# Patient Record
Sex: FEMALE | Race: BLACK OR AFRICAN AMERICAN | NOT HISPANIC OR LATINO | Employment: OTHER | URBAN - METROPOLITAN AREA
[De-identification: names, ages, dates, MRNs, and addresses within clinical notes are randomized per-mention and may not be internally consistent; named-entity substitution may affect disease eponyms.]

---

## 2018-11-12 ENCOUNTER — PATIENT OUTREACH (OUTPATIENT)
Dept: ADMINISTRATIVE | Facility: HOSPITAL | Age: 63
End: 2018-11-12

## 2018-11-15 LAB
LEFT EYE DM RETINOPATHY: NEGATIVE
RIGHT EYE DM RETINOPATHY: NEGATIVE

## 2018-12-03 ENCOUNTER — OFFICE VISIT (OUTPATIENT)
Dept: FAMILY MEDICINE | Facility: CLINIC | Age: 63
End: 2018-12-03
Payer: MEDICARE

## 2018-12-03 ENCOUNTER — LAB VISIT (OUTPATIENT)
Dept: LAB | Facility: HOSPITAL | Age: 63
End: 2018-12-03
Attending: INTERNAL MEDICINE
Payer: MEDICARE

## 2018-12-03 VITALS
RESPIRATION RATE: 18 BRPM | OXYGEN SATURATION: 99 % | BODY MASS INDEX: 35.81 KG/M2 | HEIGHT: 65 IN | SYSTOLIC BLOOD PRESSURE: 146 MMHG | DIASTOLIC BLOOD PRESSURE: 94 MMHG | HEART RATE: 56 BPM | WEIGHT: 214.94 LBS | TEMPERATURE: 98 F

## 2018-12-03 DIAGNOSIS — Z12.31 ENCOUNTER FOR SCREENING MAMMOGRAM FOR BREAST CANCER: ICD-10-CM

## 2018-12-03 DIAGNOSIS — Z11.59 NEED FOR HEPATITIS C SCREENING TEST: ICD-10-CM

## 2018-12-03 DIAGNOSIS — Z12.11 ENCOUNTER FOR SCREENING COLONOSCOPY: ICD-10-CM

## 2018-12-03 DIAGNOSIS — E11.69 HYPERLIPIDEMIA ASSOCIATED WITH TYPE 2 DIABETES MELLITUS: ICD-10-CM

## 2018-12-03 DIAGNOSIS — E11.65 TYPE 2 DIABETES MELLITUS WITH HYPERGLYCEMIA, WITHOUT LONG-TERM CURRENT USE OF INSULIN: ICD-10-CM

## 2018-12-03 DIAGNOSIS — E66.01 SEVERE OBESITY (BMI 35.0-35.9 WITH COMORBIDITY): ICD-10-CM

## 2018-12-03 DIAGNOSIS — M94.9 DISORDER OF CARTILAGE: ICD-10-CM

## 2018-12-03 DIAGNOSIS — E78.5 HYPERLIPIDEMIA ASSOCIATED WITH TYPE 2 DIABETES MELLITUS: ICD-10-CM

## 2018-12-03 DIAGNOSIS — I10 HYPERTENSION, ESSENTIAL, BENIGN: Primary | ICD-10-CM

## 2018-12-03 DIAGNOSIS — I10 HYPERTENSION, ESSENTIAL, BENIGN: ICD-10-CM

## 2018-12-03 DIAGNOSIS — I69.30 HISTORY OF STROKE WITH CURRENT RESIDUAL EFFECTS: ICD-10-CM

## 2018-12-03 LAB
ALBUMIN SERPL BCP-MCNC: 4.4 G/DL
ALP SERPL-CCNC: 77 U/L
ALT SERPL W/O P-5'-P-CCNC: 17 U/L
ANION GAP SERPL CALC-SCNC: 11 MMOL/L
AST SERPL-CCNC: 17 U/L
BASOPHILS # BLD AUTO: 0.04 K/UL
BASOPHILS NFR BLD: 0.7 %
BILIRUB SERPL-MCNC: 0.3 MG/DL
BUN SERPL-MCNC: 12 MG/DL
CALCIUM SERPL-MCNC: 9.9 MG/DL
CHLORIDE SERPL-SCNC: 101 MMOL/L
CHOLEST SERPL-MCNC: 121 MG/DL
CHOLEST/HDLC SERPL: 2.8 {RATIO}
CO2 SERPL-SCNC: 27 MMOL/L
CREAT SERPL-MCNC: 0.9 MG/DL
DIFFERENTIAL METHOD: ABNORMAL
EOSINOPHIL # BLD AUTO: 0.3 K/UL
EOSINOPHIL NFR BLD: 5.1 %
ERYTHROCYTE [DISTWIDTH] IN BLOOD BY AUTOMATED COUNT: 16.2 %
EST. GFR  (AFRICAN AMERICAN): >60 ML/MIN/1.73 M^2
EST. GFR  (NON AFRICAN AMERICAN): >60 ML/MIN/1.73 M^2
ESTIMATED AVG GLUCOSE: 148 MG/DL
GLUCOSE SERPL-MCNC: 147 MG/DL
HBA1C MFR BLD HPLC: 6.8 %
HCT VFR BLD AUTO: 36.7 %
HDLC SERPL-MCNC: 43 MG/DL
HDLC SERPL: 35.5 %
HGB BLD-MCNC: 11.2 G/DL
LDLC SERPL CALC-MCNC: 55.8 MG/DL
LYMPHOCYTES # BLD AUTO: 2.2 K/UL
LYMPHOCYTES NFR BLD: 37.5 %
MCH RBC QN AUTO: 24.2 PG
MCHC RBC AUTO-ENTMCNC: 30.5 G/DL
MCV RBC AUTO: 79 FL
MONOCYTES # BLD AUTO: 0.5 K/UL
MONOCYTES NFR BLD: 9.2 %
NEUTROPHILS # BLD AUTO: 2.8 K/UL
NEUTROPHILS NFR BLD: 47.5 %
NONHDLC SERPL-MCNC: 78 MG/DL
PLATELET # BLD AUTO: 610 K/UL
PMV BLD AUTO: 10.6 FL
POTASSIUM SERPL-SCNC: 3.5 MMOL/L
PROT SERPL-MCNC: 8.4 G/DL
RBC # BLD AUTO: 4.63 M/UL
SODIUM SERPL-SCNC: 139 MMOL/L
TRIGL SERPL-MCNC: 111 MG/DL
TSH SERPL DL<=0.005 MIU/L-ACNC: 1.85 UIU/ML
WBC # BLD AUTO: 5.9 K/UL

## 2018-12-03 PROCEDURE — 36415 COLL VENOUS BLD VENIPUNCTURE: CPT | Mod: PO

## 2018-12-03 PROCEDURE — 85025 COMPLETE CBC W/AUTO DIFF WBC: CPT

## 2018-12-03 PROCEDURE — 86803 HEPATITIS C AB TEST: CPT

## 2018-12-03 PROCEDURE — 99204 OFFICE O/P NEW MOD 45 MIN: CPT | Mod: S$PBB,,, | Performed by: INTERNAL MEDICINE

## 2018-12-03 PROCEDURE — 99214 OFFICE O/P EST MOD 30 MIN: CPT | Mod: PBBFAC,PO | Performed by: INTERNAL MEDICINE

## 2018-12-03 PROCEDURE — 80053 COMPREHEN METABOLIC PANEL: CPT

## 2018-12-03 PROCEDURE — 83036 HEMOGLOBIN GLYCOSYLATED A1C: CPT

## 2018-12-03 PROCEDURE — 82043 UR ALBUMIN QUANTITATIVE: CPT

## 2018-12-03 PROCEDURE — 99999 PR PBB SHADOW E&M-EST. PATIENT-LVL IV: CPT | Mod: PBBFAC,,, | Performed by: INTERNAL MEDICINE

## 2018-12-03 PROCEDURE — 84443 ASSAY THYROID STIM HORMONE: CPT

## 2018-12-03 PROCEDURE — 80061 LIPID PANEL: CPT

## 2018-12-03 RX ORDER — GLIPIZIDE 5 MG/1
TABLET ORAL
Qty: 90 TABLET | Refills: 1 | Status: SHIPPED | OUTPATIENT
Start: 2018-12-03 | End: 2019-02-26 | Stop reason: SDUPTHER

## 2018-12-03 RX ORDER — METFORMIN HYDROCHLORIDE 1000 MG/1
1000 TABLET ORAL 2 TIMES DAILY
Qty: 180 TABLET | Refills: 1 | Status: SHIPPED | OUTPATIENT
Start: 2018-12-03 | End: 2019-02-26 | Stop reason: SDUPTHER

## 2018-12-03 RX ORDER — ATENOLOL 25 MG/1
25 TABLET ORAL DAILY
Qty: 30 TABLET | Refills: 1 | Status: SHIPPED | OUTPATIENT
Start: 2018-12-03 | End: 2019-01-28 | Stop reason: SDUPTHER

## 2018-12-03 RX ORDER — UBIDECARENONE 75 MG
500 CAPSULE ORAL DAILY
COMMUNITY
End: 2023-08-11

## 2018-12-03 RX ORDER — VALSARTAN AND HYDROCHLOROTHIAZIDE 320; 25 MG/1; MG/1
TABLET, FILM COATED ORAL
Refills: 2 | COMMUNITY
Start: 2018-10-22 | End: 2018-12-03 | Stop reason: SDUPTHER

## 2018-12-03 RX ORDER — SIMVASTATIN 20 MG/1
20 TABLET, FILM COATED ORAL DAILY
Refills: 2 | COMMUNITY
Start: 2018-10-22 | End: 2018-12-03 | Stop reason: SDUPTHER

## 2018-12-03 RX ORDER — GLIPIZIDE 5 MG/1
TABLET ORAL
Refills: 2 | COMMUNITY
Start: 2018-10-22 | End: 2018-12-03 | Stop reason: SDUPTHER

## 2018-12-03 RX ORDER — PNEUMOCOCCAL 13-VALENT CONJUGATE VACCINE 2.2; 2.2; 2.2; 2.2; 2.2; 4.4; 2.2; 2.2; 2.2; 2.2; 2.2; 2.2; 2.2 UG/.5ML; UG/.5ML; UG/.5ML; UG/.5ML; UG/.5ML; UG/.5ML; UG/.5ML; UG/.5ML; UG/.5ML; UG/.5ML; UG/.5ML; UG/.5ML; UG/.5ML
INJECTION, SUSPENSION INTRAMUSCULAR
Refills: 0 | COMMUNITY
Start: 2018-10-15 | End: 2020-07-14 | Stop reason: ALTCHOICE

## 2018-12-03 RX ORDER — ASPIRIN 81 MG/1
81 TABLET ORAL DAILY
COMMUNITY

## 2018-12-03 RX ORDER — AMLODIPINE BESYLATE 10 MG/1
10 TABLET ORAL DAILY
Refills: 2 | COMMUNITY
Start: 2018-10-22 | End: 2018-12-03 | Stop reason: SDUPTHER

## 2018-12-03 RX ORDER — SIMVASTATIN 20 MG/1
20 TABLET, FILM COATED ORAL DAILY
Qty: 90 TABLET | Refills: 1 | Status: SHIPPED | OUTPATIENT
Start: 2018-12-03 | End: 2019-02-26 | Stop reason: SDUPTHER

## 2018-12-03 RX ORDER — AMLODIPINE BESYLATE 10 MG/1
10 TABLET ORAL DAILY
Qty: 90 TABLET | Refills: 1 | Status: SHIPPED | OUTPATIENT
Start: 2018-12-03 | End: 2019-02-26 | Stop reason: SDUPTHER

## 2018-12-03 RX ORDER — METFORMIN HYDROCHLORIDE 1000 MG/1
1000 TABLET ORAL 2 TIMES DAILY
Refills: 2 | COMMUNITY
Start: 2018-10-22 | End: 2018-12-03 | Stop reason: SDUPTHER

## 2018-12-03 RX ORDER — VALSARTAN AND HYDROCHLOROTHIAZIDE 320; 25 MG/1; MG/1
TABLET, FILM COATED ORAL
Qty: 90 TABLET | Refills: 1 | Status: SHIPPED | OUTPATIENT
Start: 2018-12-03 | End: 2019-02-26 | Stop reason: SDUPTHER

## 2018-12-03 NOTE — PROGRESS NOTES
SUBJECTIVE     Chief Complaint   Patient presents with    Establish Care    Medication Refill       HPI  Harriett Oglesby is a 63 y.o. female with multiple medical diagnoses as listed in the medical history and problem list that presents for establishment of care with [facility] as below. Pt presents for med refills. She is fully compliant with meds and denies any adverse side effects. Her fasting blood sugar levels range from . She does not check her BP at home. Pt is mostly compliant with a low Na and ADA diet. She walks for exercise 3 times weekly in her neighborhood.     PAST MEDICAL HISTORY:  Past Medical History:   Diagnosis Date    Hyperlipidemia associated with type 2 diabetes mellitus     Hypertension     Type 2 diabetes mellitus        PAST SURGICAL HISTORY:  Past Surgical History:   Procedure Laterality Date    HEMORRHOID SURGERY         SOCIAL HISTORY:  Social History     Socioeconomic History    Marital status: Single     Spouse name: Not on file    Number of children: Not on file    Years of education: Not on file    Highest education level: Not on file   Social Needs    Financial resource strain: Not on file    Food insecurity - worry: Not on file    Food insecurity - inability: Not on file    Transportation needs - medical: Not on file    Transportation needs - non-medical: Not on file   Occupational History    Not on file   Tobacco Use    Smoking status: Former Smoker     Types: Cigarettes    Smokeless tobacco: Never Used   Substance and Sexual Activity    Alcohol use: No     Frequency: Never    Drug use: No    Sexual activity: Not on file   Other Topics Concern    Not on file   Social History Narrative    Not on file       FAMILY HISTORY:  Family History   Problem Relation Age of Onset    Pancreatic cancer Mother     Emphysema Father     Cancer Father        ALLERGIES AND MEDICATIONS: updated and reviewed.  Review of patient's allergies indicates:  No Known  Allergies  Current Outpatient Medications   Medication Sig Dispense Refill    amLODIPine (NORVASC) 10 MG tablet Take 1 tablet (10 mg total) by mouth once daily. 90 tablet 1    aspirin (ECOTRIN) 81 MG EC tablet Take 81 mg by mouth once daily.      atenolol (TENORMIN) 25 MG tablet Take 1 tablet (25 mg total) by mouth once daily. 30 tablet 1    cyanocobalamin (VITAMIN B-12) 500 MCG tablet Take 500 mcg by mouth once daily.      glipiZIDE (GLUCOTROL) 5 MG tablet TAKE ONE TABLET BY MOUTH ONCE DAILY FOR DIABETES. 90 tablet 1    metFORMIN (GLUCOPHAGE) 1000 MG tablet Take 1 tablet (1,000 mg total) by mouth 2 (two) times daily. for diabetes. 180 tablet 1    simvastatin (ZOCOR) 20 MG tablet Take 1 tablet (20 mg total) by mouth once daily. 90 tablet 1    valsartan-hydrochlorothiazide (DIOVAN-HCT) 320-25 mg per tablet TAKE ONE TABLET BY MOUTH ONCE DAILY FOR BLOOD PRESSURE AND FOR FLUID. 90 tablet 1    blood sugar diagnostic Strp 1 strip by Misc.(Non-Drug; Combo Route) route once daily. 200 each 3    FLUZONE HIGH-DOSE 2018-19, PF, 180 mcg/0.5 mL vaccine ADM 0.5ML IM UTD  0    PREVNAR 13, PF, 0.5 mL Syrg ADM 0.5ML IM UTD  0     No current facility-administered medications for this visit.        ROS  Review of Systems   Constitutional: Negative for chills and fever.   HENT: Negative for hearing loss and sore throat.    Eyes: Negative for visual disturbance.   Respiratory: Negative for cough and shortness of breath.    Cardiovascular: Negative for chest pain, palpitations and leg swelling.   Gastrointestinal: Negative for abdominal pain, constipation, diarrhea, nausea and vomiting.   Genitourinary: Negative for dysuria, frequency and urgency.   Musculoskeletal: Negative for arthralgias, joint swelling and myalgias.   Skin: Negative for rash and wound.   Neurological: Negative for headaches.   Psychiatric/Behavioral: Negative for agitation and confusion. The patient is not nervous/anxious.          OBJECTIVE     Physical  "Exam  Vitals:    12/03/18 0812   BP: (!) 146/94   Pulse: (!) 56   Resp: 18   Temp: 97.8 °F (36.6 °C)    Body mass index is 35.77 kg/m².  Weight: 97.5 kg (214 lb 15.2 oz)   Height: 5' 5" (165.1 cm)     Physical Exam   Constitutional: She is oriented to person, place, and time. She appears well-developed and well-nourished. No distress.   HENT:   Head: Normocephalic and atraumatic.   Right Ear: External ear normal.   Left Ear: External ear normal.   Nose: Nose normal.   Mouth/Throat: Oropharynx is clear and moist.   Eyes: Conjunctivae and EOM are normal. Right eye exhibits no discharge. Left eye exhibits no discharge. No scleral icterus.   Neck: Normal range of motion. Neck supple. No JVD present. No tracheal deviation present.   Cardiovascular: Normal rate, regular rhythm and intact distal pulses. Exam reveals no gallop and no friction rub.   No murmur heard.  Pulmonary/Chest: Effort normal and breath sounds normal. No respiratory distress. She has no wheezes.   Abdominal: Soft. Bowel sounds are normal. She exhibits no distension and no mass. There is no tenderness. There is no rebound and no guarding.   Musculoskeletal: Normal range of motion. She exhibits no edema, tenderness or deformity.   LUE weakness   Neurological: She is alert and oriented to person, place, and time. She exhibits normal muscle tone. Coordination normal.   Skin: Skin is warm and dry. No rash noted. No erythema.   Psychiatric: She has a normal mood and affect. Her behavior is normal. Judgment and thought content normal.         Health Maintenance       Date Due Completion Date    Hepatitis C Screening 1955 ---    Lipid Panel 1955 ---    TETANUS VACCINE 02/02/1971 ---    DEXA SCAN 02/02/1993 ---    Colonoscopy 02/02/2003 ---    Zoster Vaccine 02/02/2013 ---    Mammogram 10/02/2016 10/2/2014    Pneumococcal Vaccine (65+ Low/Medium Risk) (1 of 2 - PCV13) 02/02/2018 ---    Influenza Vaccine 08/01/2018 ---            ASSESSMENT     63 " y.o. female with     1. Hypertension, essential, benign    2. Type 2 diabetes mellitus with hyperglycemia, without long-term current use of insulin    3. Hyperlipidemia associated with type 2 diabetes mellitus    4. Severe obesity (BMI 35.0-35.9 with comorbidity)    5. History of stroke with current residual effects    6. Need for hepatitis C screening test    7. Encounter for screening mammogram for breast cancer    8. Encounter for screening colonoscopy    9. Disorder of cartilage         PLAN:     1. Hypertension, essential, benign  - BP elevated above goal of <140/90  - Will add Atenolol to current regimen  - Comprehensive metabolic panel; Future  - CBC auto differential; Future  - TSH; Future  - atenolol (TENORMIN) 25 MG tablet; Take 1 tablet (25 mg total) by mouth once daily.  Dispense: 30 tablet; Refill: 1  - valsartan-hydrochlorothiazide (DIOVAN-HCT) 320-25 mg per tablet; TAKE ONE TABLET BY MOUTH ONCE DAILY FOR BLOOD PRESSURE AND FOR FLUID.  Dispense: 90 tablet; Refill: 1  - amLODIPine (NORVASC) 10 MG tablet; Take 1 tablet (10 mg total) by mouth once daily.  Dispense: 90 tablet; Refill: 1  - Monitor    2. Type 2 diabetes mellitus with hyperglycemia, without long-term current use of insulin  - Check labs and urine as below; continue current regimen  - Comprehensive metabolic panel; Future  - CBC auto differential; Future  - Hemoglobin A1c; Future  - Microalbumin/creatinine urine ratio  - DXA Bone Density Spine And Hip; Future  - glipiZIDE (GLUCOTROL) 5 MG tablet; TAKE ONE TABLET BY MOUTH ONCE DAILY FOR DIABETES.  Dispense: 90 tablet; Refill: 1  - metFORMIN (GLUCOPHAGE) 1000 MG tablet; Take 1 tablet (1,000 mg total) by mouth 2 (two) times daily. for diabetes.  Dispense: 180 tablet; Refill: 1  - blood sugar diagnostic Strp; 1 strip by Misc.(Non-Drug; Combo Route) route once daily.  Dispense: 200 each; Refill: 3    3. Hyperlipidemia associated with type 2 diabetes mellitus  - Lipid check today; continue current  meds  - Lipid panel; Future  - simvastatin (ZOCOR) 20 MG tablet; Take 1 tablet (20 mg total) by mouth once daily.  Dispense: 90 tablet; Refill: 1    4. Severe obesity (BMI 35.0-35.9 with comorbidity)  - Pt aware of importance of eating a prudent diet and exercising    5. History of stroke with current residual effects  - Pt needs to obtain good BP control, statin, and ASA    6. Need for hepatitis C screening test  - Hepatitis C antibody; Future    7. Encounter for screening mammogram for breast cancer  - Mammo Digital Screening Bilat w/ Suman; Future    8. Encounter for screening colonoscopy  - Case request GI: COLONOSCOPY    9. Disorder of cartilage   - DXA Bone Density Spine And Hip; Future        RTC in 4 weeks for nurse visit BP check     Zeinab Noble MD  12/03/2018 8:29 AM        No Follow-up on file.

## 2018-12-04 PROBLEM — E11.21 MACROALBUMINURIC DIABETIC NEPHROPATHY: Status: ACTIVE | Noted: 2018-12-04

## 2018-12-04 LAB
ALBUMIN/CREAT UR: 48.5 UG/MG
CREAT UR-MCNC: 130 MG/DL
HCV AB SERPL QL IA: NEGATIVE
MICROALBUMIN UR DL<=1MG/L-MCNC: 63 UG/ML

## 2018-12-05 PROBLEM — D75.839 THROMBOCYTOSIS: Status: ACTIVE | Noted: 2018-12-05

## 2018-12-05 PROBLEM — D50.9 MICROCYTIC ANEMIA: Status: ACTIVE | Noted: 2018-12-05

## 2018-12-28 DIAGNOSIS — I10 HYPERTENSION, ESSENTIAL, BENIGN: ICD-10-CM

## 2018-12-28 RX ORDER — ATENOLOL 25 MG/1
TABLET ORAL
Qty: 30 TABLET | Refills: 1 | OUTPATIENT
Start: 2018-12-28

## 2019-01-03 ENCOUNTER — CLINICAL SUPPORT (OUTPATIENT)
Dept: FAMILY MEDICINE | Facility: CLINIC | Age: 64
End: 2019-01-03
Payer: MEDICARE

## 2019-01-03 VITALS — DIASTOLIC BLOOD PRESSURE: 78 MMHG | SYSTOLIC BLOOD PRESSURE: 138 MMHG | HEART RATE: 73 BPM

## 2019-01-03 DIAGNOSIS — I10 HYPERTENSION, ESSENTIAL, BENIGN: Primary | ICD-10-CM

## 2019-01-03 PROCEDURE — 99499 UNLISTED E&M SERVICE: CPT | Mod: S$PBB,,, | Performed by: INTERNAL MEDICINE

## 2019-01-03 PROCEDURE — 99211 OFF/OP EST MAY X REQ PHY/QHP: CPT | Mod: PBBFAC,PO

## 2019-01-03 PROCEDURE — 99499 NO LOS: ICD-10-PCS | Mod: S$PBB,,, | Performed by: INTERNAL MEDICINE

## 2019-01-03 PROCEDURE — 99999 PR PBB SHADOW E&M-EST. PATIENT-LVL I: CPT | Mod: PBBFAC,,,

## 2019-01-03 PROCEDURE — 99999 PR PBB SHADOW E&M-EST. PATIENT-LVL I: ICD-10-PCS | Mod: PBBFAC,,,

## 2019-01-04 ENCOUNTER — HOSPITAL ENCOUNTER (OUTPATIENT)
Facility: HOSPITAL | Age: 64
Discharge: HOME | End: 2019-01-04
Attending: INTERNAL MEDICINE | Admitting: INTERNAL MEDICINE
Payer: MEDICARE

## 2019-01-04 ENCOUNTER — ANESTHESIA EVENT (OUTPATIENT)
Dept: ENDOSCOPY | Facility: HOSPITAL | Age: 64
End: 2019-01-04
Payer: MEDICARE

## 2019-01-04 ENCOUNTER — ANESTHESIA (OUTPATIENT)
Dept: ENDOSCOPY | Facility: HOSPITAL | Age: 64
End: 2019-01-04
Payer: MEDICARE

## 2019-01-04 VITALS
OXYGEN SATURATION: 100 % | TEMPERATURE: 98 F | HEART RATE: 81 BPM | HEIGHT: 67 IN | RESPIRATION RATE: 18 BRPM | WEIGHT: 212 LBS | DIASTOLIC BLOOD PRESSURE: 61 MMHG | BODY MASS INDEX: 33.27 KG/M2 | SYSTOLIC BLOOD PRESSURE: 137 MMHG

## 2019-01-04 DIAGNOSIS — Z12.11 SCREEN FOR COLON CANCER: ICD-10-CM

## 2019-01-04 LAB — POCT GLUCOSE: 161 MG/DL (ref 70–110)

## 2019-01-04 PROCEDURE — 37000008 HC ANESTHESIA 1ST 15 MINUTES: Performed by: INTERNAL MEDICINE

## 2019-01-04 PROCEDURE — D9220A PRA ANESTHESIA: Mod: PT,CRNA,, | Performed by: NURSE ANESTHETIST, CERTIFIED REGISTERED

## 2019-01-04 PROCEDURE — 25000003 PHARM REV CODE 250: Performed by: ANESTHESIOLOGY

## 2019-01-04 PROCEDURE — 88305 TISSUE EXAM BY PATHOLOGIST: CPT | Performed by: PATHOLOGY

## 2019-01-04 PROCEDURE — 27201012 HC FORCEPS, HOT/COLD, DISP: Performed by: INTERNAL MEDICINE

## 2019-01-04 PROCEDURE — D9220A PRA ANESTHESIA: ICD-10-PCS | Mod: PT,CRNA,, | Performed by: NURSE ANESTHETIST, CERTIFIED REGISTERED

## 2019-01-04 PROCEDURE — D9220A PRA ANESTHESIA: ICD-10-PCS | Mod: PT,ANES,, | Performed by: ANESTHESIOLOGY

## 2019-01-04 PROCEDURE — 82962 GLUCOSE BLOOD TEST: CPT | Performed by: INTERNAL MEDICINE

## 2019-01-04 PROCEDURE — 37000009 HC ANESTHESIA EA ADD 15 MINS: Performed by: INTERNAL MEDICINE

## 2019-01-04 PROCEDURE — 88305 TISSUE EXAM BY PATHOLOGIST: CPT | Mod: 26,,, | Performed by: PATHOLOGY

## 2019-01-04 PROCEDURE — 45380 COLONOSCOPY AND BIOPSY: CPT | Performed by: INTERNAL MEDICINE

## 2019-01-04 PROCEDURE — 88305 TISSUE SPECIMEN TO PATHOLOGY - SURGERY: ICD-10-PCS | Mod: 26,,, | Performed by: PATHOLOGY

## 2019-01-04 PROCEDURE — D9220A PRA ANESTHESIA: Mod: PT,ANES,, | Performed by: ANESTHESIOLOGY

## 2019-01-04 PROCEDURE — 63600175 PHARM REV CODE 636 W HCPCS: Performed by: NURSE ANESTHETIST, CERTIFIED REGISTERED

## 2019-01-04 RX ORDER — PROPOFOL 10 MG/ML
VIAL (ML) INTRAVENOUS
Status: DISCONTINUED | OUTPATIENT
Start: 2019-01-04 | End: 2019-01-04

## 2019-01-04 RX ORDER — PROPOFOL 10 MG/ML
VIAL (ML) INTRAVENOUS
Status: DISCONTINUED
Start: 2019-01-04 | End: 2019-01-04 | Stop reason: HOSPADM

## 2019-01-04 RX ORDER — SODIUM CHLORIDE 0.9 % (FLUSH) 0.9 %
3 SYRINGE (ML) INJECTION
Status: DISCONTINUED | OUTPATIENT
Start: 2019-01-04 | End: 2019-01-04 | Stop reason: HOSPADM

## 2019-01-04 RX ORDER — SODIUM CHLORIDE 9 MG/ML
INJECTION, SOLUTION INTRAVENOUS CONTINUOUS
Status: DISCONTINUED | OUTPATIENT
Start: 2019-01-04 | End: 2019-01-04 | Stop reason: HOSPADM

## 2019-01-04 RX ORDER — LIDOCAINE HCL/PF 100 MG/5ML
SYRINGE (ML) INTRAVENOUS
Status: DISCONTINUED | OUTPATIENT
Start: 2019-01-04 | End: 2019-01-04

## 2019-01-04 RX ORDER — LIDOCAINE HYDROCHLORIDE 20 MG/ML
INJECTION, SOLUTION EPIDURAL; INFILTRATION; INTRACAUDAL; PERINEURAL
Status: DISCONTINUED
Start: 2019-01-04 | End: 2019-01-04 | Stop reason: HOSPADM

## 2019-01-04 RX ADMIN — PROPOFOL 20 MG: 10 INJECTION, EMULSION INTRAVENOUS at 10:01

## 2019-01-04 RX ADMIN — PROPOFOL 50 MG: 10 INJECTION, EMULSION INTRAVENOUS at 10:01

## 2019-01-04 RX ADMIN — PROPOFOL 150 MG: 10 INJECTION, EMULSION INTRAVENOUS at 10:01

## 2019-01-04 RX ADMIN — PROPOFOL 30 MG: 10 INJECTION, EMULSION INTRAVENOUS at 10:01

## 2019-01-04 RX ADMIN — SODIUM CHLORIDE: 0.9 INJECTION, SOLUTION INTRAVENOUS at 09:01

## 2019-01-04 RX ADMIN — LIDOCAINE HYDROCHLORIDE 100 MG: 20 INJECTION, SOLUTION INTRAVENOUS at 10:01

## 2019-01-04 NOTE — H&P
"Chief Complaint:  "I need a colonoscopy."    HPI:  The patient is a 63 year old woman presenting for a screening colonoscopy.  She believes she had a colonoscopy years ago, but she does not know if she had polyps.  The patient denies any abdominal pain, weight loss, nausea, emesis, diarrhea, constipation, melena, or hematochezia.  The patient also denies a family history of colon cancer.    Past Medical History:   Diagnosis Date    Hyperlipidemia associated with type 2 diabetes mellitus     Hypertension     Type 2 diabetes mellitus      Past Surgical History:   Procedure Laterality Date    HEMORRHOID SURGERY       Family History   Problem Relation Age of Onset    Pancreatic cancer Mother     Emphysema Father     Cancer Father      Social History     Socioeconomic History    Marital status: Single     Spouse name: Not on file    Number of children: Not on file    Years of education: Not on file    Highest education level: Not on file   Social Needs    Financial resource strain: Not on file    Food insecurity - worry: Not on file    Food insecurity - inability: Not on file    Transportation needs - medical: Not on file    Transportation needs - non-medical: Not on file   Occupational History    Not on file   Tobacco Use    Smoking status: Former Smoker     Types: Cigarettes    Smokeless tobacco: Never Used   Substance and Sexual Activity    Alcohol use: No     Frequency: Never    Drug use: No    Sexual activity: Not on file   Other Topics Concern    Not on file   Social History Narrative    Not on file           Review of patient's allergies indicates:  No Known Allergies    ROS:  No chest pain or dyspnea.  No dysuria.  No heartburn or dysphagia.  Otherwise as stated above.  Ten other systems negative.    Vitals:    01/04/19 0921   BP: 114/72   BP Location: Left arm   Patient Position: Lying   Pulse: 94   Resp: 18   Temp: 98 °F (36.7 °C)   TempSrc: Oral   SpO2: 97%   Weight: 96.2 kg (212 lb) " "  Height: 5' 7" (1.702 m)     P.E.:  GEN: A x O x 3, NAD  SKIN: No jaundice  HEENT: EOMI, PERRL, anicteric sclera  CV: RRR, no M/R/G  Chest: CTA B  Abdomen: soft, NTND, normoactive BS  Ext: No C/C/E.  2+ dorsalis pedis pulses B  Neuro: No asterixes or tremors.  CN II-XII intact  Musculoskeletal: 5/5 strength bilaterally    Labs:  Lab Results   Component Value Date    WBC 5.90 12/03/2018    HGB 11.2 (L) 12/03/2018    HCT 36.7 (L) 12/03/2018    MCV 79 (L) 12/03/2018     (H) 12/03/2018     CMP  Sodium   Date Value Ref Range Status   12/03/2018 139 136 - 145 mmol/L Final     Potassium   Date Value Ref Range Status   12/03/2018 3.5 3.5 - 5.1 mmol/L Final     Chloride   Date Value Ref Range Status   12/03/2018 101 95 - 110 mmol/L Final     CO2   Date Value Ref Range Status   12/03/2018 27 23 - 29 mmol/L Final     Glucose   Date Value Ref Range Status   12/03/2018 147 (H) 70 - 110 mg/dL Final     BUN, Bld   Date Value Ref Range Status   12/03/2018 12 8 - 23 mg/dL Final     Creatinine   Date Value Ref Range Status   12/03/2018 0.9 0.5 - 1.4 mg/dL Final     Calcium   Date Value Ref Range Status   12/03/2018 9.9 8.7 - 10.5 mg/dL Final     Total Protein   Date Value Ref Range Status   12/03/2018 8.4 6.0 - 8.4 g/dL Final     Albumin   Date Value Ref Range Status   12/03/2018 4.4 3.5 - 5.2 g/dL Final     Total Bilirubin   Date Value Ref Range Status   12/03/2018 0.3 0.1 - 1.0 mg/dL Final     Comment:     For infants and newborns, interpretation of results should be based  on gestational age, weight and in agreement with clinical  observations.  Premature Infant recommended reference ranges:  Up to 24 hours.............<8.0 mg/dL  Up to 48 hours............<12.0 mg/dL  3-5 days..................<15.0 mg/dL  6-29 days.................<15.0 mg/dL       Alkaline Phosphatase   Date Value Ref Range Status   12/03/2018 77 55 - 135 U/L Final     AST   Date Value Ref Range Status   12/03/2018 17 10 - 40 U/L Final     ALT   Date " Value Ref Range Status   12/03/2018 17 10 - 44 U/L Final     Anion Gap   Date Value Ref Range Status   12/03/2018 11 8 - 16 mmol/L Final     eGFR if    Date Value Ref Range Status   12/03/2018 >60 >60 mL/min/1.73 m^2 Final     eGFR if non    Date Value Ref Range Status   12/03/2018 >60 >60 mL/min/1.73 m^2 Final     Comment:     Calculation used to obtain the estimated glomerular filtration  rate (eGFR) is the CKD-EPI equation.          No results for input(s): PT, INR, APTT in the last 24 hours.    A/P:  The patient is a 63 year old woman presenting for a colonoscopy.  1.  Colonoscopy - she can undergo a colonoscopy.  I have explained the risks, benefits, and alternatives of the procedure in detail.  The patient voices understanding and all questions have been answered.  The patient agrees to proceed as planned.

## 2019-01-04 NOTE — ANESTHESIA PREPROCEDURE EVALUATION
01/04/2019  Harriett Oglesby is a 63 y.o., female.    Anesthesia Evaluation         Review of Systems  Anesthesia Hx:  No problems with previous Anesthesia   Social:  Non-Smoker    Hematology/Oncology:  Hematology Normal   Oncology Normal     EENT/Dental:EENT/Dental Normal   Cardiovascular:   Hypertension    Pulmonary:  Pulmonary Normal    Renal/:   Chronic Renal Disease    Hepatic/GI:  Hepatic/GI Normal    Musculoskeletal:  Musculoskeletal Normal    Neurological:   CVA (left upper ext paresis)    Endocrine:   Diabetes    Dermatological:  Skin Normal    Psych:  Psychiatric Normal           Physical Exam  General:  Well nourished    Airway/Jaw/Neck:  Airway Findings: Mallampati: II TM Distance: 4 - 6 cm      Dental:  DENTAL FINDINGS: Normal   Chest/Lungs:  Chest/Lungs Clear    Heart/Vascular:  Heart Findings: Normal       Mental Status:  Mental Status Findings:  Cooperative, Alert and Oriented         Anesthesia Plan  Type of Anesthesia, risks & benefits discussed:  Anesthesia Type:  general  Patient's Preference:   Intra-op Monitoring Plan: standard ASA monitors  Intra-op Monitoring Plan Comments:   Post Op Pain Control Plan: multimodal analgesia, IV/PO Opioids PRN and per primary service following discharge from PACU  Post Op Pain Control Plan Comments:   Induction:    Beta Blocker:  Patient is on a Beta-Blocker and has received one dose within the past 24 hours (No further documentation required).       Informed Consent: Patient understands risks and agrees with Anesthesia plan.  Questions answered. Anesthesia consent signed with patient.  ASA Score: 3     Day of Surgery Review of History & Physical:    H&P update referred to the provider.  H&P completed by Anesthesiologist.   Anesthesia Plan Notes: npo        Ready For Surgery From Anesthesia Perspective.

## 2019-01-04 NOTE — TRANSFER OF CARE
"Anesthesia Transfer of Care Note    Patient: Harriett Oglesby    Procedure(s) Performed: Procedure(s) (LRB):  COLONOSCOPY (N/A)    Patient location: GI    Anesthesia Type: general    Transport from OR: Transported from OR on room air with adequate spontaneous ventilation    Post pain: adequate analgesia    Post assessment: no apparent anesthetic complications and tolerated procedure well    Post vital signs: stable    Level of consciousness: awake and responds to stimulation    Nausea/Vomiting: no nausea/vomiting    Complications: none    Transfer of care protocol was followed      Last vitals:   Visit Vitals  BP (!) 100/53 (BP Location: Left arm, Patient Position: Lying)   Pulse 93   Temp 36.4 °C (97.5 °F) (Oral)   Resp 14   Ht 5' 7" (1.702 m)   Wt 96.2 kg (212 lb)   SpO2 99%   Breastfeeding? No   BMI 33.20 kg/m²     "

## 2019-01-04 NOTE — ANESTHESIA POSTPROCEDURE EVALUATION
"Anesthesia Post Evaluation    Patient: Harriett Oglesby    Procedure(s) Performed: Procedure(s) (LRB):  COLONOSCOPY (N/A)    Final Anesthesia Type: general  Patient location during evaluation: PACU  Patient participation: Yes- Able to Participate  Level of consciousness: awake and alert, oriented and awake  Post-procedure vital signs: reviewed and stable  Pain management: adequate  Airway patency: patent  PONV status at discharge: No PONV  Anesthetic complications: no      Cardiovascular status: blood pressure returned to baseline, hemodynamically stable and stable  Respiratory status: unassisted and spontaneous ventilation  Hydration status: euvolemic  Follow-up not needed.        Visit Vitals  /61 (BP Location: Left arm, Patient Position: Lying)   Pulse 81   Temp 36.7 °C (98 °F) (Oral)   Resp 18   Ht 5' 7" (1.702 m)   Wt 96.2 kg (212 lb)   SpO2 100%   Breastfeeding? No   BMI 33.20 kg/m²       Pain/Lori Score: Lori Score: 10 (1/4/2019 10:49 AM)        "

## 2019-01-04 NOTE — DISCHARGE SUMMARY
[facility]  Discharge Summary      Admit Date: 1/4/2019    Discharge Date and Time:  01/04/2019 10:30 AM    Attending Physician: James Ozuna MD     Reason for Admission: Screening colonoscopy    Procedures Performed: Procedure(s) (LRB):  COLONOSCOPY (N/A)    Hospital Course (synopsis of major diagnoses, care, treatment, and services provided during the course of the hospital stay): Outpatient colonoscopy     Consults: none    Significant Diagnostic Studies: Screening colonoscopy    Final Diagnoses:    Principal Problem: <principal problem not specified>   Secondary Diagnoses: Screening colonoscopy    Discharged Condition: good    Disposition: Home or Self Care    Follow Up/Patient Instructions: Follow-up with referring physician             Resume previous diet and activity.    Medications:  Reconciled Home Medications:      Medication List      ASK your doctor about these medications    amLODIPine 10 MG tablet  Commonly known as:  NORVASC  Take 1 tablet (10 mg total) by mouth once daily.     aspirin 81 MG EC tablet  Commonly known as:  ECOTRIN  Take 81 mg by mouth once daily.     atenolol 25 MG tablet  Commonly known as:  TENORMIN  Take 1 tablet (25 mg total) by mouth once daily.     blood sugar diagnostic Strp  1 strip by Misc.(Non-Drug; Combo Route) route once daily.     FLUZONE HIGH-DOSE 2018-19 (PF) 180 mcg/0.5 mL vaccine  Generic drug:  influenza  ADM 0.5ML IM UTD     glipiZIDE 5 MG tablet  Commonly known as:  GLUCOTROL  TAKE ONE TABLET BY MOUTH ONCE DAILY FOR DIABETES.     metFORMIN 1000 MG tablet  Commonly known as:  GLUCOPHAGE  Take 1 tablet (1,000 mg total) by mouth 2 (two) times daily. for diabetes.     polyethylene glycol 240-22.72-6.72 -5.84 gram Solr  Commonly known as:  COLYTE  One gallon; split prep.     PREVNAR 13 (PF) 0.5 mL Syrg  Generic drug:  pneumoc 13-sheila conj-dip cr(PF)  ADM 0.5ML IM UTD     simvastatin 20 MG tablet  Commonly known as:  ZOCOR  Take 1 tablet (20 mg  total) by mouth once daily.     valsartan-hydrochlorothiazide 320-25 mg per tablet  Commonly known as:  DIOVAN-HCT  TAKE ONE TABLET BY MOUTH ONCE DAILY FOR BLOOD PRESSURE AND FOR FLUID.     VITAMIN B-12 500 MCG tablet  Generic drug:  cyanocobalamin  Take 500 mcg by mouth once daily.          No discharge procedures on file.

## 2019-01-04 NOTE — [employer] PATIENT INSTRUCTIONS
Discharge Summary/Instructions after an Endoscopic Procedure  Patient Name: Harriett Oglesby  Patient MRN: [id number]  Patient YOB: 1955 Friday, January 04, 2019  James Ozuna MD  RESTRICTIONS:  During your procedure today, you received medications for sedation.  These   medications may affect your judgment, balance and coordination.  Therefore,   for 24 hours, you have the following restrictions:   - DO NOT drive a car, operate machinery, make legal/financial decisions,   sign important papers or drink alcohol.    ACTIVITY:  Today: no heavy lifting, straining or running due to procedural   sedation/anesthesia.  The following day: return to full activity including work.  DIET:  Eat and drink normally unless instructed otherwise.     TREATMENT FOR COMMON SIDE EFFECTS:  - Mild abdominal pain, nausea, belching, bloating or excessive gas:  rest,   eat lightly and use a heating pad.  - Sore Throat: treat with throat lozenges and/or gargle with warm salt   water.  - Because air was used during the procedure, expelling large amounts of air   from your rectum or belching is normal.  - If a bowel prep was taken, you may not have a bowel movement for 1-3 days.    This is normal.  SYMPTOMS TO WATCH FOR AND REPORT TO YOUR PHYSICIAN:  1. Abdominal pain or bloating, other than gas cramps.  2. Chest pain.  3. Back pain.  4. Signs of infection such as: chills or fever occurring within 24 hours   after the procedure.  5. Rectal bleeding, which would show as bright red, maroon, or black stools.   (A tablespoon of blood from the rectum is not serious, especially if   hemorrhoids are present.)  6. Vomiting.  7. Weakness or dizziness.  GO DIRECTLY TO THE NEAREST EMERGENCY ROOM IF YOU HAVE ANY OF THE FOLLOWING:      Difficulty breathing              Chills and/or fever over 101 F   Persistent vomiting and/or vomiting blood   Severe abdominal pain   Severe chest pain   Black, tarry stools   Bleeding- more than one  tablespoon   Any other symptom or condition that you feel may need urgent attention  Your doctor recommends these additional instructions:  If any biopsies were taken, your doctors clinic will contact you in 1 to 2   weeks with any results.  - Await pathology results.   - Repeat colonoscopy in 10 years for screening purposes.   - Return to referring physician as previously scheduled.   - Discharge patient to home (via wheelchair).  For questions, problems or results please call your physician - James Ozuna MD at Work:  [phone].  [facility] can be reached at [phone]     IF A COMPLICATION OR EMERGENCY SITUATION ARISES AND YOU ARE UNABLE TO REACH   YOUR PHYSICIAN - GO DIRECTLY TO THE EMERGENCY ROOM.  James Ozuna MD  1/4/2019 10:33:30 AM  This report has been verified and signed electronically.  [employer]

## 2019-01-28 ENCOUNTER — HOSPITAL ENCOUNTER (OUTPATIENT)
Dept: RADIOLOGY | Facility: HOSPITAL | Age: 64
Discharge: HOME | End: 2019-01-28
Attending: INTERNAL MEDICINE
Payer: MEDICARE

## 2019-01-28 DIAGNOSIS — Z12.31 ENCOUNTER FOR SCREENING MAMMOGRAM FOR BREAST CANCER: ICD-10-CM

## 2019-01-28 DIAGNOSIS — I10 HYPERTENSION, ESSENTIAL, BENIGN: ICD-10-CM

## 2019-01-28 DIAGNOSIS — M94.9 DISORDER OF CARTILAGE: ICD-10-CM

## 2019-01-28 DIAGNOSIS — E11.65 TYPE 2 DIABETES MELLITUS WITH HYPERGLYCEMIA, WITHOUT LONG-TERM CURRENT USE OF INSULIN: ICD-10-CM

## 2019-01-28 DIAGNOSIS — M85.80 OSTEOPENIA, UNSPECIFIED LOCATION: Primary | ICD-10-CM

## 2019-01-28 PROCEDURE — 77063 BREAST TOMOSYNTHESIS BI: CPT | Mod: 26,,, | Performed by: RADIOLOGY

## 2019-01-28 PROCEDURE — 77080 DEXA BONE DENSITY SPINE HIP: ICD-10-PCS | Mod: 26,,, | Performed by: RADIOLOGY

## 2019-01-28 PROCEDURE — 77067 SCR MAMMO BI INCL CAD: CPT | Mod: 26,,, | Performed by: RADIOLOGY

## 2019-01-28 PROCEDURE — 77067 MAMMO DIGITAL SCREENING BILAT WITH TOMOSYNTHESIS_CAD: ICD-10-PCS | Mod: 26,,, | Performed by: RADIOLOGY

## 2019-01-28 PROCEDURE — 77080 DXA BONE DENSITY AXIAL: CPT | Mod: 26,,, | Performed by: RADIOLOGY

## 2019-01-28 PROCEDURE — 77063 MAMMO DIGITAL SCREENING BILAT WITH TOMOSYNTHESIS_CAD: ICD-10-PCS | Mod: 26,,, | Performed by: RADIOLOGY

## 2019-01-28 PROCEDURE — 77080 DXA BONE DENSITY AXIAL: CPT | Mod: TC

## 2019-01-28 PROCEDURE — 77067 SCR MAMMO BI INCL CAD: CPT | Mod: TC

## 2019-01-28 RX ORDER — ALENDRONATE SODIUM 70 MG/1
70 TABLET ORAL
Qty: 4 TABLET | Refills: 11 | Status: SHIPPED | OUTPATIENT
Start: 2019-01-28 | End: 2019-06-03 | Stop reason: SDUPTHER

## 2019-01-28 RX ORDER — ATENOLOL 25 MG/1
TABLET ORAL
Qty: 90 TABLET | Refills: 1 | Status: SHIPPED | OUTPATIENT
Start: 2019-01-28 | End: 2019-05-01 | Stop reason: SDUPTHER

## 2019-02-26 DIAGNOSIS — E11.65 TYPE 2 DIABETES MELLITUS WITH HYPERGLYCEMIA, WITHOUT LONG-TERM CURRENT USE OF INSULIN: ICD-10-CM

## 2019-02-26 DIAGNOSIS — I10 HYPERTENSION, ESSENTIAL, BENIGN: ICD-10-CM

## 2019-02-26 DIAGNOSIS — E11.69 HYPERLIPIDEMIA ASSOCIATED WITH TYPE 2 DIABETES MELLITUS: ICD-10-CM

## 2019-02-26 DIAGNOSIS — E78.5 HYPERLIPIDEMIA ASSOCIATED WITH TYPE 2 DIABETES MELLITUS: ICD-10-CM

## 2019-02-26 RX ORDER — GLIPIZIDE 5 MG/1
TABLET ORAL
Qty: 90 TABLET | Refills: 1 | Status: SHIPPED | OUTPATIENT
Start: 2019-02-26 | End: 2019-06-03

## 2019-02-26 RX ORDER — VALSARTAN AND HYDROCHLOROTHIAZIDE 320; 25 MG/1; MG/1
TABLET, FILM COATED ORAL
Qty: 90 TABLET | Refills: 1 | Status: SHIPPED | OUTPATIENT
Start: 2019-02-26 | End: 2019-06-03 | Stop reason: SDUPTHER

## 2019-02-26 RX ORDER — AMLODIPINE BESYLATE 10 MG/1
TABLET ORAL
Qty: 90 TABLET | Refills: 1 | Status: SHIPPED | OUTPATIENT
Start: 2019-02-26 | End: 2019-06-03 | Stop reason: SDUPTHER

## 2019-02-26 RX ORDER — METFORMIN HYDROCHLORIDE 1000 MG/1
TABLET ORAL
Qty: 180 TABLET | Refills: 1 | Status: SHIPPED | OUTPATIENT
Start: 2019-02-26 | End: 2019-06-03 | Stop reason: SDUPTHER

## 2019-02-26 RX ORDER — SIMVASTATIN 20 MG/1
TABLET, FILM COATED ORAL
Qty: 90 TABLET | Refills: 1 | Status: SHIPPED | OUTPATIENT
Start: 2019-02-26 | End: 2019-06-03 | Stop reason: SDUPTHER

## 2019-05-01 DIAGNOSIS — I10 HYPERTENSION, ESSENTIAL, BENIGN: ICD-10-CM

## 2019-05-01 RX ORDER — ATENOLOL 25 MG/1
TABLET ORAL
Qty: 90 TABLET | Refills: 1 | Status: SHIPPED | OUTPATIENT
Start: 2019-05-01 | End: 2019-06-03 | Stop reason: SDUPTHER

## 2019-05-17 ENCOUNTER — PATIENT OUTREACH (OUTPATIENT)
Dept: ADMINISTRATIVE | Facility: HOSPITAL | Age: 64
End: 2019-05-17

## 2019-05-17 DIAGNOSIS — E11.65 TYPE 2 DIABETES MELLITUS WITH HYPERGLYCEMIA, WITHOUT LONG-TERM CURRENT USE OF INSULIN: Primary | ICD-10-CM

## 2019-06-03 ENCOUNTER — PATIENT OUTREACH (OUTPATIENT)
Dept: ADMINISTRATIVE | Facility: HOSPITAL | Age: 64
End: 2019-06-03

## 2019-06-03 ENCOUNTER — LAB VISIT (OUTPATIENT)
Dept: LAB | Facility: HOSPITAL | Age: 64
End: 2019-06-03
Attending: INTERNAL MEDICINE
Payer: MEDICARE

## 2019-06-03 ENCOUNTER — OFFICE VISIT (OUTPATIENT)
Dept: FAMILY MEDICINE | Facility: CLINIC | Age: 64
End: 2019-06-03
Payer: MEDICARE

## 2019-06-03 VITALS
DIASTOLIC BLOOD PRESSURE: 70 MMHG | BODY MASS INDEX: 33.27 KG/M2 | HEART RATE: 77 BPM | TEMPERATURE: 97 F | OXYGEN SATURATION: 99 % | SYSTOLIC BLOOD PRESSURE: 134 MMHG | WEIGHT: 212 LBS | HEIGHT: 67 IN

## 2019-06-03 DIAGNOSIS — I10 HYPERTENSION, ESSENTIAL, BENIGN: ICD-10-CM

## 2019-06-03 DIAGNOSIS — M85.80 OSTEOPENIA, UNSPECIFIED LOCATION: ICD-10-CM

## 2019-06-03 DIAGNOSIS — E11.65 TYPE 2 DIABETES MELLITUS WITH HYPERGLYCEMIA, WITHOUT LONG-TERM CURRENT USE OF INSULIN: Primary | ICD-10-CM

## 2019-06-03 DIAGNOSIS — E11.65 TYPE 2 DIABETES MELLITUS WITH HYPERGLYCEMIA, WITHOUT LONG-TERM CURRENT USE OF INSULIN: ICD-10-CM

## 2019-06-03 DIAGNOSIS — E11.69 HYPERLIPIDEMIA ASSOCIATED WITH TYPE 2 DIABETES MELLITUS: ICD-10-CM

## 2019-06-03 DIAGNOSIS — E78.5 HYPERLIPIDEMIA ASSOCIATED WITH TYPE 2 DIABETES MELLITUS: ICD-10-CM

## 2019-06-03 LAB
ALBUMIN SERPL BCP-MCNC: 4.3 G/DL (ref 3.5–5.2)
ALP SERPL-CCNC: 58 U/L (ref 55–135)
ALT SERPL W/O P-5'-P-CCNC: 9 U/L (ref 10–44)
ANION GAP SERPL CALC-SCNC: 8 MMOL/L (ref 8–16)
AST SERPL-CCNC: 13 U/L (ref 10–40)
BASOPHILS # BLD AUTO: 0.02 K/UL (ref 0–0.2)
BASOPHILS NFR BLD: 0.3 % (ref 0–1.9)
BILIRUB SERPL-MCNC: 0.3 MG/DL (ref 0.1–1)
BUN SERPL-MCNC: 15 MG/DL (ref 8–23)
CALCIUM SERPL-MCNC: 10.2 MG/DL (ref 8.7–10.5)
CHLORIDE SERPL-SCNC: 100 MMOL/L (ref 95–110)
CO2 SERPL-SCNC: 31 MMOL/L (ref 23–29)
CREAT SERPL-MCNC: 0.8 MG/DL (ref 0.5–1.4)
DIFFERENTIAL METHOD: ABNORMAL
EOSINOPHIL # BLD AUTO: 0.3 K/UL (ref 0–0.5)
EOSINOPHIL NFR BLD: 4.1 % (ref 0–8)
ERYTHROCYTE [DISTWIDTH] IN BLOOD BY AUTOMATED COUNT: 15.9 % (ref 11.5–14.5)
EST. GFR  (AFRICAN AMERICAN): >60 ML/MIN/1.73 M^2
EST. GFR  (NON AFRICAN AMERICAN): >60 ML/MIN/1.73 M^2
ESTIMATED AVG GLUCOSE: 146 MG/DL (ref 68–131)
GLUCOSE SERPL-MCNC: 100 MG/DL (ref 70–110)
HBA1C MFR BLD HPLC: 6.7 % (ref 4–5.6)
HCT VFR BLD AUTO: 35.3 % (ref 37–48.5)
HGB BLD-MCNC: 10.6 G/DL (ref 12–16)
LYMPHOCYTES # BLD AUTO: 2.1 K/UL (ref 1–4.8)
LYMPHOCYTES NFR BLD: 34.4 % (ref 18–48)
MCH RBC QN AUTO: 24.5 PG (ref 27–31)
MCHC RBC AUTO-ENTMCNC: 30 G/DL (ref 32–36)
MCV RBC AUTO: 82 FL (ref 82–98)
MONOCYTES # BLD AUTO: 0.5 K/UL (ref 0.3–1)
MONOCYTES NFR BLD: 8.4 % (ref 4–15)
NEUTROPHILS # BLD AUTO: 3.3 K/UL (ref 1.8–7.7)
NEUTROPHILS NFR BLD: 52.8 % (ref 38–73)
PLATELET # BLD AUTO: 606 K/UL (ref 150–350)
PMV BLD AUTO: 10.1 FL (ref 9.2–12.9)
POTASSIUM SERPL-SCNC: 4 MMOL/L (ref 3.5–5.1)
PROT SERPL-MCNC: 8.4 G/DL (ref 6–8.4)
RBC # BLD AUTO: 4.32 M/UL (ref 4–5.4)
SODIUM SERPL-SCNC: 139 MMOL/L (ref 136–145)
WBC # BLD AUTO: 6.17 K/UL (ref 3.9–12.7)

## 2019-06-03 PROCEDURE — 99999 PR PBB SHADOW E&M-EST. PATIENT-LVL III: ICD-10-PCS | Mod: PBBFAC,,, | Performed by: INTERNAL MEDICINE

## 2019-06-03 PROCEDURE — 80053 COMPREHEN METABOLIC PANEL: CPT

## 2019-06-03 PROCEDURE — 99213 OFFICE O/P EST LOW 20 MIN: CPT | Mod: PBBFAC,PO | Performed by: INTERNAL MEDICINE

## 2019-06-03 PROCEDURE — 99214 OFFICE O/P EST MOD 30 MIN: CPT | Mod: S$PBB,,, | Performed by: INTERNAL MEDICINE

## 2019-06-03 PROCEDURE — 99999 PR PBB SHADOW E&M-EST. PATIENT-LVL III: CPT | Mod: PBBFAC,,, | Performed by: INTERNAL MEDICINE

## 2019-06-03 PROCEDURE — 83036 HEMOGLOBIN GLYCOSYLATED A1C: CPT

## 2019-06-03 PROCEDURE — 85025 COMPLETE CBC W/AUTO DIFF WBC: CPT

## 2019-06-03 PROCEDURE — 36415 COLL VENOUS BLD VENIPUNCTURE: CPT | Mod: PO

## 2019-06-03 PROCEDURE — 99214 PR OFFICE/OUTPT VISIT, EST, LEVL IV, 30-39 MIN: ICD-10-PCS | Mod: S$PBB,,, | Performed by: INTERNAL MEDICINE

## 2019-06-03 RX ORDER — ALENDRONATE SODIUM 70 MG/1
70 TABLET ORAL
Qty: 4 TABLET | Refills: 11 | Status: SHIPPED | OUTPATIENT
Start: 2019-06-03 | End: 2020-11-19

## 2019-06-03 RX ORDER — ATENOLOL 25 MG/1
25 TABLET ORAL DAILY
Qty: 90 TABLET | Refills: 1 | Status: SHIPPED | OUTPATIENT
Start: 2019-06-03 | End: 2019-12-04 | Stop reason: SDUPTHER

## 2019-06-03 RX ORDER — AMLODIPINE BESYLATE 10 MG/1
10 TABLET ORAL DAILY
Qty: 90 TABLET | Refills: 1 | Status: SHIPPED | OUTPATIENT
Start: 2019-06-03 | End: 2019-12-04 | Stop reason: SDUPTHER

## 2019-06-03 RX ORDER — VALSARTAN AND HYDROCHLOROTHIAZIDE 320; 25 MG/1; MG/1
1 TABLET, FILM COATED ORAL DAILY
Qty: 90 TABLET | Refills: 1 | Status: SHIPPED | OUTPATIENT
Start: 2019-06-03 | End: 2019-12-04 | Stop reason: SDUPTHER

## 2019-06-03 RX ORDER — SIMVASTATIN 20 MG/1
20 TABLET, FILM COATED ORAL DAILY
Qty: 90 TABLET | Refills: 1 | Status: SHIPPED | OUTPATIENT
Start: 2019-06-03 | End: 2019-12-04 | Stop reason: SDUPTHER

## 2019-06-03 RX ORDER — METFORMIN HYDROCHLORIDE 1000 MG/1
1000 TABLET ORAL 2 TIMES DAILY
Qty: 180 TABLET | Refills: 1 | Status: SHIPPED | OUTPATIENT
Start: 2019-06-03 | End: 2019-12-04 | Stop reason: SDUPTHER

## 2019-06-03 NOTE — LETTER
Shavon 3, 2019    DR FAISAL Herrera - [facility]   Shavon 3, 2019     Patient: Harriett Oglesby    YOB: 1955   Date of Visit: 6/3/2019       To Whom It May Concern:    We are seeing Harriett Oglesby in the clinic at [facility].  Zeinab Noble MD is their PCP.      To help with our Health Maintenance records will you please supply the following report(s)                                                    (XX) EYE EXAM                         Please Fax to [facility] at [phone]    If any questions please contact Mary Alba at [phone]

## 2019-06-03 NOTE — PROGRESS NOTES
SUBJECTIVE     Chief Complaint   Patient presents with    Medication Refill    Diabetes       HPI  Harriett Oglesby is a 64 y.o. female with multiple medical diagnoses as listed in the medical history and problem list that presents for follow-up of HTN and DM2. Pt has been doing okay since her last visit. She is fully compliant with meds and denies any adverse side effects. Her fasting blood sugar levels range from . She does not check her BP at home. Pt is mostly compliant with a low Na and ADA diet. She still walks for exercise 3 times weekly in her neighborhood. Pt is without any other complaints today and presents for med refill.     PAST MEDICAL HISTORY:  Past Medical History:   Diagnosis Date    Hyperlipidemia associated with type 2 diabetes mellitus     Hypertension     Type 2 diabetes mellitus        PAST SURGICAL HISTORY:  Past Surgical History:   Procedure Laterality Date    COLONOSCOPY N/A 1/4/2019    Performed by [facility]una [facility] at [facility] END[facility]    HEMORRHOID SURGERY         SOCIAL HISTORY:  Social History     Socioeconomic History    Marital status: Single     Spouse name: Not on file    Number of children: Not on file    Years of education: Not on file    Highest education level: Not on file   Occupational History    Not on file   Social Needs    Financial resource strain: Not on file    Food insecurity:     Worry: Not on file     Inability: Not on file    Transportation needs:     Medical: Not on file     Non-medical: Not on file   Tobacco Use    Smoking status: Former Smoker     Types: Cigarettes    Smokeless tobacco: Never Used   Substance and Sexual Activity    Alcohol use: No     Frequency: Never    Drug use: No    Sexual activity: Not on file   Lifestyle    Physical activity:     Days per week: Not on file     Minutes per session: Not on file    Stress: Not on file   Relationships    Social connections:     Talks on phone: Not on file     Gets together: Not on file      Attends Jewish service: Not on file     Active member of club or organization: Not on file     Attends meetings of clubs or organizations: Not on file     Relationship status: Not on file   Other Topics Concern    Not on file   Social History Narrative    Not on file       FAMILY HISTORY:  Family History   Problem Relation Age of Onset    Pancreatic cancer Mother     Emphysema Father     Cancer Father     Breast cancer Cousin     Breast cancer Cousin        ALLERGIES AND MEDICATIONS: updated and reviewed.  Review of patient's allergies indicates:  No Known Allergies  Current Outpatient Medications   Medication Sig Dispense Refill    alendronate (FOSAMAX) 70 MG tablet Take 1 tablet (70 mg total) by mouth every 7 days. 4 tablet 11    amLODIPine (NORVASC) 10 MG tablet Take 1 tablet (10 mg total) by mouth once daily. 90 tablet 1    aspirin (ECOTRIN) 81 MG EC tablet Take 81 mg by mouth once daily.      atenolol (TENORMIN) 25 MG tablet Take 1 tablet (25 mg total) by mouth once daily. 90 tablet 1    blood sugar diagnostic Strp 1 strip by Misc.(Non-Drug; Combo Route) route once daily. 200 each 3    cyanocobalamin (VITAMIN B-12) 500 MCG tablet Take 500 mcg by mouth once daily.      metFORMIN (GLUCOPHAGE) 1000 MG tablet Take 1 tablet (1,000 mg total) by mouth 2 (two) times daily. for diabetes. 180 tablet 1    simvastatin (ZOCOR) 20 MG tablet Take 1 tablet (20 mg total) by mouth once daily. 90 tablet 1    valsartan-hydrochlorothiazide (DIOVAN-HCT) 320-25 mg per tablet Take 1 tablet by mouth once daily. 90 tablet 1    FLUZONE HIGH-DOSE 2018-19, PF, 180 mcg/0.5 mL vaccine ADM 0.5ML IM UTD  0    polyethylene glycol (COLYTE) 240-22.72-6.72 -5.84 gram SolR One gallon; split prep. 1 Bottle 0    PREVNAR 13, PF, 0.5 mL Syrg ADM 0.5ML IM UTD  0     No current facility-administered medications for this visit.        ROS  Review of Systems   Constitutional: Negative for chills and fever.   HENT: Negative for  "hearing loss and sore throat.    Eyes: Negative for visual disturbance.   Respiratory: Negative for cough and shortness of breath.    Cardiovascular: Negative for chest pain, palpitations and leg swelling.   Gastrointestinal: Negative for abdominal pain, constipation, diarrhea, nausea and vomiting.   Genitourinary: Negative for dysuria, frequency and urgency.   Musculoskeletal: Negative for arthralgias, joint swelling and myalgias.   Skin: Negative for rash and wound.   Neurological: Negative for headaches.   Psychiatric/Behavioral: Negative for agitation and confusion. The patient is not nervous/anxious.          OBJECTIVE     Physical Exam  Vitals:    06/03/19 0905   BP: 134/70   Pulse: 77   Temp: 97.3 °F (36.3 °C)    Body mass index is 33.2 kg/m².  Weight: 96.2 kg (212 lb)   Height: 5' 7" (170.2 cm)     Physical Exam   Constitutional: She is oriented to person, place, and time. She appears well-developed and well-nourished. No distress.   HENT:   Head: Normocephalic and atraumatic.   Right Ear: External ear normal.   Left Ear: External ear normal.   Nose: Nose normal.   Mouth/Throat: Oropharynx is clear and moist.   Eyes: Conjunctivae and EOM are normal. Right eye exhibits no discharge. Left eye exhibits no discharge. No scleral icterus.   Neck: Normal range of motion. Neck supple. No JVD present. No tracheal deviation present.   Cardiovascular: Normal rate, regular rhythm and intact distal pulses. Exam reveals no gallop and no friction rub.   No murmur heard.  Pulmonary/Chest: Effort normal and breath sounds normal. No respiratory distress. She has no wheezes.   Abdominal: Soft. Bowel sounds are normal. She exhibits no distension and no mass. There is no tenderness. There is no rebound and no guarding.   Musculoskeletal: Normal range of motion. She exhibits no edema, tenderness or deformity.        Right foot: There is normal range of motion and no deformity.        Left foot: There is normal range of motion and " no deformity.   Feet:   Right Foot:   Protective Sensation: 10 sites tested. 10 sites sensed.   Skin Integrity: Positive for dry skin. Negative for ulcer, blister, skin breakdown or callus.   Left Foot:   Protective Sensation: 10 sites tested. 10 sites sensed.   Skin Integrity: Positive for dry skin. Negative for ulcer, blister, skin breakdown or callus.   Neurological: She is alert and oriented to person, place, and time. She exhibits normal muscle tone. Coordination normal.   Skin: Skin is warm and dry. No rash noted. No erythema.   Psychiatric: She has a normal mood and affect. Her behavior is normal. Judgment and thought content normal.         Health Maintenance       Date Due Completion Date    Foot Exam 02/02/1963 ---    Eye Exam 02/02/1963 ---    TETANUS VACCINE 02/02/1971 ---    Shingles Vaccine (1 of 2) 02/02/2003 ---    Hemoglobin A1c 06/03/2019 12/3/2018    Influenza Vaccine 08/01/2019 9/26/2018    Pneumococcal Vaccine (65+ Low/Medium Risk) (2 of 2 - PPSV23) 10/15/2019 10/15/2018    Lipid Panel 12/03/2019 12/3/2018    High Dose Statin 02/26/2020 2/26/2019    Mammogram 01/28/2021 1/28/2019    DEXA SCAN 01/28/2022 1/28/2019    Colonoscopy 01/04/2029 1/4/2019            ASSESSMENT     64 y.o. female with     1. Type 2 diabetes mellitus with hyperglycemia, without long-term current use of insulin    2. Hypertension, essential, benign    3. Hyperlipidemia associated with type 2 diabetes mellitus    4. Osteopenia, unspecified location        PLAN:     1. Type 2 diabetes mellitus with hyperglycemia, without long-term current use of insulin  - Pt with >10 episodes of low blood sugar readings, but denies any hypoglycemia symptoms  - Will discontinue Glipizide and have pt continue Metformin  - CBC auto differential; Future  - Comprehensive metabolic panel; Future  - Hemoglobin A1c; Future  - metFORMIN (GLUCOPHAGE) 1000 MG tablet; Take 1 tablet (1,000 mg total) by mouth 2 (two) times daily. for diabetes.  Dispense:  180 tablet; Refill: 1    2. Hypertension, essential, benign  - BP well controlled; at goal of <140/90  - The current medical regimen is effective;  continue present plan and medications.  - valsartan-hydrochlorothiazide (DIOVAN-HCT) 320-25 mg per tablet; Take 1 tablet by mouth once daily.  Dispense: 90 tablet; Refill: 1  - amLODIPine (NORVASC) 10 MG tablet; Take 1 tablet (10 mg total) by mouth once daily.  Dispense: 90 tablet; Refill: 1  - atenolol (TENORMIN) 25 MG tablet; Take 1 tablet (25 mg total) by mouth once daily.  Dispense: 90 tablet; Refill: 1    3. Hyperlipidemia associated with type 2 diabetes mellitus  - Stable; no acute issues  - The current medical regimen is effective;  continue present plan and medications.  - simvastatin (ZOCOR) 20 MG tablet; Take 1 tablet (20 mg total) by mouth once daily.  Dispense: 90 tablet; Refill: 1    4. Osteopenia, unspecified location  - Stable; no acute issues  - The current medical regimen is effective;  continue present plan and medications.  - alendronate (FOSAMAX) 70 MG tablet; Take 1 tablet (70 mg total) by mouth every 7 days.  Dispense: 4 tablet; Refill: 11        RTC in 6 months     Zeinab Noble MD  06/03/2019 9:16 AM        No follow-ups on file.

## 2019-06-26 ENCOUNTER — OFFICE VISIT (OUTPATIENT)
Dept: FAMILY MEDICINE | Facility: CLINIC | Age: 64
End: 2019-06-26
Payer: MEDICARE

## 2019-06-26 VITALS
OXYGEN SATURATION: 99 % | WEIGHT: 212.75 LBS | SYSTOLIC BLOOD PRESSURE: 128 MMHG | HEART RATE: 76 BPM | BODY MASS INDEX: 33.39 KG/M2 | TEMPERATURE: 98 F | HEIGHT: 67 IN | DIASTOLIC BLOOD PRESSURE: 75 MMHG

## 2019-06-26 DIAGNOSIS — E11.65 TYPE 2 DIABETES MELLITUS WITH HYPERGLYCEMIA, WITHOUT LONG-TERM CURRENT USE OF INSULIN: Primary | ICD-10-CM

## 2019-06-26 PROCEDURE — 99999 PR PBB SHADOW E&M-EST. PATIENT-LVL III: CPT | Mod: PBBFAC,,, | Performed by: INTERNAL MEDICINE

## 2019-06-26 PROCEDURE — 99999 PR PBB SHADOW E&M-EST. PATIENT-LVL III: ICD-10-PCS | Mod: PBBFAC,,, | Performed by: INTERNAL MEDICINE

## 2019-06-26 PROCEDURE — 99213 OFFICE O/P EST LOW 20 MIN: CPT | Mod: PBBFAC,PO | Performed by: INTERNAL MEDICINE

## 2019-06-26 PROCEDURE — 99214 OFFICE O/P EST MOD 30 MIN: CPT | Mod: S$PBB,,, | Performed by: INTERNAL MEDICINE

## 2019-06-26 PROCEDURE — 99214 PR OFFICE/OUTPT VISIT, EST, LEVL IV, 30-39 MIN: ICD-10-PCS | Mod: S$PBB,,, | Performed by: INTERNAL MEDICINE

## 2019-06-26 RX ORDER — DAPAGLIFLOZIN 10 MG/1
1 TABLET, FILM COATED ORAL DAILY
Qty: 90 TABLET | Refills: 1 | Status: SHIPPED | OUTPATIENT
Start: 2019-06-26 | End: 2019-09-19 | Stop reason: SDUPTHER

## 2019-08-27 DIAGNOSIS — E11.65 TYPE 2 DIABETES MELLITUS WITH HYPERGLYCEMIA, WITHOUT LONG-TERM CURRENT USE OF INSULIN: ICD-10-CM

## 2019-08-27 RX ORDER — GLIPIZIDE 5 MG/1
TABLET ORAL
Qty: 90 TABLET | Refills: 1 | OUTPATIENT
Start: 2019-08-27

## 2019-09-19 DIAGNOSIS — E11.65 TYPE 2 DIABETES MELLITUS WITH HYPERGLYCEMIA, WITHOUT LONG-TERM CURRENT USE OF INSULIN: ICD-10-CM

## 2019-09-20 RX ORDER — DAPAGLIFLOZIN 10 MG/1
TABLET, FILM COATED ORAL
Qty: 90 TABLET | Refills: 0 | Status: SHIPPED | OUTPATIENT
Start: 2019-09-20 | End: 2019-12-04 | Stop reason: SDUPTHER

## 2019-10-28 LAB
LEFT EYE DM RETINOPATHY: NEGATIVE
RIGHT EYE DM RETINOPATHY: NEGATIVE

## 2019-12-04 ENCOUNTER — TELEPHONE (OUTPATIENT)
Dept: FAMILY MEDICINE | Facility: CLINIC | Age: 64
End: 2019-12-04

## 2019-12-04 ENCOUNTER — LAB VISIT (OUTPATIENT)
Dept: LAB | Facility: HOSPITAL | Age: 64
End: 2019-12-04
Payer: MEDICARE

## 2019-12-04 ENCOUNTER — OFFICE VISIT (OUTPATIENT)
Dept: FAMILY MEDICINE | Facility: CLINIC | Age: 64
End: 2019-12-04
Payer: MEDICARE

## 2019-12-04 VITALS
SYSTOLIC BLOOD PRESSURE: 132 MMHG | WEIGHT: 202.19 LBS | HEIGHT: 67 IN | HEART RATE: 81 BPM | DIASTOLIC BLOOD PRESSURE: 82 MMHG | OXYGEN SATURATION: 99 % | BODY MASS INDEX: 31.73 KG/M2 | TEMPERATURE: 97 F

## 2019-12-04 DIAGNOSIS — I10 HYPERTENSION, ESSENTIAL, BENIGN: ICD-10-CM

## 2019-12-04 DIAGNOSIS — Z23 NEED FOR PNEUMOCOCCAL VACCINATION: ICD-10-CM

## 2019-12-04 DIAGNOSIS — E11.69 HYPERLIPIDEMIA ASSOCIATED WITH TYPE 2 DIABETES MELLITUS: ICD-10-CM

## 2019-12-04 DIAGNOSIS — E78.5 HYPERLIPIDEMIA ASSOCIATED WITH TYPE 2 DIABETES MELLITUS: ICD-10-CM

## 2019-12-04 DIAGNOSIS — E11.65 TYPE 2 DIABETES MELLITUS WITH HYPERGLYCEMIA, WITHOUT LONG-TERM CURRENT USE OF INSULIN: Primary | ICD-10-CM

## 2019-12-04 DIAGNOSIS — E11.65 TYPE 2 DIABETES MELLITUS WITH HYPERGLYCEMIA, WITHOUT LONG-TERM CURRENT USE OF INSULIN: ICD-10-CM

## 2019-12-04 LAB
ALBUMIN SERPL BCP-MCNC: 4.6 G/DL (ref 3.5–5.2)
ALP SERPL-CCNC: 59 U/L (ref 55–135)
ALT SERPL W/O P-5'-P-CCNC: 8 U/L (ref 10–44)
ANION GAP SERPL CALC-SCNC: 10 MMOL/L (ref 8–16)
AST SERPL-CCNC: 12 U/L (ref 10–40)
BASOPHILS # BLD AUTO: 0.04 K/UL (ref 0–0.2)
BASOPHILS NFR BLD: 0.6 % (ref 0–1.9)
BILIRUB SERPL-MCNC: 0.3 MG/DL (ref 0.1–1)
BUN SERPL-MCNC: 14 MG/DL (ref 8–23)
CALCIUM SERPL-MCNC: 9.8 MG/DL (ref 8.7–10.5)
CHLORIDE SERPL-SCNC: 100 MMOL/L (ref 95–110)
CHOLEST SERPL-MCNC: 122 MG/DL (ref 120–199)
CHOLEST/HDLC SERPL: 2.5 {RATIO} (ref 2–5)
CO2 SERPL-SCNC: 29 MMOL/L (ref 23–29)
CREAT SERPL-MCNC: 0.9 MG/DL (ref 0.5–1.4)
DIFFERENTIAL METHOD: ABNORMAL
EOSINOPHIL # BLD AUTO: 0.1 K/UL (ref 0–0.5)
EOSINOPHIL NFR BLD: 2.1 % (ref 0–8)
ERYTHROCYTE [DISTWIDTH] IN BLOOD BY AUTOMATED COUNT: 14.6 % (ref 11.5–14.5)
EST. GFR  (AFRICAN AMERICAN): >60 ML/MIN/1.73 M^2
EST. GFR  (NON AFRICAN AMERICAN): >60 ML/MIN/1.73 M^2
GLUCOSE SERPL-MCNC: 136 MG/DL (ref 70–110)
HCT VFR BLD AUTO: 37.3 % (ref 37–48.5)
HDLC SERPL-MCNC: 48 MG/DL (ref 40–75)
HDLC SERPL: 39.3 % (ref 20–50)
HGB BLD-MCNC: 11.2 G/DL (ref 12–16)
IMM GRANULOCYTES # BLD AUTO: 0.01 K/UL (ref 0–0.04)
IMM GRANULOCYTES NFR BLD AUTO: 0.2 % (ref 0–0.5)
LDLC SERPL CALC-MCNC: 53.6 MG/DL (ref 63–159)
LYMPHOCYTES # BLD AUTO: 1.7 K/UL (ref 1–4.8)
LYMPHOCYTES NFR BLD: 25.5 % (ref 18–48)
MCH RBC QN AUTO: 25.7 PG (ref 27–31)
MCHC RBC AUTO-ENTMCNC: 30 G/DL (ref 32–36)
MCV RBC AUTO: 86 FL (ref 82–98)
MONOCYTES # BLD AUTO: 0.5 K/UL (ref 0.3–1)
MONOCYTES NFR BLD: 7 % (ref 4–15)
NEUTROPHILS # BLD AUTO: 4.3 K/UL (ref 1.8–7.7)
NEUTROPHILS NFR BLD: 64.6 % (ref 38–73)
NONHDLC SERPL-MCNC: 74 MG/DL
NRBC BLD-RTO: 0 /100 WBC
PLATELET # BLD AUTO: 562 K/UL (ref 150–350)
PMV BLD AUTO: 10.4 FL (ref 9.2–12.9)
POTASSIUM SERPL-SCNC: 3.5 MMOL/L (ref 3.5–5.1)
PROT SERPL-MCNC: 8.8 G/DL (ref 6–8.4)
RBC # BLD AUTO: 4.35 M/UL (ref 4–5.4)
SODIUM SERPL-SCNC: 139 MMOL/L (ref 136–145)
TRIGL SERPL-MCNC: 102 MG/DL (ref 30–150)
TSH SERPL DL<=0.005 MIU/L-ACNC: 1.88 UIU/ML (ref 0.4–4)
WBC # BLD AUTO: 6.6 K/UL (ref 3.9–12.7)

## 2019-12-04 PROCEDURE — 99213 OFFICE O/P EST LOW 20 MIN: CPT | Mod: PBBFAC,PO | Performed by: INTERNAL MEDICINE

## 2019-12-04 PROCEDURE — 84443 ASSAY THYROID STIM HORMONE: CPT

## 2019-12-04 PROCEDURE — 1126F PR PAIN SEVERITY QUANTIFIED, NO PAIN PRESENT: ICD-10-PCS | Mod: ,,, | Performed by: INTERNAL MEDICINE

## 2019-12-04 PROCEDURE — 99214 PR OFFICE/OUTPT VISIT, EST, LEVL IV, 30-39 MIN: ICD-10-PCS | Mod: S$PBB,,, | Performed by: INTERNAL MEDICINE

## 2019-12-04 PROCEDURE — 85025 COMPLETE CBC W/AUTO DIFF WBC: CPT

## 2019-12-04 PROCEDURE — 80061 LIPID PANEL: CPT

## 2019-12-04 PROCEDURE — 83036 HEMOGLOBIN GLYCOSYLATED A1C: CPT

## 2019-12-04 PROCEDURE — G0009 ADMIN PNEUMOCOCCAL VACCINE: HCPCS | Mod: PBBFAC,PO

## 2019-12-04 PROCEDURE — 80053 COMPREHEN METABOLIC PANEL: CPT

## 2019-12-04 PROCEDURE — 99999 PR PBB SHADOW E&M-EST. PATIENT-LVL III: CPT | Mod: PBBFAC,,, | Performed by: INTERNAL MEDICINE

## 2019-12-04 PROCEDURE — 1159F PR MEDICATION LIST DOCUMENTED IN MEDICAL RECORD: ICD-10-PCS | Mod: ,,, | Performed by: INTERNAL MEDICINE

## 2019-12-04 PROCEDURE — 1126F AMNT PAIN NOTED NONE PRSNT: CPT | Mod: ,,, | Performed by: INTERNAL MEDICINE

## 2019-12-04 PROCEDURE — 1159F MED LIST DOCD IN RCRD: CPT | Mod: ,,, | Performed by: INTERNAL MEDICINE

## 2019-12-04 PROCEDURE — 99214 OFFICE O/P EST MOD 30 MIN: CPT | Mod: S$PBB,,, | Performed by: INTERNAL MEDICINE

## 2019-12-04 PROCEDURE — 99999 PR PBB SHADOW E&M-EST. PATIENT-LVL III: ICD-10-PCS | Mod: PBBFAC,,, | Performed by: INTERNAL MEDICINE

## 2019-12-04 RX ORDER — AMLODIPINE BESYLATE 10 MG/1
10 TABLET ORAL DAILY
Qty: 90 TABLET | Refills: 3 | Status: SHIPPED | OUTPATIENT
Start: 2019-12-04 | End: 2020-11-19 | Stop reason: SDUPTHER

## 2019-12-04 RX ORDER — VALSARTAN AND HYDROCHLOROTHIAZIDE 320; 25 MG/1; MG/1
1 TABLET, FILM COATED ORAL DAILY
Qty: 90 TABLET | Refills: 3 | Status: SHIPPED | OUTPATIENT
Start: 2019-12-04 | End: 2020-11-19

## 2019-12-04 RX ORDER — METFORMIN HYDROCHLORIDE 1000 MG/1
1000 TABLET ORAL 2 TIMES DAILY
Qty: 180 TABLET | Refills: 1 | Status: SHIPPED | OUTPATIENT
Start: 2019-12-04 | End: 2020-05-27 | Stop reason: SDUPTHER

## 2019-12-04 RX ORDER — SIMVASTATIN 20 MG/1
20 TABLET, FILM COATED ORAL DAILY
Qty: 90 TABLET | Refills: 3 | Status: SHIPPED | OUTPATIENT
Start: 2019-12-04 | End: 2020-11-19 | Stop reason: SDUPTHER

## 2019-12-04 RX ORDER — ATENOLOL 25 MG/1
25 TABLET ORAL DAILY
Qty: 90 TABLET | Refills: 3 | Status: SHIPPED | OUTPATIENT
Start: 2019-12-04 | End: 2020-10-26

## 2019-12-04 RX ORDER — DAPAGLIFLOZIN 10 MG/1
TABLET, FILM COATED ORAL
Qty: 90 TABLET | Refills: 1 | Status: SHIPPED | OUTPATIENT
Start: 2019-12-04 | End: 2020-05-27 | Stop reason: SDUPTHER

## 2019-12-04 NOTE — PROGRESS NOTES
Administered Pneumococcal 23 Vaccine 0.5mL IM to right deltoid. No s/s of any adverse reaction noted.   Pt received Influenza Vaccine High Dose on 10-28-19 at her Pharmacy- [facility]. It is already documented in LINKS.

## 2019-12-04 NOTE — PROGRESS NOTES
Informed patient shingles and tetanus vaccine is overdue.  Patient had flu vaccine administered by [facility] on [facility] in [place] on 10/28/19.

## 2019-12-04 NOTE — TELEPHONE ENCOUNTER
----- Message from Zeinab Noble MD sent at 12/4/2019  8:54 AM CST -----  Please schedule pt for f/u in 6 months

## 2019-12-04 NOTE — PROGRESS NOTES
SUBJECTIVE     Chief Complaint   Patient presents with    Medication Refill       HPI  Harriett Oglesby is a 64 y.o. female with multiple medical diagnoses as listed in the medical history and problem list that presents for follow-up for DM2.  Pt has been doing okay since her last visit. She is fully compliant with meds and denies any adverse side effects. Her fasting blood sugar levels range from . Pt is mostly compliant with an ADA diet. She reports that she has not been able to exercise 2/2 cold weather outside. Pt has tried to move more in her house to make up for the lack of exercise. Pt reports weight loss since her last visit, which she is very proud of today.     PAST MEDICAL HISTORY:  Past Medical History:   Diagnosis Date    Hyperlipidemia associated with type 2 diabetes mellitus     Hypertension     Type 2 diabetes mellitus        PAST SURGICAL HISTORY:  Past Surgical History:   Procedure Laterality Date    COLONOSCOPY N/A 1/4/2019    Procedure: COLONOSCOPY;  Surgeon: James Ozuna MD;  Location: [facility];  Service: Endoscopy;  Laterality: N/A;    HEMORRHOID SURGERY         SOCIAL HISTORY:  Social History     Socioeconomic History    Marital status: Single     Spouse name: Not on file    Number of children: Not on file    Years of education: Not on file    Highest education level: Not on file   Occupational History    Not on file   Social Needs    Financial resource strain: Not on file    Food insecurity:     Worry: Not on file     Inability: Not on file    Transportation needs:     Medical: Not on file     Non-medical: Not on file   Tobacco Use    Smoking status: Former Smoker     Types: Cigarettes    Smokeless tobacco: Never Used   Substance and Sexual Activity    Alcohol use: No     Frequency: Never    Drug use: No    Sexual activity: Not on file   Lifestyle    Physical activity:     Days per week: Not on file     Minutes per session: Not on file    Stress: Not on file    Relationships    Social connections:     Talks on phone: Not on file     Gets together: Not on file     Attends Tenriism service: Not on file     Active member of club or organization: Not on file     Attends meetings of clubs or organizations: Not on file     Relationship status: Not on file   Other Topics Concern    Not on file   Social History Narrative    Not on file       FAMILY HISTORY:  Family History   Problem Relation Age of Onset    Pancreatic cancer Mother     Emphysema Father     Cancer Father     Breast cancer Cousin     Breast cancer Cousin        ALLERGIES AND MEDICATIONS: updated and reviewed.  Review of patient's allergies indicates:  No Known Allergies  Current Outpatient Medications   Medication Sig Dispense Refill    alendronate (FOSAMAX) 70 MG tablet Take 1 tablet (70 mg total) by mouth every 7 days. 4 tablet 11    amLODIPine (NORVASC) 10 MG tablet Take 1 tablet (10 mg total) by mouth once daily. 90 tablet 3    aspirin (ECOTRIN) 81 MG EC tablet Take 81 mg by mouth once daily.      atenolol (TENORMIN) 25 MG tablet Take 1 tablet (25 mg total) by mouth once daily. 90 tablet 3    blood sugar diagnostic Strp 1 strip by Misc.(Non-Drug; Combo Route) route once daily. 200 each 3    cyanocobalamin (VITAMIN B-12) 500 MCG tablet Take 500 mcg by mouth once daily.      dapagliflozin (FARXIGA) 10 mg Tab TAKE ONE TABLET BY MOUTH ONCE DAILY FOR DIABETES 90 tablet 1    metFORMIN (GLUCOPHAGE) 1000 MG tablet Take 1 tablet (1,000 mg total) by mouth 2 (two) times daily. for diabetes. 180 tablet 1    simvastatin (ZOCOR) 20 MG tablet Take 1 tablet (20 mg total) by mouth once daily. 90 tablet 3    valsartan-hydrochlorothiazide (DIOVAN-HCT) 320-25 mg per tablet Take 1 tablet by mouth once daily. 90 tablet 3    FLUZONE HIGH-DOSE 2018-19, PF, 180 mcg/0.5 mL vaccine ADM 0.5ML IM UTD  0    FLUZONE HIGH-DOSE 2019-20, PF, 180 mcg/0.5 mL Syrg ADM 0.5ML IM UTD  0    polyethylene glycol (COLYTE)  "240-22.72-6.72 -5.84 gram SolR One gallon; split prep. (Patient not taking: Reported on 12/4/2019) 1 Bottle 0    PREVNAR 13, PF, 0.5 mL Syrg ADM 0.5ML IM UTD  0     No current facility-administered medications for this visit.        ROS  Review of Systems   Constitutional: Negative for chills and fever.   HENT: Negative for hearing loss and sore throat.    Eyes: Negative for visual disturbance.   Respiratory: Negative for cough and shortness of breath.    Cardiovascular: Negative for chest pain, palpitations and leg swelling.   Gastrointestinal: Negative for abdominal pain, constipation, diarrhea, nausea and vomiting.   Genitourinary: Negative for dysuria, frequency and urgency.   Musculoskeletal: Negative for arthralgias, joint swelling and myalgias.   Skin: Negative for rash and wound.   Neurological: Negative for headaches.   Psychiatric/Behavioral: Negative for agitation and confusion. The patient is not nervous/anxious.          OBJECTIVE     Physical Exam  Vitals:    12/04/19 0813   BP: 132/82   Pulse: 81   Temp: 97.4 °F (36.3 °C)    Body mass index is 31.66 kg/m².  Weight: 91.7 kg (202 lb 2.6 oz)   Height: 5' 7" (170.2 cm)     Physical Exam   Constitutional: She is oriented to person, place, and time. She appears well-developed and well-nourished. No distress.   HENT:   Head: Normocephalic and atraumatic.   Right Ear: Hearing, tympanic membrane and external ear normal.   Left Ear: Hearing, tympanic membrane and external ear normal.   Nose: Nose normal. No rhinorrhea.   Mouth/Throat: Oropharynx is clear and moist. No uvula swelling. No posterior oropharyngeal edema or posterior oropharyngeal erythema.   Eyes: Conjunctivae and EOM are normal. Right eye exhibits no discharge. Left eye exhibits no discharge. No scleral icterus.   Neck: Normal range of motion. Neck supple. No JVD present. No tracheal deviation present.   Cardiovascular: Normal rate, regular rhythm and intact distal pulses. Exam reveals no gallop " and no friction rub.   No murmur heard.  Pulmonary/Chest: Effort normal and breath sounds normal. No respiratory distress. She has no wheezes.   Abdominal: Soft. Bowel sounds are normal. She exhibits no distension and no mass. There is no tenderness. There is no rebound and no guarding.   Musculoskeletal: Normal range of motion. She exhibits no edema, tenderness or deformity.   Neurological: She is alert and oriented to person, place, and time. She exhibits normal muscle tone. Coordination normal.   Skin: Skin is warm and dry. No rash noted. No erythema.   Psychiatric: She has a normal mood and affect. Her behavior is normal. Judgment and thought content normal.         Health Maintenance       Date Due Completion Date    TETANUS VACCINE 02/02/1971 ---    Shingles Vaccine (1 of 2) 02/02/2003 ---    Influenza Vaccine (1) 09/01/2019 9/26/2018    Pneumococcal Vaccine (65+ Low/Medium Risk) (2 of 2 - PPSV23) 10/15/2019 10/15/2018    Eye Exam 11/15/2019 11/15/2018    Lipid Panel 12/03/2019 12/3/2018    Hemoglobin A1c 12/03/2019 6/3/2019    Foot Exam 06/03/2020 6/3/2019    Override on 6/3/2019: Done    High Dose Statin 12/04/2020 12/4/2019    Mammogram 01/28/2021 1/28/2019    DEXA SCAN 01/28/2022 1/28/2019    Colonoscopy 01/04/2029 1/4/2019            ASSESSMENT     64 y.o. female with     1. Type 2 diabetes mellitus with hyperglycemia, without long-term current use of insulin    2. Hypertension, essential, benign    3. Hyperlipidemia associated with type 2 diabetes mellitus    4. Need for pneumococcal vaccination        PLAN:     1. Type 2 diabetes mellitus with hyperglycemia, without long-term current use of insulin  - Well controlled; repeat labs today  - metFORMIN (GLUCOPHAGE) 1000 MG tablet; Take 1 tablet (1,000 mg total) by mouth 2 (two) times daily. for diabetes.  Dispense: 180 tablet; Refill: 1  - dapagliflozin (FARXIGA) 10 mg Tab; TAKE ONE TABLET BY MOUTH ONCE DAILY FOR DIABETES  Dispense: 90 tablet; Refill: 1  -  Comprehensive metabolic panel; Future  - CBC auto differential; Future  - Hemoglobin A1c; Future    2. Hypertension, essential, benign  - BP well controlled; at goal of <140/90  - The current medical regimen is effective;  continue present plan and medications.  - amLODIPine (NORVASC) 10 MG tablet; Take 1 tablet (10 mg total) by mouth once daily.  Dispense: 90 tablet; Refill: 3  - atenolol (TENORMIN) 25 MG tablet; Take 1 tablet (25 mg total) by mouth once daily.  Dispense: 90 tablet; Refill: 3  - valsartan-hydrochlorothiazide (DIOVAN-HCT) 320-25 mg per tablet; Take 1 tablet by mouth once daily.  Dispense: 90 tablet; Refill: 3  - Comprehensive metabolic panel; Future  - CBC auto differential; Future  - TSH; Future    3. Hyperlipidemia associated with type 2 diabetes mellitus  - Stable; no acute issues  - The current medical regimen is effective;  continue present plan and medications.  - simvastatin (ZOCOR) 20 MG tablet; Take 1 tablet (20 mg total) by mouth once daily.  Dispense: 90 tablet; Refill: 3  - Lipid panel; Future    4. Need for pneumococcal vaccination  - (In Office Administered) Pneumococcal Polysaccharide Vaccine (23 Valent) (SQ/IM)        RTC in 6 months     Zeinab Noble MD  12/04/2019 8:35 AM        No follow-ups on file.

## 2019-12-05 ENCOUNTER — PATIENT OUTREACH (OUTPATIENT)
Dept: ADMINISTRATIVE | Facility: HOSPITAL | Age: 64
End: 2019-12-05

## 2019-12-05 DIAGNOSIS — E11.65 TYPE 2 DIABETES MELLITUS WITH HYPERGLYCEMIA, WITHOUT LONG-TERM CURRENT USE OF INSULIN: Primary | ICD-10-CM

## 2019-12-05 LAB
ESTIMATED AVG GLUCOSE: 160 MG/DL (ref 68–131)
HBA1C MFR BLD HPLC: 7.2 % (ref 4–5.6)

## 2019-12-09 NOTE — TELEPHONE ENCOUNTER
----- Message from Wilma Prince sent at 12/9/2019  9:48 AM CST -----  Contact: [facility]  Type:  Pharmacy Calling to Clarify an RX    Name of Caller:  Olga    Pharmacy Name:  [facility]      What do they need to clarify? Is the SITagliptin (JANUVIA) 100 MG Tab to be taken with the dapagliflozin (FARXIGA) 10 mg Tab    Can you be contacted via [facility]? Call     Best Call Back Number:  [phone]

## 2020-05-27 ENCOUNTER — OFFICE VISIT (OUTPATIENT)
Dept: FAMILY MEDICINE | Facility: CLINIC | Age: 65
End: 2020-05-27
Payer: MEDICARE

## 2020-05-27 ENCOUNTER — LAB VISIT (OUTPATIENT)
Dept: LAB | Facility: HOSPITAL | Age: 65
End: 2020-05-27
Attending: INTERNAL MEDICINE
Payer: MEDICARE

## 2020-05-27 VITALS
DIASTOLIC BLOOD PRESSURE: 74 MMHG | HEART RATE: 107 BPM | HEIGHT: 67 IN | WEIGHT: 190.69 LBS | OXYGEN SATURATION: 99 % | SYSTOLIC BLOOD PRESSURE: 126 MMHG | BODY MASS INDEX: 29.93 KG/M2 | TEMPERATURE: 97 F

## 2020-05-27 DIAGNOSIS — D75.839 THROMBOCYTOSIS: ICD-10-CM

## 2020-05-27 DIAGNOSIS — M25.572 ACUTE LEFT ANKLE PAIN: ICD-10-CM

## 2020-05-27 DIAGNOSIS — E11.65 TYPE 2 DIABETES MELLITUS WITH HYPERGLYCEMIA, WITHOUT LONG-TERM CURRENT USE OF INSULIN: ICD-10-CM

## 2020-05-27 DIAGNOSIS — M85.80 OSTEOPENIA, UNSPECIFIED LOCATION: ICD-10-CM

## 2020-05-27 DIAGNOSIS — E11.65 TYPE 2 DIABETES MELLITUS WITH HYPERGLYCEMIA, WITHOUT LONG-TERM CURRENT USE OF INSULIN: Primary | ICD-10-CM

## 2020-05-27 DIAGNOSIS — E66.01 SEVERE OBESITY (BMI 35.0-35.9 WITH COMORBIDITY): ICD-10-CM

## 2020-05-27 DIAGNOSIS — Z12.31 ENCOUNTER FOR SCREENING MAMMOGRAM FOR BREAST CANCER: ICD-10-CM

## 2020-05-27 LAB
ALBUMIN SERPL BCP-MCNC: 4.2 G/DL (ref 3.5–5.2)
ALP SERPL-CCNC: 53 U/L (ref 55–135)
ALT SERPL W/O P-5'-P-CCNC: 7 U/L (ref 10–44)
ANION GAP SERPL CALC-SCNC: 11 MMOL/L (ref 8–16)
AST SERPL-CCNC: 13 U/L (ref 10–40)
BASOPHILS # BLD AUTO: 0.03 K/UL (ref 0–0.2)
BASOPHILS NFR BLD: 0.4 % (ref 0–1.9)
BILIRUB SERPL-MCNC: 0.2 MG/DL (ref 0.1–1)
BUN SERPL-MCNC: 14 MG/DL (ref 8–23)
CALCIUM SERPL-MCNC: 9.9 MG/DL (ref 8.7–10.5)
CHLORIDE SERPL-SCNC: 101 MMOL/L (ref 95–110)
CO2 SERPL-SCNC: 28 MMOL/L (ref 23–29)
CREAT SERPL-MCNC: 0.8 MG/DL (ref 0.5–1.4)
CRP SERPL-MCNC: 10.7 MG/L (ref 0–8.2)
DIFFERENTIAL METHOD: ABNORMAL
EOSINOPHIL # BLD AUTO: 0.3 K/UL (ref 0–0.5)
EOSINOPHIL NFR BLD: 3.4 % (ref 0–8)
ERYTHROCYTE [DISTWIDTH] IN BLOOD BY AUTOMATED COUNT: 15.5 % (ref 11.5–14.5)
ERYTHROCYTE [SEDIMENTATION RATE] IN BLOOD BY WESTERGREN METHOD: 50 MM/HR (ref 0–20)
EST. GFR  (AFRICAN AMERICAN): >60 ML/MIN/1.73 M^2
EST. GFR  (NON AFRICAN AMERICAN): >60 ML/MIN/1.73 M^2
GLUCOSE SERPL-MCNC: 110 MG/DL (ref 70–110)
HCT VFR BLD AUTO: 37.2 % (ref 37–48.5)
HGB BLD-MCNC: 11 G/DL (ref 12–16)
IMM GRANULOCYTES # BLD AUTO: 0.01 K/UL (ref 0–0.04)
IMM GRANULOCYTES NFR BLD AUTO: 0.1 % (ref 0–0.5)
LYMPHOCYTES # BLD AUTO: 2.1 K/UL (ref 1–4.8)
LYMPHOCYTES NFR BLD: 28.7 % (ref 18–48)
MCH RBC QN AUTO: 25.5 PG (ref 27–31)
MCHC RBC AUTO-ENTMCNC: 29.6 G/DL (ref 32–36)
MCV RBC AUTO: 86 FL (ref 82–98)
MONOCYTES # BLD AUTO: 0.5 K/UL (ref 0.3–1)
MONOCYTES NFR BLD: 6.9 % (ref 4–15)
NEUTROPHILS # BLD AUTO: 4.4 K/UL (ref 1.8–7.7)
NEUTROPHILS NFR BLD: 60.5 % (ref 38–73)
NRBC BLD-RTO: 0 /100 WBC
PLATELET # BLD AUTO: 555 K/UL (ref 150–350)
PMV BLD AUTO: 10.4 FL (ref 9.2–12.9)
POTASSIUM SERPL-SCNC: 3.8 MMOL/L (ref 3.5–5.1)
PROT SERPL-MCNC: 8.3 G/DL (ref 6–8.4)
RBC # BLD AUTO: 4.32 M/UL (ref 4–5.4)
SODIUM SERPL-SCNC: 140 MMOL/L (ref 136–145)
URATE SERPL-MCNC: 5.2 MG/DL (ref 2.4–5.7)
WBC # BLD AUTO: 7.25 K/UL (ref 3.9–12.7)

## 2020-05-27 PROCEDURE — 99999 PR PBB SHADOW E&M-EST. PATIENT-LVL III: ICD-10-PCS | Mod: PBBFAC,,, | Performed by: INTERNAL MEDICINE

## 2020-05-27 PROCEDURE — 99214 OFFICE O/P EST MOD 30 MIN: CPT | Mod: S$PBB,,, | Performed by: INTERNAL MEDICINE

## 2020-05-27 PROCEDURE — 85025 COMPLETE CBC W/AUTO DIFF WBC: CPT

## 2020-05-27 PROCEDURE — 83036 HEMOGLOBIN GLYCOSYLATED A1C: CPT

## 2020-05-27 PROCEDURE — 84550 ASSAY OF BLOOD/URIC ACID: CPT

## 2020-05-27 PROCEDURE — 99213 OFFICE O/P EST LOW 20 MIN: CPT | Mod: PBBFAC,PO | Performed by: INTERNAL MEDICINE

## 2020-05-27 PROCEDURE — 99999 PR PBB SHADOW E&M-EST. PATIENT-LVL III: CPT | Mod: PBBFAC,,, | Performed by: INTERNAL MEDICINE

## 2020-05-27 PROCEDURE — 86140 C-REACTIVE PROTEIN: CPT

## 2020-05-27 PROCEDURE — 80053 COMPREHEN METABOLIC PANEL: CPT

## 2020-05-27 PROCEDURE — 99214 PR OFFICE/OUTPT VISIT, EST, LEVL IV, 30-39 MIN: ICD-10-PCS | Mod: S$PBB,,, | Performed by: INTERNAL MEDICINE

## 2020-05-27 PROCEDURE — 85652 RBC SED RATE AUTOMATED: CPT

## 2020-05-27 RX ORDER — DAPAGLIFLOZIN 10 MG/1
TABLET, FILM COATED ORAL
Qty: 90 TABLET | Refills: 1 | Status: SHIPPED | OUTPATIENT
Start: 2020-05-27 | End: 2020-11-19 | Stop reason: SDUPTHER

## 2020-05-27 RX ORDER — MELOXICAM 7.5 MG/1
7.5 TABLET ORAL DAILY PRN
Qty: 30 TABLET | Refills: 0 | Status: SHIPPED | OUTPATIENT
Start: 2020-05-27 | End: 2020-07-14

## 2020-05-27 RX ORDER — METFORMIN HYDROCHLORIDE 1000 MG/1
1000 TABLET ORAL 2 TIMES DAILY
Qty: 180 TABLET | Refills: 1 | Status: SHIPPED | OUTPATIENT
Start: 2020-05-27 | End: 2020-11-19 | Stop reason: SDUPTHER

## 2020-05-27 NOTE — PROGRESS NOTES
SUBJECTIVE     Chief Complaint   Patient presents with    Follow-up       HPI  Harriett Oglesby is a 65 y.o. female with multiple medical diagnoses as listed in the medical history and problem list that presents for follow-up for DM2.  Pt has been doing okay since her last visit. She is fully compliant with meds and denies any adverse side effects. Her fasting blood sugar levels range from 106. Pt is mostly compliant with an ADA diet. She has not been exercising. Pt reports L ankle pain for 2 weeks. She reports it gets stiff and Tylenol only helps on occasion.       PAST MEDICAL HISTORY:  Past Medical History:   Diagnosis Date    Hyperlipidemia associated with type 2 diabetes mellitus     Hypertension     Type 2 diabetes mellitus        PAST SURGICAL HISTORY:  Past Surgical History:   Procedure Laterality Date    COLONOSCOPY N/A 1/4/2019    Procedure: COLONOSCOPY;  Surgeon: James Ozuna MD;  Location: [facility];  Service: Endoscopy;  Laterality: N/A;    HEMORRHOID SURGERY         SOCIAL HISTORY:  Social History     Socioeconomic History    Marital status: Single     Spouse name: Not on file    Number of children: Not on file    Years of education: Not on file    Highest education level: Not on file   Occupational History    Not on file   Social Needs    Financial resource strain: Not on file    Food insecurity:     Worry: Not on file     Inability: Not on file    Transportation needs:     Medical: Not on file     Non-medical: Not on file   Tobacco Use    Smoking status: Former Smoker     Types: Cigarettes    Smokeless tobacco: Never Used   Substance and Sexual Activity    Alcohol use: No     Frequency: Never    Drug use: No    Sexual activity: Not on file   Lifestyle    Physical activity:     Days per week: Not on file     Minutes per session: Not on file    Stress: Only a little   Relationships    Social connections:     Talks on phone: Not on file     Gets together: Not on file      Attends Jewish service: Not on file     Active member of club or organization: Not on file     Attends meetings of clubs or organizations: Not on file     Relationship status: Not on file   Other Topics Concern    Not on file   Social History Narrative    Not on file       FAMILY HISTORY:  Family History   Problem Relation Age of Onset    Pancreatic cancer Mother     Emphysema Father     Cancer Father     Breast cancer Cousin     Breast cancer Cousin        ALLERGIES AND MEDICATIONS: updated and reviewed.  Review of patient's allergies indicates:  No Known Allergies  Current Outpatient Medications   Medication Sig Dispense Refill    alendronate (FOSAMAX) 70 MG tablet Take 1 tablet (70 mg total) by mouth every 7 days. 4 tablet 11    amLODIPine (NORVASC) 10 MG tablet Take 1 tablet (10 mg total) by mouth once daily. 90 tablet 3    aspirin (ECOTRIN) 81 MG EC tablet Take 81 mg by mouth once daily.      atenolol (TENORMIN) 25 MG tablet Take 1 tablet (25 mg total) by mouth once daily. 90 tablet 3    blood sugar diagnostic Strp 1 strip by Misc.(Non-Drug; Combo Route) route once daily. 200 each 3    cyanocobalamin (VITAMIN B-12) 500 MCG tablet Take 500 mcg by mouth once daily.      dapagliflozin (FARXIGA) 10 mg Tab TAKE ONE TABLET BY MOUTH ONCE DAILY FOR DIABETES 90 tablet 1    metFORMIN (GLUCOPHAGE) 1000 MG tablet Take 1 tablet (1,000 mg total) by mouth 2 (two) times daily. for diabetes. 180 tablet 1    polyethylene glycol (COLYTE) 240-22.72-6.72 -5.84 gram SolR One gallon; split prep. 1 Bottle 0    PREVNAR 13, PF, 0.5 mL Syrg ADM 0.5ML IM UTD  0    simvastatin (ZOCOR) 20 MG tablet Take 1 tablet (20 mg total) by mouth once daily. 90 tablet 3    SITagliptin (JANUVIA) 100 MG Tab Take 1 tablet (100 mg total) by mouth once daily. 90 tablet 1    valsartan-hydrochlorothiazide (DIOVAN-HCT) 320-25 mg per tablet Take 1 tablet by mouth once daily. 90 tablet 3    FLUZONE HIGH-DOSE 2018-19, PF, 180 mcg/0.5 mL  "vaccine ADM 0.5ML IM UTD  0    FLUZONE HIGH-DOSE 2019-20, PF, 180 mcg/0.5 mL Syrg ADM 0.5ML IM UTD  0    meloxicam (MOBIC) 7.5 MG tablet Take 1 tablet (7.5 mg total) by mouth daily as needed for Pain. 30 tablet 0     No current facility-administered medications for this visit.        ROS  Review of Systems   Constitutional: Negative for chills and fever.   HENT: Negative for hearing loss and sore throat.    Eyes: Negative for visual disturbance.   Respiratory: Negative for cough and shortness of breath.    Cardiovascular: Negative for chest pain, palpitations and leg swelling.   Gastrointestinal: Negative for abdominal pain, constipation, diarrhea, nausea and vomiting.   Genitourinary: Negative for dysuria, frequency and urgency.   Musculoskeletal: Positive for arthralgias (L ankle) and joint swelling (L ankle). Negative for myalgias.   Skin: Negative for rash and wound.   Neurological: Negative for headaches.   Psychiatric/Behavioral: Negative for agitation and confusion. The patient is not nervous/anxious.          OBJECTIVE     Physical Exam  Vitals:    05/27/20 0953   BP: 126/74   Pulse: 107   Temp: 97.4 °F (36.3 °C)    Body mass index is 29.87 kg/m².  Weight: 86.5 kg (190 lb 11.2 oz)   Height: 5' 7" (170.2 cm)     Physical Exam   Constitutional: She is oriented to person, place, and time. She appears well-developed and well-nourished. No distress.   HENT:   Head: Normocephalic and atraumatic.   Right Ear: External ear normal.   Left Ear: External ear normal.   Nose: Nose normal.   Mouth/Throat: Oropharynx is clear and moist.   Eyes: Conjunctivae and EOM are normal. Right eye exhibits no discharge. Left eye exhibits no discharge. No scleral icterus.   Neck: Normal range of motion. Neck supple. No JVD present. No tracheal deviation present.   Cardiovascular: Normal rate, regular rhythm and intact distal pulses. Exam reveals no gallop and no friction rub.   No murmur heard.  Pulmonary/Chest: Effort normal and " breath sounds normal. No respiratory distress. She has no wheezes.   Abdominal: Soft. Bowel sounds are normal. She exhibits no distension and no mass. There is no tenderness. There is no rebound and no guarding.   Musculoskeletal: Normal range of motion. She exhibits edema (L ankle). She exhibits no tenderness or deformity.   Neurological: She is alert and oriented to person, place, and time. She exhibits normal muscle tone. Coordination normal.   Skin: Skin is warm and dry. No rash noted. There is erythema (L ankle).   Psychiatric: She has a normal mood and affect. Her behavior is normal. Judgment and thought content normal.         Health Maintenance       Date Due Completion Date    TETANUS VACCINE 02/02/1971 ---    Shingles Vaccine (1 of 2) 02/02/2003 ---    Foot Exam 06/03/2020 6/3/2019    Override on 6/3/2019: Done    Hemoglobin A1c 06/04/2020 12/4/2019    Eye Exam 10/28/2020 10/28/2019    Lipid Panel 12/04/2020 12/4/2019    Mammogram 01/28/2021 1/28/2019    High Dose Statin 05/27/2021 5/27/2020    DEXA SCAN 01/28/2022 1/28/2019    Colonoscopy 01/04/2029 1/4/2019            ASSESSMENT     65 y.o. female with     1. Type 2 diabetes mellitus with hyperglycemia, without long-term current use of insulin    2. Acute left ankle pain    3. Osteopenia, unspecified location    4. Thrombocytosis    5. Severe obesity (BMI 35.0-35.9 with comorbidity)    6. Encounter for screening mammogram for breast cancer        PLAN:     1. Type 2 diabetes mellitus with hyperglycemia, without long-term current use of insulin  - Check labs today  - dapagliflozin (FARXIGA) 10 mg Tab; TAKE ONE TABLET BY MOUTH ONCE DAILY FOR DIABETES  Dispense: 90 tablet; Refill: 1  - metFORMIN (GLUCOPHAGE) 1000 MG tablet; Take 1 tablet (1,000 mg total) by mouth 2 (two) times daily. for diabetes.  Dispense: 180 tablet; Refill: 1  - SITagliptin (JANUVIA) 100 MG Tab; Take 1 tablet (100 mg total) by mouth once daily.  Dispense: 90 tablet; Refill: 1    2.  Acute left ankle pain  - r/o gout, but likely 2/2 strain vs OA  - Sedimentation rate; Future  - C-Reactive Protein; Future  - Uric acid; Future  - meloxicam (MOBIC) 7.5 MG tablet; Take 1 tablet (7.5 mg total) by mouth daily as needed for Pain.  Dispense: 30 tablet; Refill: 0    3. Osteopenia, unspecified location  - Stable; no acute issues  - Monitor    4. Thrombocytosis  - Stable; no acute issues  - Monitor    5. Severe obesity (BMI 35.0-35.9 with comorbidity)  - Pt actively losing weight; aware of importance of eating a prudent diet and exercising    6. Encounter for screening mammogram for breast cancer  - Mammo Digital Screening Bilateral With CAD; Future        RTC in 6 months     Zeinab Noble MD  05/27/2020 10:02 AM        No follow-ups on file.

## 2020-05-28 ENCOUNTER — TELEPHONE (OUTPATIENT)
Dept: FAMILY MEDICINE | Facility: CLINIC | Age: 65
End: 2020-05-28

## 2020-05-28 LAB
ESTIMATED AVG GLUCOSE: 134 MG/DL (ref 68–131)
HBA1C MFR BLD HPLC: 6.3 % (ref 4–5.6)

## 2020-05-28 NOTE — TELEPHONE ENCOUNTER
Patient informed of Dr. Noble' note and recommendations.  Patient verbalized understanding.  Wanted to let Dr. Noble know that the medication prescribed for the ankle pain is working well, stated she took one this morning and has not had any pain.  Patient would also like to know when she should return for next visit with Dr. Noble.  Please advise.

## 2020-07-14 ENCOUNTER — TELEPHONE (OUTPATIENT)
Dept: FAMILY MEDICINE | Facility: CLINIC | Age: 65
End: 2020-07-14

## 2020-07-14 ENCOUNTER — OFFICE VISIT (OUTPATIENT)
Dept: FAMILY MEDICINE | Facility: CLINIC | Age: 65
End: 2020-07-14
Payer: MEDICARE

## 2020-07-14 VITALS
DIASTOLIC BLOOD PRESSURE: 60 MMHG | RESPIRATION RATE: 16 BRPM | SYSTOLIC BLOOD PRESSURE: 110 MMHG | BODY MASS INDEX: 30.27 KG/M2 | HEART RATE: 87 BPM | WEIGHT: 192.88 LBS | TEMPERATURE: 98 F | HEIGHT: 67 IN | OXYGEN SATURATION: 97 %

## 2020-07-14 DIAGNOSIS — M25.572 ACUTE LEFT ANKLE PAIN: Primary | ICD-10-CM

## 2020-07-14 DIAGNOSIS — R22.42 ANKLE MASS, LEFT: Primary | ICD-10-CM

## 2020-07-14 DIAGNOSIS — R22.42 ANKLE MASS, LEFT: ICD-10-CM

## 2020-07-14 PROCEDURE — 99214 OFFICE O/P EST MOD 30 MIN: CPT | Mod: S$PBB,,, | Performed by: PHYSICIAN ASSISTANT

## 2020-07-14 PROCEDURE — 99999 PR PBB SHADOW E&M-EST. PATIENT-LVL V: CPT | Mod: PBBFAC,,, | Performed by: PHYSICIAN ASSISTANT

## 2020-07-14 PROCEDURE — 99214 PR OFFICE/OUTPT VISIT, EST, LEVL IV, 30-39 MIN: ICD-10-PCS | Mod: S$PBB,,, | Performed by: PHYSICIAN ASSISTANT

## 2020-07-14 PROCEDURE — 99999 PR PBB SHADOW E&M-EST. PATIENT-LVL V: ICD-10-PCS | Mod: PBBFAC,,, | Performed by: PHYSICIAN ASSISTANT

## 2020-07-14 PROCEDURE — 99215 OFFICE O/P EST HI 40 MIN: CPT | Mod: PBBFAC,PO | Performed by: PHYSICIAN ASSISTANT

## 2020-07-14 RX ORDER — NAPROXEN 500 MG/1
500 TABLET ORAL 2 TIMES DAILY WITH MEALS
Qty: 60 TABLET | Refills: 0 | Status: SHIPPED | OUTPATIENT
Start: 2020-07-14 | End: 2022-01-26

## 2020-07-14 NOTE — PROGRESS NOTES
Subjective:       Patient ID: Harriett Oglesby is a 65 y.o. female with multiple medical diagnoses as listed in the medical history and problem list that presents for Ankle Pain (left since may)  .    Chief Complaint: Ankle Pain (left since may)      Ankle Pain   The incident occurred more than 1 week ago. There was no injury mechanism. The pain is present in the left ankle (medial aspect ). The quality of the pain is described as aching. The pain has been intermittent since onset. Pertinent negatives include no numbness or tingling. Associated symptoms comments: Occasionally radiate to big toe . The symptoms are aggravated by movement (going long periods without moving ). Treatments tried: tried both heat and ice--does get some relief ; tried tylenol and meloxicam and worked but only at night  Improvement on treatment: 10 percent relief wiht medication         Review of Systems   Musculoskeletal: Positive for arthralgias, gait problem and joint swelling.   Neurological: Negative for tingling, weakness and numbness.         PAST MEDICAL HISTORY:  Past Medical History:   Diagnosis Date    Hyperlipidemia associated with type 2 diabetes mellitus     Hypertension     Type 2 diabetes mellitus        SOCIAL HISTORY:  Social History     Socioeconomic History    Marital status: Single     Spouse name: Not on file    Number of children: Not on file    Years of education: Not on file    Highest education level: Not on file   Occupational History    Not on file   Social Needs    Financial resource strain: Not on file    Food insecurity     Worry: Not on file     Inability: Not on file    Transportation needs     Medical: Not on file     Non-medical: Not on file   Tobacco Use    Smoking status: Former Smoker     Types: Cigarettes    Smokeless tobacco: Never Used   Substance and Sexual Activity    Alcohol use: No     Frequency: Never    Drug use: No    Sexual activity: Not on file   Lifestyle    Physical  activity     Days per week: Not on file     Minutes per session: Not on file    Stress: Only a little   Relationships    Social connections     Talks on phone: Not on file     Gets together: Not on file     Attends Amish service: Not on file     Active member of club or organization: Not on file     Attends meetings of clubs or organizations: Not on file     Relationship status: Not on file   Other Topics Concern    Not on file   Social History Narrative    Not on file       ALLERGIES AND MEDICATIONS: updated and reviewed.  Review of patient's allergies indicates:  No Known Allergies  Current Outpatient Medications   Medication Sig Dispense Refill    amLODIPine (NORVASC) 10 MG tablet Take 1 tablet (10 mg total) by mouth once daily. 90 tablet 3    aspirin (ECOTRIN) 81 MG EC tablet Take 81 mg by mouth 2 (two) times a day.       atenolol (TENORMIN) 25 MG tablet Take 1 tablet (25 mg total) by mouth once daily. 90 tablet 3    blood sugar diagnostic Strp 1 strip by Misc.(Non-Drug; Combo Route) route once daily. 200 each 3    cyanocobalamin (VITAMIN B-12) 500 MCG tablet Take 500 mcg by mouth once daily.      dapagliflozin (FARXIGA) 10 mg Tab TAKE ONE TABLET BY MOUTH ONCE DAILY FOR DIABETES 90 tablet 1    metFORMIN (GLUCOPHAGE) 1000 MG tablet Take 1 tablet (1,000 mg total) by mouth 2 (two) times daily. for diabetes. 180 tablet 1    simvastatin (ZOCOR) 20 MG tablet Take 1 tablet (20 mg total) by mouth once daily. 90 tablet 3    valsartan-hydrochlorothiazide (DIOVAN-HCT) 320-25 mg per tablet Take 1 tablet by mouth once daily. 90 tablet 3    alendronate (FOSAMAX) 70 MG tablet Take 1 tablet (70 mg total) by mouth every 7 days. 4 tablet 11    FLUZONE HIGH-DOSE 2018-19, PF, 180 mcg/0.5 mL vaccine ADM 0.5ML IM UTD  0    FLUZONE HIGH-DOSE 2019-20, PF, 180 mcg/0.5 mL Syrg ADM 0.5ML IM UTD  0    meloxicam (MOBIC) 7.5 MG tablet Take 1 tablet (7.5 mg total) by mouth daily as needed for Pain. (Patient not taking:  "Reported on 7/14/2020) 30 tablet 0    naproxen (NAPROSYN) 500 MG tablet Take 1 tablet (500 mg total) by mouth 2 (two) times daily with meals. 60 tablet 0    polyethylene glycol (COLYTE) 240-22.72-6.72 -5.84 gram SolR One gallon; split prep. 1 Bottle 0    PREVNAR 13, PF, 0.5 mL Syrg ADM 0.5ML IM UTD  0     No current facility-administered medications for this visit.          Objective:   /60   Pulse 87   Temp 98 °F (36.7 °C) (Oral)   Resp 16   Ht 5' 7" (1.702 m)   Wt 87.5 kg (192 lb 14.4 oz)   SpO2 97%   BMI 30.21 kg/m²      Physical Exam  Cardiovascular:      Pulses:           Dorsalis pedis pulses are 2+ on the left side.        Posterior tibial pulses are 2+ on the left side.   Musculoskeletal:      Left foot: Normal range of motion. Deformity present.        Feet:    Feet:      Right foot:      Skin integrity: No ulcer, blister, skin breakdown, erythema, warmth, callus, dry skin or fissure.      Left foot:      Skin integrity: No ulcer, blister, skin breakdown, erythema, warmth, callus, dry skin or fissure.   Neurological:      Mental Status: She is alert and oriented to person, place, and time.   Psychiatric:         Mood and Affect: Mood normal.         Behavior: Behavior normal.                 Assessment:       1. Acute left ankle pain    2. Ankle mass, left        Plan:       Acute left ankle pain  -     X-Ray Ankle Complete Left; Future; Expected date: 07/14/2020  -     naproxen (NAPROSYN) 500 MG tablet; Take 1 tablet (500 mg total) by mouth 2 (two) times daily with meals.  Dispense: 60 tablet; Refill: 0    Ankle mass, left    if x-ray not definitive, will order US or CT if needed.           No follow-ups on file.  "

## 2020-07-14 NOTE — TELEPHONE ENCOUNTER
----- Message from GARETT Sanchez sent at 7/14/2020  1:36 PM CDT -----  Bone is fine so we need to proceed with ultrasound. They did see swelling so US will look at the soft tissue I am putting the order in. Call [phone] to schedule US

## 2020-07-15 DIAGNOSIS — Z71.89 COMPLEX CARE COORDINATION: ICD-10-CM

## 2020-11-11 LAB
LEFT EYE DM RETINOPATHY: NEGATIVE
RIGHT EYE DM RETINOPATHY: NEGATIVE

## 2020-11-19 ENCOUNTER — OFFICE VISIT (OUTPATIENT)
Dept: FAMILY MEDICINE | Facility: CLINIC | Age: 65
End: 2020-11-19
Payer: MEDICARE

## 2020-11-19 VITALS
RESPIRATION RATE: 16 BRPM | HEART RATE: 73 BPM | DIASTOLIC BLOOD PRESSURE: 64 MMHG | SYSTOLIC BLOOD PRESSURE: 136 MMHG | BODY MASS INDEX: 32.97 KG/M2 | WEIGHT: 193.13 LBS | HEIGHT: 64 IN | TEMPERATURE: 98 F | OXYGEN SATURATION: 99 %

## 2020-11-19 DIAGNOSIS — E78.5 HYPERLIPIDEMIA ASSOCIATED WITH TYPE 2 DIABETES MELLITUS: ICD-10-CM

## 2020-11-19 DIAGNOSIS — Z23 NEEDS FLU SHOT: ICD-10-CM

## 2020-11-19 DIAGNOSIS — E11.65 TYPE 2 DIABETES MELLITUS WITH HYPERGLYCEMIA, WITHOUT LONG-TERM CURRENT USE OF INSULIN: Primary | ICD-10-CM

## 2020-11-19 DIAGNOSIS — D75.839 THROMBOCYTOSIS: Primary | ICD-10-CM

## 2020-11-19 DIAGNOSIS — I10 HYPERTENSION, ESSENTIAL, BENIGN: ICD-10-CM

## 2020-11-19 DIAGNOSIS — E11.69 HYPERLIPIDEMIA ASSOCIATED WITH TYPE 2 DIABETES MELLITUS: ICD-10-CM

## 2020-11-19 PROCEDURE — 99999 PR PBB SHADOW E&M-EST. PATIENT-LVL III: CPT | Mod: PBBFAC,,, | Performed by: INTERNAL MEDICINE

## 2020-11-19 PROCEDURE — 90694 VACC AIIV4 NO PRSRV 0.5ML IM: CPT | Mod: PBBFAC,PO

## 2020-11-19 PROCEDURE — 99214 OFFICE O/P EST MOD 30 MIN: CPT | Mod: S$PBB,,, | Performed by: INTERNAL MEDICINE

## 2020-11-19 PROCEDURE — G0008 ADMIN INFLUENZA VIRUS VAC: HCPCS | Mod: PBBFAC,PO

## 2020-11-19 PROCEDURE — 99999 PR PBB SHADOW E&M-EST. PATIENT-LVL III: ICD-10-PCS | Mod: PBBFAC,,, | Performed by: INTERNAL MEDICINE

## 2020-11-19 PROCEDURE — 99214 PR OFFICE/OUTPT VISIT, EST, LEVL IV, 30-39 MIN: ICD-10-PCS | Mod: S$PBB,,, | Performed by: INTERNAL MEDICINE

## 2020-11-19 PROCEDURE — 99213 OFFICE O/P EST LOW 20 MIN: CPT | Mod: PBBFAC,PO,25 | Performed by: INTERNAL MEDICINE

## 2020-11-19 RX ORDER — VALSARTAN 320 MG/1
320 TABLET ORAL DAILY
COMMUNITY
Start: 2020-10-24 | End: 2020-11-19 | Stop reason: SDUPTHER

## 2020-11-19 RX ORDER — METFORMIN HYDROCHLORIDE 1000 MG/1
1000 TABLET ORAL 2 TIMES DAILY
Qty: 180 TABLET | Refills: 1 | Status: SHIPPED | OUTPATIENT
Start: 2020-11-19 | End: 2021-06-28 | Stop reason: SDUPTHER

## 2020-11-19 RX ORDER — HYDROCHLOROTHIAZIDE 25 MG/1
25 TABLET ORAL DAILY
Qty: 90 TABLET | Refills: 3 | Status: SHIPPED | OUTPATIENT
Start: 2020-11-19 | End: 2021-12-30

## 2020-11-19 RX ORDER — AMLODIPINE BESYLATE 10 MG/1
10 TABLET ORAL DAILY
Qty: 90 TABLET | Refills: 3 | Status: SHIPPED | OUTPATIENT
Start: 2020-11-19 | End: 2021-10-01

## 2020-11-19 RX ORDER — SIMVASTATIN 20 MG/1
20 TABLET, FILM COATED ORAL DAILY
Qty: 90 TABLET | Refills: 3 | Status: SHIPPED | OUTPATIENT
Start: 2020-11-19 | End: 2021-10-01

## 2020-11-19 RX ORDER — VALSARTAN 320 MG/1
320 TABLET ORAL DAILY
Qty: 90 TABLET | Refills: 3 | Status: SHIPPED | OUTPATIENT
Start: 2020-11-19 | End: 2021-12-30

## 2020-11-19 RX ORDER — DAPAGLIFLOZIN 10 MG/1
TABLET, FILM COATED ORAL
Qty: 90 TABLET | Refills: 1 | Status: SHIPPED | OUTPATIENT
Start: 2020-11-19 | End: 2021-06-10 | Stop reason: SDUPTHER

## 2020-11-19 RX ORDER — HYDROCHLOROTHIAZIDE 25 MG/1
25 TABLET ORAL DAILY
COMMUNITY
Start: 2020-10-24 | End: 2020-11-19 | Stop reason: SDUPTHER

## 2020-11-19 NOTE — PROGRESS NOTES
SUBJECTIVE     Chief Complaint   Patient presents with    Diabetes    Diabetic Foot Exam       HPI  Harriett Oglesby is a 65 y.o. female with multiple medical diagnoses as listed in the medical history and problem list that presents for follow-up for DM2. Pt has been doing well since her last visit. She is fully compliant with meds and denies any adverse side effects. Pt adheres to an ADA diet and does a little walking for exercise. Her fasting blood sugar levels range from . Pt denies any hypoglycemic episodes since her last visit. Pt is without any other complaints today.     PAST MEDICAL HISTORY:  Past Medical History:   Diagnosis Date    Hyperlipidemia associated with type 2 diabetes mellitus     Hypertension     Type 2 diabetes mellitus        PAST SURGICAL HISTORY:  Past Surgical History:   Procedure Laterality Date    COLONOSCOPY N/A 1/4/2019    Procedure: COLONOSCOPY;  Surgeon: James Ozuna MD;  Location: [facility];  Service: Endoscopy;  Laterality: N/A;    HEMORRHOID SURGERY         SOCIAL HISTORY:  Social History     Socioeconomic History    Marital status: Single     Spouse name: Not on file    Number of children: Not on file    Years of education: Not on file    Highest education level: Not on file   Occupational History    Not on file   Social Needs    Financial resource strain: Not on file    Food insecurity     Worry: Not on file     Inability: Not on file    Transportation needs     Medical: Not on file     Non-medical: Not on file   Tobacco Use    Smoking status: Former Smoker     Types: Cigarettes    Smokeless tobacco: Never Used   Substance and Sexual Activity    Alcohol use: No     Frequency: Never    Drug use: No    Sexual activity: Not on file   Lifestyle    Physical activity     Days per week: Not on file     Minutes per session: Not on file    Stress: Only a little   Relationships    Social connections     Talks on phone: Not on file     Gets together: Not  on file     Attends Yarsanism service: Not on file     Active member of club or organization: Not on file     Attends meetings of clubs or organizations: Not on file     Relationship status: Not on file   Other Topics Concern    Not on file   Social History Narrative    Not on file       FAMILY HISTORY:  Family History   Problem Relation Age of Onset    Pancreatic cancer Mother     Emphysema Father     Cancer Father     Breast cancer Cousin     Breast cancer Cousin        ALLERGIES AND MEDICATIONS: updated and reviewed.  Review of patient's allergies indicates:  No Known Allergies  Current Outpatient Medications   Medication Sig Dispense Refill    amLODIPine (NORVASC) 10 MG tablet Take 1 tablet (10 mg total) by mouth once daily. 90 tablet 3    aspirin (ECOTRIN) 81 MG EC tablet Take 81 mg by mouth 2 (two) times a day.       atenoloL (TENORMIN) 25 MG tablet TAKE ONE TABLET BY MOUTH EVERY DAY FOR BLOOD PRESSURE 90 tablet 1    blood sugar diagnostic Strp 1 strip by Misc.(Non-Drug; Combo Route) route once daily. 200 each 3    cyanocobalamin (VITAMIN B-12) 500 MCG tablet Take 500 mcg by mouth once daily.      dapagliflozin (FARXIGA) 10 mg tablet TAKE ONE TABLET BY MOUTH ONCE DAILY FOR DIABETES 90 tablet 1    hydroCHLOROthiazide (HYDRODIURIL) 25 MG tablet Take 1 tablet (25 mg total) by mouth once daily. 90 tablet 3    metFORMIN (GLUCOPHAGE) 1000 MG tablet Take 1 tablet (1,000 mg total) by mouth 2 (two) times daily. for diabetes. 180 tablet 1    naproxen (NAPROSYN) 500 MG tablet Take 1 tablet (500 mg total) by mouth 2 (two) times daily with meals. 60 tablet 0    simvastatin (ZOCOR) 20 MG tablet Take 1 tablet (20 mg total) by mouth once daily. 90 tablet 3    valsartan (DIOVAN) 320 MG tablet Take 1 tablet (320 mg total) by mouth once daily. 90 tablet 3     No current facility-administered medications for this visit.        ROS  Review of Systems   Constitutional: Negative for chills and fever.   HENT:  "Negative for hearing loss and sore throat.    Eyes: Negative for visual disturbance.   Respiratory: Negative for cough and shortness of breath.    Cardiovascular: Negative for chest pain, palpitations and leg swelling.   Gastrointestinal: Negative for abdominal pain, constipation, diarrhea, nausea and vomiting.   Genitourinary: Negative for dysuria, frequency and urgency.   Musculoskeletal: Negative for arthralgias, joint swelling and myalgias.   Skin: Negative for rash and wound.   Neurological: Negative for headaches.   Psychiatric/Behavioral: Negative for agitation and confusion. The patient is not nervous/anxious.          OBJECTIVE     Physical Exam  Vitals:    11/19/20 0847   BP: 136/64   Pulse: 73   Resp: 16   Temp: 97.6 °F (36.4 °C)    Body mass index is 33.15 kg/m².  Weight: 87.6 kg (193 lb 2 oz)   Height: 5' 4" (162.6 cm)     Physical Exam  Constitutional:       General: She is not in acute distress.     Appearance: She is well-developed.   HENT:      Head: Normocephalic and atraumatic.      Right Ear: External ear normal.      Left Ear: External ear normal.      Nose: Nose normal.   Eyes:      General: No scleral icterus.        Right eye: No discharge.         Left eye: No discharge.      Conjunctiva/sclera: Conjunctivae normal.   Neck:      Musculoskeletal: Normal range of motion and neck supple.      Vascular: No JVD.      Trachea: No tracheal deviation.   Cardiovascular:      Rate and Rhythm: Normal rate and regular rhythm.      Heart sounds: No murmur. No friction rub. No gallop.    Pulmonary:      Effort: Pulmonary effort is normal. No respiratory distress.      Breath sounds: Normal breath sounds. No wheezing.   Abdominal:      General: Bowel sounds are normal. There is no distension.      Palpations: Abdomen is soft. There is no mass.      Tenderness: There is no abdominal tenderness. There is no guarding or rebound.   Musculoskeletal: Normal range of motion.         General: No tenderness or " deformity.      Right foot: Normal range of motion.      Left foot: Normal range of motion.   Feet:      Right foot:      Protective Sensation: 10 sites tested. 10 sites sensed.      Skin integrity: Callus and dry skin present. No ulcer, blister or skin breakdown.      Left foot:      Protective Sensation: 10 sites tested. 10 sites sensed.      Skin integrity: Callus and dry skin present. No ulcer, blister or skin breakdown.   Skin:     General: Skin is warm and dry.      Findings: No erythema or rash.   Neurological:      Mental Status: She is alert and oriented to person, place, and time.      Motor: No abnormal muscle tone.      Coordination: Coordination normal.   Psychiatric:         Behavior: Behavior normal.         Thought Content: Thought content normal.         Judgment: Judgment normal.           Health Maintenance       Date Due Completion Date    TETANUS VACCINE 02/02/1971 ---    Shingles Vaccine (1 of 2) 02/02/2003 ---    Foot Exam 06/03/2020 6/3/2019    Override on 6/3/2019: Done    Influenza Vaccine (1) 08/01/2020 10/28/2019    Eye Exam 10/28/2020 10/28/2019    Hemoglobin A1c 11/27/2020 5/27/2020    Lipid Panel 12/04/2020 12/4/2019    Mammogram 01/28/2021 1/28/2019    High Dose Statin 11/19/2021 11/19/2020    DEXA SCAN 01/28/2022 1/28/2019    Colorectal Cancer Screening 01/04/2029 1/4/2019            ASSESSMENT     65 y.o. female with     1. Type 2 diabetes mellitus with hyperglycemia, without long-term current use of insulin    2. Hypertension, essential, benign    3. Hyperlipidemia associated with type 2 diabetes mellitus    4. Needs flu shot        PLAN:     1. Type 2 diabetes mellitus with hyperglycemia, without long-term current use of insulin  - Stable; no acute issues  - The current medical regimen is effective;  continue present plan and medications.  - CBC Auto Differential; Future  - Comprehensive Metabolic Panel; Future  - Hemoglobin A1C; Future  - Lipid Panel; Future  - TSH; Future  -  dapagliflozin (FARXIGA) 10 mg tablet; TAKE ONE TABLET BY MOUTH ONCE DAILY FOR DIABETES  Dispense: 90 tablet; Refill: 1  - metFORMIN (GLUCOPHAGE) 1000 MG tablet; Take 1 tablet (1,000 mg total) by mouth 2 (two) times daily. for diabetes.  Dispense: 180 tablet; Refill: 1    2. Hypertension, essential, benign  - BP well controlled; at goal of <140/90  - The current medical regimen is effective;  continue present plan and medications.  - amLODIPine (NORVASC) 10 MG tablet; Take 1 tablet (10 mg total) by mouth once daily.  Dispense: 90 tablet; Refill: 3  - valsartan (DIOVAN) 320 MG tablet; Take 1 tablet (320 mg total) by mouth once daily.  Dispense: 90 tablet; Refill: 3  - hydroCHLOROthiazide (HYDRODIURIL) 25 MG tablet; Take 1 tablet (25 mg total) by mouth once daily.  Dispense: 90 tablet; Refill: 3    3. Hyperlipidemia associated with type 2 diabetes mellitus  - Stable; no acute issues  - The current medical regimen is effective;  continue present plan and medications.  - simvastatin (ZOCOR) 20 MG tablet; Take 1 tablet (20 mg total) by mouth once daily.  Dispense: 90 tablet; Refill: 3    4. Needs flu shot  - Influenza (FLUAD) - Quadrivalent (Adjuvanted) *Preferred* (65+) (PF)        RTC in 6 months     Zeinab Noble MD  11/19/2020 9:04 AM        No follow-ups on file.

## 2020-11-19 NOTE — PROGRESS NOTES
Administered High Dose Flu vaccine IM to right deltoid.  Patient tolerated injection well.  Patient advised to wait in lobby for 15 minutes for observation and to report any adverse reactions immediately.  Patient verbalized understanding.

## 2020-11-24 ENCOUNTER — PATIENT OUTREACH (OUTPATIENT)
Dept: ADMINISTRATIVE | Facility: HOSPITAL | Age: 65
End: 2020-11-24

## 2020-12-02 DIAGNOSIS — E11.65 TYPE 2 DIABETES MELLITUS WITH HYPERGLYCEMIA, WITHOUT LONG-TERM CURRENT USE OF INSULIN: ICD-10-CM

## 2020-12-02 NOTE — TELEPHONE ENCOUNTER
----- Message from Loly Mercedes sent at 12/2/2020  8:46 AM CST -----  Contact: Patient [phone]  Type: RX Refill Request    Who Called:  Patient    Have you contacted your pharmacy: No    Refill or New Rx: Refill    RX Name and Strength: SITagliptin (JANUVIA) 100 MG Tab    Is this a 30 day or 90 day RX: 90 day    Preferred Pharmacy with phone number: .  [facility] [address] [address]  [address] [address]  Phone: [phone] Fax: [phone]    Local or Mail Order: Local    Would the patient rather a call back or a response via [facility]? Call back    Best Call Back Number: [phone]    Additional Information: Patient would like to know if the medication can be called in? Please call pt when this is done.

## 2020-12-03 ENCOUNTER — TELEPHONE (OUTPATIENT)
Dept: HEMATOLOGY/ONCOLOGY | Facility: CLINIC | Age: 65
End: 2020-12-03

## 2020-12-03 ENCOUNTER — OFFICE VISIT (OUTPATIENT)
Dept: HEMATOLOGY/ONCOLOGY | Facility: CLINIC | Age: 65
End: 2020-12-03
Payer: MEDICARE

## 2020-12-03 ENCOUNTER — LAB VISIT (OUTPATIENT)
Dept: LAB | Facility: HOSPITAL | Age: 65
End: 2020-12-03
Attending: INTERNAL MEDICINE
Payer: MEDICARE

## 2020-12-03 VITALS
WEIGHT: 188.94 LBS | DIASTOLIC BLOOD PRESSURE: 72 MMHG | BODY MASS INDEX: 29.65 KG/M2 | OXYGEN SATURATION: 100 % | HEIGHT: 67 IN | TEMPERATURE: 98 F | HEART RATE: 82 BPM | SYSTOLIC BLOOD PRESSURE: 148 MMHG

## 2020-12-03 DIAGNOSIS — E11.65 TYPE 2 DIABETES MELLITUS WITH HYPERGLYCEMIA, WITHOUT LONG-TERM CURRENT USE OF INSULIN: ICD-10-CM

## 2020-12-03 DIAGNOSIS — D75.839 THROMBOCYTOSIS: ICD-10-CM

## 2020-12-03 DIAGNOSIS — D75.839 THROMBOCYTOSIS: Primary | ICD-10-CM

## 2020-12-03 DIAGNOSIS — E78.5 HYPERLIPIDEMIA ASSOCIATED WITH TYPE 2 DIABETES MELLITUS: ICD-10-CM

## 2020-12-03 DIAGNOSIS — M19.90 ARTHRITIS: ICD-10-CM

## 2020-12-03 DIAGNOSIS — E11.69 HYPERLIPIDEMIA ASSOCIATED WITH TYPE 2 DIABETES MELLITUS: ICD-10-CM

## 2020-12-03 DIAGNOSIS — I10 HYPERTENSION, ESSENTIAL, BENIGN: ICD-10-CM

## 2020-12-03 DIAGNOSIS — D50.0 IRON DEFICIENCY ANEMIA DUE TO CHRONIC BLOOD LOSS: ICD-10-CM

## 2020-12-03 LAB
BASOPHILS # BLD AUTO: 0.05 K/UL (ref 0–0.2)
BASOPHILS NFR BLD: 0.8 % (ref 0–1.9)
CRP SERPL-MCNC: 8 MG/L (ref 0–8.2)
DIFFERENTIAL METHOD: ABNORMAL
EOSINOPHIL # BLD AUTO: 0.2 K/UL (ref 0–0.5)
EOSINOPHIL NFR BLD: 2.6 % (ref 0–8)
ERYTHROCYTE [DISTWIDTH] IN BLOOD BY AUTOMATED COUNT: 14.5 % (ref 11.5–14.5)
ERYTHROCYTE [SEDIMENTATION RATE] IN BLOOD BY WESTERGREN METHOD: 45 MM/HR (ref 0–20)
FERRITIN SERPL-MCNC: 7 NG/ML (ref 20–300)
HCT VFR BLD AUTO: 35.3 % (ref 37–48.5)
HGB BLD-MCNC: 10.5 G/DL (ref 12–16)
IMM GRANULOCYTES # BLD AUTO: 0.02 K/UL (ref 0–0.04)
IMM GRANULOCYTES NFR BLD AUTO: 0.3 % (ref 0–0.5)
IRON SERPL-MCNC: 52 UG/DL (ref 30–160)
LYMPHOCYTES # BLD AUTO: 2.2 K/UL (ref 1–4.8)
LYMPHOCYTES NFR BLD: 36.5 % (ref 18–48)
MCH RBC QN AUTO: 25.4 PG (ref 27–31)
MCHC RBC AUTO-ENTMCNC: 29.7 G/DL (ref 32–36)
MCV RBC AUTO: 85 FL (ref 82–98)
MONOCYTES # BLD AUTO: 0.5 K/UL (ref 0.3–1)
MONOCYTES NFR BLD: 8.4 % (ref 4–15)
NEUTROPHILS # BLD AUTO: 3.1 K/UL (ref 1.8–7.7)
NEUTROPHILS NFR BLD: 51.4 % (ref 38–73)
NRBC BLD-RTO: 0 /100 WBC
PATH REV BLD -IMP: NORMAL
PATH REV BLD -IMP: NORMAL
PLATELET # BLD AUTO: 532 K/UL (ref 150–350)
PMV BLD AUTO: 10 FL (ref 9.2–12.9)
RBC # BLD AUTO: 4.14 M/UL (ref 4–5.4)
SATURATED IRON: 11 % (ref 20–50)
TOTAL IRON BINDING CAPACITY: 457 UG/DL (ref 250–450)
TRANSFERRIN SERPL-MCNC: 309 MG/DL (ref 200–375)
WBC # BLD AUTO: 6.05 K/UL (ref 3.9–12.7)

## 2020-12-03 PROCEDURE — 86140 C-REACTIVE PROTEIN: CPT

## 2020-12-03 PROCEDURE — 85060 PATHOLOGIST REVIEW: ICD-10-PCS | Mod: ,,, | Performed by: PATHOLOGY

## 2020-12-03 PROCEDURE — 83540 ASSAY OF IRON: CPT

## 2020-12-03 PROCEDURE — 85652 RBC SED RATE AUTOMATED: CPT

## 2020-12-03 PROCEDURE — 99204 PR OFFICE/OUTPT VISIT, NEW, LEVL IV, 45-59 MIN: ICD-10-PCS | Mod: S$PBB,,, | Performed by: INTERNAL MEDICINE

## 2020-12-03 PROCEDURE — 85060 BLOOD SMEAR INTERPRETATION: CPT | Mod: ,,, | Performed by: PATHOLOGY

## 2020-12-03 PROCEDURE — 99999 PR PBB SHADOW E&M-EST. PATIENT-LVL V: CPT | Mod: PBBFAC,,, | Performed by: INTERNAL MEDICINE

## 2020-12-03 PROCEDURE — 82728 ASSAY OF FERRITIN: CPT

## 2020-12-03 PROCEDURE — 99215 OFFICE O/P EST HI 40 MIN: CPT | Mod: PBBFAC | Performed by: INTERNAL MEDICINE

## 2020-12-03 PROCEDURE — 85025 COMPLETE CBC W/AUTO DIFF WBC: CPT

## 2020-12-03 PROCEDURE — 99204 OFFICE O/P NEW MOD 45 MIN: CPT | Mod: S$PBB,,, | Performed by: INTERNAL MEDICINE

## 2020-12-03 PROCEDURE — 99999 PR PBB SHADOW E&M-EST. PATIENT-LVL V: ICD-10-PCS | Mod: PBBFAC,,, | Performed by: INTERNAL MEDICINE

## 2020-12-03 RX ORDER — CETIRIZINE HYDROCHLORIDE 10 MG/1
10 TABLET ORAL DAILY
COMMUNITY

## 2020-12-03 NOTE — TELEPHONE ENCOUNTER
Called pt to inform of her appt [facility] with Dr. Mejias on Monday Dec. 9, 2020 @1:00pm. Pt stated that she will make it to the appt.   Erik

## 2020-12-03 NOTE — PROGRESS NOTES
PATIENT: Harriett Oglesby  MRN: [id number]  DATE: 12/3/2020      Diagnosis:   1. Thrombocytosis    2. Type 2 diabetes mellitus with hyperglycemia, without long-term current use of insulin    3. Hypertension, essential, benign    4. Hyperlipidemia associated with type 2 diabetes mellitus    5. Arthritis        Chief Complaint: thrombocytosis (new patient)    Subjective:    Initial History: Ms. Oglesby is a 65 y.o. 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  Her platelets have been elevated since at least 12/03/18.  Platelet count has ranged from 555k-675k.  The patient complains of chronic pain in her left ankle.  She had x-rays done 7/14/20 showing soft tissue swelling of the medial malleolus.  She states the pain is worse with activity.  She also endorses chronic fatigue and cold intolerance.  The patient denies CP, SOB, abdominal pain, N/V, constipation, diarrhea.  The patient denies fever, chills, night sweats, weight loss, new lumps or bumps, easy bruising or bleeding.  She states she has been on oral iron in the past and developed severe constipation.     Past Medical History:   Past Medical History:   Diagnosis Date    CVA (cerebral vascular accident)     Residual weakness in the left arm and hand    Hyperlipidemia associated with type 2 diabetes mellitus     Hypertension     Type 2 diabetes mellitus        Past Surgical HIstory:   Past Surgical History:   Procedure Laterality Date    COLONOSCOPY N/A 1/4/2019    Procedure: COLONOSCOPY;  Surgeon: James Ozuna MD;  Location: [facility];  Service: Endoscopy;  Laterality: N/A;    HEMORRHOID SURGERY         Family History:   Family History   Problem Relation Age of Onset    Pancreatic cancer Mother     Emphysema Father     Cancer Father         Lung    Breast cancer Cousin     Breast cancer Cousin        Social History:  reports that she has quit smoking. Her  smoking use included cigarettes. She has never used smokeless tobacco. She reports that she does not drink alcohol or use drugs.    Allergies:  Review of patient's allergies indicates:  No Known Allergies    Medications:  Current Outpatient Medications   Medication Sig Dispense Refill    amLODIPine (NORVASC) 10 MG tablet Take 1 tablet (10 mg total) by mouth once daily. 90 tablet 3    aspirin (ECOTRIN) 81 MG EC tablet Take 81 mg by mouth 2 (two) times a day.       atenoloL (TENORMIN) 25 MG tablet TAKE ONE TABLET BY MOUTH EVERY DAY FOR BLOOD PRESSURE 90 tablet 1    blood sugar diagnostic Strp 1 strip by Misc.(Non-Drug; Combo Route) route once daily. 200 each 3    cetirizine (ZYRTEC) 10 MG tablet Take 10 mg by mouth once daily.      cyanocobalamin (VITAMIN B-12) 500 MCG tablet Take 500 mcg by mouth once daily.      dapagliflozin (FARXIGA) 10 mg tablet TAKE ONE TABLET BY MOUTH ONCE DAILY FOR DIABETES 90 tablet 1    hydroCHLOROthiazide (HYDRODIURIL) 25 MG tablet Take 1 tablet (25 mg total) by mouth once daily. 90 tablet 3    metFORMIN (GLUCOPHAGE) 1000 MG tablet Take 1 tablet (1,000 mg total) by mouth 2 (two) times daily. for diabetes. 180 tablet 1    naproxen (NAPROSYN) 500 MG tablet Take 1 tablet (500 mg total) by mouth 2 (two) times daily with meals. 60 tablet 0    simvastatin (ZOCOR) 20 MG tablet Take 1 tablet (20 mg total) by mouth once daily. 90 tablet 3    valsartan (DIOVAN) 320 MG tablet Take 1 tablet (320 mg total) by mouth once daily. 90 tablet 3     No current facility-administered medications for this visit.        Review of Systems   Constitutional: Positive for fatigue. Negative for chills, diaphoresis, fever and unexpected weight change.   HENT: Negative for sore throat and trouble swallowing.    Eyes: Negative for photophobia, pain and visual disturbance.   Respiratory: Negative for cough, chest tightness and shortness of breath.    Cardiovascular: Negative for chest pain, palpitations and leg  "swelling.   Gastrointestinal: Negative for abdominal pain, constipation, diarrhea, nausea and vomiting.   Endocrine: Positive for cold intolerance. Negative for heat intolerance.   Genitourinary: Negative for difficulty urinating and dysuria.   Musculoskeletal: Positive for arthralgias (Left ankle pain). Negative for back pain.   Skin: Negative for color change and rash.   Neurological: Positive for weakness (chronic left hand and left arm). Negative for dizziness, light-headedness, numbness and headaches.   Hematological: Negative for adenopathy. Does not bruise/bleed easily.       ECOG Performance Status: 2   Objective:      Vitals:   Vitals:    12/03/20 0852   BP: (!) 148/72   BP Location: Right arm   Patient Position: Sitting   BP Method: Large (Automatic)   Pulse: 82   Temp: 97.6 °F (36.4 °C)   TempSrc: Oral   SpO2: 100%   Weight: 85.7 kg (188 lb 15 oz)   Height: 5' 7" (1.702 m)       Physical Exam  Constitutional:       General: She is not in acute distress.     Appearance: She is well-developed. She is not diaphoretic.   HENT:      Head: Normocephalic and atraumatic.   Eyes:      General: No scleral icterus.        Right eye: No discharge.         Left eye: No discharge.   Cardiovascular:      Rate and Rhythm: Normal rate and regular rhythm.      Heart sounds: Normal heart sounds. No murmur. No friction rub. No gallop.    Pulmonary:      Effort: Pulmonary effort is normal. No respiratory distress.      Breath sounds: Normal breath sounds. No wheezing or rales.   Chest:      Chest wall: No tenderness.   Abdominal:      General: Bowel sounds are normal. There is no distension.      Palpations: Abdomen is soft. There is no mass.      Tenderness: There is no abdominal tenderness. There is no guarding or rebound.   Musculoskeletal:         General: No tenderness.      Comments: Left hand contracted in flexion   Lymphadenopathy:      Cervical: No cervical adenopathy.      Upper Body:      Right upper body: No " supraclavicular or axillary adenopathy.      Left upper body: No supraclavicular or axillary adenopathy.   Skin:     Findings: No erythema or rash.   Neurological:      Mental Status: She is alert and oriented to person, place, and time.   Psychiatric:         Behavior: Behavior normal.         Laboratory Data:  No visits with results within 1 Week(s) from this visit.   Latest known visit with results is:   Lab Visit on 11/19/2020   Component Date Value Ref Range Status    WBC 11/19/2020 6.39  3.90 - 12.70 K/uL Final    RBC 11/19/2020 4.39  4.00 - 5.40 M/uL Final    Hemoglobin 11/19/2020 11.3* 12.0 - 16.0 g/dL Final    Hematocrit 11/19/2020 37.0  37.0 - 48.5 % Final    MCV 11/19/2020 84  82 - 98 fL Final    MCH 11/19/2020 25.7* 27.0 - 31.0 pg Final    MCHC 11/19/2020 30.5* 32.0 - 36.0 g/dL Final    RDW 11/19/2020 14.5  11.5 - 14.5 % Final    Platelets 11/19/2020 675* 150 - 350 K/uL Final    MPV 11/19/2020 10.2  9.2 - 12.9 fL Final    Immature Granulocytes 11/19/2020 0.3  0.0 - 0.5 % Final    Gran # (ANC) 11/19/2020 3.8  1.8 - 7.7 K/uL Final    Immature Grans (Abs) 11/19/2020 0.02  0.00 - 0.04 K/uL Final    Comment: Mild elevation in immature granulocytes is non specific and   can be seen in a variety of conditions including stress response,   acute inflammation, trauma and pregnancy. Correlation with other   laboratory and clinical findings is essential.      Lymph # 11/19/2020 1.9  1.0 - 4.8 K/uL Final    Mono # 11/19/2020 0.4  0.3 - 1.0 K/uL Final    Eos # 11/19/2020 0.2  0.0 - 0.5 K/uL Final    Baso # 11/19/2020 0.04  0.00 - 0.20 K/uL Final    nRBC 11/19/2020 0  0 /100 WBC Final    Gran % 11/19/2020 59.7  38.0 - 73.0 % Final    Lymph % 11/19/2020 30.2  18.0 - 48.0 % Final    Mono % 11/19/2020 6.4  4.0 - 15.0 % Final    Eosinophil % 11/19/2020 2.8  0.0 - 8.0 % Final    Basophil % 11/19/2020 0.6  0.0 - 1.9 % Final    Differential Method 11/19/2020 Automated   Final    Sodium 11/19/2020 141   136 - 145 mmol/L Final    Potassium 11/19/2020 3.5  3.5 - 5.1 mmol/L Final    Chloride 11/19/2020 98  95 - 110 mmol/L Final    CO2 11/19/2020 28  23 - 29 mmol/L Final    Glucose 11/19/2020 123* 70 - 110 mg/dL Final    BUN 11/19/2020 12  8 - 23 mg/dL Final    Creatinine 11/19/2020 0.9  0.5 - 1.4 mg/dL Final    Calcium 11/19/2020 10.0  8.7 - 10.5 mg/dL Final    Total Protein 11/19/2020 8.5* 6.0 - 8.4 g/dL Final    Albumin 11/19/2020 4.4  3.5 - 5.2 g/dL Final    Total Bilirubin 11/19/2020 0.3  0.1 - 1.0 mg/dL Final    Comment: For infants and newborns, interpretation of results should be based  on gestational age, weight and in agreement with clinical  observations.  Premature Infant recommended reference ranges:  Up to 24 hours.............<8.0 mg/dL  Up to 48 hours............<12.0 mg/dL  3-5 days..................<15.0 mg/dL  6-29 days.................<15.0 mg/dL      Alkaline Phosphatase 11/19/2020 61  55 - 135 U/L Final    AST 11/19/2020 11  10 - 40 U/L Final    ALT 11/19/2020 7* 10 - 44 U/L Final    Anion Gap 11/19/2020 15  8 - 16 mmol/L Final    eGFR if African American 11/19/2020 >60  >60 mL/min/1.73 m^2 Final    eGFR if non African American 11/19/2020 >60  >60 mL/min/1.73 m^2 Final    Comment: Calculation used to obtain the estimated glomerular filtration  rate (eGFR) is the CKD-EPI equation.       Hemoglobin A1C 11/19/2020 6.1* 4.0 - 5.6 % Final    Comment: ADA Screening Guidelines:  5.7-6.4%  Consistent with prediabetes  >or=6.5%  Consistent with diabetes  High levels of fetal hemoglobin interfere with the HbA1C  assay. Heterozygous hemoglobin variants (HbS, HgC, etc)do  not significantly interfere with this assay.   However, presence of multiple variants may affect accuracy.      Estimated Avg Glucose 11/19/2020 128  68 - 131 mg/dL Final    Cholesterol 11/19/2020 126  120 - 199 mg/dL Final    Comment: The National Cholesterol Education Program (NCEP) has set the  following guidelines  (reference ranges) for Cholesterol:  Optimal.....................<200 mg/dL  Borderline High.............200-239 mg/dL  High........................> or = 240 mg/dL      Triglycerides 2020 127  30 - 150 mg/dL Final    Comment: The National Cholesterol Education Program (NCEP) has set the  following guidelines (reference values) for triglycerides:  Normal......................<150 mg/dL  Borderline High.............150-199 mg/dL  High........................200-499 mg/dL      HDL 2020 49  40 - 75 mg/dL Final    Comment: The National Cholesterol Education Program (NCEP) has set the  following guidelines (reference values) for HDL Cholesterol:  Low...............<40 mg/dL  Optimal...........>60 mg/dL      LDL Cholesterol 2020 51.6* 63.0 - 159.0 mg/dL Final    Comment: The National Cholesterol Education Program (NCEP) has set the  following guidelines (reference values) for LDL Cholesterol:  Optimal.......................<130 mg/dL  Borderline High...............130-159 mg/dL  High..........................160-189 mg/dL  Very High.....................>190 mg/dL      HDL/Cholesterol Ratio 2020 38.9  20.0 - 50.0 % Final    Total Cholesterol/HDL Ratio 2020 2.6  2.0 - 5.0 Final    Non-HDL Cholesterol 2020 77  mg/dL Final    Comment: Risk category and Non-HDL cholesterol goals:  Coronary heart disease (CHD)or equivalent (10-year risk of CHD >20%):  Non-HDL cholesterol goal     <130 mg/dL  Two or more CHD risk factors and 10-year risk of CHD <= 20%:  Non-HDL cholesterol goal     <160 mg/dL  0 to 1 CHD risk factor:  Non-HDL cholesterol goal     <190 mg/dL      TSH 2020 1.694  0.400 - 4.000 uIU/mL Final         Imagin20 X-ray Left Ankle Complete    The alignment is within normal limits.  No displaced fractures identified.  No evidence of lytic or blastic lesions.Joint spaces are unremarkable.Soft tissue swelling medial malleolus.  Osseous calcaneal spur.       Assessment:        1. Thrombocytosis    2. Type 2 diabetes mellitus with hyperglycemia, without long-term current use of insulin    3. Hypertension, essential, benign    4. Hyperlipidemia associated with type 2 diabetes mellitus    5. Arthritis           Plan:     Thrombocytosis - The patient has thrombocytosis persistent and present since at least 12/03/18 with counts ranging from 555k-675k.  -Pt with prior elevations in ESRT and CRP 5/27/20 indicating chronic inflammation may be plating a role  -Will repeat ESR and CRP  -Ferritin and iron/TIBC to screen for iron deficiency  -BCR/ABL and Jak2 with reflex to CALR and MPL to screen for MPN  -The patient stats she has had severe constipation with oral iron in the past and would not want to take oral iron if indicated.    History of CVA - The patient had a CVA in 2005 with residual left hand and left arm weakness  -The patient is on ASA 81 mg and a statin  -PCP managing    DMII - pt on dapagliflozin, metformin  -Last A1C was 6.1 on 11/19/20  -PCP managing    HTN - pt on Atenolol, amlodipine, HCTZ, and valsartan  -WIll monitor    HLD - pt on Simvastatin  -PCP managing    Arthritis - pt with chronic pain in the left ankle  -Will refer to orthopedics    Advance Care Planning     Power of   I initiated the process of advance care planning today and explained the importance of this process to the patient.  I introduced the concept of advance directives to the patient, as well. Then the patient received detailed information about the importance of designating a Health Care Power of  (HCPOA). She was also instructed to communicate with this person about their wishes for future healthcare, should she become sick and lose decision-making capacity. The patient has not previously appointed a HCPOA. After our discussion, the patient has decided to complete a HCPOA and will bring the form completed to her next clinic appointment.            Follow Up - Ferritin, ESR, CRP,  iron/TIBC, path review, BCR/ABL, Jak2 with reflex to CALR and MPL with follow up in 2 weeks.     Benjamin Llanes MD  Hematology and Oncology  [facility]  Office:[phone]  Fax: [phone]

## 2020-12-03 NOTE — Clinical Note
The patient will need blood work today (Ferritin , iron/TIBC, ESR, CRP, BCR/ABL, Jak2 and pathologist review) and follow up with me in 2 weeks.

## 2020-12-03 NOTE — TELEPHONE ENCOUNTER
I called the patient and left her a message to call me back at [phone].     Benjamin Llanes MD  Hematology and Oncology  [facility]  Office:[phone]  Fax: [phone]

## 2020-12-03 NOTE — TELEPHONE ENCOUNTER
----- Message from Benjamin Llanes MD sent at 12/3/2020  9:36 AM CST -----  Please call the patient and have her set up to see the [facility] for left ankle pain.

## 2020-12-03 NOTE — LETTER
December 3, 2020      Zeinab Noble MD  [address] [address]  [address]  [place] [address]           [facility]  [address] [place] [address]  Phone: [phone]          Patient: Harriett Oglesby   MR Number: [id number]   YOB: 1955   Date of Visit: 12/3/2020       Dear Dr. Zeinab Noble:    Thank you for referring Harriett Oglesby to me for evaluation. Attached you will find relevant portions of my assessment and plan of care.    If you have questions, please do not hesitate to call me. I look forward to following Harriett Oglesby along with you.    Sincerely,    Benjamin Llanes MD    Enclosure  CC:  No Recipients    If you would like to receive this communication electronically, please contact [email or web address] or [phone] to request more information on [email or web address] Link access.    For providers and/or their staff who would like to refer a patient to [facility], please contact us through our one-stop-shop provider referral line, [facility] , at [phone].    If you feel you have received this communication in error or would no longer like to receive these types of communications, please e-mail [email or web address]

## 2020-12-11 LAB
MPNR  FINAL DIAGNOSIS: NORMAL
MPNR  SPECIMEN TYPE: 1
MPNR RESULT: NORMAL

## 2020-12-16 NOTE — PROGRESS NOTES
PATIENT: Harriett Oglesby  MRN: [id number]  DATE: 12/17/2020      Diagnosis:   1. Iron deficiency anemia due to chronic blood loss    2. Thrombocytosis    3. Type 2 diabetes mellitus with hyperglycemia, without long-term current use of insulin    4. Hypertension, essential, benign    5. Hyperlipidemia associated with type 2 diabetes mellitus        Chief Complaint: Follow-up (Iron deficiency anemia)    Subjective:    Interval History: Ms. Oglesby is a 65 y.o. 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  The patient's platelets have been elevated since at least 12/03/18 and have ranged from 555k-675k. Upon work up the patient was found to have a ferritin of 7.  Jaime 2 with reflex to CALR  And MPL was negative.  BCR/ABL is pending.  CRP was normal.  The patient denies any melena, BRBPR, hemoptysis, hematemesis, hematuria.  She states she eats red meats and greens.  The patient denies CP, SOB, abdominal pain, N/V, constipation, diarrhea.    Past Medical History:   Past Medical History:   Diagnosis Date    CVA (cerebral vascular accident)     Residual weakness in the left arm and hand    Hyperlipidemia associated with type 2 diabetes mellitus     Hypertension     Type 2 diabetes mellitus        Past Surgical HIstory:   Past Surgical History:   Procedure Laterality Date    COLONOSCOPY N/A 1/4/2019    Procedure: COLONOSCOPY;  Surgeon: James Ozuna MD;  Location: [facility];  Service: Endoscopy;  Laterality: N/A;    HEMORRHOID SURGERY         Family History:   Family History   Problem Relation Age of Onset    Pancreatic cancer Mother     Emphysema Father     Cancer Father         Lung    Breast cancer Cousin     Breast cancer Cousin        Social History:  reports that she has quit smoking. Her smoking use included cigarettes. She has never used smokeless tobacco. She reports that she does not drink alcohol  or use drugs.    Allergies:  Review of patient's allergies indicates:  No Known Allergies    Medications:  Current Outpatient Medications   Medication Sig Dispense Refill    amLODIPine (NORVASC) 10 MG tablet Take 1 tablet (10 mg total) by mouth once daily. 90 tablet 3    aspirin (ECOTRIN) 81 MG EC tablet Take 81 mg by mouth 2 (two) times a day.       atenoloL (TENORMIN) 25 MG tablet TAKE ONE TABLET BY MOUTH EVERY DAY FOR BLOOD PRESSURE 90 tablet 1    blood sugar diagnostic Strp 1 strip by Misc.(Non-Drug; Combo Route) route once daily. 200 each 3    cetirizine (ZYRTEC) 10 MG tablet Take 10 mg by mouth once daily.      cyanocobalamin (VITAMIN B-12) 500 MCG tablet Take 500 mcg by mouth once daily.      dapagliflozin (FARXIGA) 10 mg tablet TAKE ONE TABLET BY MOUTH ONCE DAILY FOR DIABETES 90 tablet 1    hydroCHLOROthiazide (HYDRODIURIL) 25 MG tablet Take 1 tablet (25 mg total) by mouth once daily. 90 tablet 3    metFORMIN (GLUCOPHAGE) 1000 MG tablet Take 1 tablet (1,000 mg total) by mouth 2 (two) times daily. for diabetes. 180 tablet 1    simvastatin (ZOCOR) 20 MG tablet Take 1 tablet (20 mg total) by mouth once daily. 90 tablet 3    valsartan (DIOVAN) 320 MG tablet Take 1 tablet (320 mg total) by mouth once daily. 90 tablet 3    naproxen (NAPROSYN) 500 MG tablet Take 1 tablet (500 mg total) by mouth 2 (two) times daily with meals. (Patient not taking: Reported on 12/17/2020) 60 tablet 0     No current facility-administered medications for this visit.        Review of Systems   Constitutional: Negative for chills, diaphoresis, fatigue, fever and unexpected weight change.   Respiratory: Negative for cough, chest tightness and shortness of breath.    Cardiovascular: Negative for chest pain, palpitations and leg swelling.   Gastrointestinal: Negative for abdominal pain, constipation, diarrhea, nausea and vomiting.   Neurological: Positive for weakness (chronic left hand and left arm). Negative for dizziness,  "light-headedness, numbness and headaches.   Hematological: Negative for adenopathy. Does not bruise/bleed easily.       ECOG Performance Status: 2   Objective:      Vitals:   Vitals:    12/17/20 1000   BP: 128/75   BP Location: Right arm   Patient Position: Sitting   BP Method: Large (Automatic)   Pulse: 88   Temp: 97.8 °F (36.6 °C)   TempSrc: Oral   SpO2: 99%   Weight: 86.4 kg (190 lb 7.6 oz)   Height: 5' 7" (1.702 m)       Physical Exam  Constitutional:       General: She is not in acute distress.     Appearance: She is well-developed. She is not diaphoretic.   HENT:      Head: Normocephalic and atraumatic.   Cardiovascular:      Rate and Rhythm: Normal rate and regular rhythm.      Heart sounds: Normal heart sounds. No murmur. No friction rub. No gallop.    Pulmonary:      Effort: Pulmonary effort is normal. No respiratory distress.      Breath sounds: Normal breath sounds. No wheezing or rales.   Chest:      Chest wall: No tenderness.   Abdominal:      General: Bowel sounds are normal. There is no distension.      Palpations: Abdomen is soft. There is no mass.      Tenderness: There is no abdominal tenderness. There is no guarding or rebound.   Musculoskeletal:         General: No tenderness.      Comments: Left hand contracted in flexion   Lymphadenopathy:      Cervical: No cervical adenopathy.      Upper Body:      Right upper body: No supraclavicular or axillary adenopathy.      Left upper body: No supraclavicular or axillary adenopathy.   Skin:     Findings: No erythema or rash.   Neurological:      Mental Status: She is alert and oriented to person, place, and time.   Psychiatric:         Behavior: Behavior normal.         Laboratory Data:  No visits with results within 1 Week(s) from this visit.   Latest known visit with results is:   Lab Visit on 12/03/2020   Component Date Value Ref Range Status    Sed Rate 12/03/2020 45* 0 - 20 mm/Hr Final    CRP 12/03/2020 8.0  0.0 - 8.2 mg/L Final    Ferritin " 12/03/2020 7* 20.0 - 300.0 ng/mL Final    Iron 12/03/2020 52  30 - 160 ug/dL Final    Transferrin 12/03/2020 309  200 - 375 mg/dL Final    TIBC 12/03/2020 457* 250 - 450 ug/dL Final    Saturated Iron 12/03/2020 11* 20 - 50 % Final    Specimen Type, BCR/ABL 12/03/2020 Blood   Final    MPNR  Specimen type 12/03/2020 1   Final    MPNR Result 12/03/2020 see interpretation   Final    MPNR  Final Diagnosis: 12/03/2020 SEE BELOW   Final    Comment: Peripheral blood, JAK2 V617F mutation analysis:  Negative for JAK2 V617F.  Method summary - JAK2 V617F analysis: Quantitative,   allele-specific polymerase chain reaction (PCR) assay was   performed using extracted genomic DNA to evaluate for the   point mutation causing JAK2 V617F. The analytic sensitivity   of this assay has been determined at 0.06%.  Peripheral blood, CALR mutation analysis, exon 9.  Negative. No deletion or insertion was detected in CALR,   exon 9.  Method summary - CALR: Exon 9 of CALR was amplified from   genomic DNA by polymerase chain reaction (PCR). The size of   the PCR product was analyzed by capillary electrophoresis.   The analytic sensitivity of this assay is approximately 6%   for the majority of CALR mutations and is approximately 20%   for the rare type 1-bp deletion.  Peripheral blood, MPL exon 10 mutation analysis:  Negative. No mutation was detected in MPL, exon 10.  Method summary - MPL exon 10 mutation analysis: Genomic DNA   was extracted and Tovar                           al sequencing used to evaluate for   mutations in MPL, exon 10. The sensitivity of this assay is   approximately 20%, such that samples containing lower   percentages of mutated DNA will appear negative.  Comment:  Negative results for [id number], CALR exon 9, and MPL exon   10 mutations do not exclude the diagnosis of a   myeloproliferative neoplasm. Correlation with clinical and   morphologic finding is recommended for a definitive   diagnosis. Samples  containing mutations in these genes at   levels below the analytical sensitivity of each assay may   not be detected by these methods. If there are persistent   unexplained erythrocytosis, JAK2 Exon 12 analysis could be   considered (JAKXB or JAKXM) to rule out polycythemia vera   (PV).  Signing Pathologist: Marci Ortega M.D.  -------------------ADDITIONAL INFORMATION-------------------  This test was developed and its performance characteristics   determined by [facility] in a manner consistent with CLIA   requirements. This test has not                            been cleared or approved by   the U.S. Food and Drug Administration.  Test Performed by:  [place] [address]  : Mykel Ortiz M.D. Ph.D.; CLIA# [id number]      Pathologist Review 12/03/2020 Review required   Final    WBC 12/03/2020 6.05  3.90 - 12.70 K/uL Final    RBC 12/03/2020 4.14  4.00 - 5.40 M/uL Final    Hemoglobin 12/03/2020 10.5* 12.0 - 16.0 g/dL Final    Hematocrit 12/03/2020 35.3* 37.0 - 48.5 % Final    MCV 12/03/2020 85  82 - 98 fL Final    MCH 12/03/2020 25.4* 27.0 - 31.0 pg Final    MCHC 12/03/2020 29.7* 32.0 - 36.0 g/dL Final    RDW 12/03/2020 14.5  11.5 - 14.5 % Final    Platelets 12/03/2020 532* 150 - 350 K/uL Final    MPV 12/03/2020 10.0  9.2 - 12.9 fL Final    Immature Granulocytes 12/03/2020 0.3  0.0 - 0.5 % Final    Gran # (ANC) 12/03/2020 3.1  1.8 - 7.7 K/uL Final    Immature Grans (Abs) 12/03/2020 0.02  0.00 - 0.04 K/uL Final    Comment: Mild elevation in immature granulocytes is non specific and   can be seen in a variety of conditions including stress response,   acute inflammation, trauma and pregnancy. Correlation with other   laboratory and clinical findings is essential.      Lymph # 12/03/2020 2.2  1.0 - 4.8 K/uL Final    Mono # 12/03/2020 0.5  0.3 - 1.0 K/uL Final    Eos # 12/03/2020 0.2  0.0 - 0.5 K/uL Final    Baso #  2020 0.05  0.00 - 0.20 K/uL Final    nRBC 2020 0  0 /100 WBC Final    Gran % 2020 51.4  38.0 - 73.0 % Final    Lymph % 2020 36.5  18.0 - 48.0 % Final    Mono % 2020 8.4  4.0 - 15.0 % Final    Eosinophil % 2020 2.6  0.0 - 8.0 % Final    Basophil % 2020 0.8  0.0 - 1.9 % Final    Differential Method 2020 Automated   Final    Pathologist Review Peripheral Smear 2020 REVIEWED   Final    Comment: Electronically reviewed and signed by:  Melany Alba M.D.  Signed on 20 at 12:10  No diagnostic morphologic abnormalities of red cells or white cells   is seen on scanning.  Platelets are increased in number.  This can be   seen in reactive and other conditions.  Correlate clinically.             Imagin20 X-ray Left Ankle Complete    The alignment is within normal limits.  No displaced fractures identified.  No evidence of lytic or blastic lesions.Joint spaces are unremarkable.Soft tissue swelling medial malleolus.  Osseous calcaneal spur.       Assessment:       1. Iron deficiency anemia due to chronic blood loss    2. Thrombocytosis    3. Type 2 diabetes mellitus with hyperglycemia, without long-term current use of insulin    4. Hypertension, essential, benign    5. Hyperlipidemia associated with type 2 diabetes mellitus           Plan:     Iron Deficiency Anemia - The patient has iron deficiency as diagnosed with lab work on 20  -The patient has had profound constipation with iron pills in the past and is intolerant  -The patient is to receive injectafer 750mg Iv for 2 doses a week apart  -Will have the patient see a gastroenterologist for GI bleeding workup.  -Will have the patient return to clinic 8 weeks after the secodn dose with repeat labs    Thrombocytosis - The patient has thrombocytosis persistent and present since at least 18 with counts ranging from 555k-675k.  -lab work up shows iron deficiency which may be  contributing  -Will assess platelet response to IV iron  -BCR/ABL pending    History of CVA - The patient had a CVA in 2005 with residual left hand and left arm weakness  -The patient is on ASA 81 mg and a statin  -PCP managing    DMII - pt on dapagliflozin, metformin  -Last A1C was 6.1 on 11/19/20  -PCP managing    HTN - pt on Atenolol, amlodipine, HCTZ, and valsartan  -WIll monitor    HLD - pt on Simvastatin  -PCP managing    Advance Care Planning     Power of   I initiated the process of advance care planning today and explained the importance of this process to the patient.  I introduced the concept of advance directives to the patient, as well. Then the patient received detailed information about the importance of designating a Health Care Power of  (HCPOA). She was also instructed to communicate with this person about their wishes for future healthcare, should she become sick and lose decision-making capacity. The patient has not previously appointed a HCPOA. After our discussion, the patient has decided to complete a HCPOA and will bring the form completed to her next clinic appointment.            Follow Up - iron injection today and in a week; follow up with me in 9 weeks with CBC, CMP, ferritin, and iron/TIBC prior to the appt    Benjamin Llanes MD  Hematology and Oncology  [facility]  Office:[phone]  Fax: [phone]

## 2020-12-17 ENCOUNTER — OFFICE VISIT (OUTPATIENT)
Dept: HEMATOLOGY/ONCOLOGY | Facility: CLINIC | Age: 65
End: 2020-12-17
Payer: MEDICARE

## 2020-12-17 ENCOUNTER — INFUSION (OUTPATIENT)
Dept: INFUSION THERAPY | Facility: HOSPITAL | Age: 65
End: 2020-12-17
Attending: INTERNAL MEDICINE
Payer: MEDICARE

## 2020-12-17 VITALS
HEIGHT: 67 IN | BODY MASS INDEX: 29.9 KG/M2 | TEMPERATURE: 98 F | DIASTOLIC BLOOD PRESSURE: 75 MMHG | WEIGHT: 190.5 LBS | HEART RATE: 88 BPM | SYSTOLIC BLOOD PRESSURE: 128 MMHG | OXYGEN SATURATION: 99 %

## 2020-12-17 VITALS
OXYGEN SATURATION: 100 % | RESPIRATION RATE: 18 BRPM | DIASTOLIC BLOOD PRESSURE: 57 MMHG | TEMPERATURE: 98 F | SYSTOLIC BLOOD PRESSURE: 119 MMHG | HEART RATE: 84 BPM

## 2020-12-17 DIAGNOSIS — D50.0 IRON DEFICIENCY ANEMIA DUE TO CHRONIC BLOOD LOSS: Primary | ICD-10-CM

## 2020-12-17 DIAGNOSIS — I10 HYPERTENSION, ESSENTIAL, BENIGN: ICD-10-CM

## 2020-12-17 DIAGNOSIS — D75.839 THROMBOCYTOSIS: ICD-10-CM

## 2020-12-17 DIAGNOSIS — E11.69 HYPERLIPIDEMIA ASSOCIATED WITH TYPE 2 DIABETES MELLITUS: ICD-10-CM

## 2020-12-17 DIAGNOSIS — E78.5 HYPERLIPIDEMIA ASSOCIATED WITH TYPE 2 DIABETES MELLITUS: ICD-10-CM

## 2020-12-17 DIAGNOSIS — E11.65 TYPE 2 DIABETES MELLITUS WITH HYPERGLYCEMIA, WITHOUT LONG-TERM CURRENT USE OF INSULIN: ICD-10-CM

## 2020-12-17 PROCEDURE — 99999 PR PBB SHADOW E&M-EST. PATIENT-LVL IV: ICD-10-PCS | Mod: PBBFAC,,, | Performed by: INTERNAL MEDICINE

## 2020-12-17 PROCEDURE — 25000003 PHARM REV CODE 250: Performed by: INTERNAL MEDICINE

## 2020-12-17 PROCEDURE — 99999 PR PBB SHADOW E&M-EST. PATIENT-LVL IV: CPT | Mod: PBBFAC,,, | Performed by: INTERNAL MEDICINE

## 2020-12-17 PROCEDURE — 99213 PR OFFICE/OUTPT VISIT, EST, LEVL III, 20-29 MIN: ICD-10-PCS | Mod: S$PBB,,, | Performed by: INTERNAL MEDICINE

## 2020-12-17 PROCEDURE — 99214 OFFICE O/P EST MOD 30 MIN: CPT | Mod: PBBFAC,25 | Performed by: INTERNAL MEDICINE

## 2020-12-17 PROCEDURE — 96374 THER/PROPH/DIAG INJ IV PUSH: CPT

## 2020-12-17 PROCEDURE — 63600175 PHARM REV CODE 636 W HCPCS: Mod: JG | Performed by: INTERNAL MEDICINE

## 2020-12-17 PROCEDURE — 99213 OFFICE O/P EST LOW 20 MIN: CPT | Mod: S$PBB,,, | Performed by: INTERNAL MEDICINE

## 2020-12-17 RX ADMIN — FERRIC CARBOXYMALTOSE INJECTION 750 MG: 50 INJECTION, SOLUTION INTRAVENOUS at 11:12

## 2020-12-17 NOTE — PLAN OF CARE
Patient arrived to unit for #1 of 2 Injectafer infusion. Pt reports fatigue, denies SOB or dizziness. Pt has left sided weakness due to CVA in 2005. Plan of care reviewed, patient agreeable to plan. Reviewed signs and symptoms of allergic reaction and potential side effects. Pt verbalized understanding. Patient tolerated infusion well. 30 min post observation completed. VSS. Discharge instructions reviewed, patient instructed to return Dec 24 for #2 Injectafer. Patient ambulated off unit accompanied by daughter. Patient in NAD at time of discharge.

## 2020-12-17 NOTE — Clinical Note
The patient can proceed with her iron injection today.  She will need a follow up with me in 9 weeks with CBC, CMP, ferritin, and iron/TIBC prior to the appt.

## 2020-12-18 LAB
DIAGNOSTIC BCR/ABL 1 RESULT: NORMAL
NARRATIVE DIAGNOSTIC REPORT-IMP: NORMAL
SPECIMEN TYPE, BCR/ABL: NORMAL

## 2020-12-23 RX ORDER — HEPARIN 100 UNIT/ML
500 SYRINGE INTRAVENOUS
Status: CANCELLED | OUTPATIENT
Start: 2020-12-24

## 2020-12-23 RX ORDER — SODIUM CHLORIDE 0.9 % (FLUSH) 0.9 %
10 SYRINGE (ML) INJECTION
Status: CANCELLED | OUTPATIENT
Start: 2020-12-24

## 2020-12-24 ENCOUNTER — INFUSION (OUTPATIENT)
Dept: INFUSION THERAPY | Facility: HOSPITAL | Age: 65
End: 2020-12-24
Attending: INTERNAL MEDICINE
Payer: MEDICARE

## 2020-12-24 VITALS
RESPIRATION RATE: 18 BRPM | TEMPERATURE: 98 F | HEART RATE: 79 BPM | OXYGEN SATURATION: 99 % | SYSTOLIC BLOOD PRESSURE: 119 MMHG | DIASTOLIC BLOOD PRESSURE: 67 MMHG

## 2020-12-24 DIAGNOSIS — D50.0 IRON DEFICIENCY ANEMIA DUE TO CHRONIC BLOOD LOSS: Primary | ICD-10-CM

## 2020-12-24 PROCEDURE — 63600175 PHARM REV CODE 636 W HCPCS: Mod: JG | Performed by: INTERNAL MEDICINE

## 2020-12-24 PROCEDURE — 25000003 PHARM REV CODE 250: Performed by: INTERNAL MEDICINE

## 2020-12-24 RX ADMIN — FERRIC CARBOXYMALTOSE INJECTION 750 MG: 50 INJECTION, SOLUTION INTRAVENOUS at 09:12

## 2020-12-24 NOTE — PLAN OF CARE
Patient arrived to unit for Injectafer #2 of 2 infusion. Plan of care reviewed, patient agreeable to plan. Patient tolerated infusion well.VSS. Discharge instructions reviewed. Patient ambulated off unit accompanied by family. Patient in NAD at time of discharge.

## 2020-12-31 ENCOUNTER — EXTERNAL CHRONIC CARE MANAGEMENT (OUTPATIENT)
Dept: PRIMARY CARE CLINIC | Facility: CLINIC | Age: 65
End: 2020-12-31
Payer: MEDICARE

## 2020-12-31 PROCEDURE — 99490 CHRNC CARE MGMT STAFF 1ST 20: CPT | Mod: PBBFAC,PO | Performed by: INTERNAL MEDICINE

## 2020-12-31 PROCEDURE — 99490 CHRNC CARE MGMT STAFF 1ST 20: CPT | Mod: S$PBB,,, | Performed by: INTERNAL MEDICINE

## 2020-12-31 PROCEDURE — 99490 PR CHRONIC CARE MGMT, 1ST 20 MIN: ICD-10-PCS | Mod: S$PBB,,, | Performed by: INTERNAL MEDICINE

## 2021-01-21 ENCOUNTER — OFFICE VISIT (OUTPATIENT)
Dept: GASTROENTEROLOGY | Facility: CLINIC | Age: 66
End: 2021-01-21
Payer: MEDICARE

## 2021-01-21 VITALS
BODY MASS INDEX: 31.99 KG/M2 | SYSTOLIC BLOOD PRESSURE: 104 MMHG | HEART RATE: 77 BPM | HEIGHT: 65 IN | DIASTOLIC BLOOD PRESSURE: 58 MMHG | WEIGHT: 192 LBS

## 2021-01-21 DIAGNOSIS — D50.9 IRON DEFICIENCY ANEMIA, UNSPECIFIED IRON DEFICIENCY ANEMIA TYPE: Primary | ICD-10-CM

## 2021-01-21 PROCEDURE — 99214 PR OFFICE/OUTPT VISIT, EST, LEVL IV, 30-39 MIN: ICD-10-PCS | Mod: S$PBB,,, | Performed by: INTERNAL MEDICINE

## 2021-01-21 PROCEDURE — 99213 OFFICE O/P EST LOW 20 MIN: CPT | Mod: PBBFAC | Performed by: INTERNAL MEDICINE

## 2021-01-21 PROCEDURE — 99999 PR PBB SHADOW E&M-EST. PATIENT-LVL III: ICD-10-PCS | Mod: PBBFAC,,, | Performed by: INTERNAL MEDICINE

## 2021-01-21 PROCEDURE — 99214 OFFICE O/P EST MOD 30 MIN: CPT | Mod: S$PBB,,, | Performed by: INTERNAL MEDICINE

## 2021-01-21 PROCEDURE — 99999 PR PBB SHADOW E&M-EST. PATIENT-LVL III: CPT | Mod: PBBFAC,,, | Performed by: INTERNAL MEDICINE

## 2021-01-22 DIAGNOSIS — Z01.818 PRE-OP TESTING: ICD-10-CM

## 2021-01-22 DIAGNOSIS — Z12.11 SPECIAL SCREENING FOR MALIGNANT NEOPLASMS, COLON: Primary | ICD-10-CM

## 2021-01-22 RX ORDER — SODIUM, POTASSIUM,MAG SULFATES 17.5-3.13G
1 SOLUTION, RECONSTITUTED, ORAL ORAL DAILY
Qty: 1 KIT | Refills: 0 | Status: SHIPPED | OUTPATIENT
Start: 2021-01-22 | End: 2021-01-24

## 2021-01-31 ENCOUNTER — EXTERNAL CHRONIC CARE MANAGEMENT (OUTPATIENT)
Dept: PRIMARY CARE CLINIC | Facility: CLINIC | Age: 66
End: 2021-01-31
Payer: MEDICARE

## 2021-01-31 PROCEDURE — 99490 CHRNC CARE MGMT STAFF 1ST 20: CPT | Mod: PBBFAC,PO | Performed by: INTERNAL MEDICINE

## 2021-01-31 PROCEDURE — 99490 PR CHRONIC CARE MGMT, 1ST 20 MIN: ICD-10-PCS | Mod: S$PBB,,, | Performed by: INTERNAL MEDICINE

## 2021-01-31 PROCEDURE — 99490 CHRNC CARE MGMT STAFF 1ST 20: CPT | Mod: S$PBB,,, | Performed by: INTERNAL MEDICINE

## 2021-02-22 ENCOUNTER — LAB VISIT (OUTPATIENT)
Dept: LAB | Facility: HOSPITAL | Age: 66
End: 2021-02-22
Attending: INTERNAL MEDICINE
Payer: MEDICARE

## 2021-02-22 DIAGNOSIS — D50.0 IRON DEFICIENCY ANEMIA DUE TO CHRONIC BLOOD LOSS: ICD-10-CM

## 2021-02-22 LAB
ALBUMIN SERPL BCP-MCNC: 4.5 G/DL (ref 3.5–5.2)
ALP SERPL-CCNC: 59 U/L (ref 55–135)
ALT SERPL W/O P-5'-P-CCNC: 12 U/L (ref 10–44)
ANION GAP SERPL CALC-SCNC: 12 MMOL/L (ref 8–16)
AST SERPL-CCNC: 17 U/L (ref 10–40)
BASOPHILS # BLD AUTO: 0.05 K/UL (ref 0–0.2)
BASOPHILS NFR BLD: 0.9 % (ref 0–1.9)
BILIRUB SERPL-MCNC: 0.3 MG/DL (ref 0.1–1)
BUN SERPL-MCNC: 17 MG/DL (ref 8–23)
CALCIUM SERPL-MCNC: 9.9 MG/DL (ref 8.7–10.5)
CHLORIDE SERPL-SCNC: 101 MMOL/L (ref 95–110)
CO2 SERPL-SCNC: 27 MMOL/L (ref 23–29)
CREAT SERPL-MCNC: 0.9 MG/DL (ref 0.5–1.4)
DIFFERENTIAL METHOD: ABNORMAL
EOSINOPHIL # BLD AUTO: 0.2 K/UL (ref 0–0.5)
EOSINOPHIL NFR BLD: 3.9 % (ref 0–8)
ERYTHROCYTE [DISTWIDTH] IN BLOOD BY AUTOMATED COUNT: 16.3 % (ref 11.5–14.5)
EST. GFR  (AFRICAN AMERICAN): >60 ML/MIN/1.73 M^2
EST. GFR  (NON AFRICAN AMERICAN): >60 ML/MIN/1.73 M^2
FERRITIN SERPL-MCNC: 336 NG/ML (ref 20–300)
GLUCOSE SERPL-MCNC: 152 MG/DL (ref 70–110)
HCT VFR BLD AUTO: 40.1 % (ref 37–48.5)
HGB BLD-MCNC: 12.9 G/DL (ref 12–16)
IMM GRANULOCYTES # BLD AUTO: 0.02 K/UL (ref 0–0.04)
IMM GRANULOCYTES NFR BLD AUTO: 0.4 % (ref 0–0.5)
IRON SERPL-MCNC: 90 UG/DL (ref 30–160)
LYMPHOCYTES # BLD AUTO: 1.7 K/UL (ref 1–4.8)
LYMPHOCYTES NFR BLD: 30.6 % (ref 18–48)
MCH RBC QN AUTO: 29.1 PG (ref 27–31)
MCHC RBC AUTO-ENTMCNC: 32.2 G/DL (ref 32–36)
MCV RBC AUTO: 90 FL (ref 82–98)
MONOCYTES # BLD AUTO: 0.4 K/UL (ref 0.3–1)
MONOCYTES NFR BLD: 7.1 % (ref 4–15)
NEUTROPHILS # BLD AUTO: 3.2 K/UL (ref 1.8–7.7)
NEUTROPHILS NFR BLD: 57.1 % (ref 38–73)
NRBC BLD-RTO: 0 /100 WBC
PLATELET # BLD AUTO: 482 K/UL (ref 150–350)
PMV BLD AUTO: 10.4 FL (ref 9.2–12.9)
POTASSIUM SERPL-SCNC: 3.7 MMOL/L (ref 3.5–5.1)
PROT SERPL-MCNC: 8.4 G/DL (ref 6–8.4)
RBC # BLD AUTO: 4.44 M/UL (ref 4–5.4)
SATURATED IRON: 29 % (ref 20–50)
SODIUM SERPL-SCNC: 140 MMOL/L (ref 136–145)
TOTAL IRON BINDING CAPACITY: 306 UG/DL (ref 250–450)
TRANSFERRIN SERPL-MCNC: 207 MG/DL (ref 200–375)
WBC # BLD AUTO: 5.63 K/UL (ref 3.9–12.7)

## 2021-02-22 PROCEDURE — 82728 ASSAY OF FERRITIN: CPT

## 2021-02-22 PROCEDURE — 80053 COMPREHEN METABOLIC PANEL: CPT

## 2021-02-22 PROCEDURE — 85025 COMPLETE CBC W/AUTO DIFF WBC: CPT

## 2021-02-22 PROCEDURE — 83540 ASSAY OF IRON: CPT

## 2021-02-22 PROCEDURE — 36415 COLL VENOUS BLD VENIPUNCTURE: CPT

## 2021-02-23 ENCOUNTER — OFFICE VISIT (OUTPATIENT)
Dept: HEMATOLOGY/ONCOLOGY | Facility: CLINIC | Age: 66
End: 2021-02-23
Payer: MEDICARE

## 2021-02-23 VITALS
OXYGEN SATURATION: 100 % | WEIGHT: 192 LBS | SYSTOLIC BLOOD PRESSURE: 125 MMHG | BODY MASS INDEX: 31.99 KG/M2 | DIASTOLIC BLOOD PRESSURE: 73 MMHG | HEART RATE: 76 BPM | HEIGHT: 65 IN | TEMPERATURE: 98 F

## 2021-02-23 DIAGNOSIS — Z86.73 HISTORY OF CVA (CEREBROVASCULAR ACCIDENT): ICD-10-CM

## 2021-02-23 DIAGNOSIS — D75.839 THROMBOCYTOSIS: ICD-10-CM

## 2021-02-23 DIAGNOSIS — E78.5 HYPERLIPIDEMIA ASSOCIATED WITH TYPE 2 DIABETES MELLITUS: ICD-10-CM

## 2021-02-23 DIAGNOSIS — D50.0 IRON DEFICIENCY ANEMIA DUE TO CHRONIC BLOOD LOSS: Primary | ICD-10-CM

## 2021-02-23 DIAGNOSIS — E11.65 TYPE 2 DIABETES MELLITUS WITH HYPERGLYCEMIA, WITHOUT LONG-TERM CURRENT USE OF INSULIN: ICD-10-CM

## 2021-02-23 DIAGNOSIS — E11.69 HYPERLIPIDEMIA ASSOCIATED WITH TYPE 2 DIABETES MELLITUS: ICD-10-CM

## 2021-02-23 DIAGNOSIS — I10 HYPERTENSION, ESSENTIAL, BENIGN: ICD-10-CM

## 2021-02-23 PROCEDURE — 99999 PR PBB SHADOW E&M-EST. PATIENT-LVL IV: ICD-10-PCS | Mod: PBBFAC,,, | Performed by: INTERNAL MEDICINE

## 2021-02-23 PROCEDURE — 99213 PR OFFICE/OUTPT VISIT, EST, LEVL III, 20-29 MIN: ICD-10-PCS | Mod: S$PBB,,, | Performed by: INTERNAL MEDICINE

## 2021-02-23 PROCEDURE — 99213 OFFICE O/P EST LOW 20 MIN: CPT | Mod: S$PBB,,, | Performed by: INTERNAL MEDICINE

## 2021-02-23 PROCEDURE — 99214 OFFICE O/P EST MOD 30 MIN: CPT | Mod: PBBFAC | Performed by: INTERNAL MEDICINE

## 2021-02-23 PROCEDURE — 99999 PR PBB SHADOW E&M-EST. PATIENT-LVL IV: CPT | Mod: PBBFAC,,, | Performed by: INTERNAL MEDICINE

## 2021-02-23 RX ORDER — NAPROXEN 250 MG/1
250 TABLET ORAL 2 TIMES DAILY PRN
COMMUNITY
Start: 2021-01-21 | End: 2022-01-26

## 2021-02-28 ENCOUNTER — ANESTHESIA EVENT (OUTPATIENT)
Dept: ENDOSCOPY | Facility: HOSPITAL | Age: 66
End: 2021-02-28
Payer: MEDICARE

## 2021-02-28 ENCOUNTER — EXTERNAL CHRONIC CARE MANAGEMENT (OUTPATIENT)
Dept: PRIMARY CARE CLINIC | Facility: CLINIC | Age: 66
End: 2021-02-28
Payer: MEDICARE

## 2021-02-28 PROCEDURE — 99490 PR CHRONIC CARE MGMT, 1ST 20 MIN: ICD-10-PCS | Mod: S$PBB,,, | Performed by: INTERNAL MEDICINE

## 2021-02-28 PROCEDURE — 99490 CHRNC CARE MGMT STAFF 1ST 20: CPT | Mod: S$PBB,,, | Performed by: INTERNAL MEDICINE

## 2021-02-28 PROCEDURE — 99490 CHRNC CARE MGMT STAFF 1ST 20: CPT | Mod: PBBFAC,PO | Performed by: INTERNAL MEDICINE

## 2021-03-01 ENCOUNTER — ANESTHESIA (OUTPATIENT)
Dept: ENDOSCOPY | Facility: HOSPITAL | Age: 66
End: 2021-03-01
Payer: MEDICARE

## 2021-03-01 ENCOUNTER — HOSPITAL ENCOUNTER (OUTPATIENT)
Facility: HOSPITAL | Age: 66
Discharge: HOME | End: 2021-03-01
Attending: INTERNAL MEDICINE | Admitting: INTERNAL MEDICINE
Payer: MEDICARE

## 2021-03-01 VITALS
HEART RATE: 84 BPM | RESPIRATION RATE: 18 BRPM | SYSTOLIC BLOOD PRESSURE: 122 MMHG | OXYGEN SATURATION: 98 % | DIASTOLIC BLOOD PRESSURE: 68 MMHG | TEMPERATURE: 98 F

## 2021-03-01 DIAGNOSIS — Z12.11 SCREENING FOR COLON CANCER: ICD-10-CM

## 2021-03-01 DIAGNOSIS — D50.0 IRON DEFICIENCY ANEMIA DUE TO CHRONIC BLOOD LOSS: Primary | ICD-10-CM

## 2021-03-01 LAB
POCT GLUCOSE: 137 MG/DL (ref 70–110)
SARS-COV-2 RDRP RESP QL NAA+PROBE: NEGATIVE

## 2021-03-01 PROCEDURE — 37000008 HC ANESTHESIA 1ST 15 MINUTES: Performed by: INTERNAL MEDICINE

## 2021-03-01 PROCEDURE — D9220A PRA ANESTHESIA: ICD-10-PCS | Mod: CRNA,,, | Performed by: REGISTERED NURSE

## 2021-03-01 PROCEDURE — 43270 EGD LESION ABLATION: CPT | Mod: 51,,, | Performed by: INTERNAL MEDICINE

## 2021-03-01 PROCEDURE — 88305 TISSUE EXAM BY PATHOLOGIST: CPT | Mod: 26,,, | Performed by: PATHOLOGY

## 2021-03-01 PROCEDURE — 63600175 PHARM REV CODE 636 W HCPCS: Performed by: REGISTERED NURSE

## 2021-03-01 PROCEDURE — 43270 PR EGD, FLEX, W/ABLATION, TUMOR/POLYP/LESION(S), W/ DILATION: ICD-10-PCS | Mod: 51,,, | Performed by: INTERNAL MEDICINE

## 2021-03-01 PROCEDURE — 25000003 PHARM REV CODE 250: Performed by: REGISTERED NURSE

## 2021-03-01 PROCEDURE — 45380 COLONOSCOPY AND BIOPSY: CPT | Performed by: INTERNAL MEDICINE

## 2021-03-01 PROCEDURE — 88305 TISSUE EXAM BY PATHOLOGIST: ICD-10-PCS | Mod: 26,,, | Performed by: PATHOLOGY

## 2021-03-01 PROCEDURE — 45380 COLONOSCOPY AND BIOPSY: CPT | Mod: ,,, | Performed by: INTERNAL MEDICINE

## 2021-03-01 PROCEDURE — 37000009 HC ANESTHESIA EA ADD 15 MINS: Performed by: INTERNAL MEDICINE

## 2021-03-01 PROCEDURE — 27202087 HC PROBE, APC: Performed by: INTERNAL MEDICINE

## 2021-03-01 PROCEDURE — 82962 GLUCOSE BLOOD TEST: CPT | Performed by: INTERNAL MEDICINE

## 2021-03-01 PROCEDURE — 45380 PR COLONOSCOPY,BIOPSY: ICD-10-PCS | Mod: ,,, | Performed by: INTERNAL MEDICINE

## 2021-03-01 PROCEDURE — 88305 TISSUE EXAM BY PATHOLOGIST: CPT | Performed by: PATHOLOGY

## 2021-03-01 PROCEDURE — 43255 EGD CONTROL BLEEDING ANY: CPT | Performed by: INTERNAL MEDICINE

## 2021-03-01 PROCEDURE — 25000003 PHARM REV CODE 250: Performed by: ANESTHESIOLOGY

## 2021-03-01 PROCEDURE — D9220A PRA ANESTHESIA: ICD-10-PCS | Mod: ANES,,, | Performed by: ANESTHESIOLOGY

## 2021-03-01 PROCEDURE — D9220A PRA ANESTHESIA: Mod: ANES,,, | Performed by: ANESTHESIOLOGY

## 2021-03-01 PROCEDURE — 27201012 HC FORCEPS, HOT/COLD, DISP: Performed by: INTERNAL MEDICINE

## 2021-03-01 PROCEDURE — D9220A PRA ANESTHESIA: Mod: CRNA,,, | Performed by: REGISTERED NURSE

## 2021-03-01 PROCEDURE — U0002 COVID-19 LAB TEST NON-CDC: HCPCS

## 2021-03-01 RX ORDER — SODIUM CHLORIDE 9 MG/ML
INJECTION, SOLUTION INTRAVENOUS ONCE
Status: COMPLETED | OUTPATIENT
Start: 2021-03-01 | End: 2021-03-01

## 2021-03-01 RX ORDER — PROPOFOL 10 MG/ML
INJECTION, EMULSION INTRAVENOUS
Status: DISCONTINUED
Start: 2021-03-01 | End: 2021-03-01 | Stop reason: HOSPADM

## 2021-03-01 RX ORDER — LIDOCAINE HYDROCHLORIDE 20 MG/ML
INJECTION INTRAVENOUS
Status: DISCONTINUED | OUTPATIENT
Start: 2021-03-01 | End: 2021-03-01

## 2021-03-01 RX ORDER — METOPROLOL TARTRATE 1 MG/ML
INJECTION, SOLUTION INTRAVENOUS
Status: DISCONTINUED | OUTPATIENT
Start: 2021-03-01 | End: 2021-03-01

## 2021-03-01 RX ORDER — LIDOCAINE HYDROCHLORIDE 10 MG/ML
1 INJECTION, SOLUTION EPIDURAL; INFILTRATION; INTRACAUDAL; PERINEURAL ONCE
Status: DISCONTINUED | OUTPATIENT
Start: 2021-03-02 | End: 2021-03-01 | Stop reason: HOSPADM

## 2021-03-01 RX ORDER — SODIUM CHLORIDE 9 MG/ML
INJECTION, SOLUTION INTRAVENOUS CONTINUOUS
Status: DISCONTINUED | OUTPATIENT
Start: 2021-03-02 | End: 2021-03-01 | Stop reason: HOSPADM

## 2021-03-01 RX ORDER — METOPROLOL TARTRATE 1 MG/ML
INJECTION, SOLUTION INTRAVENOUS
Status: COMPLETED
Start: 2021-03-01 | End: 2021-03-01

## 2021-03-01 RX ORDER — PROPOFOL 10 MG/ML
VIAL (ML) INTRAVENOUS
Status: DISCONTINUED | OUTPATIENT
Start: 2021-03-01 | End: 2021-03-01

## 2021-03-01 RX ORDER — LIDOCAINE HYDROCHLORIDE 20 MG/ML
INJECTION, SOLUTION EPIDURAL; INFILTRATION; INTRACAUDAL; PERINEURAL
Status: DISCONTINUED
Start: 2021-03-01 | End: 2021-03-01 | Stop reason: HOSPADM

## 2021-03-01 RX ORDER — ESMOLOL HYDROCHLORIDE 10 MG/ML
INJECTION INTRAVENOUS
Status: DISCONTINUED
Start: 2021-03-01 | End: 2021-03-01 | Stop reason: HOSPADM

## 2021-03-01 RX ADMIN — METOPROLOL TARTRATE 2.5 MG: 5 INJECTION, SOLUTION INTRAVENOUS at 10:03

## 2021-03-01 RX ADMIN — PROPOFOL 30 MG: 10 INJECTION, EMULSION INTRAVENOUS at 11:03

## 2021-03-01 RX ADMIN — ESMOLOL HYDROCHLORIDE 20 MG: 10 INJECTION INTRAVENOUS at 10:03

## 2021-03-01 RX ADMIN — ESMOLOL HYDROCHLORIDE 30 MG: 10 INJECTION INTRAVENOUS at 10:03

## 2021-03-01 RX ADMIN — PROPOFOL 50 MG: 10 INJECTION, EMULSION INTRAVENOUS at 10:03

## 2021-03-01 RX ADMIN — PROPOFOL 30 MG: 10 INJECTION, EMULSION INTRAVENOUS at 10:03

## 2021-03-01 RX ADMIN — METOPROLOL TARTRATE 2.5 MG: 5 INJECTION, SOLUTION INTRAVENOUS at 11:03

## 2021-03-01 RX ADMIN — SODIUM CHLORIDE: 0.9 INJECTION, SOLUTION INTRAVENOUS at 10:03

## 2021-03-01 RX ADMIN — Medication 100 MG: at 10:03

## 2021-03-02 LAB
FINAL PATHOLOGIC DIAGNOSIS: NORMAL
GROSS: NORMAL
Lab: NORMAL

## 2021-03-04 ENCOUNTER — TELEPHONE (OUTPATIENT)
Dept: GASTROENTEROLOGY | Facility: CLINIC | Age: 66
End: 2021-03-04

## 2021-03-31 ENCOUNTER — EXTERNAL CHRONIC CARE MANAGEMENT (OUTPATIENT)
Dept: PRIMARY CARE CLINIC | Facility: CLINIC | Age: 66
End: 2021-03-31
Payer: MEDICARE

## 2021-03-31 PROCEDURE — 99490 CHRNC CARE MGMT STAFF 1ST 20: CPT | Mod: PBBFAC,PO | Performed by: INTERNAL MEDICINE

## 2021-03-31 PROCEDURE — 99490 PR CHRONIC CARE MGMT, 1ST 20 MIN: ICD-10-PCS | Mod: S$PBB,,, | Performed by: INTERNAL MEDICINE

## 2021-03-31 PROCEDURE — 99490 CHRNC CARE MGMT STAFF 1ST 20: CPT | Mod: S$PBB,,, | Performed by: INTERNAL MEDICINE

## 2021-04-30 ENCOUNTER — EXTERNAL CHRONIC CARE MANAGEMENT (OUTPATIENT)
Dept: PRIMARY CARE CLINIC | Facility: CLINIC | Age: 66
End: 2021-04-30
Payer: MEDICARE

## 2021-04-30 PROCEDURE — 99490 CHRNC CARE MGMT STAFF 1ST 20: CPT | Mod: PBBFAC,PO | Performed by: INTERNAL MEDICINE

## 2021-04-30 PROCEDURE — 99490 CHRNC CARE MGMT STAFF 1ST 20: CPT | Mod: S$PBB,,, | Performed by: INTERNAL MEDICINE

## 2021-04-30 PROCEDURE — 99490 PR CHRONIC CARE MGMT, 1ST 20 MIN: ICD-10-PCS | Mod: S$PBB,,, | Performed by: INTERNAL MEDICINE

## 2021-05-11 ENCOUNTER — TELEPHONE (OUTPATIENT)
Dept: FAMILY MEDICINE | Facility: CLINIC | Age: 66
End: 2021-05-11

## 2021-05-17 ENCOUNTER — LAB VISIT (OUTPATIENT)
Dept: LAB | Facility: HOSPITAL | Age: 66
End: 2021-05-17
Attending: INTERNAL MEDICINE
Payer: MEDICARE

## 2021-05-17 DIAGNOSIS — D50.0 IRON DEFICIENCY ANEMIA DUE TO CHRONIC BLOOD LOSS: ICD-10-CM

## 2021-05-17 DIAGNOSIS — D75.839 THROMBOCYTOSIS: ICD-10-CM

## 2021-05-17 LAB
ALBUMIN SERPL BCP-MCNC: 4.2 G/DL (ref 3.5–5.2)
ALP SERPL-CCNC: 52 U/L (ref 55–135)
ALT SERPL W/O P-5'-P-CCNC: 12 U/L (ref 10–44)
ANION GAP SERPL CALC-SCNC: 10 MMOL/L (ref 8–16)
AST SERPL-CCNC: 13 U/L (ref 10–40)
BASOPHILS # BLD AUTO: 0.04 K/UL (ref 0–0.2)
BASOPHILS NFR BLD: 0.7 % (ref 0–1.9)
BILIRUB SERPL-MCNC: 0.3 MG/DL (ref 0.1–1)
BUN SERPL-MCNC: 12 MG/DL (ref 8–23)
CALCIUM SERPL-MCNC: 10.1 MG/DL (ref 8.7–10.5)
CHLORIDE SERPL-SCNC: 103 MMOL/L (ref 95–110)
CO2 SERPL-SCNC: 28 MMOL/L (ref 23–29)
CREAT SERPL-MCNC: 0.8 MG/DL (ref 0.5–1.4)
DIFFERENTIAL METHOD: ABNORMAL
EOSINOPHIL # BLD AUTO: 0.2 K/UL (ref 0–0.5)
EOSINOPHIL NFR BLD: 4.2 % (ref 0–8)
ERYTHROCYTE [DISTWIDTH] IN BLOOD BY AUTOMATED COUNT: 13 % (ref 11.5–14.5)
EST. GFR  (AFRICAN AMERICAN): >60 ML/MIN/1.73 M^2
EST. GFR  (NON AFRICAN AMERICAN): >60 ML/MIN/1.73 M^2
FERRITIN SERPL-MCNC: 218 NG/ML (ref 20–300)
GLUCOSE SERPL-MCNC: 120 MG/DL (ref 70–110)
HCT VFR BLD AUTO: 38.1 % (ref 37–48.5)
HGB BLD-MCNC: 12.7 G/DL (ref 12–16)
IMM GRANULOCYTES # BLD AUTO: 0.02 K/UL (ref 0–0.04)
IMM GRANULOCYTES NFR BLD AUTO: 0.4 % (ref 0–0.5)
IRON SERPL-MCNC: 53 UG/DL (ref 30–160)
LYMPHOCYTES # BLD AUTO: 2 K/UL (ref 1–4.8)
LYMPHOCYTES NFR BLD: 34.9 % (ref 18–48)
MCH RBC QN AUTO: 31.4 PG (ref 27–31)
MCHC RBC AUTO-ENTMCNC: 33.3 G/DL (ref 32–36)
MCV RBC AUTO: 94 FL (ref 82–98)
MONOCYTES # BLD AUTO: 0.4 K/UL (ref 0.3–1)
MONOCYTES NFR BLD: 7.8 % (ref 4–15)
NEUTROPHILS # BLD AUTO: 2.9 K/UL (ref 1.8–7.7)
NEUTROPHILS NFR BLD: 52 % (ref 38–73)
NRBC BLD-RTO: 0 /100 WBC
PLATELET # BLD AUTO: 452 K/UL (ref 150–450)
PMV BLD AUTO: 10.1 FL (ref 9.2–12.9)
POTASSIUM SERPL-SCNC: 3.5 MMOL/L (ref 3.5–5.1)
PROT SERPL-MCNC: 7.8 G/DL (ref 6–8.4)
RBC # BLD AUTO: 4.05 M/UL (ref 4–5.4)
SATURATED IRON: 19 % (ref 20–50)
SODIUM SERPL-SCNC: 141 MMOL/L (ref 136–145)
TOTAL IRON BINDING CAPACITY: 281 UG/DL (ref 250–450)
TRANSFERRIN SERPL-MCNC: 190 MG/DL (ref 200–375)
WBC # BLD AUTO: 5.65 K/UL (ref 3.9–12.7)

## 2021-05-17 PROCEDURE — 82728 ASSAY OF FERRITIN: CPT | Performed by: INTERNAL MEDICINE

## 2021-05-17 PROCEDURE — 36415 COLL VENOUS BLD VENIPUNCTURE: CPT | Performed by: INTERNAL MEDICINE

## 2021-05-17 PROCEDURE — 80053 COMPREHEN METABOLIC PANEL: CPT | Performed by: INTERNAL MEDICINE

## 2021-05-17 PROCEDURE — 85025 COMPLETE CBC W/AUTO DIFF WBC: CPT | Performed by: INTERNAL MEDICINE

## 2021-05-17 PROCEDURE — 83540 ASSAY OF IRON: CPT | Performed by: INTERNAL MEDICINE

## 2021-05-24 ENCOUNTER — PATIENT OUTREACH (OUTPATIENT)
Dept: ADMINISTRATIVE | Facility: HOSPITAL | Age: 66
End: 2021-05-24

## 2021-05-31 ENCOUNTER — OFFICE VISIT (OUTPATIENT)
Dept: HEMATOLOGY/ONCOLOGY | Facility: CLINIC | Age: 66
End: 2021-05-31
Payer: MEDICARE

## 2021-05-31 ENCOUNTER — EXTERNAL CHRONIC CARE MANAGEMENT (OUTPATIENT)
Dept: PRIMARY CARE CLINIC | Facility: CLINIC | Age: 66
End: 2021-05-31
Payer: MEDICARE

## 2021-05-31 VITALS
BODY MASS INDEX: 33.21 KG/M2 | SYSTOLIC BLOOD PRESSURE: 135 MMHG | TEMPERATURE: 98 F | HEIGHT: 65 IN | HEART RATE: 79 BPM | WEIGHT: 199.31 LBS | OXYGEN SATURATION: 98 % | DIASTOLIC BLOOD PRESSURE: 75 MMHG

## 2021-05-31 DIAGNOSIS — I10 HYPERTENSION, ESSENTIAL, BENIGN: ICD-10-CM

## 2021-05-31 DIAGNOSIS — D50.0 IRON DEFICIENCY ANEMIA DUE TO CHRONIC BLOOD LOSS: Primary | ICD-10-CM

## 2021-05-31 DIAGNOSIS — Z86.73 HISTORY OF CVA (CEREBROVASCULAR ACCIDENT): ICD-10-CM

## 2021-05-31 DIAGNOSIS — D75.839 THROMBOCYTOSIS: ICD-10-CM

## 2021-05-31 DIAGNOSIS — E78.5 HYPERLIPIDEMIA ASSOCIATED WITH TYPE 2 DIABETES MELLITUS: ICD-10-CM

## 2021-05-31 DIAGNOSIS — E11.65 TYPE 2 DIABETES MELLITUS WITH HYPERGLYCEMIA, WITHOUT LONG-TERM CURRENT USE OF INSULIN: ICD-10-CM

## 2021-05-31 DIAGNOSIS — E11.69 HYPERLIPIDEMIA ASSOCIATED WITH TYPE 2 DIABETES MELLITUS: ICD-10-CM

## 2021-05-31 PROCEDURE — 99999 PR PBB SHADOW E&M-EST. PATIENT-LVL IV: CPT | Mod: PBBFAC,,, | Performed by: INTERNAL MEDICINE

## 2021-05-31 PROCEDURE — 99490 CHRNC CARE MGMT STAFF 1ST 20: CPT | Mod: S$PBB,,, | Performed by: INTERNAL MEDICINE

## 2021-05-31 PROCEDURE — 99490 CHRNC CARE MGMT STAFF 1ST 20: CPT | Mod: PBBFAC,PO | Performed by: INTERNAL MEDICINE

## 2021-05-31 PROCEDURE — 99213 PR OFFICE/OUTPT VISIT, EST, LEVL III, 20-29 MIN: ICD-10-PCS | Mod: S$PBB,,, | Performed by: INTERNAL MEDICINE

## 2021-05-31 PROCEDURE — 99999 PR PBB SHADOW E&M-EST. PATIENT-LVL IV: ICD-10-PCS | Mod: PBBFAC,,, | Performed by: INTERNAL MEDICINE

## 2021-05-31 PROCEDURE — 99214 OFFICE O/P EST MOD 30 MIN: CPT | Mod: PBBFAC | Performed by: INTERNAL MEDICINE

## 2021-05-31 PROCEDURE — 99490 PR CHRONIC CARE MGMT, 1ST 20 MIN: ICD-10-PCS | Mod: S$PBB,,, | Performed by: INTERNAL MEDICINE

## 2021-05-31 PROCEDURE — 99213 OFFICE O/P EST LOW 20 MIN: CPT | Mod: S$PBB,,, | Performed by: INTERNAL MEDICINE

## 2021-06-02 DIAGNOSIS — E11.9 TYPE 2 DIABETES MELLITUS WITHOUT COMPLICATION: ICD-10-CM

## 2021-06-10 DIAGNOSIS — E11.65 TYPE 2 DIABETES MELLITUS WITH HYPERGLYCEMIA, WITHOUT LONG-TERM CURRENT USE OF INSULIN: ICD-10-CM

## 2021-06-10 DIAGNOSIS — I10 HYPERTENSION, ESSENTIAL, BENIGN: Primary | ICD-10-CM

## 2021-06-10 DIAGNOSIS — E11.69 HYPERLIPIDEMIA ASSOCIATED WITH TYPE 2 DIABETES MELLITUS: ICD-10-CM

## 2021-06-10 DIAGNOSIS — E78.5 HYPERLIPIDEMIA ASSOCIATED WITH TYPE 2 DIABETES MELLITUS: ICD-10-CM

## 2021-06-10 RX ORDER — DAPAGLIFLOZIN 10 MG/1
TABLET, FILM COATED ORAL
Qty: 30 TABLET | Refills: 0 | Status: SHIPPED | OUTPATIENT
Start: 2021-06-10 | End: 2021-06-28 | Stop reason: SDUPTHER

## 2021-06-14 ENCOUNTER — PATIENT OUTREACH (OUTPATIENT)
Dept: ADMINISTRATIVE | Facility: HOSPITAL | Age: 66
End: 2021-06-14

## 2021-06-14 DIAGNOSIS — E11.65 TYPE 2 DIABETES MELLITUS WITH HYPERGLYCEMIA, WITHOUT LONG-TERM CURRENT USE OF INSULIN: Primary | ICD-10-CM

## 2021-06-14 DIAGNOSIS — Z12.31 SCREENING MAMMOGRAM FOR HIGH-RISK PATIENT: ICD-10-CM

## 2021-06-23 ENCOUNTER — LAB VISIT (OUTPATIENT)
Dept: LAB | Facility: HOSPITAL | Age: 66
End: 2021-06-23
Attending: INTERNAL MEDICINE
Payer: MEDICARE

## 2021-06-23 DIAGNOSIS — I10 HYPERTENSION, ESSENTIAL, BENIGN: ICD-10-CM

## 2021-06-23 DIAGNOSIS — E11.9 TYPE 2 DIABETES MELLITUS WITHOUT COMPLICATION: ICD-10-CM

## 2021-06-23 DIAGNOSIS — E78.5 HYPERLIPIDEMIA ASSOCIATED WITH TYPE 2 DIABETES MELLITUS: ICD-10-CM

## 2021-06-23 DIAGNOSIS — E11.69 HYPERLIPIDEMIA ASSOCIATED WITH TYPE 2 DIABETES MELLITUS: ICD-10-CM

## 2021-06-23 LAB
ALBUMIN SERPL BCP-MCNC: 4.2 G/DL (ref 3.5–5.2)
ALP SERPL-CCNC: 51 U/L (ref 55–135)
ALT SERPL W/O P-5'-P-CCNC: 10 U/L (ref 10–44)
ANION GAP SERPL CALC-SCNC: 12 MMOL/L (ref 8–16)
AST SERPL-CCNC: 11 U/L (ref 10–40)
BILIRUB SERPL-MCNC: 0.3 MG/DL (ref 0.1–1)
BUN SERPL-MCNC: 13 MG/DL (ref 8–23)
CALCIUM SERPL-MCNC: 9.5 MG/DL (ref 8.7–10.5)
CHLORIDE SERPL-SCNC: 104 MMOL/L (ref 95–110)
CHOLEST SERPL-MCNC: 122 MG/DL (ref 120–199)
CHOLEST/HDLC SERPL: 2.5 {RATIO} (ref 2–5)
CO2 SERPL-SCNC: 28 MMOL/L (ref 23–29)
CREAT SERPL-MCNC: 0.8 MG/DL (ref 0.5–1.4)
EST. GFR  (AFRICAN AMERICAN): >60 ML/MIN/1.73 M^2
EST. GFR  (NON AFRICAN AMERICAN): >60 ML/MIN/1.73 M^2
ESTIMATED AVG GLUCOSE: 137 MG/DL (ref 68–131)
GLUCOSE SERPL-MCNC: 118 MG/DL (ref 70–110)
HBA1C MFR BLD: 6.4 % (ref 4–5.6)
HDLC SERPL-MCNC: 48 MG/DL (ref 40–75)
HDLC SERPL: 39.3 % (ref 20–50)
LDLC SERPL CALC-MCNC: 53.6 MG/DL (ref 63–159)
NONHDLC SERPL-MCNC: 74 MG/DL
POTASSIUM SERPL-SCNC: 3.7 MMOL/L (ref 3.5–5.1)
PROT SERPL-MCNC: 7.9 G/DL (ref 6–8.4)
SODIUM SERPL-SCNC: 144 MMOL/L (ref 136–145)
TRIGL SERPL-MCNC: 102 MG/DL (ref 30–150)

## 2021-06-23 PROCEDURE — 83036 HEMOGLOBIN GLYCOSYLATED A1C: CPT | Performed by: INTERNAL MEDICINE

## 2021-06-23 PROCEDURE — 80061 LIPID PANEL: CPT | Performed by: INTERNAL MEDICINE

## 2021-06-23 PROCEDURE — 80053 COMPREHEN METABOLIC PANEL: CPT | Performed by: INTERNAL MEDICINE

## 2021-06-28 ENCOUNTER — OFFICE VISIT (OUTPATIENT)
Dept: FAMILY MEDICINE | Facility: CLINIC | Age: 66
End: 2021-06-28
Payer: MEDICARE

## 2021-06-28 VITALS
HEIGHT: 65 IN | WEIGHT: 199.06 LBS | SYSTOLIC BLOOD PRESSURE: 104 MMHG | BODY MASS INDEX: 33.16 KG/M2 | HEART RATE: 76 BPM | OXYGEN SATURATION: 97 % | TEMPERATURE: 98 F | DIASTOLIC BLOOD PRESSURE: 68 MMHG

## 2021-06-28 DIAGNOSIS — Z23 NEED FOR SHINGLES VACCINE: ICD-10-CM

## 2021-06-28 DIAGNOSIS — Z71.2 ENCOUNTER TO DISCUSS TEST RESULTS: ICD-10-CM

## 2021-06-28 DIAGNOSIS — E11.65 TYPE 2 DIABETES MELLITUS WITH HYPERGLYCEMIA, WITHOUT LONG-TERM CURRENT USE OF INSULIN: Primary | ICD-10-CM

## 2021-06-28 PROCEDURE — 90750 HZV VACC RECOMBINANT IM: CPT | Mod: PBBFAC,PO

## 2021-06-28 PROCEDURE — 90471 IMMUNIZATION ADMIN: CPT | Mod: PBBFAC,PO

## 2021-06-28 PROCEDURE — 99999 PR PBB SHADOW E&M-EST. PATIENT-LVL IV: CPT | Mod: PBBFAC,,, | Performed by: INTERNAL MEDICINE

## 2021-06-28 PROCEDURE — 99214 OFFICE O/P EST MOD 30 MIN: CPT | Mod: S$PBB,,, | Performed by: INTERNAL MEDICINE

## 2021-06-28 PROCEDURE — 99214 PR OFFICE/OUTPT VISIT, EST, LEVL IV, 30-39 MIN: ICD-10-PCS | Mod: S$PBB,,, | Performed by: INTERNAL MEDICINE

## 2021-06-28 PROCEDURE — 99214 OFFICE O/P EST MOD 30 MIN: CPT | Mod: PBBFAC,PO | Performed by: INTERNAL MEDICINE

## 2021-06-28 PROCEDURE — 99999 PR PBB SHADOW E&M-EST. PATIENT-LVL IV: ICD-10-PCS | Mod: PBBFAC,,, | Performed by: INTERNAL MEDICINE

## 2021-06-28 RX ORDER — METFORMIN HYDROCHLORIDE 1000 MG/1
1000 TABLET ORAL 2 TIMES DAILY
Qty: 180 TABLET | Refills: 1 | Status: SHIPPED | OUTPATIENT
Start: 2021-06-28 | End: 2021-09-02 | Stop reason: SDUPTHER

## 2021-06-28 RX ORDER — DAPAGLIFLOZIN 10 MG/1
TABLET, FILM COATED ORAL
Qty: 90 TABLET | Refills: 1 | Status: SHIPPED | OUTPATIENT
Start: 2021-06-28 | End: 2022-01-26 | Stop reason: SDUPTHER

## 2021-06-30 ENCOUNTER — EXTERNAL CHRONIC CARE MANAGEMENT (OUTPATIENT)
Dept: PRIMARY CARE CLINIC | Facility: CLINIC | Age: 66
End: 2021-06-30
Payer: MEDICARE

## 2021-06-30 PROCEDURE — 99490 CHRNC CARE MGMT STAFF 1ST 20: CPT | Mod: S$PBB,,, | Performed by: INTERNAL MEDICINE

## 2021-06-30 PROCEDURE — 99490 CHRNC CARE MGMT STAFF 1ST 20: CPT | Mod: PBBFAC,PO | Performed by: INTERNAL MEDICINE

## 2021-06-30 PROCEDURE — 99490 PR CHRONIC CARE MGMT, 1ST 20 MIN: ICD-10-PCS | Mod: S$PBB,,, | Performed by: INTERNAL MEDICINE

## 2021-07-15 ENCOUNTER — HOSPITAL ENCOUNTER (OUTPATIENT)
Dept: RADIOLOGY | Facility: HOSPITAL | Age: 66
Discharge: HOME | End: 2021-07-15
Attending: INTERNAL MEDICINE
Payer: MEDICARE

## 2021-07-15 DIAGNOSIS — Z12.31 SCREENING MAMMOGRAM FOR HIGH-RISK PATIENT: ICD-10-CM

## 2021-07-15 PROCEDURE — 77063 BREAST TOMOSYNTHESIS BI: CPT | Mod: 26,,, | Performed by: RADIOLOGY

## 2021-07-15 PROCEDURE — 77063 MAMMO DIGITAL SCREENING BILAT WITH TOMO: ICD-10-PCS | Mod: 26,,, | Performed by: RADIOLOGY

## 2021-07-15 PROCEDURE — 77067 SCR MAMMO BI INCL CAD: CPT | Mod: 26,,, | Performed by: RADIOLOGY

## 2021-07-15 PROCEDURE — 77067 MAMMO DIGITAL SCREENING BILAT WITH TOMO: ICD-10-PCS | Mod: 26,,, | Performed by: RADIOLOGY

## 2021-07-15 PROCEDURE — 77067 SCR MAMMO BI INCL CAD: CPT | Mod: TC

## 2021-07-31 ENCOUNTER — EXTERNAL CHRONIC CARE MANAGEMENT (OUTPATIENT)
Dept: PRIMARY CARE CLINIC | Facility: CLINIC | Age: 66
End: 2021-07-31
Payer: MEDICARE

## 2021-07-31 PROCEDURE — 99490 PR CHRONIC CARE MGMT, 1ST 20 MIN: ICD-10-PCS | Mod: S$PBB,,, | Performed by: INTERNAL MEDICINE

## 2021-07-31 PROCEDURE — 99490 CHRNC CARE MGMT STAFF 1ST 20: CPT | Mod: S$PBB,,, | Performed by: INTERNAL MEDICINE

## 2021-07-31 PROCEDURE — 99490 CHRNC CARE MGMT STAFF 1ST 20: CPT | Mod: PBBFAC,PO | Performed by: INTERNAL MEDICINE

## 2021-08-02 ENCOUNTER — LAB VISIT (OUTPATIENT)
Dept: LAB | Facility: HOSPITAL | Age: 66
End: 2021-08-02
Attending: INTERNAL MEDICINE
Payer: MEDICARE

## 2021-08-02 DIAGNOSIS — D50.0 IRON DEFICIENCY ANEMIA DUE TO CHRONIC BLOOD LOSS: ICD-10-CM

## 2021-08-02 LAB
ALBUMIN SERPL BCP-MCNC: 4.2 G/DL (ref 3.5–5.2)
ALP SERPL-CCNC: 49 U/L (ref 55–135)
ALT SERPL W/O P-5'-P-CCNC: 7 U/L (ref 10–44)
ANION GAP SERPL CALC-SCNC: 10 MMOL/L (ref 8–16)
AST SERPL-CCNC: 10 U/L (ref 10–40)
BASOPHILS # BLD AUTO: 0.05 K/UL (ref 0–0.2)
BASOPHILS NFR BLD: 0.9 % (ref 0–1.9)
BILIRUB SERPL-MCNC: 0.3 MG/DL (ref 0.1–1)
BUN SERPL-MCNC: 17 MG/DL (ref 8–23)
CALCIUM SERPL-MCNC: 10.1 MG/DL (ref 8.7–10.5)
CHLORIDE SERPL-SCNC: 103 MMOL/L (ref 95–110)
CO2 SERPL-SCNC: 28 MMOL/L (ref 23–29)
CREAT SERPL-MCNC: 0.9 MG/DL (ref 0.5–1.4)
DIFFERENTIAL METHOD: NORMAL
EOSINOPHIL # BLD AUTO: 0.2 K/UL (ref 0–0.5)
EOSINOPHIL NFR BLD: 3.1 % (ref 0–8)
ERYTHROCYTE [DISTWIDTH] IN BLOOD BY AUTOMATED COUNT: 12.7 % (ref 11.5–14.5)
EST. GFR  (AFRICAN AMERICAN): >60 ML/MIN/1.73 M^2
EST. GFR  (NON AFRICAN AMERICAN): >60 ML/MIN/1.73 M^2
FERRITIN SERPL-MCNC: 179 NG/ML (ref 20–300)
GLUCOSE SERPL-MCNC: 152 MG/DL (ref 70–110)
HCT VFR BLD AUTO: 39.2 % (ref 37–48.5)
HGB BLD-MCNC: 13 G/DL (ref 12–16)
IMM GRANULOCYTES # BLD AUTO: 0.01 K/UL (ref 0–0.04)
IMM GRANULOCYTES NFR BLD AUTO: 0.2 % (ref 0–0.5)
IRON SERPL-MCNC: 90 UG/DL (ref 30–160)
LYMPHOCYTES # BLD AUTO: 1.8 K/UL (ref 1–4.8)
LYMPHOCYTES NFR BLD: 32.2 % (ref 18–48)
MCH RBC QN AUTO: 30.7 PG (ref 27–31)
MCHC RBC AUTO-ENTMCNC: 33.2 G/DL (ref 32–36)
MCV RBC AUTO: 93 FL (ref 82–98)
MONOCYTES # BLD AUTO: 0.4 K/UL (ref 0.3–1)
MONOCYTES NFR BLD: 7.7 % (ref 4–15)
NEUTROPHILS # BLD AUTO: 3.1 K/UL (ref 1.8–7.7)
NEUTROPHILS NFR BLD: 55.9 % (ref 38–73)
NRBC BLD-RTO: 0 /100 WBC
PLATELET # BLD AUTO: 426 K/UL (ref 150–450)
PMV BLD AUTO: 10 FL (ref 9.2–12.9)
POTASSIUM SERPL-SCNC: 3.6 MMOL/L (ref 3.5–5.1)
PROT SERPL-MCNC: 7.9 G/DL (ref 6–8.4)
RBC # BLD AUTO: 4.24 M/UL (ref 4–5.4)
SATURATED IRON: 31 % (ref 20–50)
SODIUM SERPL-SCNC: 141 MMOL/L (ref 136–145)
TOTAL IRON BINDING CAPACITY: 293 UG/DL (ref 250–450)
TRANSFERRIN SERPL-MCNC: 198 MG/DL (ref 200–375)
WBC # BLD AUTO: 5.46 K/UL (ref 3.9–12.7)

## 2021-08-02 PROCEDURE — 82728 ASSAY OF FERRITIN: CPT | Performed by: INTERNAL MEDICINE

## 2021-08-02 PROCEDURE — 80053 COMPREHEN METABOLIC PANEL: CPT | Performed by: INTERNAL MEDICINE

## 2021-08-02 PROCEDURE — 36415 COLL VENOUS BLD VENIPUNCTURE: CPT | Performed by: INTERNAL MEDICINE

## 2021-08-02 PROCEDURE — 85025 COMPLETE CBC W/AUTO DIFF WBC: CPT | Performed by: INTERNAL MEDICINE

## 2021-08-02 PROCEDURE — 84466 ASSAY OF TRANSFERRIN: CPT | Performed by: INTERNAL MEDICINE

## 2021-08-05 ENCOUNTER — OFFICE VISIT (OUTPATIENT)
Dept: HEMATOLOGY/ONCOLOGY | Facility: CLINIC | Age: 66
End: 2021-08-05
Payer: MEDICARE

## 2021-08-05 VITALS
TEMPERATURE: 98 F | DIASTOLIC BLOOD PRESSURE: 63 MMHG | SYSTOLIC BLOOD PRESSURE: 130 MMHG | WEIGHT: 200.63 LBS | HEART RATE: 86 BPM | HEIGHT: 65 IN | OXYGEN SATURATION: 96 % | BODY MASS INDEX: 33.43 KG/M2

## 2021-08-05 DIAGNOSIS — E11.65 TYPE 2 DIABETES MELLITUS WITH HYPERGLYCEMIA, WITHOUT LONG-TERM CURRENT USE OF INSULIN: ICD-10-CM

## 2021-08-05 DIAGNOSIS — I10 HYPERTENSION, ESSENTIAL, BENIGN: ICD-10-CM

## 2021-08-05 DIAGNOSIS — Z86.73 HISTORY OF CVA (CEREBROVASCULAR ACCIDENT): ICD-10-CM

## 2021-08-05 DIAGNOSIS — E78.5 HYPERLIPIDEMIA ASSOCIATED WITH TYPE 2 DIABETES MELLITUS: ICD-10-CM

## 2021-08-05 DIAGNOSIS — D50.0 IRON DEFICIENCY ANEMIA DUE TO CHRONIC BLOOD LOSS: Primary | ICD-10-CM

## 2021-08-05 DIAGNOSIS — D75.839 THROMBOCYTOSIS: ICD-10-CM

## 2021-08-05 DIAGNOSIS — E11.69 HYPERLIPIDEMIA ASSOCIATED WITH TYPE 2 DIABETES MELLITUS: ICD-10-CM

## 2021-08-05 PROCEDURE — 99999 PR PBB SHADOW E&M-EST. PATIENT-LVL III: CPT | Mod: PBBFAC,,, | Performed by: INTERNAL MEDICINE

## 2021-08-05 PROCEDURE — 99213 OFFICE O/P EST LOW 20 MIN: CPT | Mod: PBBFAC | Performed by: INTERNAL MEDICINE

## 2021-08-05 PROCEDURE — 99999 PR PBB SHADOW E&M-EST. PATIENT-LVL III: ICD-10-PCS | Mod: PBBFAC,,, | Performed by: INTERNAL MEDICINE

## 2021-08-05 PROCEDURE — 99214 OFFICE O/P EST MOD 30 MIN: CPT | Mod: S$PBB,,, | Performed by: INTERNAL MEDICINE

## 2021-08-05 PROCEDURE — 99214 PR OFFICE/OUTPT VISIT, EST, LEVL IV, 30-39 MIN: ICD-10-PCS | Mod: S$PBB,,, | Performed by: INTERNAL MEDICINE

## 2021-08-31 ENCOUNTER — EXTERNAL CHRONIC CARE MANAGEMENT (OUTPATIENT)
Dept: PRIMARY CARE CLINIC | Facility: CLINIC | Age: 66
End: 2021-08-31
Payer: MEDICARE

## 2021-08-31 PROCEDURE — 99490 PR CHRONIC CARE MGMT, 1ST 20 MIN: ICD-10-PCS | Mod: S$PBB,,, | Performed by: INTERNAL MEDICINE

## 2021-08-31 PROCEDURE — 99490 CHRNC CARE MGMT STAFF 1ST 20: CPT | Mod: PBBFAC,PO | Performed by: INTERNAL MEDICINE

## 2021-08-31 PROCEDURE — 99490 CHRNC CARE MGMT STAFF 1ST 20: CPT | Mod: S$PBB,,, | Performed by: INTERNAL MEDICINE

## 2021-09-02 DIAGNOSIS — E11.65 TYPE 2 DIABETES MELLITUS WITH HYPERGLYCEMIA, WITHOUT LONG-TERM CURRENT USE OF INSULIN: ICD-10-CM

## 2021-09-02 RX ORDER — METFORMIN HYDROCHLORIDE 1000 MG/1
1000 TABLET ORAL 2 TIMES DAILY
Qty: 60 TABLET | Refills: 0 | Status: SHIPPED | OUTPATIENT
Start: 2021-09-02 | End: 2021-10-01

## 2021-09-22 ENCOUNTER — PES CALL (OUTPATIENT)
Dept: ADMINISTRATIVE | Facility: CLINIC | Age: 66
End: 2021-09-22

## 2021-09-28 ENCOUNTER — PES CALL (OUTPATIENT)
Dept: ADMINISTRATIVE | Facility: CLINIC | Age: 66
End: 2021-09-28

## 2021-09-30 ENCOUNTER — EXTERNAL CHRONIC CARE MANAGEMENT (OUTPATIENT)
Dept: PRIMARY CARE CLINIC | Facility: CLINIC | Age: 66
End: 2021-09-30
Payer: MEDICARE

## 2021-09-30 PROCEDURE — 99490 CHRNC CARE MGMT STAFF 1ST 20: CPT | Mod: PBBFAC,PO | Performed by: INTERNAL MEDICINE

## 2021-09-30 PROCEDURE — 99490 CHRNC CARE MGMT STAFF 1ST 20: CPT | Mod: S$PBB,,, | Performed by: INTERNAL MEDICINE

## 2021-09-30 PROCEDURE — 99490 PR CHRONIC CARE MGMT, 1ST 20 MIN: ICD-10-PCS | Mod: S$PBB,,, | Performed by: INTERNAL MEDICINE

## 2021-10-07 ENCOUNTER — TELEPHONE (OUTPATIENT)
Dept: FAMILY MEDICINE | Facility: CLINIC | Age: 66
End: 2021-10-07

## 2021-10-31 ENCOUNTER — EXTERNAL CHRONIC CARE MANAGEMENT (OUTPATIENT)
Dept: PRIMARY CARE CLINIC | Facility: CLINIC | Age: 66
End: 2021-10-31
Payer: MEDICARE

## 2021-10-31 PROCEDURE — 99490 CHRNC CARE MGMT STAFF 1ST 20: CPT | Mod: S$PBB,,, | Performed by: INTERNAL MEDICINE

## 2021-10-31 PROCEDURE — 99490 PR CHRONIC CARE MGMT, 1ST 20 MIN: ICD-10-PCS | Mod: S$PBB,,, | Performed by: INTERNAL MEDICINE

## 2021-10-31 PROCEDURE — 99490 CHRNC CARE MGMT STAFF 1ST 20: CPT | Mod: PBBFAC,PO | Performed by: INTERNAL MEDICINE

## 2021-11-30 ENCOUNTER — EXTERNAL CHRONIC CARE MANAGEMENT (OUTPATIENT)
Dept: PRIMARY CARE CLINIC | Facility: CLINIC | Age: 66
End: 2021-11-30
Payer: MEDICARE

## 2021-11-30 PROCEDURE — 99490 CHRNC CARE MGMT STAFF 1ST 20: CPT | Mod: PBBFAC,PO | Performed by: INTERNAL MEDICINE

## 2021-11-30 PROCEDURE — 99490 CHRNC CARE MGMT STAFF 1ST 20: CPT | Mod: S$PBB,,, | Performed by: INTERNAL MEDICINE

## 2021-11-30 PROCEDURE — 99490 PR CHRONIC CARE MGMT, 1ST 20 MIN: ICD-10-PCS | Mod: S$PBB,,, | Performed by: INTERNAL MEDICINE

## 2021-12-06 ENCOUNTER — PES CALL (OUTPATIENT)
Dept: ADMINISTRATIVE | Facility: CLINIC | Age: 66
End: 2021-12-06
Payer: MEDICARE

## 2021-12-10 ENCOUNTER — PES CALL (OUTPATIENT)
Dept: ADMINISTRATIVE | Facility: CLINIC | Age: 66
End: 2021-12-10
Payer: MEDICARE

## 2021-12-31 ENCOUNTER — EXTERNAL CHRONIC CARE MANAGEMENT (OUTPATIENT)
Dept: PRIMARY CARE CLINIC | Facility: CLINIC | Age: 66
End: 2021-12-31
Payer: MEDICARE

## 2021-12-31 PROCEDURE — 99490 CHRNC CARE MGMT STAFF 1ST 20: CPT | Mod: PBBFAC,PO | Performed by: INTERNAL MEDICINE

## 2021-12-31 PROCEDURE — 99490 PR CHRONIC CARE MGMT, 1ST 20 MIN: ICD-10-PCS | Mod: S$PBB,,, | Performed by: INTERNAL MEDICINE

## 2021-12-31 PROCEDURE — 99490 CHRNC CARE MGMT STAFF 1ST 20: CPT | Mod: S$PBB,,, | Performed by: INTERNAL MEDICINE

## 2022-01-26 ENCOUNTER — OFFICE VISIT (OUTPATIENT)
Dept: FAMILY MEDICINE | Facility: CLINIC | Age: 67
End: 2022-01-26
Payer: MEDICARE

## 2022-01-26 ENCOUNTER — LAB VISIT (OUTPATIENT)
Dept: LAB | Facility: HOSPITAL | Age: 67
End: 2022-01-26
Attending: INTERNAL MEDICINE
Payer: MEDICARE

## 2022-01-26 VITALS
BODY MASS INDEX: 31.7 KG/M2 | SYSTOLIC BLOOD PRESSURE: 142 MMHG | WEIGHT: 190.25 LBS | DIASTOLIC BLOOD PRESSURE: 78 MMHG | RESPIRATION RATE: 16 BRPM | OXYGEN SATURATION: 98 % | HEIGHT: 65 IN | TEMPERATURE: 98 F | HEART RATE: 83 BPM

## 2022-01-26 DIAGNOSIS — I10 HYPERTENSION, ESSENTIAL, BENIGN: ICD-10-CM

## 2022-01-26 DIAGNOSIS — Z23 NEEDS FLU SHOT: ICD-10-CM

## 2022-01-26 DIAGNOSIS — E11.69 HYPERLIPIDEMIA ASSOCIATED WITH TYPE 2 DIABETES MELLITUS: ICD-10-CM

## 2022-01-26 DIAGNOSIS — E11.65 TYPE 2 DIABETES MELLITUS WITH HYPERGLYCEMIA, WITHOUT LONG-TERM CURRENT USE OF INSULIN: ICD-10-CM

## 2022-01-26 DIAGNOSIS — E78.5 HYPERLIPIDEMIA ASSOCIATED WITH TYPE 2 DIABETES MELLITUS: ICD-10-CM

## 2022-01-26 DIAGNOSIS — D50.0 IRON DEFICIENCY ANEMIA DUE TO CHRONIC BLOOD LOSS: ICD-10-CM

## 2022-01-26 DIAGNOSIS — D47.3 ESSENTIAL THROMBOCYTHEMIA: ICD-10-CM

## 2022-01-26 DIAGNOSIS — E11.21 MACROALBUMINURIC DIABETIC NEPHROPATHY: ICD-10-CM

## 2022-01-26 DIAGNOSIS — E11.65 TYPE 2 DIABETES MELLITUS WITH HYPERGLYCEMIA, WITHOUT LONG-TERM CURRENT USE OF INSULIN: Primary | ICD-10-CM

## 2022-01-26 PROBLEM — Z12.11 SCREENING FOR COLON CANCER: Status: RESOLVED | Noted: 2021-03-01 | Resolved: 2022-01-26

## 2022-01-26 PROBLEM — Z12.11 SCREEN FOR COLON CANCER: Status: RESOLVED | Noted: 2019-01-04 | Resolved: 2022-01-26

## 2022-01-26 LAB
ALBUMIN SERPL BCP-MCNC: 4.5 G/DL (ref 3.5–5.2)
ALP SERPL-CCNC: 53 U/L (ref 55–135)
ALT SERPL W/O P-5'-P-CCNC: 12 U/L (ref 10–44)
ANION GAP SERPL CALC-SCNC: 12 MMOL/L (ref 8–16)
AST SERPL-CCNC: 14 U/L (ref 10–40)
BASOPHILS # BLD AUTO: 0.06 K/UL (ref 0–0.2)
BASOPHILS # BLD AUTO: 0.06 K/UL (ref 0–0.2)
BASOPHILS NFR BLD: 0.9 % (ref 0–1.9)
BASOPHILS NFR BLD: 0.9 % (ref 0–1.9)
BILIRUB SERPL-MCNC: 0.3 MG/DL (ref 0.1–1)
BUN SERPL-MCNC: 14 MG/DL (ref 8–23)
CALCIUM SERPL-MCNC: 10 MG/DL (ref 8.7–10.5)
CHLORIDE SERPL-SCNC: 99 MMOL/L (ref 95–110)
CHOLEST SERPL-MCNC: 134 MG/DL (ref 120–199)
CHOLEST/HDLC SERPL: 2.7 {RATIO} (ref 2–5)
CO2 SERPL-SCNC: 28 MMOL/L (ref 23–29)
CREAT SERPL-MCNC: 0.9 MG/DL (ref 0.5–1.4)
DIFFERENTIAL METHOD: ABNORMAL
DIFFERENTIAL METHOD: ABNORMAL
EOSINOPHIL # BLD AUTO: 0.1 K/UL (ref 0–0.5)
EOSINOPHIL # BLD AUTO: 0.1 K/UL (ref 0–0.5)
EOSINOPHIL NFR BLD: 1.8 % (ref 0–8)
EOSINOPHIL NFR BLD: 1.8 % (ref 0–8)
ERYTHROCYTE [DISTWIDTH] IN BLOOD BY AUTOMATED COUNT: 12.7 % (ref 11.5–14.5)
ERYTHROCYTE [DISTWIDTH] IN BLOOD BY AUTOMATED COUNT: 12.7 % (ref 11.5–14.5)
EST. GFR  (AFRICAN AMERICAN): >60 ML/MIN/1.73 M^2
EST. GFR  (NON AFRICAN AMERICAN): >60 ML/MIN/1.73 M^2
FERRITIN SERPL-MCNC: 179 NG/ML (ref 20–300)
GLUCOSE SERPL-MCNC: 122 MG/DL (ref 70–110)
HCT VFR BLD AUTO: 43.4 % (ref 37–48.5)
HCT VFR BLD AUTO: 43.4 % (ref 37–48.5)
HDLC SERPL-MCNC: 49 MG/DL (ref 40–75)
HDLC SERPL: 36.6 % (ref 20–50)
HGB BLD-MCNC: 13.4 G/DL (ref 12–16)
HGB BLD-MCNC: 13.4 G/DL (ref 12–16)
IMM GRANULOCYTES # BLD AUTO: 0.02 K/UL (ref 0–0.04)
IMM GRANULOCYTES # BLD AUTO: 0.02 K/UL (ref 0–0.04)
IMM GRANULOCYTES NFR BLD AUTO: 0.3 % (ref 0–0.5)
IMM GRANULOCYTES NFR BLD AUTO: 0.3 % (ref 0–0.5)
IRON SERPL-MCNC: 76 UG/DL (ref 30–160)
LDLC SERPL CALC-MCNC: 58.2 MG/DL (ref 63–159)
LYMPHOCYTES # BLD AUTO: 2.2 K/UL (ref 1–4.8)
LYMPHOCYTES # BLD AUTO: 2.2 K/UL (ref 1–4.8)
LYMPHOCYTES NFR BLD: 34.2 % (ref 18–48)
LYMPHOCYTES NFR BLD: 34.2 % (ref 18–48)
MCH RBC QN AUTO: 29.4 PG (ref 27–31)
MCH RBC QN AUTO: 29.4 PG (ref 27–31)
MCHC RBC AUTO-ENTMCNC: 30.9 G/DL (ref 32–36)
MCHC RBC AUTO-ENTMCNC: 30.9 G/DL (ref 32–36)
MCV RBC AUTO: 95 FL (ref 82–98)
MCV RBC AUTO: 95 FL (ref 82–98)
MONOCYTES # BLD AUTO: 0.6 K/UL (ref 0.3–1)
MONOCYTES # BLD AUTO: 0.6 K/UL (ref 0.3–1)
MONOCYTES NFR BLD: 8.6 % (ref 4–15)
MONOCYTES NFR BLD: 8.6 % (ref 4–15)
NEUTROPHILS # BLD AUTO: 3.5 K/UL (ref 1.8–7.7)
NEUTROPHILS # BLD AUTO: 3.5 K/UL (ref 1.8–7.7)
NEUTROPHILS NFR BLD: 54.2 % (ref 38–73)
NEUTROPHILS NFR BLD: 54.2 % (ref 38–73)
NONHDLC SERPL-MCNC: 85 MG/DL
NRBC BLD-RTO: 0 /100 WBC
NRBC BLD-RTO: 0 /100 WBC
PLATELET # BLD AUTO: 542 K/UL (ref 150–450)
PLATELET # BLD AUTO: 542 K/UL (ref 150–450)
PMV BLD AUTO: 10.4 FL (ref 9.2–12.9)
PMV BLD AUTO: 10.4 FL (ref 9.2–12.9)
POTASSIUM SERPL-SCNC: 3.7 MMOL/L (ref 3.5–5.1)
PROT SERPL-MCNC: 8.5 G/DL (ref 6–8.4)
RBC # BLD AUTO: 4.56 M/UL (ref 4–5.4)
RBC # BLD AUTO: 4.56 M/UL (ref 4–5.4)
SATURATED IRON: 23 % (ref 20–50)
SODIUM SERPL-SCNC: 139 MMOL/L (ref 136–145)
TOTAL IRON BINDING CAPACITY: 324 UG/DL (ref 250–450)
TRANSFERRIN SERPL-MCNC: 219 MG/DL (ref 200–375)
TRIGL SERPL-MCNC: 134 MG/DL (ref 30–150)
WBC # BLD AUTO: 6.52 K/UL (ref 3.9–12.7)
WBC # BLD AUTO: 6.52 K/UL (ref 3.9–12.7)

## 2022-01-26 PROCEDURE — 84466 ASSAY OF TRANSFERRIN: CPT | Mod: HCNC | Performed by: INTERNAL MEDICINE

## 2022-01-26 PROCEDURE — 3008F BODY MASS INDEX DOCD: CPT | Mod: HCNC,CPTII,S$GLB, | Performed by: NURSE PRACTITIONER

## 2022-01-26 PROCEDURE — 99214 OFFICE O/P EST MOD 30 MIN: CPT | Mod: HCNC,25,S$GLB, | Performed by: NURSE PRACTITIONER

## 2022-01-26 PROCEDURE — G0008 FLU VACCINE - QUADRIVALENT - ADJUVANTED: ICD-10-PCS | Mod: HCNC,S$GLB,, | Performed by: NURSE PRACTITIONER

## 2022-01-26 PROCEDURE — 3288F PR FALLS RISK ASSESSMENT DOCUMENTED: ICD-10-PCS | Mod: HCNC,CPTII,S$GLB, | Performed by: NURSE PRACTITIONER

## 2022-01-26 PROCEDURE — 99499 UNLISTED E&M SERVICE: CPT | Mod: HCNC,S$GLB,, | Performed by: NURSE PRACTITIONER

## 2022-01-26 PROCEDURE — 1159F MED LIST DOCD IN RCRD: CPT | Mod: HCNC,CPTII,S$GLB, | Performed by: NURSE PRACTITIONER

## 2022-01-26 PROCEDURE — 85025 COMPLETE CBC W/AUTO DIFF WBC: CPT | Mod: HCNC | Performed by: INTERNAL MEDICINE

## 2022-01-26 PROCEDURE — 1126F AMNT PAIN NOTED NONE PRSNT: CPT | Mod: HCNC,CPTII,S$GLB, | Performed by: NURSE PRACTITIONER

## 2022-01-26 PROCEDURE — 36415 COLL VENOUS BLD VENIPUNCTURE: CPT | Mod: HCNC,PO | Performed by: INTERNAL MEDICINE

## 2022-01-26 PROCEDURE — 4010F PR ACE/ARB THEARPY RXD/TAKEN: ICD-10-PCS | Mod: HCNC,CPTII,S$GLB, | Performed by: NURSE PRACTITIONER

## 2022-01-26 PROCEDURE — 4010F ACE/ARB THERAPY RXD/TAKEN: CPT | Mod: HCNC,CPTII,S$GLB, | Performed by: NURSE PRACTITIONER

## 2022-01-26 PROCEDURE — 83036 HEMOGLOBIN GLYCOSYLATED A1C: CPT | Mod: HCNC | Performed by: NURSE PRACTITIONER

## 2022-01-26 PROCEDURE — 99999 PR PBB SHADOW E&M-EST. PATIENT-LVL IV: ICD-10-PCS | Mod: PBBFAC,HCNC,, | Performed by: NURSE PRACTITIONER

## 2022-01-26 PROCEDURE — G0008 ADMIN INFLUENZA VIRUS VAC: HCPCS | Mod: HCNC,S$GLB,, | Performed by: NURSE PRACTITIONER

## 2022-01-26 PROCEDURE — 90694 FLU VACCINE - QUADRIVALENT - ADJUVANTED: ICD-10-PCS | Mod: HCNC,S$GLB,, | Performed by: NURSE PRACTITIONER

## 2022-01-26 PROCEDURE — 99999 PR PBB SHADOW E&M-EST. PATIENT-LVL IV: CPT | Mod: PBBFAC,HCNC,, | Performed by: NURSE PRACTITIONER

## 2022-01-26 PROCEDURE — 3288F FALL RISK ASSESSMENT DOCD: CPT | Mod: HCNC,CPTII,S$GLB, | Performed by: NURSE PRACTITIONER

## 2022-01-26 PROCEDURE — 80053 COMPREHEN METABOLIC PANEL: CPT | Mod: HCNC | Performed by: NURSE PRACTITIONER

## 2022-01-26 PROCEDURE — 3077F SYST BP >= 140 MM HG: CPT | Mod: HCNC,CPTII,S$GLB, | Performed by: NURSE PRACTITIONER

## 2022-01-26 PROCEDURE — 3008F PR BODY MASS INDEX (BMI) DOCUMENTED: ICD-10-PCS | Mod: HCNC,CPTII,S$GLB, | Performed by: NURSE PRACTITIONER

## 2022-01-26 PROCEDURE — 90694 VACC AIIV4 NO PRSRV 0.5ML IM: CPT | Mod: HCNC,S$GLB,, | Performed by: NURSE PRACTITIONER

## 2022-01-26 PROCEDURE — 1101F PT FALLS ASSESS-DOCD LE1/YR: CPT | Mod: HCNC,CPTII,S$GLB, | Performed by: NURSE PRACTITIONER

## 2022-01-26 PROCEDURE — 99214 PR OFFICE/OUTPT VISIT, EST, LEVL IV, 30-39 MIN: ICD-10-PCS | Mod: HCNC,25,S$GLB, | Performed by: NURSE PRACTITIONER

## 2022-01-26 PROCEDURE — 1160F PR REVIEW ALL MEDS BY PRESCRIBER/CLIN PHARMACIST DOCUMENTED: ICD-10-PCS | Mod: HCNC,CPTII,S$GLB, | Performed by: NURSE PRACTITIONER

## 2022-01-26 PROCEDURE — 1160F RVW MEDS BY RX/DR IN RCRD: CPT | Mod: HCNC,CPTII,S$GLB, | Performed by: NURSE PRACTITIONER

## 2022-01-26 PROCEDURE — 1159F PR MEDICATION LIST DOCUMENTED IN MEDICAL RECORD: ICD-10-PCS | Mod: HCNC,CPTII,S$GLB, | Performed by: NURSE PRACTITIONER

## 2022-01-26 PROCEDURE — 1101F PR PT FALLS ASSESS DOC 0-1 FALLS W/OUT INJ PAST YR: ICD-10-PCS | Mod: HCNC,CPTII,S$GLB, | Performed by: NURSE PRACTITIONER

## 2022-01-26 PROCEDURE — 1126F PR PAIN SEVERITY QUANTIFIED, NO PAIN PRESENT: ICD-10-PCS | Mod: HCNC,CPTII,S$GLB, | Performed by: NURSE PRACTITIONER

## 2022-01-26 PROCEDURE — 3077F PR MOST RECENT SYSTOLIC BLOOD PRESSURE >= 140 MM HG: ICD-10-PCS | Mod: HCNC,CPTII,S$GLB, | Performed by: NURSE PRACTITIONER

## 2022-01-26 PROCEDURE — 82728 ASSAY OF FERRITIN: CPT | Mod: HCNC | Performed by: INTERNAL MEDICINE

## 2022-01-26 PROCEDURE — 3078F DIAST BP <80 MM HG: CPT | Mod: HCNC,CPTII,S$GLB, | Performed by: NURSE PRACTITIONER

## 2022-01-26 PROCEDURE — 99499 RISK ADDL DX/OHS AUDIT: ICD-10-PCS | Mod: HCNC,S$GLB,, | Performed by: NURSE PRACTITIONER

## 2022-01-26 PROCEDURE — 80061 LIPID PANEL: CPT | Mod: HCNC | Performed by: NURSE PRACTITIONER

## 2022-01-26 PROCEDURE — 3078F PR MOST RECENT DIASTOLIC BLOOD PRESSURE < 80 MM HG: ICD-10-PCS | Mod: HCNC,CPTII,S$GLB, | Performed by: NURSE PRACTITIONER

## 2022-01-26 RX ORDER — ATENOLOL 25 MG/1
25 TABLET ORAL DAILY
Qty: 90 TABLET | Refills: 1 | Status: SHIPPED | OUTPATIENT
Start: 2022-01-26 | End: 2022-11-02 | Stop reason: SDUPTHER

## 2022-01-26 RX ORDER — HYDROCHLOROTHIAZIDE 25 MG/1
TABLET ORAL
Qty: 90 TABLET | Refills: 1 | Status: SHIPPED | OUTPATIENT
Start: 2022-01-26 | End: 2022-03-31

## 2022-01-26 RX ORDER — SIMVASTATIN 20 MG/1
20 TABLET, FILM COATED ORAL DAILY
Qty: 90 TABLET | Refills: 1 | Status: SHIPPED | OUTPATIENT
Start: 2022-01-26 | End: 2022-10-11 | Stop reason: SDUPTHER

## 2022-01-26 RX ORDER — VALSARTAN 320 MG/1
320 TABLET ORAL DAILY
Qty: 90 TABLET | Refills: 1 | Status: SHIPPED | OUTPATIENT
Start: 2022-01-26 | End: 2022-03-31

## 2022-01-26 RX ORDER — AMLODIPINE BESYLATE 10 MG/1
10 TABLET ORAL DAILY
Qty: 90 TABLET | Refills: 1 | Status: SHIPPED | OUTPATIENT
Start: 2022-01-26 | End: 2022-11-02 | Stop reason: SDUPTHER

## 2022-01-26 RX ORDER — DAPAGLIFLOZIN 10 MG/1
TABLET, FILM COATED ORAL
Qty: 90 TABLET | Refills: 1 | Status: SHIPPED | OUTPATIENT
Start: 2022-01-26 | End: 2022-09-07

## 2022-01-26 RX ORDER — METFORMIN HYDROCHLORIDE 1000 MG/1
1000 TABLET ORAL 2 TIMES DAILY
Qty: 180 TABLET | Refills: 1 | Status: SHIPPED | OUTPATIENT
Start: 2022-01-26 | End: 2022-04-20

## 2022-01-26 NOTE — PROGRESS NOTES
"Subjective:      Patient ID: Harriett Oglesby is a 66 y.o. female.  New to me but seen previously in clinic by a fellow provider. Pt presents for routine 6 months check. Denies any acute complaints.     DM - -100 at home, controlled on farxiga and metformin, last HgA1c 6.4, eye exam up to date and denies any new neuropathy or foot complaints    HTN - on amlodipine, atenolol, valsartan and HCTZ, asymptomatic and usually controlled, 144/76 on arrival today and did not take meds this AM    HLD - controlled on simvastatin    Anemia - followed by hem/onc, levels stabalized, no longer on iron infusions, unable to tolerate PO ferrous sulfate        Review of Systems   Constitutional: Negative for activity change, appetite change, fever and unexpected weight change.   Respiratory: Negative for chest tightness and shortness of breath.    Cardiovascular: Negative for chest pain and palpitations.   Gastrointestinal: Negative for abdominal pain, constipation, diarrhea, nausea and vomiting.   Genitourinary: Negative for difficulty urinating and dysuria.   Neurological: Negative for headaches.   Hematological: Does not bruise/bleed easily.   All other systems reviewed and are negative.        Objective:     Vitals:    01/26/22 0932   BP: (!) 144/76   Pulse: 83   Resp: 16   Temp: 97.5 °F (36.4 °C)   TempSrc: Oral   SpO2: 98%   Weight: 86.3 kg (190 lb 4.1 oz)   Height: 5' 5" (1.651 m)     Physical Exam  Vitals and nursing note reviewed.   Constitutional:       General: She is not in acute distress.     Appearance: Normal appearance. She is well-developed, well-groomed and overweight. She is not ill-appearing.   HENT:      Head: Normocephalic and atraumatic.      Right Ear: External ear normal.      Left Ear: External ear normal.      Nose: Nose normal.      Mouth/Throat:      Lips: Pink.      Mouth: Mucous membranes are moist.   Eyes:      General: Lids are normal. Vision grossly intact. Gaze aligned appropriately.      " Extraocular Movements: Extraocular movements intact.      Conjunctiva/sclera: Conjunctivae normal.      Pupils: Pupils are equal, round, and reactive to light.   Neck:      Trachea: Trachea and phonation normal.   Cardiovascular:      Rate and Rhythm: Normal rate and regular rhythm.      Pulses:           Dorsalis pedis pulses are 2+ on the right side and 2+ on the left side.        Posterior tibial pulses are 2+ on the right side and 2+ on the left side.      Heart sounds: Normal heart sounds.   Pulmonary:      Effort: Pulmonary effort is normal. No respiratory distress.      Breath sounds: Normal breath sounds and air entry.   Abdominal:      General: Abdomen is flat. Bowel sounds are normal. There is no distension.      Palpations: Abdomen is soft.      Tenderness: There is no abdominal tenderness.   Musculoskeletal:      Cervical back: Normal range of motion and neck supple.      Comments: Left hand/wrist/forearm contracture    Feet:      Right foot:      Protective Sensation: 5 sites tested. 5 sites sensed.      Skin integrity: Skin integrity normal.      Toenail Condition: Right toenails are normal.      Left foot:      Protective Sensation: 5 sites tested. 5 sites sensed.      Skin integrity: Skin integrity normal.      Toenail Condition: Left toenails are normal.   Skin:     General: Skin is warm and dry.      Findings: No rash.   Neurological:      General: No focal deficit present.      Mental Status: She is alert and oriented to person, place, and time. Mental status is at baseline.   Psychiatric:         Attention and Perception: Attention and perception normal.         Mood and Affect: Mood and affect normal.         Speech: Speech normal.         Behavior: Behavior normal. Behavior is cooperative.         Thought Content: Thought content normal.         Cognition and Memory: Cognition and memory normal.         Judgment: Judgment normal.       Assessment and Plan:     1. Type 2 diabetes mellitus with  hyperglycemia, without long-term current use of insulin  DM controlled on current therapy  Rx changes: none  Education: Reviewed ABCs of diabetes management (respective goals in parentheses):  A1C (<7), blood pressure (<130/80), and cholesterol (LDL <100).  Follow up: 6 months  - dapagliflozin (FARXIGA) 10 mg tablet; TAKE ONE TABLET BY MOUTH ONCE DAILY FOR DIABETES  Dispense: 90 tablet; Refill: 1  - metFORMIN (GLUCOPHAGE) 1000 MG tablet; Take 1 tablet (1,000 mg total) by mouth 2 (two) times daily. for diabetes.  Dispense: 180 tablet; Refill: 1  - CBC Auto Differential; Future  - Comprehensive Metabolic Panel; Future  - Hemoglobin A1C; Future  - Microalbumin/Creatinine Ratio, Urine; Future    2. Hypertension, essential, benign  Medication: no change.  Dietary sodium restriction.  Regular aerobic exercise.  - amLODIPine (NORVASC) 10 MG tablet; Take 1 tablet (10 mg total) by mouth once daily.  Dispense: 90 tablet; Refill: 1  - atenoloL (TENORMIN) 25 MG tablet; Take 1 tablet (25 mg total) by mouth once daily.  Dispense: 90 tablet; Refill: 1  - hydroCHLOROthiazide (HYDRODIURIL) 25 MG tablet; TAKE ONE TABLET BY MOUTH ONCE DAILY FOR BLOOD PRESSURE and FOR FLUID  Dispense: 90 tablet; Refill: 1  - valsartan (DIOVAN) 320 MG tablet; Take 1 tablet (320 mg total) by mouth once daily.  Dispense: 90 tablet; Refill: 1  - CBC Auto Differential; Future  - Comprehensive Metabolic Panel; Future  - Microalbumin/Creatinine Ratio, Urine; Future    3. Hyperlipidemia associated with type 2 diabetes mellitus  Limit the cholesterol in your diet to less than 300 mg per day.   Fats should contribute no more than 20 to 35% of your daily calories.   Less than 7 to 10% of your calories should come from saturated fat.   Avoid saturated fat products e.g., Butter, some oils, meat, and poultry fat contain a lot of saturated fat.   Check food labels for fat and cholesterol content. Choose the foods with less fat per serving.   Limit the amount of  butter and margarine you eat.   Use salad dressings and margarine made with polyunsaturated and monounsaturated fats.   Use egg whites or egg substitutes rather than whole eggs.   Replace whole-milk dairy products with nonfat or low-fat milk, cheese, spreads, and yogurt.   Eat skinless chicken, turkey, fish, and meatless entrees more often than red meat.   Choose lean cuts of meat and trim off all visible fat. Keep portion sizes moderate.   Avoid fatty desserts such as ice cream, cream-filled cakes, and cheesecakes. Choose fresh fruits, nonfat frozen yogurt, Popsicles, etc.   Reduce the amount of fried foods, vending machine food, and fast food you eat.   Eat fruits and vegetables (especially fresh fruits and leafy vegetables), beans, and whole grains daily. The fiber in these foods helps lower cholesterol.   Look for low-fat or nonfat varieties of the foods you like to eat, or look for substitutes.   You may need to exercise 60 minutes a day to prevent weight gain and 90 minutes a day to lose weight.  - simvastatin (ZOCOR) 20 MG tablet; Take 1 tablet (20 mg total) by mouth once daily.  Dispense: 90 tablet; Refill: 1  - Lipid Panel; Future    4. Macroalbuminuric diabetic nephropathy  BP and BS controlled, on ARB, continue to monitor    5. Essential thrombocythemia  Platelets normal, continue to monitor  - CBC Auto Differential; Future    6. Needs flu shot  - Influenza - Quadrivalent - High Dose (65+) (PF) (IM)           KATIE Khan, FNP-C  Family/Internal Medicine  [facility]

## 2022-01-26 NOTE — PROGRESS NOTES
Administered High Dose Flu vaccine IM to right deltoid.  Patient tolerated injection well.  Patient advised to wait in lobby for 15 minutes for observation and to report any adverse reactions immediately.  Patient verbalized understanding.       Patient scheduled for nurse bp check - 2/16/2022.

## 2022-01-27 LAB
ESTIMATED AVG GLUCOSE: 134 MG/DL (ref 68–131)
HBA1C MFR BLD: 6.3 % (ref 4–5.6)

## 2022-01-28 ENCOUNTER — PES CALL (OUTPATIENT)
Dept: ADMINISTRATIVE | Facility: CLINIC | Age: 67
End: 2022-01-28
Payer: MEDICARE

## 2022-01-31 ENCOUNTER — EXTERNAL CHRONIC CARE MANAGEMENT (OUTPATIENT)
Dept: PRIMARY CARE CLINIC | Facility: CLINIC | Age: 67
End: 2022-01-31
Payer: MEDICARE

## 2022-01-31 PROCEDURE — 99490 PR CHRONIC CARE MGMT, 1ST 20 MIN: ICD-10-PCS | Mod: S$GLB,,, | Performed by: INTERNAL MEDICINE

## 2022-01-31 PROCEDURE — 99490 CHRNC CARE MGMT STAFF 1ST 20: CPT | Mod: S$GLB,,, | Performed by: INTERNAL MEDICINE

## 2022-02-02 ENCOUNTER — TELEPHONE (OUTPATIENT)
Dept: FAMILY MEDICINE | Facility: CLINIC | Age: 67
End: 2022-02-02
Payer: MEDICARE

## 2022-02-02 NOTE — TELEPHONE ENCOUNTER
----- Message from Khalida Montalvo NP sent at 1/28/2022 11:00 AM CST -----  Labs are stable, continue current regimen

## 2022-02-16 ENCOUNTER — CLINICAL SUPPORT (OUTPATIENT)
Dept: FAMILY MEDICINE | Facility: CLINIC | Age: 67
End: 2022-02-16
Payer: MEDICARE

## 2022-02-16 VITALS — SYSTOLIC BLOOD PRESSURE: 139 MMHG | DIASTOLIC BLOOD PRESSURE: 67 MMHG

## 2022-02-16 DIAGNOSIS — I10 HYPERTENSION, ESSENTIAL, BENIGN: Primary | ICD-10-CM

## 2022-02-16 DIAGNOSIS — Z23 NEED FOR SHINGLES VACCINE: ICD-10-CM

## 2022-02-16 PROCEDURE — 99999 PR PBB SHADOW E&M-EST. PATIENT-LVL I: ICD-10-PCS | Mod: PBBFAC,HCNC,,

## 2022-02-16 PROCEDURE — 90471 IMMUNIZATION ADMIN: CPT | Mod: HCNC,S$GLB,, | Performed by: INTERNAL MEDICINE

## 2022-02-16 PROCEDURE — 90750 ZOSTER RECOMBINANT VACCINE: ICD-10-PCS | Mod: HCNC,S$GLB,, | Performed by: INTERNAL MEDICINE

## 2022-02-16 PROCEDURE — 90750 HZV VACC RECOMBINANT IM: CPT | Mod: HCNC,S$GLB,, | Performed by: INTERNAL MEDICINE

## 2022-02-16 PROCEDURE — 90471 ZOSTER RECOMBINANT VACCINE: ICD-10-PCS | Mod: HCNC,S$GLB,, | Performed by: INTERNAL MEDICINE

## 2022-02-16 PROCEDURE — 99999 PR PBB SHADOW E&M-EST. PATIENT-LVL I: CPT | Mod: PBBFAC,HCNC,,

## 2022-02-16 NOTE — PROGRESS NOTES
Harriett Oglesby 67 y.o. female is here today for Blood Pressure check.   History of HTN yes.    Review of patient's allergies indicates:  No Known Allergies  Creatinine   Date Value Ref Range Status   01/26/2022 0.9 0.5 - 1.4 mg/dL Final     Sodium   Date Value Ref Range Status   01/26/2022 139 136 - 145 mmol/L Final     Potassium   Date Value Ref Range Status   01/26/2022 3.7 3.5 - 5.1 mmol/L Final   ]  Patient verifies taking blood pressure medications on a regular basis at the same time of the day.     Current Outpatient Medications:     amLODIPine (NORVASC) 10 MG tablet, Take 1 tablet (10 mg total) by mouth once daily., Disp: 90 tablet, Rfl: 1    aspirin (ECOTRIN) 81 MG EC tablet, Take 81 mg by mouth once daily. , Disp: , Rfl:     atenoloL (TENORMIN) 25 MG tablet, Take 1 tablet (25 mg total) by mouth once daily., Disp: 90 tablet, Rfl: 1    blood sugar diagnostic Strp, 1 strip by Misc.(Non-Drug; Combo Route) route once daily., Disp: 200 each, Rfl: 3    cetirizine (ZYRTEC) 10 MG tablet, Take 10 mg by mouth once daily., Disp: , Rfl:     cyanocobalamin 500 MCG tablet, Take 500 mcg by mouth once daily., Disp: , Rfl:     dapagliflozin (FARXIGA) 10 mg tablet, TAKE ONE TABLET BY MOUTH ONCE DAILY FOR DIABETES, Disp: 90 tablet, Rfl: 1    hydroCHLOROthiazide (HYDRODIURIL) 25 MG tablet, TAKE ONE TABLET BY MOUTH ONCE DAILY FOR BLOOD PRESSURE and FOR FLUID, Disp: 90 tablet, Rfl: 1    metFORMIN (GLUCOPHAGE) 1000 MG tablet, Take 1 tablet (1,000 mg total) by mouth 2 (two) times daily. for diabetes., Disp: 180 tablet, Rfl: 1    simvastatin (ZOCOR) 20 MG tablet, Take 1 tablet (20 mg total) by mouth once daily., Disp: 90 tablet, Rfl: 1    valsartan (DIOVAN) 320 MG tablet, Take 1 tablet (320 mg total) by mouth once daily., Disp: 90 tablet, Rfl: 1  Does patient have record of home blood pressure readings no. Readings have been averaging  - no readings.   Last dose of blood pressure medication was taken at 2/16/2022@8:30  am.  Patient is asymptomatic.   Complains of  - no complaints.    148/78 right arm, sitting, manual    139/67 right arm, sitting, manual       NP Katia informed of nurse visit. Patient advised to continue current medication regimen.      Administered Shingrix vaccine dose #2 IM to right deltoid.  Patient tolerated injection well, no adverse reactions noted.

## 2022-02-28 ENCOUNTER — EXTERNAL CHRONIC CARE MANAGEMENT (OUTPATIENT)
Dept: PRIMARY CARE CLINIC | Facility: CLINIC | Age: 67
End: 2022-02-28
Payer: MEDICARE

## 2022-02-28 PROCEDURE — 99490 PR CHRONIC CARE MGMT, 1ST 20 MIN: ICD-10-PCS | Mod: S$GLB,,, | Performed by: INTERNAL MEDICINE

## 2022-02-28 PROCEDURE — 99490 CHRNC CARE MGMT STAFF 1ST 20: CPT | Mod: S$GLB,,, | Performed by: INTERNAL MEDICINE

## 2022-03-18 ENCOUNTER — TELEPHONE (OUTPATIENT)
Dept: ADMINISTRATIVE | Facility: CLINIC | Age: 67
End: 2022-03-18
Payer: MEDICARE

## 2022-03-22 ENCOUNTER — OFFICE VISIT (OUTPATIENT)
Dept: FAMILY MEDICINE | Facility: CLINIC | Age: 67
End: 2022-03-22
Payer: MEDICARE

## 2022-03-22 VITALS
RESPIRATION RATE: 16 BRPM | BODY MASS INDEX: 31.55 KG/M2 | HEIGHT: 65 IN | WEIGHT: 189.38 LBS | DIASTOLIC BLOOD PRESSURE: 76 MMHG | HEART RATE: 87 BPM | TEMPERATURE: 98 F | OXYGEN SATURATION: 98 % | SYSTOLIC BLOOD PRESSURE: 130 MMHG

## 2022-03-22 DIAGNOSIS — E78.5 HYPERLIPIDEMIA ASSOCIATED WITH TYPE 2 DIABETES MELLITUS: ICD-10-CM

## 2022-03-22 DIAGNOSIS — D50.9 IRON DEFICIENCY ANEMIA, UNSPECIFIED IRON DEFICIENCY ANEMIA TYPE: ICD-10-CM

## 2022-03-22 DIAGNOSIS — E11.29 TYPE 2 DIABETES MELLITUS WITH MICROALBUMINURIA, WITHOUT LONG-TERM CURRENT USE OF INSULIN: ICD-10-CM

## 2022-03-22 DIAGNOSIS — D47.3 ESSENTIAL THROMBOCYTHEMIA: ICD-10-CM

## 2022-03-22 DIAGNOSIS — Z00.00 ENCOUNTER FOR PREVENTIVE HEALTH EXAMINATION: Primary | ICD-10-CM

## 2022-03-22 DIAGNOSIS — R80.9 TYPE 2 DIABETES MELLITUS WITH MICROALBUMINURIA, WITHOUT LONG-TERM CURRENT USE OF INSULIN: ICD-10-CM

## 2022-03-22 DIAGNOSIS — E11.69 HYPERLIPIDEMIA ASSOCIATED WITH TYPE 2 DIABETES MELLITUS: ICD-10-CM

## 2022-03-22 DIAGNOSIS — I69.30 HISTORY OF STROKE WITH CURRENT RESIDUAL EFFECTS: ICD-10-CM

## 2022-03-22 DIAGNOSIS — E11.21 MACROALBUMINURIC DIABETIC NEPHROPATHY: ICD-10-CM

## 2022-03-22 DIAGNOSIS — M85.80 OSTEOPENIA, UNSPECIFIED LOCATION: ICD-10-CM

## 2022-03-22 DIAGNOSIS — I10 HYPERTENSION, ESSENTIAL, BENIGN: ICD-10-CM

## 2022-03-22 PROCEDURE — 99999 PR PBB SHADOW E&M-EST. PATIENT-LVL IV: ICD-10-PCS | Mod: PBBFAC,,, | Performed by: NURSE PRACTITIONER

## 2022-03-22 PROCEDURE — 3044F HG A1C LEVEL LT 7.0%: CPT | Mod: CPTII,S$GLB,, | Performed by: NURSE PRACTITIONER

## 2022-03-22 PROCEDURE — 3078F DIAST BP <80 MM HG: CPT | Mod: CPTII,S$GLB,, | Performed by: NURSE PRACTITIONER

## 2022-03-22 PROCEDURE — G0439 PR MEDICARE ANNUAL WELLNESS SUBSEQUENT VISIT: ICD-10-PCS | Mod: S$GLB,,, | Performed by: NURSE PRACTITIONER

## 2022-03-22 PROCEDURE — G9919 SCRN ND POS ND PROV OF REC: HCPCS | Mod: CPTII,S$GLB,, | Performed by: NURSE PRACTITIONER

## 2022-03-22 PROCEDURE — 3075F PR MOST RECENT SYSTOLIC BLOOD PRESS GE 130-139MM HG: ICD-10-PCS | Mod: CPTII,S$GLB,, | Performed by: NURSE PRACTITIONER

## 2022-03-22 PROCEDURE — 1101F PT FALLS ASSESS-DOCD LE1/YR: CPT | Mod: CPTII,S$GLB,, | Performed by: NURSE PRACTITIONER

## 2022-03-22 PROCEDURE — 3078F PR MOST RECENT DIASTOLIC BLOOD PRESSURE < 80 MM HG: ICD-10-PCS | Mod: CPTII,S$GLB,, | Performed by: NURSE PRACTITIONER

## 2022-03-22 PROCEDURE — 1126F PR PAIN SEVERITY QUANTIFIED, NO PAIN PRESENT: ICD-10-PCS | Mod: CPTII,S$GLB,, | Performed by: NURSE PRACTITIONER

## 2022-03-22 PROCEDURE — 3075F SYST BP GE 130 - 139MM HG: CPT | Mod: CPTII,S$GLB,, | Performed by: NURSE PRACTITIONER

## 2022-03-22 PROCEDURE — G0439 PPPS, SUBSEQ VISIT: HCPCS | Mod: S$GLB,,, | Performed by: NURSE PRACTITIONER

## 2022-03-22 PROCEDURE — 4010F ACE/ARB THERAPY RXD/TAKEN: CPT | Mod: CPTII,S$GLB,, | Performed by: NURSE PRACTITIONER

## 2022-03-22 PROCEDURE — 1126F AMNT PAIN NOTED NONE PRSNT: CPT | Mod: CPTII,S$GLB,, | Performed by: NURSE PRACTITIONER

## 2022-03-22 PROCEDURE — 3060F POS MICROALBUMINURIA REV: CPT | Mod: CPTII,S$GLB,, | Performed by: NURSE PRACTITIONER

## 2022-03-22 PROCEDURE — 3066F PR DOCUMENTATION OF TREATMENT FOR NEPHROPATHY: ICD-10-PCS | Mod: CPTII,S$GLB,, | Performed by: NURSE PRACTITIONER

## 2022-03-22 PROCEDURE — 3066F NEPHROPATHY DOC TX: CPT | Mod: CPTII,S$GLB,, | Performed by: NURSE PRACTITIONER

## 2022-03-22 PROCEDURE — 1159F MED LIST DOCD IN RCRD: CPT | Mod: CPTII,S$GLB,, | Performed by: NURSE PRACTITIONER

## 2022-03-22 PROCEDURE — 3044F PR MOST RECENT HEMOGLOBIN A1C LEVEL <7.0%: ICD-10-PCS | Mod: CPTII,S$GLB,, | Performed by: NURSE PRACTITIONER

## 2022-03-22 PROCEDURE — G9919 PR SCREENING AND POSITIVE: ICD-10-PCS | Mod: CPTII,S$GLB,, | Performed by: NURSE PRACTITIONER

## 2022-03-22 PROCEDURE — 99999 PR PBB SHADOW E&M-EST. PATIENT-LVL IV: CPT | Mod: PBBFAC,,, | Performed by: NURSE PRACTITIONER

## 2022-03-22 PROCEDURE — 3288F PR FALLS RISK ASSESSMENT DOCUMENTED: ICD-10-PCS | Mod: CPTII,S$GLB,, | Performed by: NURSE PRACTITIONER

## 2022-03-22 PROCEDURE — 3060F PR POS MICROALBUMINURIA RESULT DOCUMENTED/REVIEW: ICD-10-PCS | Mod: CPTII,S$GLB,, | Performed by: NURSE PRACTITIONER

## 2022-03-22 PROCEDURE — 3288F FALL RISK ASSESSMENT DOCD: CPT | Mod: CPTII,S$GLB,, | Performed by: NURSE PRACTITIONER

## 2022-03-22 PROCEDURE — 1170F FXNL STATUS ASSESSED: CPT | Mod: CPTII,S$GLB,, | Performed by: NURSE PRACTITIONER

## 2022-03-22 PROCEDURE — 1170F PR FUNCTIONAL STATUS ASSESSED: ICD-10-PCS | Mod: CPTII,S$GLB,, | Performed by: NURSE PRACTITIONER

## 2022-03-22 PROCEDURE — 3008F BODY MASS INDEX DOCD: CPT | Mod: CPTII,S$GLB,, | Performed by: NURSE PRACTITIONER

## 2022-03-22 PROCEDURE — 1101F PR PT FALLS ASSESS DOC 0-1 FALLS W/OUT INJ PAST YR: ICD-10-PCS | Mod: CPTII,S$GLB,, | Performed by: NURSE PRACTITIONER

## 2022-03-22 PROCEDURE — 1159F PR MEDICATION LIST DOCUMENTED IN MEDICAL RECORD: ICD-10-PCS | Mod: CPTII,S$GLB,, | Performed by: NURSE PRACTITIONER

## 2022-03-22 PROCEDURE — 3008F PR BODY MASS INDEX (BMI) DOCUMENTED: ICD-10-PCS | Mod: CPTII,S$GLB,, | Performed by: NURSE PRACTITIONER

## 2022-03-22 PROCEDURE — 4010F PR ACE/ARB THEARPY RXD/TAKEN: ICD-10-PCS | Mod: CPTII,S$GLB,, | Performed by: NURSE PRACTITIONER

## 2022-03-22 NOTE — PATIENT INSTRUCTIONS
Counseling and Referral of Other Preventative  (Italic type indicates deductible and co-insurance are waived)    Patient Name: Harriett Oglesby  Today's Date: 3/22/2022    Health Maintenance       Date Due Completion Date    TETANUS VACCINE Never done ---    Eye Exam 11/11/2021 11/11/2020    DEXA Scan 01/28/2022 1/28/2019    Mammogram 07/15/2022 7/15/2021    Hemoglobin A1c 07/26/2022 1/26/2022    Diabetes Urine Screening 01/26/2023 1/26/2022    Foot Exam 01/26/2023 1/26/2022    Override on 11/19/2020: Done    Override on 6/3/2019: Done    Lipid Panel 01/26/2023 1/26/2022    High Dose Statin 03/22/2023 3/22/2022    Colorectal Cancer Screening 03/01/2028 3/1/2021        No orders of the defined types were placed in this encounter.    The following information is provided to all patients.  This information is to help you find resources for any of the problems found today that may be affecting your health:                Living healthy guide: www.[facility].[place].[facility]      Understanding Diabetes: www.diabetes.org      Eating healthy: www.cdc.gov/healthyweight      CDC home safety checklist: www.cdc.gov/steadi/patient.html      Agency on Aging: www.goea.[place].gov      Alcoholics anonymous (AA): www.aa.org      Physical Activity: www.gracie.nih.gov/aa4izwo      Tobacco use: www.quitwithusla.org

## 2022-03-23 ENCOUNTER — PATIENT OUTREACH (OUTPATIENT)
Dept: ADMINISTRATIVE | Facility: OTHER | Age: 67
End: 2022-03-23
Payer: MEDICARE

## 2022-03-29 NOTE — PROGRESS NOTES
CHW - Initial Contact    This Community Health Worker completed OR updated the Social Determinant of Health questionnaire with Harriett Livingston by telephone today.    Pt identified barriers of most importance are:   Referrals to community agencies completed with patient/caregiver consent outside of [facility] include: No  Referrals were put through [facility] - No  Support and Services: A callback for closure.  Other information discussed the patient needs / wants help with: SDOH   Follow up required:Only to close.   Initial Outreach, Follow-up Outreach - Due: 3/29/2022, 4/12/2022

## 2022-03-31 ENCOUNTER — EXTERNAL CHRONIC CARE MANAGEMENT (OUTPATIENT)
Dept: PRIMARY CARE CLINIC | Facility: CLINIC | Age: 67
End: 2022-03-31
Payer: MEDICARE

## 2022-03-31 DIAGNOSIS — I10 HYPERTENSION, ESSENTIAL, BENIGN: ICD-10-CM

## 2022-03-31 PROCEDURE — 99490 PR CHRONIC CARE MGMT, 1ST 20 MIN: ICD-10-PCS | Mod: S$GLB,,, | Performed by: INTERNAL MEDICINE

## 2022-03-31 PROCEDURE — 99490 CHRNC CARE MGMT STAFF 1ST 20: CPT | Mod: S$GLB,,, | Performed by: INTERNAL MEDICINE

## 2022-03-31 NOTE — TELEPHONE ENCOUNTER
No new care gaps identified.  Powered by [facility] [employer]. Reference number: [id number].   3/31/2022 7:53:12 AM CDT

## 2022-04-01 RX ORDER — HYDROCHLOROTHIAZIDE 25 MG/1
TABLET ORAL
Qty: 90 TABLET | Refills: 0 | Status: SHIPPED | OUTPATIENT
Start: 2022-04-01 | End: 2022-11-02 | Stop reason: SDUPTHER

## 2022-04-01 NOTE — TELEPHONE ENCOUNTER
Refill Routing Note   Medication(s) are not appropriate for processing by [facility] for the following reason(s):      - Drug-Disease Interaction (valsartan and Macroalbuminuric diabetic nephropathy;)    OR action(s):  Defer  Approve Medication-related problems identified: Drug-disease interaction     Medication Therapy Plan: Drug-Disease: valsartan and Macroalbuminuric diabetic nephropathy; Type 2 diabetes mellitus with microalbuminuria, without long-term current use of insulin; Defer valsartan; Approve hydrochlorothiazide  --->Care Gap information included in message below if applicable.   Medication reconciliation completed: No   Automatic Epic Generated Protocol Data:    Orders Placed This Encounter    hydroCHLOROthiazide (HYDRODIURIL) 25 MG tablet      Requested Prescriptions   Pending Prescriptions Disp Refills    valsartan (DIOVAN) 320 MG tablet [Pharmacy Med Name: valsartan 320 mg tablet] 90 tablet 0     Sig: TAKE ONE TABLET BY MOUTH ONCE DAILY FOR BLOOD PRESSURE       Cardiovascular:  Angiotensin Receptor Blockers Failed - 3/31/2022  7:52 AM        Failed - Matches previous order       Previous Authorizing Provider: Khalida Montalvo NP (valsartan (DIOVAN) 320 MG tablet)  Previous Authorizing Provider: Khalida Montalvo NP (hydroCHLOROthiazide (HYDRODIURIL) 25 MG tablet)  Previous Pharmacy: [facility] [place] [id number]  Previous Pharmacy: [facility] - [place] [id number]            Passed - Patient is at least 18 years old        Passed - Last BP in normal range within 360 days     BP Readings from Last 3 Encounters:   03/22/22 130/76   02/16/22 139/67   01/26/22 (!) 142/78               Passed - Valid encounter within last 15 months     Recent Visits  Date Type Provider Dept   06/28/21 Office Visit Zeinab Noble MD [facility] Family Medicine/Internal Med   11/19/20 Office Visit Zeinab Noble MD [facility] Family Medicine/Internal Med    05/27/20 Office Visit Zeinab Noble MD [facility] Family Medicine/Internal Med   Showing recent visits within past 720 days and meeting all other requirements  Future Appointments  No visits were found meeting these conditions.  Showing future appointments within next 150 days and meeting all other requirements                Passed - No ED/Hospital visits since last PCP visit     Last PCP Visit: 6/28/2021 Last Admission: 3/1/2021 Last ED Visit:           Passed - Cr is 1.39 or below and within 360 days     Lab Results   Component Value Date    CREATININE 0.9 01/26/2022    CREATININE 0.9 08/02/2021    CREATININE 0.8 06/23/2021              Passed - K is 5.2 or below and within 360 days     Potassium   Date Value Ref Range Status   01/26/2022 3.7 3.5 - 5.1 mmol/L Final   08/02/2021 3.6 3.5 - 5.1 mmol/L Final   06/23/2021 3.7 3.5 - 5.1 mmol/L Final              Passed - eGFR within 360 days     Lab Results   Component Value Date    EGFRNONAA >60 01/26/2022    EGFRNONAA >60 08/02/2021    EGFRNONAA >60 06/23/2021                 Signed Prescriptions Disp Refills    hydroCHLOROthiazide (HYDRODIURIL) 25 MG tablet 90 tablet 0     Sig: TAKE ONE TABLET BY MOUTH ONCE DAILY FOR BLOOD PRESSURE and FOR FLUID       Cardiovascular: Diuretics - Thiazide Failed - 3/31/2022  7:52 AM        Failed - Matches previous order       Previous Authorizing Provider: Khalida Montalvo NP (valsartan (DIOVAN) 320 MG tablet)  Previous Authorizing Provider: Khalida Montalvo NP (hydroCHLOROthiazide (HYDRODIURIL) 25 MG tablet)  Previous Pharmacy: [facility] - [place] [address]  Previous Pharmacy: [facility] - [place] [address]            Passed - Patient is at least 18 years old        Passed - Last BP in normal range within 360 days     BP Readings from Last 3 Encounters:   03/22/22 130/76   02/16/22 139/67   01/26/22 (!) 142/78               Passed - Valid encounter within last 15 months      Recent Visits  Date Type Provider Dept   06/28/21 Office Visit Zeinab Noble MD [facility] Family Medicine/Internal Med   11/19/20 Office Visit Zeinab Noble MD [facility] Family Medicine/Internal Med   05/27/20 Office Visit Zeinab Noble MD [facility] Family Medicine/Internal Med   Showing recent visits within past 720 days and meeting all other requirements  Future Appointments  No visits were found meeting these conditions.  Showing future appointments within next 150 days and meeting all other requirements                Passed - No ED/Hospital visits since last PCP visit     Last PCP Visit: 6/28/2021 Last Admission: 3/1/2021 Last ED Visit:           Passed - Cr is 1.39 or below and within 360 days     Lab Results   Component Value Date    CREATININE 0.9 01/26/2022    CREATININE 0.9 08/02/2021    CREATININE 0.8 06/23/2021              Passed - K in normal range and within 360 days     Potassium   Date Value Ref Range Status   01/26/2022 3.7 3.5 - 5.1 mmol/L Final   08/02/2021 3.6 3.5 - 5.1 mmol/L Final   06/23/2021 3.7 3.5 - 5.1 mmol/L Final              Passed - Na is between 130 and 148 and within 360 days     Sodium   Date Value Ref Range Status   01/26/2022 139 136 - 145 mmol/L Final   08/02/2021 141 136 - 145 mmol/L Final   06/23/2021 144 136 - 145 mmol/L Final              Passed - eGFR within 360 days     Lab Results   Component Value Date    EGFRNONAA >60 01/26/2022    EGFRNONAA >60 08/02/2021    EGFRNONAA >60 06/23/2021                      Appointments  past 12m or future 3m with PCP    Date Provider   Last Visit   6/28/2021 Zeinab Noble MD   Next Visit   Visit date not found Zeinab Noble MD   ED visits in past 90 days: 0        Note composed:8:32 AM 04/01/2022

## 2022-04-03 RX ORDER — VALSARTAN 320 MG/1
TABLET ORAL
Qty: 90 TABLET | Refills: 0 | Status: SHIPPED | OUTPATIENT
Start: 2022-04-03 | End: 2022-11-02 | Stop reason: SDUPTHER

## 2022-04-12 ENCOUNTER — PATIENT OUTREACH (OUTPATIENT)
Dept: ADMINISTRATIVE | Facility: OTHER | Age: 67
End: 2022-04-12
Payer: MEDICARE

## 2022-04-12 NOTE — PROGRESS NOTES
CHW - Case Closure    This Community Health Worker spoke to Harriett Oglesby by telephone today.   Pt/Caregiver reported: Felling very well.  Pt/Caregiver denied any additional needs at this time and agrees with episode closure at this time.  Provided Harriett Oglesby with Community Health Worker's contact information and encouraged him/her to contact this Community Health Worker if additional needs arise.

## 2022-05-27 ENCOUNTER — PATIENT OUTREACH (OUTPATIENT)
Dept: ADMINISTRATIVE | Facility: HOSPITAL | Age: 67
End: 2022-05-27
Payer: MEDICARE

## 2022-08-31 DIAGNOSIS — Z78.0 MENOPAUSE: ICD-10-CM

## 2022-09-06 DIAGNOSIS — E11.65 TYPE 2 DIABETES MELLITUS WITH HYPERGLYCEMIA, WITHOUT LONG-TERM CURRENT USE OF INSULIN: ICD-10-CM

## 2022-09-07 ENCOUNTER — TELEPHONE (OUTPATIENT)
Dept: FAMILY MEDICINE | Facility: CLINIC | Age: 67
End: 2022-09-07
Payer: MEDICARE

## 2022-09-07 RX ORDER — DAPAGLIFLOZIN 10 MG/1
TABLET, FILM COATED ORAL
Qty: 30 TABLET | Refills: 0 | Status: SHIPPED | OUTPATIENT
Start: 2022-09-07 | End: 2022-10-12 | Stop reason: SDUPTHER

## 2022-09-07 NOTE — TELEPHONE ENCOUNTER
----- Message from Angie Richardson sent at 9/7/2022 10:33 AM CDT -----  Name of Who is Calling: JACOBO BETANCUR         What is the request in detail: The patient is calling to schedule an appointment for any day next week in the morning. Please advise          Can the clinic reply by [facility]: no         What Number to Call Back if not in [facility]: [phone]

## 2022-09-20 DIAGNOSIS — E11.9 TYPE 2 DIABETES MELLITUS WITHOUT COMPLICATION: ICD-10-CM

## 2022-10-10 DIAGNOSIS — E11.65 TYPE 2 DIABETES MELLITUS WITH HYPERGLYCEMIA, WITHOUT LONG-TERM CURRENT USE OF INSULIN: ICD-10-CM

## 2022-10-10 RX ORDER — METFORMIN HYDROCHLORIDE 1000 MG/1
TABLET ORAL
Qty: 180 TABLET | Refills: 1 | OUTPATIENT
Start: 2022-10-10

## 2022-10-11 DIAGNOSIS — E78.5 HYPERLIPIDEMIA ASSOCIATED WITH TYPE 2 DIABETES MELLITUS: ICD-10-CM

## 2022-10-11 DIAGNOSIS — E11.69 HYPERLIPIDEMIA ASSOCIATED WITH TYPE 2 DIABETES MELLITUS: ICD-10-CM

## 2022-10-11 RX ORDER — SIMVASTATIN 20 MG/1
20 TABLET, FILM COATED ORAL DAILY
Qty: 90 TABLET | Refills: 1 | Status: SHIPPED | OUTPATIENT
Start: 2022-10-11 | End: 2022-11-02 | Stop reason: SDUPTHER

## 2022-10-11 RX ORDER — METFORMIN HYDROCHLORIDE 1000 MG/1
1000 TABLET ORAL 2 TIMES DAILY
Qty: 60 TABLET | Refills: 0 | Status: SHIPPED | OUTPATIENT
Start: 2022-10-11 | End: 2022-11-02 | Stop reason: SDUPTHER

## 2022-10-12 ENCOUNTER — TELEPHONE (OUTPATIENT)
Dept: FAMILY MEDICINE | Facility: CLINIC | Age: 67
End: 2022-10-12
Payer: MEDICARE

## 2022-10-12 DIAGNOSIS — E11.65 TYPE 2 DIABETES MELLITUS WITH HYPERGLYCEMIA, WITHOUT LONG-TERM CURRENT USE OF INSULIN: ICD-10-CM

## 2022-10-12 DIAGNOSIS — E11.9 TYPE 2 DIABETES MELLITUS WITHOUT COMPLICATION, UNSPECIFIED WHETHER LONG TERM INSULIN USE: ICD-10-CM

## 2022-10-12 RX ORDER — DAPAGLIFLOZIN 10 MG/1
10 TABLET, FILM COATED ORAL DAILY
Qty: 30 TABLET | Refills: 0 | Status: SHIPPED | OUTPATIENT
Start: 2022-10-12 | End: 2022-11-02 | Stop reason: SDUPTHER

## 2022-10-12 NOTE — TELEPHONE ENCOUNTER
----- Message from Zeinab Noble MD sent at 10/11/2022  9:14 PM CDT -----  Metformin was sent as per refill request.  Zocor is not for diabetes.  It is for high cholesterol.  Nevertheless, I will send a refill now.    Zeinab  ----- Message -----  From: Amara Flaherty LPN  Sent: 10/11/2022   5:13 PM CDT  To: Zeinab Noble MD      ----- Message -----  From: Dionne Noble  Sent: 10/11/2022  11:40 AM CDT  To: [facility] Staff    Type: Patient Call Back    Who called: Self    What is the request in detail: pt is calling because she states the wrong medication was sent in after her appt. She was supposed to receive simvastatin (ZOCOR) 20 MG tablet for her diabetes. Please call    Can the clinic reply by [facility]? no    Would the patient rather a call back or a response via [facility]? Call    Best call back number: [phone]

## 2022-10-12 NOTE — TELEPHONE ENCOUNTER
Care Due:                  Date            Visit Type   Department     Provider  --------------------------------------------------------------------------------                                [facility] FAMILY                              PRIMARY      MEDICINE/INTERN  Last Visit: 06-      CARE ([facility])   JEANETH Noble                              [facility]                              PRIMARY      MEDICINE/INTERN  Next Visit: 11-      CARE ([facility])   JEANETH Noble                                                            Last  Test          Frequency    Reason                     Performed    Due Date  --------------------------------------------------------------------------------    HBA1C.......  6 months...  metFORMIN................  01- 07-    [facility] Embedded Care Gaps. Reference number: [id number]. 10/12/2022   10:54:22 AM CDT

## 2022-10-12 NOTE — TELEPHONE ENCOUNTER
----- Message from Shelton House sent at 10/12/2022  2:06 PM CDT -----  Type: Patient Call Back    Who called:Self    What is the request in detail: Pt states that the wrong prescription is being called in . Pt would like a refill on dapagliflozin (FARXIGA) 10 mg tablet    Can the clinic reply by [facility]? no    Would the patient rather a call back or a response via [facility]? Call back    Best call back number: [phone] (home)       Additional Information:

## 2022-10-12 NOTE — TELEPHONE ENCOUNTER
----- Message from Dionne Noble sent at 10/12/2022  9:39 AM CDT -----  Regarding: refill request  Type: RX Refill Request    Who Called: Self    Have you contacted your pharmacy: no    Refill or New Rx: refill    RX Name and Strength: dapagliflozin (FARXIGA) 10 mg tablet    Preferred Pharmacy with phone number: [facility] [place] [address]   Phone:  [phone]  Fax:  [phone]    Local or Mail Order: local    Ordering Provider: Dr. Noble    Would the patient rather a call back or a response via [facility]? Call    Best Call Back Number: [phone]    Additional info: (Rx sent in error on yesterday) Pt is requesting this RX until she is able to see Dr. Noble. Pleaase call

## 2022-10-17 ENCOUNTER — PATIENT OUTREACH (OUTPATIENT)
Dept: ADMINISTRATIVE | Facility: HOSPITAL | Age: 67
End: 2022-10-17
Payer: MEDICARE

## 2022-10-17 NOTE — LETTER
AUTHORIZATION FOR RELEASE OF   CONFIDENTIAL INFORMATION        We are seeing Harriett Oglesby, date of birth 1955, in the clinic at [facility] FAMILY MEDICINE/INTERNAL MED. Zeinab Noble MD is the patient's PCP. Harriett Oglesby has an outstanding lab/procedure at the time we reviewed her chart. In order to help keep her health information updated, she has authorized us to request the following medical record(s):        (  )  MAMMOGRAM                                      (  )  COLONOSCOPY      (  )  PAP SMEAR                                          (  )  OUTSIDE LAB RESULTS     (  )  DEXA SCAN                                          ( X) DIABETIC EYE EXAM         (  )  FOOT EXAM                                          (  )  ENTIRE RECORD     (  )  OUTSIDE IMMUNIZATIONS                 (  )  _______________         Please fax records to [facility], Nadja N Jones, MD, Fax [phone]   [address] [facility] [place] [address]       If you have any questions, please contact Zach Mullins MA,[facility] at [phone].           Patient Name: Harriett Oglesby  : 1955  Patient Phone #: [phone]

## 2022-11-02 ENCOUNTER — LAB VISIT (OUTPATIENT)
Dept: LAB | Facility: HOSPITAL | Age: 67
End: 2022-11-02
Attending: INTERNAL MEDICINE
Payer: MEDICARE

## 2022-11-02 ENCOUNTER — OFFICE VISIT (OUTPATIENT)
Dept: FAMILY MEDICINE | Facility: CLINIC | Age: 67
End: 2022-11-02
Payer: MEDICARE

## 2022-11-02 VITALS
HEART RATE: 82 BPM | OXYGEN SATURATION: 97 % | HEIGHT: 65 IN | RESPIRATION RATE: 18 BRPM | WEIGHT: 191 LBS | SYSTOLIC BLOOD PRESSURE: 122 MMHG | TEMPERATURE: 98 F | DIASTOLIC BLOOD PRESSURE: 82 MMHG | BODY MASS INDEX: 31.82 KG/M2

## 2022-11-02 DIAGNOSIS — E11.29 TYPE 2 DIABETES MELLITUS WITH MICROALBUMINURIA, WITHOUT LONG-TERM CURRENT USE OF INSULIN: ICD-10-CM

## 2022-11-02 DIAGNOSIS — E78.5 HYPERLIPIDEMIA ASSOCIATED WITH TYPE 2 DIABETES MELLITUS: ICD-10-CM

## 2022-11-02 DIAGNOSIS — E11.29 TYPE 2 DIABETES MELLITUS WITH MICROALBUMINURIA, WITHOUT LONG-TERM CURRENT USE OF INSULIN: Primary | ICD-10-CM

## 2022-11-02 DIAGNOSIS — I10 HYPERTENSION, ESSENTIAL, BENIGN: ICD-10-CM

## 2022-11-02 DIAGNOSIS — E11.69 HYPERLIPIDEMIA ASSOCIATED WITH TYPE 2 DIABETES MELLITUS: ICD-10-CM

## 2022-11-02 DIAGNOSIS — Z23 NEEDS FLU SHOT: ICD-10-CM

## 2022-11-02 DIAGNOSIS — Z78.0 ASYMPTOMATIC MENOPAUSE: ICD-10-CM

## 2022-11-02 DIAGNOSIS — Z12.31 ENCOUNTER FOR SCREENING MAMMOGRAM FOR BREAST CANCER: ICD-10-CM

## 2022-11-02 DIAGNOSIS — R80.9 TYPE 2 DIABETES MELLITUS WITH MICROALBUMINURIA, WITHOUT LONG-TERM CURRENT USE OF INSULIN: ICD-10-CM

## 2022-11-02 DIAGNOSIS — R80.9 TYPE 2 DIABETES MELLITUS WITH MICROALBUMINURIA, WITHOUT LONG-TERM CURRENT USE OF INSULIN: Primary | ICD-10-CM

## 2022-11-02 LAB
ALBUMIN SERPL BCP-MCNC: 4.5 G/DL (ref 3.5–5.2)
ALP SERPL-CCNC: 50 U/L (ref 55–135)
ALT SERPL W/O P-5'-P-CCNC: 10 U/L (ref 10–44)
ANION GAP SERPL CALC-SCNC: 12 MMOL/L (ref 8–16)
AST SERPL-CCNC: 12 U/L (ref 10–40)
BASOPHILS # BLD AUTO: 0.06 K/UL (ref 0–0.2)
BASOPHILS NFR BLD: 1.1 % (ref 0–1.9)
BILIRUB SERPL-MCNC: 0.4 MG/DL (ref 0.1–1)
BUN SERPL-MCNC: 14 MG/DL (ref 8–23)
CALCIUM SERPL-MCNC: 9.8 MG/DL (ref 8.7–10.5)
CHLORIDE SERPL-SCNC: 101 MMOL/L (ref 95–110)
CO2 SERPL-SCNC: 29 MMOL/L (ref 23–29)
CREAT SERPL-MCNC: 0.9 MG/DL (ref 0.5–1.4)
DIFFERENTIAL METHOD: ABNORMAL
EOSINOPHIL # BLD AUTO: 0.2 K/UL (ref 0–0.5)
EOSINOPHIL NFR BLD: 3 % (ref 0–8)
ERYTHROCYTE [DISTWIDTH] IN BLOOD BY AUTOMATED COUNT: 13 % (ref 11.5–14.5)
EST. GFR  (NO RACE VARIABLE): >60 ML/MIN/1.73 M^2
ESTIMATED AVG GLUCOSE: 137 MG/DL (ref 68–131)
GLUCOSE SERPL-MCNC: 137 MG/DL (ref 70–110)
HBA1C MFR BLD: 6.4 % (ref 4–5.6)
HCT VFR BLD AUTO: 40.9 % (ref 37–48.5)
HGB BLD-MCNC: 12.5 G/DL (ref 12–16)
IMM GRANULOCYTES # BLD AUTO: 0.02 K/UL (ref 0–0.04)
IMM GRANULOCYTES NFR BLD AUTO: 0.4 % (ref 0–0.5)
LYMPHOCYTES # BLD AUTO: 1.9 K/UL (ref 1–4.8)
LYMPHOCYTES NFR BLD: 33.5 % (ref 18–48)
MCH RBC QN AUTO: 28.6 PG (ref 27–31)
MCHC RBC AUTO-ENTMCNC: 30.6 G/DL (ref 32–36)
MCV RBC AUTO: 94 FL (ref 82–98)
MONOCYTES # BLD AUTO: 0.5 K/UL (ref 0.3–1)
MONOCYTES NFR BLD: 8.9 % (ref 4–15)
NEUTROPHILS # BLD AUTO: 3 K/UL (ref 1.8–7.7)
NEUTROPHILS NFR BLD: 53.1 % (ref 38–73)
NRBC BLD-RTO: 0 /100 WBC
PLATELET # BLD AUTO: 481 K/UL (ref 150–450)
PMV BLD AUTO: 10.6 FL (ref 9.2–12.9)
POTASSIUM SERPL-SCNC: 3.9 MMOL/L (ref 3.5–5.1)
PROT SERPL-MCNC: 8.2 G/DL (ref 6–8.4)
RBC # BLD AUTO: 4.37 M/UL (ref 4–5.4)
SODIUM SERPL-SCNC: 142 MMOL/L (ref 136–145)
TSH SERPL DL<=0.005 MIU/L-ACNC: 1.83 UIU/ML (ref 0.4–4)
WBC # BLD AUTO: 5.61 K/UL (ref 3.9–12.7)

## 2022-11-02 PROCEDURE — 1101F PR PT FALLS ASSESS DOC 0-1 FALLS W/OUT INJ PAST YR: ICD-10-PCS | Mod: CPTII,S$GLB,, | Performed by: INTERNAL MEDICINE

## 2022-11-02 PROCEDURE — 1126F PR PAIN SEVERITY QUANTIFIED, NO PAIN PRESENT: ICD-10-PCS | Mod: CPTII,S$GLB,, | Performed by: INTERNAL MEDICINE

## 2022-11-02 PROCEDURE — 1160F PR REVIEW ALL MEDS BY PRESCRIBER/CLIN PHARMACIST DOCUMENTED: ICD-10-PCS | Mod: CPTII,S$GLB,, | Performed by: INTERNAL MEDICINE

## 2022-11-02 PROCEDURE — 99999 PR PBB SHADOW E&M-EST. PATIENT-LVL IV: ICD-10-PCS | Mod: PBBFAC,,, | Performed by: INTERNAL MEDICINE

## 2022-11-02 PROCEDURE — 1159F PR MEDICATION LIST DOCUMENTED IN MEDICAL RECORD: ICD-10-PCS | Mod: CPTII,S$GLB,, | Performed by: INTERNAL MEDICINE

## 2022-11-02 PROCEDURE — 3008F BODY MASS INDEX DOCD: CPT | Mod: CPTII,S$GLB,, | Performed by: INTERNAL MEDICINE

## 2022-11-02 PROCEDURE — 3044F HG A1C LEVEL LT 7.0%: CPT | Mod: CPTII,S$GLB,, | Performed by: INTERNAL MEDICINE

## 2022-11-02 PROCEDURE — 3044F PR MOST RECENT HEMOGLOBIN A1C LEVEL <7.0%: ICD-10-PCS | Mod: CPTII,S$GLB,, | Performed by: INTERNAL MEDICINE

## 2022-11-02 PROCEDURE — G0008 ADMIN INFLUENZA VIRUS VAC: HCPCS | Mod: S$GLB,,, | Performed by: INTERNAL MEDICINE

## 2022-11-02 PROCEDURE — 3066F NEPHROPATHY DOC TX: CPT | Mod: CPTII,S$GLB,, | Performed by: INTERNAL MEDICINE

## 2022-11-02 PROCEDURE — 3079F DIAST BP 80-89 MM HG: CPT | Mod: CPTII,S$GLB,, | Performed by: INTERNAL MEDICINE

## 2022-11-02 PROCEDURE — G0008 FLU VACCINE - QUADRIVALENT - ADJUVANTED: ICD-10-PCS | Mod: S$GLB,,, | Performed by: INTERNAL MEDICINE

## 2022-11-02 PROCEDURE — 4010F ACE/ARB THERAPY RXD/TAKEN: CPT | Mod: CPTII,S$GLB,, | Performed by: INTERNAL MEDICINE

## 2022-11-02 PROCEDURE — 85025 COMPLETE CBC W/AUTO DIFF WBC: CPT | Performed by: INTERNAL MEDICINE

## 2022-11-02 PROCEDURE — 36415 COLL VENOUS BLD VENIPUNCTURE: CPT | Mod: PO | Performed by: INTERNAL MEDICINE

## 2022-11-02 PROCEDURE — 4010F PR ACE/ARB THEARPY RXD/TAKEN: ICD-10-PCS | Mod: CPTII,S$GLB,, | Performed by: INTERNAL MEDICINE

## 2022-11-02 PROCEDURE — 80053 COMPREHEN METABOLIC PANEL: CPT | Performed by: INTERNAL MEDICINE

## 2022-11-02 PROCEDURE — 3060F PR POS MICROALBUMINURIA RESULT DOCUMENTED/REVIEW: ICD-10-PCS | Mod: CPTII,S$GLB,, | Performed by: INTERNAL MEDICINE

## 2022-11-02 PROCEDURE — 3288F FALL RISK ASSESSMENT DOCD: CPT | Mod: CPTII,S$GLB,, | Performed by: INTERNAL MEDICINE

## 2022-11-02 PROCEDURE — 1126F AMNT PAIN NOTED NONE PRSNT: CPT | Mod: CPTII,S$GLB,, | Performed by: INTERNAL MEDICINE

## 2022-11-02 PROCEDURE — 1159F MED LIST DOCD IN RCRD: CPT | Mod: CPTII,S$GLB,, | Performed by: INTERNAL MEDICINE

## 2022-11-02 PROCEDURE — 3008F PR BODY MASS INDEX (BMI) DOCUMENTED: ICD-10-PCS | Mod: CPTII,S$GLB,, | Performed by: INTERNAL MEDICINE

## 2022-11-02 PROCEDURE — 3066F PR DOCUMENTATION OF TREATMENT FOR NEPHROPATHY: ICD-10-PCS | Mod: CPTII,S$GLB,, | Performed by: INTERNAL MEDICINE

## 2022-11-02 PROCEDURE — 84443 ASSAY THYROID STIM HORMONE: CPT | Performed by: INTERNAL MEDICINE

## 2022-11-02 PROCEDURE — 99214 OFFICE O/P EST MOD 30 MIN: CPT | Mod: 25,S$GLB,, | Performed by: INTERNAL MEDICINE

## 2022-11-02 PROCEDURE — 3074F SYST BP LT 130 MM HG: CPT | Mod: CPTII,S$GLB,, | Performed by: INTERNAL MEDICINE

## 2022-11-02 PROCEDURE — 3288F PR FALLS RISK ASSESSMENT DOCUMENTED: ICD-10-PCS | Mod: CPTII,S$GLB,, | Performed by: INTERNAL MEDICINE

## 2022-11-02 PROCEDURE — 1160F RVW MEDS BY RX/DR IN RCRD: CPT | Mod: CPTII,S$GLB,, | Performed by: INTERNAL MEDICINE

## 2022-11-02 PROCEDURE — 83036 HEMOGLOBIN GLYCOSYLATED A1C: CPT | Performed by: INTERNAL MEDICINE

## 2022-11-02 PROCEDURE — 99999 PR PBB SHADOW E&M-EST. PATIENT-LVL IV: CPT | Mod: PBBFAC,,, | Performed by: INTERNAL MEDICINE

## 2022-11-02 PROCEDURE — 90694 FLU VACCINE - QUADRIVALENT - ADJUVANTED: ICD-10-PCS | Mod: S$GLB,,, | Performed by: INTERNAL MEDICINE

## 2022-11-02 PROCEDURE — 90694 VACC AIIV4 NO PRSRV 0.5ML IM: CPT | Mod: S$GLB,,, | Performed by: INTERNAL MEDICINE

## 2022-11-02 PROCEDURE — 99214 PR OFFICE/OUTPT VISIT, EST, LEVL IV, 30-39 MIN: ICD-10-PCS | Mod: 25,S$GLB,, | Performed by: INTERNAL MEDICINE

## 2022-11-02 PROCEDURE — 1101F PT FALLS ASSESS-DOCD LE1/YR: CPT | Mod: CPTII,S$GLB,, | Performed by: INTERNAL MEDICINE

## 2022-11-02 PROCEDURE — 3079F PR MOST RECENT DIASTOLIC BLOOD PRESSURE 80-89 MM HG: ICD-10-PCS | Mod: CPTII,S$GLB,, | Performed by: INTERNAL MEDICINE

## 2022-11-02 PROCEDURE — 3074F PR MOST RECENT SYSTOLIC BLOOD PRESSURE < 130 MM HG: ICD-10-PCS | Mod: CPTII,S$GLB,, | Performed by: INTERNAL MEDICINE

## 2022-11-02 PROCEDURE — 3060F POS MICROALBUMINURIA REV: CPT | Mod: CPTII,S$GLB,, | Performed by: INTERNAL MEDICINE

## 2022-11-02 RX ORDER — SIMVASTATIN 20 MG/1
20 TABLET, FILM COATED ORAL DAILY
Qty: 90 TABLET | Refills: 3 | Status: SHIPPED | OUTPATIENT
Start: 2022-11-02 | End: 2023-11-06 | Stop reason: SDUPTHER

## 2022-11-02 RX ORDER — METFORMIN HYDROCHLORIDE 1000 MG/1
1000 TABLET ORAL 2 TIMES DAILY
Qty: 180 TABLET | Refills: 1 | Status: SHIPPED | OUTPATIENT
Start: 2022-11-02 | End: 2023-05-31 | Stop reason: SDUPTHER

## 2022-11-02 RX ORDER — AMLODIPINE BESYLATE 10 MG/1
10 TABLET ORAL DAILY
Qty: 90 TABLET | Refills: 3 | Status: SHIPPED | OUTPATIENT
Start: 2022-11-02 | End: 2023-11-06 | Stop reason: SDUPTHER

## 2022-11-02 RX ORDER — DAPAGLIFLOZIN 10 MG/1
10 TABLET, FILM COATED ORAL DAILY
Qty: 90 TABLET | Refills: 1 | Status: SHIPPED | OUTPATIENT
Start: 2022-11-02 | End: 2023-05-31 | Stop reason: SDUPTHER

## 2022-11-02 RX ORDER — ATENOLOL 25 MG/1
25 TABLET ORAL DAILY
Qty: 90 TABLET | Refills: 3 | Status: ON HOLD | OUTPATIENT
Start: 2022-11-02 | End: 2023-11-13 | Stop reason: HOSPADM

## 2022-11-02 RX ORDER — HYDROCHLOROTHIAZIDE 25 MG/1
25 TABLET ORAL DAILY
Qty: 90 TABLET | Refills: 3 | Status: SHIPPED | OUTPATIENT
Start: 2022-11-02 | End: 2023-11-06 | Stop reason: SDUPTHER

## 2022-11-02 RX ORDER — VALSARTAN 320 MG/1
320 TABLET ORAL DAILY
Qty: 90 TABLET | Refills: 3 | Status: SHIPPED | OUTPATIENT
Start: 2022-11-02 | End: 2023-11-06 | Stop reason: SDUPTHER

## 2022-11-02 NOTE — PROGRESS NOTES
SUBJECTIVE     Chief Complaint   Patient presents with    Medication Refill    Annual Exam       HPI  Harriett Oglesby is a 67 y.o. female with multiple medical diagnoses as listed in the medical history and problem list that presents for follow-up for DM2. Pt has been doing well since her last visit. She is fully compliant with meds and denies any adverse side effects. Pt tries to adhere to an ADA diet and walks for exercise 2 days weekly. Her fasting blood sugar levels range from . Pt denies any hypoglycemic episodes since her last visit. Pt is without any other complaints today.     PAST MEDICAL HISTORY:  Past Medical History:   Diagnosis Date    CVA (cerebral vascular accident)     Residual weakness in the left arm and hand    Hyperlipidemia associated with type 2 diabetes mellitus     Hypertension     Type 2 diabetes mellitus        PAST SURGICAL HISTORY:  Past Surgical History:   Procedure Laterality Date    COLONOSCOPY N/A 1/4/2019    Procedure: COLONOSCOPY;  Surgeon: James Ozuna MD;  Location: [facility];  Service: Endoscopy;  Laterality: N/A;    COLONOSCOPY N/A 3/1/2021    Procedure: COLONOSCOPY;  Surgeon: Nelida Cook MD;  Location: [facility];  Service: Endoscopy;  Laterality: N/A;    ESOPHAGOGASTRODUODENOSCOPY N/A 3/1/2021    Procedure: EGD (ESOPHAGOGASTRODUODENOSCOPY);  Surgeon: Nelida Cook MD;  Location: [facility];  Service: Endoscopy;  Laterality: N/A;  rapid covid > 50 miles away, instructions mailed -ml    HEMORRHOID SURGERY         SOCIAL HISTORY:  Social History     Socioeconomic History    Marital status: Single   Tobacco Use    Smoking status: Former     Types: Cigarettes    Smokeless tobacco: Never   Substance and Sexual Activity    Alcohol use: No    Drug use: No     Social Determinants of Health     Financial Resource Strain: Medium Risk    Difficulty of Paying Living Expenses: Somewhat hard   Food Insecurity: No Food Insecurity    Worried About Running Out of Food in  the Last Year: Never true    Ran Out of Food in the Last Year: Never true   Transportation Needs: No Transportation Needs    Lack of Transportation (Medical): No    Lack of Transportation (Non-Medical): No   Physical Activity: Insufficiently Active    Days of Exercise per Week: 3 days    Minutes of Exercise per Session: 30 min   Stress: No Stress Concern Present    Feeling of Stress : Not at all   Social Connections: Moderately Isolated    Frequency of Communication with Friends and Family: More than three times a week    Frequency of Social Gatherings with Friends and Family: Three times a week    Attends Buddhism Services: More than 4 times per year    Active Member of Clubs or Organizations: No    Marital Status: Never    Housing Stability: Low Risk     Unable to Pay for Housing in the Last Year: No    Number of Places Lived in the Last Year: 1    Unstable Housing in the Last Year: No       FAMILY HISTORY:  Family History   Problem Relation Age of Onset    Pancreatic cancer Mother     Emphysema Father     Cancer Father         Lung    Breast cancer Cousin     Breast cancer Cousin     Colon cancer Neg Hx     Esophageal cancer Neg Hx        ALLERGIES AND MEDICATIONS: updated and reviewed.  Review of patient's allergies indicates:  No Known Allergies  Current Outpatient Medications   Medication Sig Dispense Refill    aspirin (ECOTRIN) 81 MG EC tablet Take 81 mg by mouth once daily.       blood sugar diagnostic Strp 1 strip by Misc.(Non-Drug; Combo Route) route once daily. 200 each 3    cetirizine (ZYRTEC) 10 MG tablet Take 10 mg by mouth once daily.      cyanocobalamin 500 MCG tablet Take 500 mcg by mouth once daily.      amLODIPine (NORVASC) 10 MG tablet Take 1 tablet (10 mg total) by mouth once daily. 90 tablet 3    atenoloL (TENORMIN) 25 MG tablet Take 1 tablet (25 mg total) by mouth once daily. 90 tablet 3    dapagliflozin (FARXIGA) 10 mg tablet Take 1 tablet (10 mg total) by mouth Daily. 90 tablet 1     "hydroCHLOROthiazide (HYDRODIURIL) 25 MG tablet Take 1 tablet (25 mg total) by mouth once daily. 90 tablet 3    metFORMIN (GLUCOPHAGE) 1000 MG tablet Take 1 tablet (1,000 mg total) by mouth 2 (two) times daily. for diabetes. 180 tablet 1    simvastatin (ZOCOR) 20 MG tablet Take 1 tablet (20 mg total) by mouth once daily. 90 tablet 3    valsartan (DIOVAN) 320 MG tablet Take 1 tablet (320 mg total) by mouth once daily. 90 tablet 3     No current facility-administered medications for this visit.       ROS  Review of Systems   Constitutional:  Negative for chills and fever.   HENT:  Negative for hearing loss and sore throat.    Eyes:  Negative for visual disturbance.   Respiratory:  Negative for cough and shortness of breath.    Cardiovascular:  Negative for chest pain, palpitations and leg swelling.   Gastrointestinal:  Negative for abdominal pain, constipation, diarrhea, nausea and vomiting.   Genitourinary:  Negative for dysuria, frequency and urgency.   Musculoskeletal:  Negative for arthralgias, joint swelling and myalgias.   Skin:  Negative for rash and wound.   Neurological:  Negative for headaches.   Psychiatric/Behavioral:  Negative for agitation and confusion. The patient is not nervous/anxious.        OBJECTIVE     Physical Exam  Vitals:    11/02/22 0835   BP: 122/82   Pulse: 82   Resp: 18   Temp: 98.3 °F (36.8 °C)    Body mass index is 31.78 kg/m².  Weight: 86.6 kg (191 lb)   Height: 5' 5" (165.1 cm)     Physical Exam  Constitutional:       General: She is not in acute distress.     Appearance: She is well-developed.   HENT:      Head: Normocephalic and atraumatic.      Right Ear: External ear normal.      Left Ear: External ear normal.      Nose: Nose normal.   Eyes:      General: No scleral icterus.        Right eye: No discharge.         Left eye: No discharge.      Conjunctiva/sclera: Conjunctivae normal.   Neck:      Vascular: No JVD.      Trachea: No tracheal deviation.   Cardiovascular:      Rate and " Rhythm: Normal rate and regular rhythm.      Heart sounds: No murmur heard.    No friction rub. No gallop.   Pulmonary:      Effort: Pulmonary effort is normal. No respiratory distress.      Breath sounds: Normal breath sounds. No wheezing.   Abdominal:      General: Bowel sounds are normal. There is no distension.      Palpations: Abdomen is soft. There is no mass.      Tenderness: There is no abdominal tenderness. There is no guarding or rebound.   Musculoskeletal:         General: No tenderness or deformity. Normal range of motion.      Cervical back: Normal range of motion and neck supple.   Skin:     General: Skin is warm and dry.      Findings: No erythema or rash.   Neurological:      Mental Status: She is alert and oriented to person, place, and time.      Motor: No abnormal muscle tone.      Coordination: Coordination normal.      Comments: L sided weakness   Psychiatric:         Behavior: Behavior normal.         Thought Content: Thought content normal.         Judgment: Judgment normal.         Health Maintenance         Date Due Completion Date    TETANUS VACCINE Never done ---    Eye Exam 11/11/2021 11/11/2020    COVID-19 Vaccine (4 - Booster for Moderna series) 01/28/2022 12/3/2021    DEXA Scan 01/28/2022 1/28/2019    Mammogram 07/15/2022 7/15/2021    Hemoglobin A1c 07/26/2022 1/26/2022    Influenza Vaccine (1) 09/01/2022 1/26/2022    Diabetes Urine Screening 01/26/2023 1/26/2022    Foot Exam 01/26/2023 1/26/2022    Override on 11/19/2020: Done    Override on 6/3/2019: Done    Lipid Panel 01/26/2023 1/26/2022    High Dose Statin 11/02/2023 11/2/2022    Colorectal Cancer Screening 03/01/2028 3/1/2021              ASSESSMENT     67 y.o. female with     1. Type 2 diabetes mellitus with microalbuminuria, without long-term current use of insulin    2. Hypertension, essential, benign    3. Hyperlipidemia associated with type 2 diabetes mellitus    4. Asymptomatic menopause    5. Encounter for screening  mammogram for breast cancer    6. Needs flu shot        PLAN:     1. Type 2 diabetes mellitus with microalbuminuria, without long-term current use of insulin  - Check labs  - CBC Auto Differential; Future  - Comprehensive Metabolic Panel; Future  - Hemoglobin A1C; Future  - TSH; Future  - metFORMIN (GLUCOPHAGE) 1000 MG tablet; Take 1 tablet (1,000 mg total) by mouth 2 (two) times daily. for diabetes.  Dispense: 180 tablet; Refill: 1  - dapagliflozin (FARXIGA) 10 mg tablet; Take 1 tablet (10 mg total) by mouth Daily.  Dispense: 90 tablet; Refill: 1    2. Hypertension, essential, benign  - BP well controlled; at goal of <140/90  - The current medical regimen is effective;  continue present plan and medications.  - amLODIPine (NORVASC) 10 MG tablet; Take 1 tablet (10 mg total) by mouth once daily.  Dispense: 90 tablet; Refill: 3  - atenoloL (TENORMIN) 25 MG tablet; Take 1 tablet (25 mg total) by mouth once daily.  Dispense: 90 tablet; Refill: 3  - hydroCHLOROthiazide (HYDRODIURIL) 25 MG tablet; Take 1 tablet (25 mg total) by mouth once daily.  Dispense: 90 tablet; Refill: 3  - valsartan (DIOVAN) 320 MG tablet; Take 1 tablet (320 mg total) by mouth once daily.  Dispense: 90 tablet; Refill: 3    3. Hyperlipidemia associated with type 2 diabetes mellitus  - Check labs  - simvastatin (ZOCOR) 20 MG tablet; Take 1 tablet (20 mg total) by mouth once daily.  Dispense: 90 tablet; Refill: 3    4. Asymptomatic menopause  - DXA Bone Density Spine And Hip; Future    5. Encounter for screening mammogram for breast cancer  - Mammo Digital Screening Bilat w/ Suman; Future    6. Needs flu shot  - Influenza (FLUAD) - Quadrivalent (Adjuvanted) *Preferred* (65+) (PF)        RTC in 6 months     Zeinab Noble MD  11/02/2022 8:55 AM        No follow-ups on file.

## 2022-11-29 ENCOUNTER — HOSPITAL ENCOUNTER (OUTPATIENT)
Dept: RADIOLOGY | Facility: CLINIC | Age: 67
Discharge: HOME | End: 2022-11-29
Attending: INTERNAL MEDICINE
Payer: MEDICARE

## 2022-11-29 ENCOUNTER — HOSPITAL ENCOUNTER (OUTPATIENT)
Dept: RADIOLOGY | Facility: HOSPITAL | Age: 67
Discharge: HOME | End: 2022-11-29
Attending: INTERNAL MEDICINE
Payer: MEDICARE

## 2022-11-29 DIAGNOSIS — Z12.31 ENCOUNTER FOR SCREENING MAMMOGRAM FOR BREAST CANCER: ICD-10-CM

## 2022-11-29 DIAGNOSIS — Z78.0 ASYMPTOMATIC MENOPAUSE: ICD-10-CM

## 2022-11-29 PROCEDURE — 77063 MAMMO DIGITAL SCREENING BILAT WITH TOMO: ICD-10-PCS | Mod: 26,,, | Performed by: RADIOLOGY

## 2022-11-29 PROCEDURE — 77063 BREAST TOMOSYNTHESIS BI: CPT | Mod: TC,PO

## 2022-11-29 PROCEDURE — 77080 DXA BONE DENSITY AXIAL: CPT | Mod: TC,PO

## 2022-11-29 PROCEDURE — 77067 MAMMO DIGITAL SCREENING BILAT WITH TOMO: ICD-10-PCS | Mod: 26,,, | Performed by: RADIOLOGY

## 2022-11-29 PROCEDURE — 77067 SCR MAMMO BI INCL CAD: CPT | Mod: 26,,, | Performed by: RADIOLOGY

## 2022-11-29 PROCEDURE — 77080 DXA BONE DENSITY AXIAL: CPT | Mod: 26,,, | Performed by: INTERNAL MEDICINE

## 2022-11-29 PROCEDURE — 77080 DEXA BONE DENSITY SPINE HIP: ICD-10-PCS | Mod: 26,,, | Performed by: INTERNAL MEDICINE

## 2022-11-29 PROCEDURE — 77063 BREAST TOMOSYNTHESIS BI: CPT | Mod: 26,,, | Performed by: RADIOLOGY

## 2023-02-07 ENCOUNTER — PES CALL (OUTPATIENT)
Dept: ADMINISTRATIVE | Facility: CLINIC | Age: 68
End: 2023-02-07
Payer: MEDICARE

## 2023-02-08 DIAGNOSIS — E11.9 TYPE 2 DIABETES MELLITUS WITHOUT COMPLICATION: ICD-10-CM

## 2023-02-15 ENCOUNTER — OFFICE VISIT (OUTPATIENT)
Dept: FAMILY MEDICINE | Facility: CLINIC | Age: 68
End: 2023-02-15
Payer: MEDICARE

## 2023-02-15 VITALS
WEIGHT: 189.38 LBS | OXYGEN SATURATION: 98 % | DIASTOLIC BLOOD PRESSURE: 82 MMHG | HEIGHT: 65 IN | SYSTOLIC BLOOD PRESSURE: 140 MMHG | HEART RATE: 88 BPM | BODY MASS INDEX: 31.55 KG/M2 | TEMPERATURE: 98 F

## 2023-02-15 DIAGNOSIS — M54.2 CERVICALGIA: Primary | ICD-10-CM

## 2023-02-15 PROCEDURE — 3008F PR BODY MASS INDEX (BMI) DOCUMENTED: ICD-10-PCS | Mod: HCNC,CPTII,S$GLB, | Performed by: FAMILY MEDICINE

## 2023-02-15 PROCEDURE — 1160F RVW MEDS BY RX/DR IN RCRD: CPT | Mod: HCNC,CPTII,S$GLB, | Performed by: FAMILY MEDICINE

## 2023-02-15 PROCEDURE — 96372 PR INJECTION,THERAP/PROPH/DIAG2ST, IM OR SUBCUT: ICD-10-PCS | Mod: HCNC,S$GLB,, | Performed by: FAMILY MEDICINE

## 2023-02-15 PROCEDURE — 1125F PR PAIN SEVERITY QUANTIFIED, PAIN PRESENT: ICD-10-PCS | Mod: HCNC,CPTII,S$GLB, | Performed by: FAMILY MEDICINE

## 2023-02-15 PROCEDURE — 3079F PR MOST RECENT DIASTOLIC BLOOD PRESSURE 80-89 MM HG: ICD-10-PCS | Mod: HCNC,CPTII,S$GLB, | Performed by: FAMILY MEDICINE

## 2023-02-15 PROCEDURE — 3077F SYST BP >= 140 MM HG: CPT | Mod: HCNC,CPTII,S$GLB, | Performed by: FAMILY MEDICINE

## 2023-02-15 PROCEDURE — 1160F PR REVIEW ALL MEDS BY PRESCRIBER/CLIN PHARMACIST DOCUMENTED: ICD-10-PCS | Mod: HCNC,CPTII,S$GLB, | Performed by: FAMILY MEDICINE

## 2023-02-15 PROCEDURE — 99214 OFFICE O/P EST MOD 30 MIN: CPT | Mod: 25,HCNC,S$GLB, | Performed by: FAMILY MEDICINE

## 2023-02-15 PROCEDURE — 3077F PR MOST RECENT SYSTOLIC BLOOD PRESSURE >= 140 MM HG: ICD-10-PCS | Mod: HCNC,CPTII,S$GLB, | Performed by: FAMILY MEDICINE

## 2023-02-15 PROCEDURE — 96372 THER/PROPH/DIAG INJ SC/IM: CPT | Mod: HCNC,S$GLB,, | Performed by: FAMILY MEDICINE

## 2023-02-15 PROCEDURE — 1125F AMNT PAIN NOTED PAIN PRSNT: CPT | Mod: HCNC,CPTII,S$GLB, | Performed by: FAMILY MEDICINE

## 2023-02-15 PROCEDURE — 99999 PR PBB SHADOW E&M-EST. PATIENT-LVL III: ICD-10-PCS | Mod: PBBFAC,HCNC,, | Performed by: FAMILY MEDICINE

## 2023-02-15 PROCEDURE — 1159F PR MEDICATION LIST DOCUMENTED IN MEDICAL RECORD: ICD-10-PCS | Mod: HCNC,CPTII,S$GLB, | Performed by: FAMILY MEDICINE

## 2023-02-15 PROCEDURE — 99999 PR PBB SHADOW E&M-EST. PATIENT-LVL III: CPT | Mod: PBBFAC,HCNC,, | Performed by: FAMILY MEDICINE

## 2023-02-15 PROCEDURE — 3079F DIAST BP 80-89 MM HG: CPT | Mod: HCNC,CPTII,S$GLB, | Performed by: FAMILY MEDICINE

## 2023-02-15 PROCEDURE — 3008F BODY MASS INDEX DOCD: CPT | Mod: HCNC,CPTII,S$GLB, | Performed by: FAMILY MEDICINE

## 2023-02-15 PROCEDURE — 1159F MED LIST DOCD IN RCRD: CPT | Mod: HCNC,CPTII,S$GLB, | Performed by: FAMILY MEDICINE

## 2023-02-15 PROCEDURE — 99214 PR OFFICE/OUTPT VISIT, EST, LEVL IV, 30-39 MIN: ICD-10-PCS | Mod: 25,HCNC,S$GLB, | Performed by: FAMILY MEDICINE

## 2023-02-15 RX ORDER — METHYLPREDNISOLONE ACETATE 40 MG/ML
40 INJECTION, SUSPENSION INTRA-ARTICULAR; INTRALESIONAL; INTRAMUSCULAR; SOFT TISSUE
Status: COMPLETED | OUTPATIENT
Start: 2023-02-15 | End: 2023-02-15

## 2023-02-15 RX ORDER — CYCLOBENZAPRINE HCL 5 MG
5 TABLET ORAL 2 TIMES DAILY PRN
Qty: 30 TABLET | Refills: 1 | Status: SHIPPED | OUTPATIENT
Start: 2023-02-15 | End: 2023-02-25

## 2023-02-15 RX ADMIN — METHYLPREDNISOLONE ACETATE 40 MG: 40 INJECTION, SUSPENSION INTRA-ARTICULAR; INTRALESIONAL; INTRAMUSCULAR; SOFT TISSUE at 09:02

## 2023-02-15 NOTE — PROGRESS NOTES
Administered Depo-Medrol 40mg/mL IM to right upper outer glut. No s/s of any adverse reaction noted.

## 2023-02-15 NOTE — PROGRESS NOTES
"Subjective:       Patient ID: Harriett Oglesby is a 68 y.o. female.    Chief Complaint: Arm Pain    68 year old female present withswith concerns about left sided neck and shoulder pain that radiates down her arm. She has had this for about 3 weeks. She has pain with neck extension. She has been working to stretch herarm out. She is s/p stroke 2001, which left her with left arm paresis.     Review of Systems      Objective:      Vitals:    02/15/23 0918   BP: (!) 140/82   Pulse: 88   Temp: 97.8 °F (36.6 °C)   TempSrc: Oral   SpO2: 98%   Weight: 85.9 kg (189 lb 6 oz)   Height: 5' 5" (1.651 m)       Physical Exam  Musculoskeletal:      Cervical back: Spasms present. No rigidity, torticollis, tenderness or bony tenderness. Pain with movement present. Decreased range of motion.       Assessment:       Problem List Items Addressed This Visit    None  Visit Diagnoses       Cervicalgia    -  Primary    Relevant Medications    methylPREDNISolone acetate injection 40 mg (Completed)            Plan:       Harriett was seen today for arm pain.    Diagnoses and all orders for this visit:    Cervicalgia  -     cyclobenzaprine (FLEXERIL) 5 MG tablet; Take 1 tablet (5 mg total) by mouth 2 (two) times daily as needed for Muscle spasms.  -     methylPREDNISolone acetate injection 40 mg              "

## 2023-03-01 ENCOUNTER — CLINICAL SUPPORT (OUTPATIENT)
Dept: FAMILY MEDICINE | Facility: CLINIC | Age: 68
End: 2023-03-01
Payer: MEDICARE

## 2023-03-01 VITALS — SYSTOLIC BLOOD PRESSURE: 128 MMHG | DIASTOLIC BLOOD PRESSURE: 70 MMHG

## 2023-03-01 DIAGNOSIS — I10 BENIGN ESSENTIAL HTN: Primary | ICD-10-CM

## 2023-03-01 NOTE — PROGRESS NOTES
Harriett Oglesby 68 y.o. female is here today for Blood Pressure check.   History of HTN yes.    Review of patient's allergies indicates:  No Known Allergies  Creatinine   Date Value Ref Range Status   11/02/2022 0.9 0.5 - 1.4 mg/dL Final     Sodium   Date Value Ref Range Status   11/02/2022 142 136 - 145 mmol/L Final     Potassium   Date Value Ref Range Status   11/02/2022 3.9 3.5 - 5.1 mmol/L Final   ]  Patient verifies taking blood pressure medications on a regular basis at the same time of the day.     Current Outpatient Medications:     amLODIPine (NORVASC) 10 MG tablet, Take 1 tablet (10 mg total) by mouth once daily., Disp: 90 tablet, Rfl: 3    aspirin (ECOTRIN) 81 MG EC tablet, Take 81 mg by mouth once daily. , Disp: , Rfl:     atenoloL (TENORMIN) 25 MG tablet, Take 1 tablet (25 mg total) by mouth once daily., Disp: 90 tablet, Rfl: 3    blood sugar diagnostic Strp, 1 strip by Misc.(Non-Drug; Combo Route) route once daily., Disp: 200 each, Rfl: 3    cetirizine (ZYRTEC) 10 MG tablet, Take 10 mg by mouth once daily., Disp: , Rfl:     cyanocobalamin 500 MCG tablet, Take 500 mcg by mouth once daily., Disp: , Rfl:     dapagliflozin (FARXIGA) 10 mg tablet, Take 1 tablet (10 mg total) by mouth Daily., Disp: 90 tablet, Rfl: 1    hydroCHLOROthiazide (HYDRODIURIL) 25 MG tablet, Take 1 tablet (25 mg total) by mouth once daily., Disp: 90 tablet, Rfl: 3    metFORMIN (GLUCOPHAGE) 1000 MG tablet, Take 1 tablet (1,000 mg total) by mouth 2 (two) times daily. for diabetes., Disp: 180 tablet, Rfl: 1    simvastatin (ZOCOR) 20 MG tablet, Take 1 tablet (20 mg total) by mouth once daily., Disp: 90 tablet, Rfl: 3    valsartan (DIOVAN) 320 MG tablet, Take 1 tablet (320 mg total) by mouth once daily., Disp: 90 tablet, Rfl: 3  Does patient have record of home blood pressure readings no.   Last dose of blood pressure medication was taken at this morning but doesn't remember the time.   Patient is asymptomatic.   BP- 128/70.       ,      Dr. Nettles notified.

## 2023-04-12 ENCOUNTER — TELEPHONE (OUTPATIENT)
Dept: FAMILY MEDICINE | Facility: CLINIC | Age: 68
End: 2023-04-12
Payer: MEDICARE

## 2023-04-12 NOTE — TELEPHONE ENCOUNTER
----- Message from Marleni Yates sent at 4/12/2023 12:35 PM CDT -----  Type:  Sooner Appointment Request    Patient is requesting a sooner appointment.  Patient declined first available appointment listed as well as another facility and provider .  Patient will not accept being placed on the waitlist and is requesting a message be sent to doctor.    Name of Caller: Self     When is the first available appointment? 05/31    Symptoms: Annual     Would the patient rather a call back or a response via My [facility]? CALL     Best Call Back Number: [phone] (home)       Additional Information: States she need to see Dr before 05/23

## 2023-04-12 NOTE — TELEPHONE ENCOUNTER
Call returned. Patient advised first available appointment with PCP is currently 5/31/23 at 8:40 am. She stated that this works for her. Appointment scheduled. Also placed on wait list for sooner availability.

## 2023-04-24 DIAGNOSIS — E11.9 TYPE 2 DIABETES MELLITUS WITHOUT COMPLICATION: ICD-10-CM

## 2023-05-31 ENCOUNTER — LAB VISIT (OUTPATIENT)
Dept: LAB | Facility: HOSPITAL | Age: 68
End: 2023-05-31
Attending: INTERNAL MEDICINE
Payer: MEDICARE

## 2023-05-31 ENCOUNTER — OFFICE VISIT (OUTPATIENT)
Dept: FAMILY MEDICINE | Facility: CLINIC | Age: 68
End: 2023-05-31
Payer: MEDICARE

## 2023-05-31 VITALS
DIASTOLIC BLOOD PRESSURE: 70 MMHG | SYSTOLIC BLOOD PRESSURE: 130 MMHG | TEMPERATURE: 97 F | HEIGHT: 65 IN | BODY MASS INDEX: 30.49 KG/M2 | OXYGEN SATURATION: 98 % | RESPIRATION RATE: 18 BRPM | HEART RATE: 63 BPM | WEIGHT: 183 LBS

## 2023-05-31 DIAGNOSIS — E11.69 HYPERLIPIDEMIA ASSOCIATED WITH TYPE 2 DIABETES MELLITUS: ICD-10-CM

## 2023-05-31 DIAGNOSIS — I10 HYPERTENSION, ESSENTIAL, BENIGN: ICD-10-CM

## 2023-05-31 DIAGNOSIS — E11.21 MACROALBUMINURIC DIABETIC NEPHROPATHY: ICD-10-CM

## 2023-05-31 DIAGNOSIS — E78.5 HYPERLIPIDEMIA ASSOCIATED WITH TYPE 2 DIABETES MELLITUS: ICD-10-CM

## 2023-05-31 DIAGNOSIS — E11.29 TYPE 2 DIABETES MELLITUS WITH MICROALBUMINURIA, WITHOUT LONG-TERM CURRENT USE OF INSULIN: ICD-10-CM

## 2023-05-31 DIAGNOSIS — M25.511 ACUTE PAIN OF RIGHT SHOULDER: ICD-10-CM

## 2023-05-31 DIAGNOSIS — Z00.00 ANNUAL PHYSICAL EXAM: Primary | ICD-10-CM

## 2023-05-31 DIAGNOSIS — R80.9 TYPE 2 DIABETES MELLITUS WITH MICROALBUMINURIA, WITHOUT LONG-TERM CURRENT USE OF INSULIN: ICD-10-CM

## 2023-05-31 DIAGNOSIS — Z00.00 ANNUAL PHYSICAL EXAM: ICD-10-CM

## 2023-05-31 DIAGNOSIS — D47.3 ESSENTIAL THROMBOCYTHEMIA: ICD-10-CM

## 2023-05-31 PROBLEM — D50.9 MICROCYTIC ANEMIA: Status: RESOLVED | Noted: 2018-12-05 | Resolved: 2023-05-31

## 2023-05-31 LAB
ALBUMIN SERPL BCP-MCNC: 4.5 G/DL (ref 3.5–5.2)
ALP SERPL-CCNC: 53 U/L (ref 55–135)
ALT SERPL W/O P-5'-P-CCNC: 8 U/L (ref 10–44)
ANION GAP SERPL CALC-SCNC: 10 MMOL/L (ref 8–16)
AST SERPL-CCNC: 10 U/L (ref 10–40)
BASOPHILS # BLD AUTO: 0.06 K/UL (ref 0–0.2)
BASOPHILS NFR BLD: 0.8 % (ref 0–1.9)
BILIRUB SERPL-MCNC: 0.3 MG/DL (ref 0.1–1)
BUN SERPL-MCNC: 18 MG/DL (ref 8–23)
CALCIUM SERPL-MCNC: 10.2 MG/DL (ref 8.7–10.5)
CHLORIDE SERPL-SCNC: 100 MMOL/L (ref 95–110)
CHOLEST SERPL-MCNC: 130 MG/DL (ref 120–199)
CHOLEST/HDLC SERPL: 3 {RATIO} (ref 2–5)
CO2 SERPL-SCNC: 28 MMOL/L (ref 23–29)
CREAT SERPL-MCNC: 1 MG/DL (ref 0.5–1.4)
DIFFERENTIAL METHOD: ABNORMAL
EOSINOPHIL # BLD AUTO: 0.3 K/UL (ref 0–0.5)
EOSINOPHIL NFR BLD: 4.5 % (ref 0–8)
ERYTHROCYTE [DISTWIDTH] IN BLOOD BY AUTOMATED COUNT: 12.9 % (ref 11.5–14.5)
EST. GFR  (NO RACE VARIABLE): >60 ML/MIN/1.73 M^2
ESTIMATED AVG GLUCOSE: 128 MG/DL (ref 68–131)
GLUCOSE SERPL-MCNC: 135 MG/DL (ref 70–110)
HBA1C MFR BLD: 6.1 % (ref 4–5.6)
HCT VFR BLD AUTO: 39.9 % (ref 37–48.5)
HDLC SERPL-MCNC: 44 MG/DL (ref 40–75)
HDLC SERPL: 33.8 % (ref 20–50)
HGB BLD-MCNC: 12.7 G/DL (ref 12–16)
IMM GRANULOCYTES # BLD AUTO: 0.03 K/UL (ref 0–0.04)
IMM GRANULOCYTES NFR BLD AUTO: 0.4 % (ref 0–0.5)
LDLC SERPL CALC-MCNC: 55.8 MG/DL (ref 63–159)
LYMPHOCYTES # BLD AUTO: 2.9 K/UL (ref 1–4.8)
LYMPHOCYTES NFR BLD: 40.7 % (ref 18–48)
MCH RBC QN AUTO: 29.7 PG (ref 27–31)
MCHC RBC AUTO-ENTMCNC: 31.8 G/DL (ref 32–36)
MCV RBC AUTO: 93 FL (ref 82–98)
MONOCYTES # BLD AUTO: 0.7 K/UL (ref 0.3–1)
MONOCYTES NFR BLD: 9.9 % (ref 4–15)
NEUTROPHILS # BLD AUTO: 3.1 K/UL (ref 1.8–7.7)
NEUTROPHILS NFR BLD: 43.7 % (ref 38–73)
NONHDLC SERPL-MCNC: 86 MG/DL
NRBC BLD-RTO: 0 /100 WBC
PLATELET # BLD AUTO: 573 K/UL (ref 150–450)
PMV BLD AUTO: 10.3 FL (ref 9.2–12.9)
POTASSIUM SERPL-SCNC: 3.4 MMOL/L (ref 3.5–5.1)
PROT SERPL-MCNC: 8.6 G/DL (ref 6–8.4)
RBC # BLD AUTO: 4.28 M/UL (ref 4–5.4)
SODIUM SERPL-SCNC: 138 MMOL/L (ref 136–145)
TRIGL SERPL-MCNC: 151 MG/DL (ref 30–150)
TSH SERPL DL<=0.005 MIU/L-ACNC: 1.55 UIU/ML (ref 0.4–4)
WBC # BLD AUTO: 7.1 K/UL (ref 3.9–12.7)

## 2023-05-31 PROCEDURE — 3008F PR BODY MASS INDEX (BMI) DOCUMENTED: ICD-10-PCS | Mod: CPTII,S$GLB,, | Performed by: INTERNAL MEDICINE

## 2023-05-31 PROCEDURE — 1159F MED LIST DOCD IN RCRD: CPT | Mod: CPTII,S$GLB,, | Performed by: INTERNAL MEDICINE

## 2023-05-31 PROCEDURE — 84443 ASSAY THYROID STIM HORMONE: CPT | Performed by: INTERNAL MEDICINE

## 2023-05-31 PROCEDURE — 3078F DIAST BP <80 MM HG: CPT | Mod: CPTII,S$GLB,, | Performed by: INTERNAL MEDICINE

## 2023-05-31 PROCEDURE — 99397 PR PREVENTIVE VISIT,EST,65 & OVER: ICD-10-PCS | Mod: S$GLB,,, | Performed by: INTERNAL MEDICINE

## 2023-05-31 PROCEDURE — 1101F PR PT FALLS ASSESS DOC 0-1 FALLS W/OUT INJ PAST YR: ICD-10-PCS | Mod: CPTII,S$GLB,, | Performed by: INTERNAL MEDICINE

## 2023-05-31 PROCEDURE — 3075F PR MOST RECENT SYSTOLIC BLOOD PRESS GE 130-139MM HG: ICD-10-PCS | Mod: CPTII,S$GLB,, | Performed by: INTERNAL MEDICINE

## 2023-05-31 PROCEDURE — 3078F PR MOST RECENT DIASTOLIC BLOOD PRESSURE < 80 MM HG: ICD-10-PCS | Mod: CPTII,S$GLB,, | Performed by: INTERNAL MEDICINE

## 2023-05-31 PROCEDURE — 80053 COMPREHEN METABOLIC PANEL: CPT | Performed by: INTERNAL MEDICINE

## 2023-05-31 PROCEDURE — 80061 LIPID PANEL: CPT | Performed by: INTERNAL MEDICINE

## 2023-05-31 PROCEDURE — 3288F PR FALLS RISK ASSESSMENT DOCUMENTED: ICD-10-PCS | Mod: CPTII,S$GLB,, | Performed by: INTERNAL MEDICINE

## 2023-05-31 PROCEDURE — 3008F BODY MASS INDEX DOCD: CPT | Mod: CPTII,S$GLB,, | Performed by: INTERNAL MEDICINE

## 2023-05-31 PROCEDURE — 99999 PR PBB SHADOW E&M-EST. PATIENT-LVL IV: ICD-10-PCS | Mod: PBBFAC,,, | Performed by: INTERNAL MEDICINE

## 2023-05-31 PROCEDURE — 1159F PR MEDICATION LIST DOCUMENTED IN MEDICAL RECORD: ICD-10-PCS | Mod: CPTII,S$GLB,, | Performed by: INTERNAL MEDICINE

## 2023-05-31 PROCEDURE — 99999 PR PBB SHADOW E&M-EST. PATIENT-LVL IV: CPT | Mod: PBBFAC,,, | Performed by: INTERNAL MEDICINE

## 2023-05-31 PROCEDURE — 3288F FALL RISK ASSESSMENT DOCD: CPT | Mod: CPTII,S$GLB,, | Performed by: INTERNAL MEDICINE

## 2023-05-31 PROCEDURE — 85025 COMPLETE CBC W/AUTO DIFF WBC: CPT | Performed by: INTERNAL MEDICINE

## 2023-05-31 PROCEDURE — 1126F AMNT PAIN NOTED NONE PRSNT: CPT | Mod: CPTII,S$GLB,, | Performed by: INTERNAL MEDICINE

## 2023-05-31 PROCEDURE — 1101F PT FALLS ASSESS-DOCD LE1/YR: CPT | Mod: CPTII,S$GLB,, | Performed by: INTERNAL MEDICINE

## 2023-05-31 PROCEDURE — 99397 PER PM REEVAL EST PAT 65+ YR: CPT | Mod: S$GLB,,, | Performed by: INTERNAL MEDICINE

## 2023-05-31 PROCEDURE — 1160F RVW MEDS BY RX/DR IN RCRD: CPT | Mod: CPTII,S$GLB,, | Performed by: INTERNAL MEDICINE

## 2023-05-31 PROCEDURE — 1160F PR REVIEW ALL MEDS BY PRESCRIBER/CLIN PHARMACIST DOCUMENTED: ICD-10-PCS | Mod: CPTII,S$GLB,, | Performed by: INTERNAL MEDICINE

## 2023-05-31 PROCEDURE — 3075F SYST BP GE 130 - 139MM HG: CPT | Mod: CPTII,S$GLB,, | Performed by: INTERNAL MEDICINE

## 2023-05-31 PROCEDURE — 36415 COLL VENOUS BLD VENIPUNCTURE: CPT | Mod: PO | Performed by: INTERNAL MEDICINE

## 2023-05-31 PROCEDURE — 1126F PR PAIN SEVERITY QUANTIFIED, NO PAIN PRESENT: ICD-10-PCS | Mod: CPTII,S$GLB,, | Performed by: INTERNAL MEDICINE

## 2023-05-31 PROCEDURE — 83036 HEMOGLOBIN GLYCOSYLATED A1C: CPT | Performed by: INTERNAL MEDICINE

## 2023-05-31 RX ORDER — METFORMIN HYDROCHLORIDE 1000 MG/1
1000 TABLET ORAL 2 TIMES DAILY
Qty: 180 TABLET | Refills: 1 | Status: SHIPPED | OUTPATIENT
Start: 2023-05-31 | End: 2023-12-27 | Stop reason: SDUPTHER

## 2023-05-31 RX ORDER — METHYLPREDNISOLONE ACETATE 40 MG/ML
INJECTION, SUSPENSION INTRA-ARTICULAR; INTRALESIONAL; INTRAMUSCULAR; SOFT TISSUE
Status: ON HOLD | COMMUNITY
Start: 2023-02-15 | End: 2023-11-13 | Stop reason: HOSPADM

## 2023-05-31 RX ORDER — DAPAGLIFLOZIN 10 MG/1
10 TABLET, FILM COATED ORAL DAILY
Qty: 90 TABLET | Refills: 1 | Status: ON HOLD | OUTPATIENT
Start: 2023-05-31 | End: 2023-11-13 | Stop reason: HOSPADM

## 2023-05-31 RX ORDER — CYCLOBENZAPRINE HCL 5 MG
5 TABLET ORAL 2 TIMES DAILY PRN
Qty: 60 TABLET | Refills: 2 | Status: SHIPPED | OUTPATIENT
Start: 2023-05-31 | End: 2023-06-10

## 2023-05-31 NOTE — PROGRESS NOTES
Health Maintenance Due   Topic     TETANUS VACCINE  Consult pcp    COVID-19 Vaccine (6 - Moderna series) Not offered at this office    Diabetes Urine Screening  Consult pcp    Foot Exam  Consult pcp    Lipid Panel  Consult pcp    Hemoglobin A1c  Consult pcp

## 2023-05-31 NOTE — PROGRESS NOTES
SUBJECTIVE     Chief Complaint   Patient presents with    Annual Exam       HPI  Harriett Oglesby is a 68 y.o. female with multiple medical diagnoses as listed in the medical history and problem list that presents for annual exam. Pt has been doing well since her last visit. She has a good appetite and eats well. She does not exercise. She sleeps for ~7 hours nightly. Pt does take OTC supplements, which is a Vit B12. She does not have any current stressors. Pt is UTD on age appropriate CA screening. Otherwise, pt's fasting blood glucose levels have been at . She is compliant with all meds and eats an ADA diet.  Pt denies any hypoglycemia.     PAST MEDICAL HISTORY:  Past Medical History:   Diagnosis Date    CVA (cerebral vascular accident)     Residual weakness in the left arm and hand    Hyperlipidemia associated with type 2 diabetes mellitus     Hypertension     Type 2 diabetes mellitus        PAST SURGICAL HISTORY:  Past Surgical History:   Procedure Laterality Date    COLONOSCOPY N/A 1/4/2019    Procedure: COLONOSCOPY;  Surgeon: James Ozuna MD;  Location: [facility];  Service: Endoscopy;  Laterality: N/A;    COLONOSCOPY N/A 3/1/2021    Procedure: COLONOSCOPY;  Surgeon: Nelida Cook MD;  Location: [facility];  Service: Endoscopy;  Laterality: N/A;    ESOPHAGOGASTRODUODENOSCOPY N/A 3/1/2021    Procedure: EGD (ESOPHAGOGASTRODUODENOSCOPY);  Surgeon: Nelida Cook MD;  Location: [facility];  Service: Endoscopy;  Laterality: N/A;  rapid covid > 50 miles away, instructions mailed -ml    HEMORRHOID SURGERY         SOCIAL HISTORY:  Social History     Socioeconomic History    Marital status: Single   Tobacco Use    Smoking status: Former     Types: Cigarettes    Smokeless tobacco: Never   Substance and Sexual Activity    Alcohol use: No    Drug use: No       FAMILY HISTORY:  Family History   Problem Relation Age of Onset    Pancreatic cancer Mother     Emphysema Father     Cancer Father         Lung     Breast cancer Cousin     Breast cancer Cousin     Colon cancer Neg Hx     Esophageal cancer Neg Hx        ALLERGIES AND MEDICATIONS: updated and reviewed.  Review of patient's allergies indicates:  No Known Allergies  Current Outpatient Medications   Medication Sig Dispense Refill    amLODIPine (NORVASC) 10 MG tablet Take 1 tablet (10 mg total) by mouth once daily. 90 tablet 3    aspirin (ECOTRIN) 81 MG EC tablet Take 81 mg by mouth once daily.       atenoloL (TENORMIN) 25 MG tablet Take 1 tablet (25 mg total) by mouth once daily. 90 tablet 3    blood sugar diagnostic Strp 1 strip by Misc.(Non-Drug; Combo Route) route once daily. 200 each 3    cetirizine (ZYRTEC) 10 MG tablet Take 10 mg by mouth once daily.      cyanocobalamin 500 MCG tablet Take 500 mcg by mouth once daily.      hydroCHLOROthiazide (HYDRODIURIL) 25 MG tablet Take 1 tablet (25 mg total) by mouth once daily. 90 tablet 3    methylPREDNISolone acetate (DEPO-MEDROL) 40 mg/mL injection       simvastatin (ZOCOR) 20 MG tablet Take 1 tablet (20 mg total) by mouth once daily. 90 tablet 3    valsartan (DIOVAN) 320 MG tablet Take 1 tablet (320 mg total) by mouth once daily. 90 tablet 3    cyclobenzaprine (FLEXERIL) 5 MG tablet Take 1 tablet (5 mg total) by mouth 2 (two) times daily as needed for Muscle spasms (shoulder pain). 60 tablet 2    dapagliflozin (FARXIGA) 10 mg tablet Take 1 tablet (10 mg total) by mouth Daily. 90 tablet 1    metFORMIN (GLUCOPHAGE) 1000 MG tablet Take 1 tablet (1,000 mg total) by mouth 2 (two) times daily. for diabetes. 180 tablet 1     No current facility-administered medications for this visit.       ROS  Review of Systems   Constitutional:  Negative for chills and fever.   HENT:  Positive for congestion. Negative for hearing loss and sore throat.    Eyes:  Negative for visual disturbance.   Respiratory:  Negative for cough and shortness of breath.    Cardiovascular:  Negative for chest pain, palpitations and leg swelling.  "  Gastrointestinal:  Negative for abdominal pain, constipation, diarrhea, nausea and vomiting.   Genitourinary:  Negative for dysuria, frequency and urgency.   Musculoskeletal:  Positive for arthralgias (R shoulder). Negative for joint swelling and myalgias.   Skin:  Negative for rash and wound.   Neurological:  Negative for headaches.   Psychiatric/Behavioral:  Negative for agitation and confusion. The patient is not nervous/anxious.        OBJECTIVE     Physical Exam  Vitals:    05/31/23 0848   BP: 130/70   Pulse: 63   Resp: 18   Temp: 97.4 °F (36.3 °C)    Body mass index is 30.45 kg/m².  Weight: 83 kg (182 lb 15.7 oz)   Height: 5' 5" (165.1 cm)     Physical Exam  Constitutional:       General: She is not in acute distress.     Appearance: She is well-developed.   HENT:      Head: Normocephalic and atraumatic.      Right Ear: External ear normal.      Left Ear: External ear normal.      Nose: Nose normal.   Eyes:      General: No scleral icterus.        Right eye: No discharge.         Left eye: No discharge.      Conjunctiva/sclera: Conjunctivae normal.   Neck:      Vascular: No JVD.      Trachea: No tracheal deviation.   Cardiovascular:      Rate and Rhythm: Normal rate and regular rhythm.      Heart sounds: No murmur heard.    No friction rub. No gallop.   Pulmonary:      Effort: Pulmonary effort is normal. No respiratory distress.      Breath sounds: Normal breath sounds. No wheezing.   Abdominal:      General: Bowel sounds are normal. There is no distension.      Palpations: Abdomen is soft. There is no mass.      Tenderness: There is no abdominal tenderness. There is no guarding or rebound.   Musculoskeletal:         General: No tenderness or deformity. Normal range of motion.      Cervical back: Normal range of motion and neck supple.   Skin:     General: Skin is warm and dry.      Findings: No erythema or rash.   Neurological:      Mental Status: She is alert and oriented to person, place, and time.      " Motor: No abnormal muscle tone.      Coordination: Coordination normal.   Psychiatric:         Behavior: Behavior normal.         Thought Content: Thought content normal.         Judgment: Judgment normal.         Health Maintenance         Date Due Completion Date    TETANUS VACCINE Never done ---    COVID-19 Vaccine (6 - Moderna series) 01/28/2022 12/3/2021    Diabetes Urine Screening 01/26/2023 1/26/2022    Foot Exam 01/26/2023 1/26/2022    Override on 11/19/2020: Done    Override on 6/3/2019: Done    Lipid Panel 01/26/2023 1/26/2022    Hemoglobin A1c 05/02/2023 11/2/2022    Mammogram 11/29/2023 11/29/2022    Eye Exam 12/09/2023 12/9/2022    High Dose Statin 02/15/2024 2/15/2023    DEXA Scan 11/29/2025 11/29/2022    Colorectal Cancer Screening 03/01/2028 3/1/2021              ASSESSMENT     68 y.o. female with     1. Annual physical exam    2. Type 2 diabetes mellitus with microalbuminuria, without long-term current use of insulin    3. Hyperlipidemia associated with type 2 diabetes mellitus    4. Essential thrombocythemia    5. Acute pain of right shoulder    6. Macroalbuminuric diabetic nephropathy    7. Hypertension, essential, benign        PLAN:     1. Annual physical exam  - Counseled on age appropriate medical preventative services, including age appropriate cancer screenings, over all nutritional health, need for a consistent exercise regimen and an over all push towards maintaining a vigorous and active lifestyle.  Counseled on age appropriate vaccines and discussed upcoming health care needs based on age/gender.  Spent time with patient counseling on need for a good patient/doctor relationship moving forward.  Discussed use of common OTC medications and supplements.  Discussed common dietary aids and use of caffeine and the need for good sleep hygiene and stress management.  - CBC Auto Differential; Future  - Comprehensive Metabolic Panel; Future  - Hemoglobin A1C; Future  - TSH; Future  - Lipid Panel;  Future    2. Type 2 diabetes mellitus with microalbuminuria, without long-term current use of insulin  - CBC Auto Differential; Future  - Comprehensive Metabolic Panel; Future  - Hemoglobin A1C; Future  - metFORMIN (GLUCOPHAGE) 1000 MG tablet; Take 1 tablet (1,000 mg total) by mouth 2 (two) times daily. for diabetes.  Dispense: 180 tablet; Refill: 1  - dapagliflozin (FARXIGA) 10 mg tablet; Take 1 tablet (10 mg total) by mouth Daily.  Dispense: 90 tablet; Refill: 1    3. Hyperlipidemia associated with type 2 diabetes mellitus  - Lipid Panel; Future    4. Essential thrombocythemia  - Stable; no acute issues    5. Acute pain of right shoulder  - Pt encouraged to apply ice packs 2-3 times daily at 10 minute intervals x 72 hours, then okay to change to heating compress with care not to burn her self; she  voiced understanding   - cyclobenzaprine (FLEXERIL) 5 MG tablet; Take 1 tablet (5 mg total) by mouth 2 (two) times daily as needed for Muscle spasms (shoulder pain).  Dispense: 60 tablet; Refill: 2    6. Macroalbuminuric diabetic nephropathy  - Microalbumin/Creatinine Ratio, Urine; Future    7. Hypertension, essential, benign  - BP well controlled; at goal of <140/90  - The current medical regimen is effective;  continue present plan and medications.  - TSH; Future        RTC in 6 months     Zeinab Noble MD  05/31/2023 9:12 AM        No follow-ups on file.

## 2023-07-20 ENCOUNTER — PES CALL (OUTPATIENT)
Dept: ADMINISTRATIVE | Facility: CLINIC | Age: 68
End: 2023-07-20
Payer: MEDICARE

## 2023-08-09 ENCOUNTER — TELEPHONE (OUTPATIENT)
Dept: ADMINISTRATIVE | Facility: CLINIC | Age: 68
End: 2023-08-09
Payer: MEDICARE

## 2023-08-11 ENCOUNTER — OFFICE VISIT (OUTPATIENT)
Dept: FAMILY MEDICINE | Facility: CLINIC | Age: 68
End: 2023-08-11
Payer: MEDICARE

## 2023-08-11 VITALS
BODY MASS INDEX: 29.82 KG/M2 | SYSTOLIC BLOOD PRESSURE: 132 MMHG | WEIGHT: 179 LBS | HEIGHT: 65 IN | OXYGEN SATURATION: 97 % | TEMPERATURE: 97 F | DIASTOLIC BLOOD PRESSURE: 82 MMHG | HEART RATE: 81 BPM

## 2023-08-11 DIAGNOSIS — Z74.09 OTHER REDUCED MOBILITY: ICD-10-CM

## 2023-08-11 DIAGNOSIS — M85.80 OSTEOPENIA, UNSPECIFIED LOCATION: ICD-10-CM

## 2023-08-11 DIAGNOSIS — D47.3 ESSENTIAL THROMBOCYTHEMIA: ICD-10-CM

## 2023-08-11 DIAGNOSIS — E11.29 TYPE 2 DIABETES MELLITUS WITH MICROALBUMINURIA, WITHOUT LONG-TERM CURRENT USE OF INSULIN: ICD-10-CM

## 2023-08-11 DIAGNOSIS — E78.5 HYPERLIPIDEMIA ASSOCIATED WITH TYPE 2 DIABETES MELLITUS: ICD-10-CM

## 2023-08-11 DIAGNOSIS — I10 HYPERTENSION, ESSENTIAL, BENIGN: ICD-10-CM

## 2023-08-11 DIAGNOSIS — Z00.00 ENCOUNTER FOR PREVENTIVE HEALTH EXAMINATION: Primary | ICD-10-CM

## 2023-08-11 DIAGNOSIS — D50.0 IRON DEFICIENCY ANEMIA DUE TO CHRONIC BLOOD LOSS: ICD-10-CM

## 2023-08-11 DIAGNOSIS — I69.30 HISTORY OF STROKE WITH CURRENT RESIDUAL EFFECTS: ICD-10-CM

## 2023-08-11 DIAGNOSIS — R80.9 TYPE 2 DIABETES MELLITUS WITH MICROALBUMINURIA, WITHOUT LONG-TERM CURRENT USE OF INSULIN: ICD-10-CM

## 2023-08-11 DIAGNOSIS — E11.69 HYPERLIPIDEMIA ASSOCIATED WITH TYPE 2 DIABETES MELLITUS: ICD-10-CM

## 2023-08-11 DIAGNOSIS — E11.21 MACROALBUMINURIC DIABETIC NEPHROPATHY: ICD-10-CM

## 2023-08-11 PROCEDURE — G0439 PPPS, SUBSEQ VISIT: HCPCS | Mod: HCNC,S$GLB,, | Performed by: NURSE PRACTITIONER

## 2023-08-11 PROCEDURE — 1170F FXNL STATUS ASSESSED: CPT | Mod: HCNC,CPTII,S$GLB, | Performed by: NURSE PRACTITIONER

## 2023-08-11 PROCEDURE — G0439 PR MEDICARE ANNUAL WELLNESS SUBSEQUENT VISIT: ICD-10-PCS | Mod: HCNC,S$GLB,, | Performed by: NURSE PRACTITIONER

## 2023-08-11 PROCEDURE — 1159F PR MEDICATION LIST DOCUMENTED IN MEDICAL RECORD: ICD-10-PCS | Mod: HCNC,CPTII,S$GLB, | Performed by: NURSE PRACTITIONER

## 2023-08-11 PROCEDURE — 3008F PR BODY MASS INDEX (BMI) DOCUMENTED: ICD-10-PCS | Mod: HCNC,CPTII,S$GLB, | Performed by: NURSE PRACTITIONER

## 2023-08-11 PROCEDURE — 1126F PR PAIN SEVERITY QUANTIFIED, NO PAIN PRESENT: ICD-10-PCS | Mod: HCNC,CPTII,S$GLB, | Performed by: NURSE PRACTITIONER

## 2023-08-11 PROCEDURE — 3079F DIAST BP 80-89 MM HG: CPT | Mod: HCNC,CPTII,S$GLB, | Performed by: NURSE PRACTITIONER

## 2023-08-11 PROCEDURE — G9919 SCRN ND POS ND PROV OF REC: HCPCS | Mod: HCNC,CPTII,S$GLB, | Performed by: NURSE PRACTITIONER

## 2023-08-11 PROCEDURE — 3288F PR FALLS RISK ASSESSMENT DOCUMENTED: ICD-10-PCS | Mod: HCNC,CPTII,S$GLB, | Performed by: NURSE PRACTITIONER

## 2023-08-11 PROCEDURE — 3061F NEG MICROALBUMINURIA REV: CPT | Mod: HCNC,CPTII,S$GLB, | Performed by: NURSE PRACTITIONER

## 2023-08-11 PROCEDURE — 99999 PR PBB SHADOW E&M-EST. PATIENT-LVL V: CPT | Mod: PBBFAC,HCNC,, | Performed by: NURSE PRACTITIONER

## 2023-08-11 PROCEDURE — 1160F PR REVIEW ALL MEDS BY PRESCRIBER/CLIN PHARMACIST DOCUMENTED: ICD-10-PCS | Mod: HCNC,CPTII,S$GLB, | Performed by: NURSE PRACTITIONER

## 2023-08-11 PROCEDURE — 3061F PR NEG MICROALBUMINURIA RESULT DOCUMENTED/REVIEW: ICD-10-PCS | Mod: HCNC,CPTII,S$GLB, | Performed by: NURSE PRACTITIONER

## 2023-08-11 PROCEDURE — 3075F SYST BP GE 130 - 139MM HG: CPT | Mod: HCNC,CPTII,S$GLB, | Performed by: NURSE PRACTITIONER

## 2023-08-11 PROCEDURE — 1170F PR FUNCTIONAL STATUS ASSESSED: ICD-10-PCS | Mod: HCNC,CPTII,S$GLB, | Performed by: NURSE PRACTITIONER

## 2023-08-11 PROCEDURE — 1160F RVW MEDS BY RX/DR IN RCRD: CPT | Mod: HCNC,CPTII,S$GLB, | Performed by: NURSE PRACTITIONER

## 2023-08-11 PROCEDURE — 1101F PR PT FALLS ASSESS DOC 0-1 FALLS W/OUT INJ PAST YR: ICD-10-PCS | Mod: HCNC,CPTII,S$GLB, | Performed by: NURSE PRACTITIONER

## 2023-08-11 PROCEDURE — 1159F MED LIST DOCD IN RCRD: CPT | Mod: HCNC,CPTII,S$GLB, | Performed by: NURSE PRACTITIONER

## 2023-08-11 PROCEDURE — 3044F PR MOST RECENT HEMOGLOBIN A1C LEVEL <7.0%: ICD-10-PCS | Mod: HCNC,CPTII,S$GLB, | Performed by: NURSE PRACTITIONER

## 2023-08-11 PROCEDURE — 1101F PT FALLS ASSESS-DOCD LE1/YR: CPT | Mod: HCNC,CPTII,S$GLB, | Performed by: NURSE PRACTITIONER

## 2023-08-11 PROCEDURE — 3075F PR MOST RECENT SYSTOLIC BLOOD PRESS GE 130-139MM HG: ICD-10-PCS | Mod: HCNC,CPTII,S$GLB, | Performed by: NURSE PRACTITIONER

## 2023-08-11 PROCEDURE — 3288F FALL RISK ASSESSMENT DOCD: CPT | Mod: HCNC,CPTII,S$GLB, | Performed by: NURSE PRACTITIONER

## 2023-08-11 PROCEDURE — 3066F NEPHROPATHY DOC TX: CPT | Mod: HCNC,CPTII,S$GLB, | Performed by: NURSE PRACTITIONER

## 2023-08-11 PROCEDURE — G9919 PR SCREENING AND POSITIVE: ICD-10-PCS | Mod: HCNC,CPTII,S$GLB, | Performed by: NURSE PRACTITIONER

## 2023-08-11 PROCEDURE — 3066F PR DOCUMENTATION OF TREATMENT FOR NEPHROPATHY: ICD-10-PCS | Mod: HCNC,CPTII,S$GLB, | Performed by: NURSE PRACTITIONER

## 2023-08-11 PROCEDURE — 99999 PR PBB SHADOW E&M-EST. PATIENT-LVL V: ICD-10-PCS | Mod: PBBFAC,HCNC,, | Performed by: NURSE PRACTITIONER

## 2023-08-11 PROCEDURE — 3079F PR MOST RECENT DIASTOLIC BLOOD PRESSURE 80-89 MM HG: ICD-10-PCS | Mod: HCNC,CPTII,S$GLB, | Performed by: NURSE PRACTITIONER

## 2023-08-11 PROCEDURE — 3008F BODY MASS INDEX DOCD: CPT | Mod: HCNC,CPTII,S$GLB, | Performed by: NURSE PRACTITIONER

## 2023-08-11 PROCEDURE — 3044F HG A1C LEVEL LT 7.0%: CPT | Mod: HCNC,CPTII,S$GLB, | Performed by: NURSE PRACTITIONER

## 2023-08-11 PROCEDURE — 1126F AMNT PAIN NOTED NONE PRSNT: CPT | Mod: HCNC,CPTII,S$GLB, | Performed by: NURSE PRACTITIONER

## 2023-08-11 RX ORDER — CYCLOBENZAPRINE HCL 5 MG
5 TABLET ORAL 2 TIMES DAILY PRN
Status: ON HOLD | COMMUNITY
Start: 2023-07-24 | End: 2023-11-13 | Stop reason: HOSPADM

## 2023-08-11 NOTE — PATIENT INSTRUCTIONS
Counseling and Referral of Other Preventative  (Italic type indicates deductible and co-insurance are waived)    Patient Name: Harriett Oglesby  Today's Date: 8/11/2023    Health Maintenance         Date Due Completion Date    TETANUS VACCINE Never done ---    Foot Exam 01/26/2023 1/26/2022    Override on 11/19/2020: Done    Override on 6/3/2019: Done    Influenza Vaccine (1) 09/01/2023 11/2/2022    Mammogram 11/29/2023 11/29/2022    Hemoglobin A1c 11/30/2023 5/31/2023    Eye Exam 12/09/2023 12/9/2022    Diabetes Urine Screening 05/31/2024 5/31/2023    Lipid Panel 05/31/2024 5/31/2023    High Dose Statin 08/11/2024 8/11/2023    DEXA Scan 11/29/2025 11/29/2022    Colorectal Cancer Screening 03/01/2028 3/1/2021          Orders Placed This Encounter   Procedures    Ambulatory referral/consult to Podiatry     The following information is provided to all patients.  This information is to help you find resources for any of the problems found today that may be affecting your health:                Living healthy guide: www.[facility].[place].gov      Understanding Diabetes: www.diabetes.org      Eating healthy: www.cdc.gov/healthyweight      CDC home safety checklist: www.cdc.gov/steadi/patient.html      Agency on Aging: www.goea.[place].[facility]      Alcoholics anonymous (AA): www.aa.org      Physical Activity: www.gracie.nih.gov/ub3cskq      Tobacco use: www.quitwithusla.org

## 2023-08-11 NOTE — PROGRESS NOTES
"  Harriett Oglesby presented for a  Medicare AWV and comprehensive Health Risk Assessment today. The following components were reviewed and updated:    Medical history  Family History  Social history  Allergies and Current Medications  Health Risk Assessment  Health Maintenance  Care Team     Patient screened moderate and/or high risk for one or more social determinants of health (SDOH). Patient connected to community resources through the ED Navigator.      See Completed Assessments for Annual Wellness Visit within the encounter summary.       The following assessments were completed:  Living Situation  CAGE  Depression Screening  Timed Get Up and Go  Whisper Test  Cognitive Function Screening  Nutrition Screening  ADL Screening  PAQ Screening          Vitals:    08/11/23 0852   BP: 132/82   Pulse: 81   Temp: 97.4 °F (36.3 °C)   TempSrc: Oral   SpO2: 97%   Weight: 81.2 kg (179 lb 0.2 oz)   Height: 5' 5" (1.651 m)     Body mass index is 29.79 kg/m².  Physical Exam  Vitals and nursing note reviewed.   Constitutional:       General: She is not in acute distress.     Appearance: Normal appearance. She is normal weight.   HENT:      Head: Normocephalic and atraumatic.   Eyes:      Conjunctiva/sclera: Conjunctivae normal.      Pupils: Pupils are equal, round, and reactive to light.   Cardiovascular:      Rate and Rhythm: Normal rate and regular rhythm.      Heart sounds: Normal heart sounds. No murmur heard.  Pulmonary:      Effort: Pulmonary effort is normal. No respiratory distress.      Breath sounds: Normal breath sounds.   Abdominal:      General: Bowel sounds are normal. There is no distension.      Palpations: Abdomen is soft.   Musculoskeletal:      Cervical back: Normal range of motion and neck supple. No tenderness.   Skin:     General: Skin is warm and dry.   Neurological:      Mental Status: She is alert and oriented to person, place, and time.      Motor: Weakness (left upper extremity hemiplegia) present. "      Gait: Gait abnormal.   Psychiatric:         Mood and Affect: Mood normal.         Behavior: Behavior normal.           Diagnoses and health risks identified today and associated recommendations/orders:    1. Encounter for preventive health examination  Screenings performed as noted above.  Personal preventative testing needs reviewed.     2. Other reduced mobility  Fall precautions in home.    3. History of stroke with current residual effects  Stable.    4. Hypertension, essential, benign  Well controlled.   The current medical regimen is effective;  continue present plan and medications.     5. Hyperlipidemia associated with type 2 diabetes mellitus  Stable.   On statin therapy.     6. Macroalbuminuric diabetic nephropathy  Stable, The current medical regimen is effective;  continue present plan and medications.     7. Essential thrombocythemia  Stable, The current medical regimen is effective;  continue present plan and medications.     8. Iron deficiency anemia due to chronic blood loss  Stable, The current medical regimen is effective;  continue present plan and medications.     9. Type 2 diabetes mellitus with microalbuminuria, without long-term current use of insulin  - Ambulatory referral/consult to Podiatry; Future    10. Osteopenia, unspecified location  The current medical regimen is effective;  continue present plan and medications.       Review for Opioid Screening: Pt does not have Rx for Opioids.  Review for Substance Use Disorders: Patient does not use substance.    Provided Harriett with a 5-10 year written screening schedule and personal prevention plan. Recommendations were developed using the USPSTF age appropriate recommendations. Education, counseling, and referrals were provided as needed. After Visit Summary printed and given to patient which includes a list of additional screenings\tests needed.    I offered to discuss advanced care planning, including how to pick a person who would make  decisions for you if you were unable to make them for yourself, called a health care power of , and what kind of decisions you might make such as use of life sustaining treatments such as ventilators and tube feeding when faced with a life limiting illness recorded on a living will that they will need to know. (How you want to be cared for as you near the end of your natural life)     X Patient is interested in learning more about how to make advanced directives.  I provided them paperwork and offered to discuss this with them.      Follow up in about 1 year (around 8/11/2024) for for health prevention/screening.    Haylie Esteves, DNP

## 2023-11-04 DIAGNOSIS — I10 HYPERTENSION, ESSENTIAL, BENIGN: ICD-10-CM

## 2023-11-04 DIAGNOSIS — E78.5 HYPERLIPIDEMIA ASSOCIATED WITH TYPE 2 DIABETES MELLITUS: ICD-10-CM

## 2023-11-04 DIAGNOSIS — E11.69 HYPERLIPIDEMIA ASSOCIATED WITH TYPE 2 DIABETES MELLITUS: ICD-10-CM

## 2023-11-04 NOTE — TELEPHONE ENCOUNTER
Care Due:                  Date            Visit Type   Department     Provider  --------------------------------------------------------------------------------                                [facility]                              PRIMARY      MEDICINE/INTERN  Last Visit: 05-      CARE ([facility])   JEANETH Noble                              [facility]                              PRIMARY      MEDICINE/INTERN  Next Visit: 12-      CARE ([facility])   JEANETH Noble                                                            Last  Test          Frequency    Reason                     Performed    Due Date  --------------------------------------------------------------------------------    HBA1C.......  6 months...  dapagliflozin, metFORMIN.  05- 11-    Health [facility] Embedded Care Due Messages. Reference number: [id number].   11/04/2023 12:21:01 PM CDT

## 2023-11-06 ENCOUNTER — OFFICE VISIT (OUTPATIENT)
Dept: PODIATRY | Facility: CLINIC | Age: 68
End: 2023-11-06
Payer: MEDICARE

## 2023-11-06 ENCOUNTER — HOSPITAL ENCOUNTER (INPATIENT)
Facility: HOSPITAL | Age: 68
LOS: 7 days | Discharge: HOME HEALTH | DRG: 276 | End: 2023-11-13
Attending: EMERGENCY MEDICINE | Admitting: STUDENT IN AN ORGANIZED HEALTH CARE EDUCATION/TRAINING PROGRAM
Payer: MEDICARE

## 2023-11-06 ENCOUNTER — OFFICE VISIT (OUTPATIENT)
Dept: URGENT CARE | Facility: CLINIC | Age: 68
End: 2023-11-06
Payer: MEDICARE

## 2023-11-06 VITALS
WEIGHT: 179 LBS | SYSTOLIC BLOOD PRESSURE: 136 MMHG | HEART RATE: 151 BPM | DIASTOLIC BLOOD PRESSURE: 95 MMHG | HEIGHT: 65 IN | BODY MASS INDEX: 29.82 KG/M2

## 2023-11-06 VITALS
OXYGEN SATURATION: 97 % | HEIGHT: 65 IN | TEMPERATURE: 97 F | SYSTOLIC BLOOD PRESSURE: 130 MMHG | BODY MASS INDEX: 29.82 KG/M2 | WEIGHT: 179 LBS | RESPIRATION RATE: 20 BRPM | HEART RATE: 123 BPM | DIASTOLIC BLOOD PRESSURE: 87 MMHG

## 2023-11-06 DIAGNOSIS — I49.01 VF (VENTRICULAR FIBRILLATION): ICD-10-CM

## 2023-11-06 DIAGNOSIS — I46.9 CARDIAC ARREST: Primary | ICD-10-CM

## 2023-11-06 DIAGNOSIS — Z76.0 ENCOUNTER FOR MEDICATION REFILL: ICD-10-CM

## 2023-11-06 DIAGNOSIS — R00.0 TACHYCARDIA: ICD-10-CM

## 2023-11-06 DIAGNOSIS — I47.20 V-TACH: ICD-10-CM

## 2023-11-06 DIAGNOSIS — Z95.810 AICD (AUTOMATIC CARDIOVERTER/DEFIBRILLATOR) PRESENT: ICD-10-CM

## 2023-11-06 DIAGNOSIS — R05.9 COUGH, UNSPECIFIED TYPE: ICD-10-CM

## 2023-11-06 DIAGNOSIS — R80.9 TYPE 2 DIABETES MELLITUS WITH MICROALBUMINURIA, WITHOUT LONG-TERM CURRENT USE OF INSULIN: ICD-10-CM

## 2023-11-06 DIAGNOSIS — I26.99 PULMONARY EMBOLISM: ICD-10-CM

## 2023-11-06 DIAGNOSIS — I50.9 CHF (CONGESTIVE HEART FAILURE): ICD-10-CM

## 2023-11-06 DIAGNOSIS — E11.29 TYPE 2 DIABETES MELLITUS WITH MICROALBUMINURIA, WITHOUT LONG-TERM CURRENT USE OF INSULIN: ICD-10-CM

## 2023-11-06 DIAGNOSIS — R94.31 ABNORMAL EKG: ICD-10-CM

## 2023-11-06 DIAGNOSIS — R06.00 DYSPNEA, UNSPECIFIED TYPE: ICD-10-CM

## 2023-11-06 DIAGNOSIS — R00.0 TACHYCARDIA: Primary | ICD-10-CM

## 2023-11-06 PROBLEM — Z71.89 ADVANCED CARE PLANNING/COUNSELING DISCUSSION: Status: ACTIVE | Noted: 2021-03-01

## 2023-11-06 PROBLEM — I47.19 MULTIFOCAL ATRIAL TACHYCARDIA: Status: ACTIVE | Noted: 2023-11-06

## 2023-11-06 PROBLEM — J96.01 ACUTE RESPIRATORY FAILURE WITH HYPOXIA: Status: ACTIVE | Noted: 2023-11-06

## 2023-11-06 LAB
ABO + RH BLD: NORMAL
ALBUMIN SERPL BCP-MCNC: 3.3 G/DL (ref 3.5–5.2)
ALBUMIN SERPL BCP-MCNC: 3.9 G/DL (ref 3.5–5.2)
ALLENS TEST: ABNORMAL
ALP SERPL-CCNC: 122 U/L (ref 55–135)
ALP SERPL-CCNC: 144 U/L (ref 55–135)
ALT SERPL W/O P-5'-P-CCNC: 41 U/L (ref 10–44)
ALT SERPL W/O P-5'-P-CCNC: 56 U/L (ref 10–44)
ANION GAP SERPL CALC-SCNC: 15 MMOL/L (ref 8–16)
ANION GAP SERPL CALC-SCNC: 15 MMOL/L (ref 8–16)
ANION GAP SERPL CALC-SCNC: 16 MMOL/L (ref 8–16)
APTT PPP: 24.2 SEC (ref 21–32)
APTT PPP: 27.6 SEC (ref 21–32)
AST SERPL-CCNC: 30 U/L (ref 10–40)
AST SERPL-CCNC: 78 U/L (ref 10–40)
AV INDEX (PROSTH): 0.59
AV MEAN GRADIENT: 4 MMHG
AV PEAK GRADIENT: 7 MMHG
AV VALVE AREA BY VELOCITY RATIO: 1.6 CM²
AV VALVE AREA: 1.75 CM²
AV VELOCITY RATIO: 0.54
BACTERIA #/AREA URNS HPF: NORMAL /HPF
BASOPHILS # BLD AUTO: 0.04 K/UL (ref 0–0.2)
BASOPHILS # BLD AUTO: 0.04 K/UL (ref 0–0.2)
BASOPHILS NFR BLD: 0.4 % (ref 0–1.9)
BASOPHILS NFR BLD: 0.5 % (ref 0–1.9)
BILIRUB SERPL-MCNC: 0.7 MG/DL (ref 0.1–1)
BILIRUB SERPL-MCNC: 0.8 MG/DL (ref 0.1–1)
BILIRUB UR QL STRIP: NEGATIVE
BLD GP AB SCN CELLS X3 SERPL QL: NORMAL
BNP SERPL-MCNC: 1839 PG/ML (ref 0–99)
BSA FOR ECHO PROCEDURE: 1.93 M2
BUN SERPL-MCNC: 12 MG/DL (ref 8–23)
BUN SERPL-MCNC: 13 MG/DL (ref 6–30)
BUN SERPL-MCNC: 14 MG/DL (ref 8–23)
CALCIUM SERPL-MCNC: 8.6 MG/DL (ref 8.7–10.5)
CALCIUM SERPL-MCNC: 9.1 MG/DL (ref 8.7–10.5)
CHLORIDE SERPL-SCNC: 101 MMOL/L (ref 95–110)
CHLORIDE SERPL-SCNC: 102 MMOL/L (ref 95–110)
CHLORIDE SERPL-SCNC: 103 MMOL/L (ref 95–110)
CLARITY UR: CLEAR
CO2 SERPL-SCNC: 19 MMOL/L (ref 23–29)
CO2 SERPL-SCNC: 21 MMOL/L (ref 23–29)
COLOR UR: YELLOW
CREAT SERPL-MCNC: 0.7 MG/DL (ref 0.5–1.4)
CREAT SERPL-MCNC: 1.1 MG/DL (ref 0.5–1.4)
CREAT SERPL-MCNC: 1.2 MG/DL (ref 0.5–1.4)
CTP QC/QA: YES
CTP QC/QA: YES
CV ECHO LV RWT: 0.27 CM
D DIMER PPP IA.FEU-MCNC: 2.87 MG/L FEU
DELSYS: ABNORMAL
DIFFERENTIAL METHOD BLD: ABNORMAL
DIFFERENTIAL METHOD BLD: ABNORMAL
DOP CALC AO PEAK VEL: 1.36 M/S
DOP CALC AO VTI: 19.8 CM
DOP CALC LVOT AREA: 3 CM2
DOP CALC LVOT DIAMETER: 1.95 CM
DOP CALC LVOT PEAK VEL: 0.73 M/S
DOP CALC LVOT STROKE VOLUME: 34.63 CM3
DOP CALCLVOT PEAK VEL VTI: 11.6 CM
ECHO LV POSTERIOR WALL: 0.74 CM (ref 0.6–1.1)
EOSINOPHIL # BLD AUTO: 0 K/UL (ref 0–0.5)
EOSINOPHIL # BLD AUTO: 0.1 K/UL (ref 0–0.5)
EOSINOPHIL NFR BLD: 0.3 % (ref 0–8)
EOSINOPHIL NFR BLD: 1 % (ref 0–8)
ERYTHROCYTE [DISTWIDTH] IN BLOOD BY AUTOMATED COUNT: 15.3 % (ref 11.5–14.5)
ERYTHROCYTE [DISTWIDTH] IN BLOOD BY AUTOMATED COUNT: 15.3 % (ref 11.5–14.5)
EST. GFR  (NO RACE VARIABLE): 49 ML/MIN/1.73 M^2
EST. GFR  (NO RACE VARIABLE): 54 ML/MIN/1.73 M^2
FLOW: 6
FRACTIONAL SHORTENING: 8 % (ref 28–44)
GLUCOSE SERPL-MCNC: 173 MG/DL (ref 70–110)
GLUCOSE SERPL-MCNC: 332 MG/DL (ref 70–110)
GLUCOSE SERPL-MCNC: 333 MG/DL (ref 70–110)
GLUCOSE UR QL STRIP: ABNORMAL
HCT VFR BLD AUTO: 39.6 % (ref 37–48.5)
HCT VFR BLD AUTO: 39.6 % (ref 37–48.5)
HCT VFR BLD CALC: 39 %PCV (ref 36–54)
HGB BLD-MCNC: 12.3 G/DL (ref 12–16)
HGB BLD-MCNC: 12.4 G/DL (ref 12–16)
HGB UR QL STRIP: NEGATIVE
HYALINE CASTS #/AREA URNS LPF: 1 /LPF
IMM GRANULOCYTES # BLD AUTO: 0.05 K/UL (ref 0–0.04)
IMM GRANULOCYTES # BLD AUTO: 0.13 K/UL (ref 0–0.04)
IMM GRANULOCYTES NFR BLD AUTO: 0.5 % (ref 0–0.5)
IMM GRANULOCYTES NFR BLD AUTO: 1.5 % (ref 0–0.5)
INTERVENTRICULAR SEPTUM: 0.69 CM (ref 0.6–1.1)
IVC DIAMETER: 1.76 CM
KETONES UR QL STRIP: ABNORMAL
LA MAJOR: 5.47 CM
LA MINOR: 5.1 CM
LA WIDTH: 5.2 CM
LACTATE SERPL-SCNC: 3 MMOL/L (ref 0.5–2.2)
LEFT ATRIUM SIZE: 3.79 CM
LEFT ATRIUM VOLUME INDEX: 46.8 ML/M2
LEFT ATRIUM VOLUME: 88.42 CM3
LEFT INTERNAL DIMENSION IN SYSTOLE: 5.05 CM (ref 2.1–4)
LEFT VENTRICLE DIASTOLIC VOLUME INDEX: 77.02 ML/M2
LEFT VENTRICLE DIASTOLIC VOLUME: 145.56 ML
LEFT VENTRICLE MASS INDEX: 73 G/M2
LEFT VENTRICLE SYSTOLIC VOLUME INDEX: 63.9 ML/M2
LEFT VENTRICLE SYSTOLIC VOLUME: 120.86 ML
LEFT VENTRICULAR INTERNAL DIMENSION IN DIASTOLE: 5.47 CM (ref 3.5–6)
LEFT VENTRICULAR MASS: 137.75 G
LEUKOCYTE ESTERASE UR QL STRIP: ABNORMAL
LVOT MG: 1.09 MMHG
LVOT MV: 0.49 CM/S
LYMPHOCYTES # BLD AUTO: 1.4 K/UL (ref 1–4.8)
LYMPHOCYTES # BLD AUTO: 2.5 K/UL (ref 1–4.8)
LYMPHOCYTES NFR BLD: 14.6 % (ref 18–48)
LYMPHOCYTES NFR BLD: 28.6 % (ref 18–48)
MAGNESIUM SERPL-MCNC: 1.7 MG/DL (ref 1.6–2.6)
MAGNESIUM SERPL-MCNC: 8.4 MG/DL (ref 1.6–2.6)
MCH RBC QN AUTO: 28.3 PG (ref 27–31)
MCH RBC QN AUTO: 28.8 PG (ref 27–31)
MCHC RBC AUTO-ENTMCNC: 31.1 G/DL (ref 32–36)
MCHC RBC AUTO-ENTMCNC: 31.3 G/DL (ref 32–36)
MCV RBC AUTO: 91 FL (ref 82–98)
MCV RBC AUTO: 92 FL (ref 82–98)
MICROSCOPIC COMMENT: NORMAL
MODE: ABNORMAL
MONOCYTES # BLD AUTO: 0.7 K/UL (ref 0.3–1)
MONOCYTES # BLD AUTO: 0.7 K/UL (ref 0.3–1)
MONOCYTES NFR BLD: 7.6 % (ref 4–15)
MONOCYTES NFR BLD: 7.9 % (ref 4–15)
NEUTROPHILS # BLD AUTO: 5.2 K/UL (ref 1.8–7.7)
NEUTROPHILS # BLD AUTO: 7.1 K/UL (ref 1.8–7.7)
NEUTROPHILS NFR BLD: 60.8 % (ref 38–73)
NEUTROPHILS NFR BLD: 76.3 % (ref 38–73)
NITRITE UR QL STRIP: NEGATIVE
NRBC BLD-RTO: 0 /100 WBC
NRBC BLD-RTO: 1 /100 WBC
PH UR STRIP: 6 [PH] (ref 5–8)
PHOSPHATE SERPL-MCNC: 3.7 MG/DL (ref 2.7–4.5)
PISA TR MAX VEL: 3.22 M/S
PLATELET # BLD AUTO: 367 K/UL (ref 150–450)
PLATELET # BLD AUTO: 440 K/UL (ref 150–450)
PMV BLD AUTO: 10.6 FL (ref 9.2–12.9)
PMV BLD AUTO: 11.1 FL (ref 9.2–12.9)
POC IONIZED CALCIUM: 1.13 MMOL/L (ref 1.06–1.42)
POC MOLECULAR INFLUENZA A AGN: NEGATIVE
POC MOLECULAR INFLUENZA B AGN: NEGATIVE
POC TCO2 (MEASURED): 22 MMOL/L (ref 23–29)
POCT GLUCOSE: 171 MG/DL (ref 70–110)
POCT GLUCOSE: 226 MG/DL (ref 70–110)
POTASSIUM BLD-SCNC: 3.6 MMOL/L (ref 3.5–5.1)
POTASSIUM SERPL-SCNC: 3.2 MMOL/L (ref 3.5–5.1)
POTASSIUM SERPL-SCNC: 3.7 MMOL/L (ref 3.5–5.1)
PROT SERPL-MCNC: 6.4 G/DL (ref 6–8.4)
PROT SERPL-MCNC: 7.4 G/DL (ref 6–8.4)
PROT UR QL STRIP: ABNORMAL
PV PEAK GRADIENT: 2 MMHG
PV PEAK VELOCITY: 0.79 M/S
RA MAJOR: 4.12 CM
RA PRESSURE ESTIMATED: 8 MMHG
RA WIDTH: 3.4 CM
RBC # BLD AUTO: 4.3 M/UL (ref 4–5.4)
RBC # BLD AUTO: 4.35 M/UL (ref 4–5.4)
RBC #/AREA URNS HPF: 0 /HPF (ref 0–4)
RIGHT VENTRICULAR END-DIASTOLIC DIMENSION: 3.6 CM
RV TB RVSP: 11 MMHG
SAMPLE: ABNORMAL
SARS-COV-2 AG RESP QL IA.RAPID: NEGATIVE
SINUS: 2.15 CM
SITE: ABNORMAL
SODIUM BLD-SCNC: 134 MMOL/L (ref 136–145)
SODIUM SERPL-SCNC: 136 MMOL/L (ref 136–145)
SODIUM SERPL-SCNC: 139 MMOL/L (ref 136–145)
SP GR UR STRIP: 1.01 (ref 1–1.03)
SPECIMEN OUTDATE: NORMAL
STJ: 1.79 CM
TDI LATERAL: 0.11 M/S
TR MAX PG: 41 MMHG
TRICUSPID ANNULAR PLANE SYSTOLIC EXCURSION: 1.5 CM
TROPONIN I SERPL DL<=0.01 NG/ML-MCNC: 0.08 NG/ML (ref 0–0.03)
TROPONIN I SERPL DL<=0.01 NG/ML-MCNC: 0.09 NG/ML (ref 0–0.03)
TROPONIN I SERPL DL<=0.01 NG/ML-MCNC: 0.09 NG/ML (ref 0–0.03)
TV REST PULMONARY ARTERY PRESSURE: 49 MMHG
URN SPEC COLLECT METH UR: ABNORMAL
UROBILINOGEN UR STRIP-ACNC: NEGATIVE EU/DL
WBC # BLD AUTO: 8.59 K/UL (ref 3.9–12.7)
WBC # BLD AUTO: 9.37 K/UL (ref 3.9–12.7)
WBC #/AREA URNS HPF: 2 /HPF (ref 0–5)
YEAST URNS QL MICRO: NORMAL
Z-SCORE OF LEFT VENTRICULAR DIMENSION IN END DIASTOLE: 0.33
Z-SCORE OF LEFT VENTRICULAR DIMENSION IN END SYSTOLE: 3.46

## 2023-11-06 PROCEDURE — 1101F PR PT FALLS ASSESS DOC 0-1 FALLS W/OUT INJ PAST YR: ICD-10-PCS | Mod: HCNC,CPTII,S$GLB, | Performed by: PODIATRIST

## 2023-11-06 PROCEDURE — 3066F PR DOCUMENTATION OF TREATMENT FOR NEPHROPATHY: ICD-10-PCS | Mod: HCNC,CPTII,S$GLB, | Performed by: PODIATRIST

## 2023-11-06 PROCEDURE — 93010 ELECTROCARDIOGRAM REPORT: CPT | Mod: S$GLB,,, | Performed by: INTERNAL MEDICINE

## 2023-11-06 PROCEDURE — 80053 COMPREHEN METABOLIC PANEL: CPT | Mod: 91,HCNC | Performed by: STUDENT IN AN ORGANIZED HEALTH CARE EDUCATION/TRAINING PROGRAM

## 2023-11-06 PROCEDURE — 1125F AMNT PAIN NOTED PAIN PRSNT: CPT | Mod: HCNC,CPTII,S$GLB, | Performed by: PODIATRIST

## 2023-11-06 PROCEDURE — 99900035 HC TECH TIME PER 15 MIN (STAT): Mod: HCNC

## 2023-11-06 PROCEDURE — 3061F PR NEG MICROALBUMINURIA RESULT DOCUMENTED/REVIEW: ICD-10-PCS | Mod: HCNC,CPTII,S$GLB, | Performed by: PODIATRIST

## 2023-11-06 PROCEDURE — 99291 CRITICAL CARE FIRST HOUR: CPT | Mod: HCNC,,, | Performed by: INTERNAL MEDICINE

## 2023-11-06 PROCEDURE — 80053 COMPREHEN METABOLIC PANEL: CPT | Mod: HCNC | Performed by: EMERGENCY MEDICINE

## 2023-11-06 PROCEDURE — 3044F HG A1C LEVEL LT 7.0%: CPT | Mod: HCNC,CPTII,S$GLB, | Performed by: PODIATRIST

## 2023-11-06 PROCEDURE — 63600175 PHARM REV CODE 636 W HCPCS: Mod: HCNC | Performed by: EMERGENCY MEDICINE

## 2023-11-06 PROCEDURE — 81000 URINALYSIS NONAUTO W/SCOPE: CPT | Mod: HCNC | Performed by: EMERGENCY MEDICINE

## 2023-11-06 PROCEDURE — 93005 EKG 12-LEAD: ICD-10-PCS | Mod: S$GLB,,, | Performed by: NURSE PRACTITIONER

## 2023-11-06 PROCEDURE — 1159F MED LIST DOCD IN RCRD: CPT | Mod: HCNC,CPTII,S$GLB, | Performed by: PODIATRIST

## 2023-11-06 PROCEDURE — 1159F PR MEDICATION LIST DOCUMENTED IN MEDICAL RECORD: ICD-10-PCS | Mod: HCNC,CPTII,S$GLB, | Performed by: PODIATRIST

## 2023-11-06 PROCEDURE — 99285 EMERGENCY DEPT VISIT HI MDM: CPT | Mod: 25,HCNC

## 2023-11-06 PROCEDURE — 84484 ASSAY OF TROPONIN QUANT: CPT | Mod: 91,HCNC | Performed by: EMERGENCY MEDICINE

## 2023-11-06 PROCEDURE — 82330 ASSAY OF CALCIUM: CPT | Mod: HCNC

## 2023-11-06 PROCEDURE — 3066F NEPHROPATHY DOC TX: CPT | Mod: HCNC,CPTII,S$GLB, | Performed by: PODIATRIST

## 2023-11-06 PROCEDURE — 3288F FALL RISK ASSESSMENT DOCD: CPT | Mod: HCNC,CPTII,S$GLB, | Performed by: PODIATRIST

## 2023-11-06 PROCEDURE — 83605 ASSAY OF LACTIC ACID: CPT | Mod: HCNC | Performed by: STUDENT IN AN ORGANIZED HEALTH CARE EDUCATION/TRAINING PROGRAM

## 2023-11-06 PROCEDURE — 3080F PR MOST RECENT DIASTOLIC BLOOD PRESSURE >= 90 MM HG: ICD-10-PCS | Mod: HCNC,CPTII,S$GLB, | Performed by: PODIATRIST

## 2023-11-06 PROCEDURE — 20000000 HC ICU ROOM: Mod: HCNC

## 2023-11-06 PROCEDURE — 85014 HEMATOCRIT: CPT | Mod: HCNC

## 2023-11-06 PROCEDURE — 84295 ASSAY OF SERUM SODIUM: CPT | Mod: HCNC

## 2023-11-06 PROCEDURE — 86901 BLOOD TYPING SEROLOGIC RH(D): CPT | Mod: HCNC | Performed by: STUDENT IN AN ORGANIZED HEALTH CARE EDUCATION/TRAINING PROGRAM

## 2023-11-06 PROCEDURE — 25000003 PHARM REV CODE 250: Mod: HCNC | Performed by: EMERGENCY MEDICINE

## 2023-11-06 PROCEDURE — 85025 COMPLETE CBC W/AUTO DIFF WBC: CPT | Mod: HCNC | Performed by: EMERGENCY MEDICINE

## 2023-11-06 PROCEDURE — 3044F PR MOST RECENT HEMOGLOBIN A1C LEVEL <7.0%: ICD-10-PCS | Mod: HCNC,CPTII,S$GLB, | Performed by: PODIATRIST

## 2023-11-06 PROCEDURE — 85730 THROMBOPLASTIN TIME PARTIAL: CPT | Mod: 91,HCNC | Performed by: EMERGENCY MEDICINE

## 2023-11-06 PROCEDURE — 94761 N-INVAS EAR/PLS OXIMETRY MLT: CPT | Mod: HCNC

## 2023-11-06 PROCEDURE — 63600175 PHARM REV CODE 636 W HCPCS: Mod: HCNC | Performed by: STUDENT IN AN ORGANIZED HEALTH CARE EDUCATION/TRAINING PROGRAM

## 2023-11-06 PROCEDURE — 87811 SARS CORONAVIRUS 2 ANTIGEN POCT, MANUAL READ: ICD-10-PCS | Mod: QW,S$GLB,, | Performed by: NURSE PRACTITIONER

## 2023-11-06 PROCEDURE — 1125F PR PAIN SEVERITY QUANTIFIED, PAIN PRESENT: ICD-10-PCS | Mod: HCNC,CPTII,S$GLB, | Performed by: PODIATRIST

## 2023-11-06 PROCEDURE — 86850 RBC ANTIBODY SCREEN: CPT | Mod: HCNC | Performed by: STUDENT IN AN ORGANIZED HEALTH CARE EDUCATION/TRAINING PROGRAM

## 2023-11-06 PROCEDURE — 25000003 PHARM REV CODE 250: Mod: HCNC | Performed by: STUDENT IN AN ORGANIZED HEALTH CARE EDUCATION/TRAINING PROGRAM

## 2023-11-06 PROCEDURE — 99291 PR CRITICAL CARE, E/M 30-74 MINUTES: ICD-10-PCS | Mod: HCNC,,, | Performed by: INTERNAL MEDICINE

## 2023-11-06 PROCEDURE — 84132 ASSAY OF SERUM POTASSIUM: CPT | Mod: HCNC

## 2023-11-06 PROCEDURE — 3080F DIAST BP >= 90 MM HG: CPT | Mod: HCNC,CPTII,S$GLB, | Performed by: PODIATRIST

## 2023-11-06 PROCEDURE — 82565 ASSAY OF CREATININE: CPT | Mod: HCNC

## 2023-11-06 PROCEDURE — 93005 ELECTROCARDIOGRAM TRACING: CPT | Mod: HCNC

## 2023-11-06 PROCEDURE — 93010 EKG 12-LEAD: ICD-10-PCS | Mod: S$GLB,,, | Performed by: INTERNAL MEDICINE

## 2023-11-06 PROCEDURE — 3075F SYST BP GE 130 - 139MM HG: CPT | Mod: HCNC,CPTII,S$GLB, | Performed by: PODIATRIST

## 2023-11-06 PROCEDURE — 3008F BODY MASS INDEX DOCD: CPT | Mod: HCNC,CPTII,S$GLB, | Performed by: PODIATRIST

## 2023-11-06 PROCEDURE — 99999 PR PBB SHADOW E&M-EST. PATIENT-LVL III: ICD-10-PCS | Mod: PBBFAC,HCNC,, | Performed by: PODIATRIST

## 2023-11-06 PROCEDURE — 3061F NEG MICROALBUMINURIA REV: CPT | Mod: HCNC,CPTII,S$GLB, | Performed by: PODIATRIST

## 2023-11-06 PROCEDURE — 93010 EKG 12-LEAD: ICD-10-PCS | Mod: HCNC,76,, | Performed by: INTERNAL MEDICINE

## 2023-11-06 PROCEDURE — 87502 INFLUENZA DNA AMP PROBE: CPT | Mod: QW,S$GLB,, | Performed by: NURSE PRACTITIONER

## 2023-11-06 PROCEDURE — 99214 PR OFFICE/OUTPT VISIT, EST, LEVL IV, 30-39 MIN: ICD-10-PCS | Mod: S$GLB,,, | Performed by: NURSE PRACTITIONER

## 2023-11-06 PROCEDURE — 87502 POCT INFLUENZA A/B MOLECULAR: ICD-10-PCS | Mod: QW,S$GLB,, | Performed by: NURSE PRACTITIONER

## 2023-11-06 PROCEDURE — 99203 OFFICE O/P NEW LOW 30 MIN: CPT | Mod: HCNC,S$GLB,, | Performed by: PODIATRIST

## 2023-11-06 PROCEDURE — 93005 ELECTROCARDIOGRAM TRACING: CPT | Mod: S$GLB,,, | Performed by: NURSE PRACTITIONER

## 2023-11-06 PROCEDURE — 86900 BLOOD TYPING SEROLOGIC ABO: CPT | Mod: HCNC | Performed by: STUDENT IN AN ORGANIZED HEALTH CARE EDUCATION/TRAINING PROGRAM

## 2023-11-06 PROCEDURE — 3075F PR MOST RECENT SYSTOLIC BLOOD PRESS GE 130-139MM HG: ICD-10-PCS | Mod: HCNC,CPTII,S$GLB, | Performed by: PODIATRIST

## 2023-11-06 PROCEDURE — 83735 ASSAY OF MAGNESIUM: CPT | Mod: 91,HCNC | Performed by: STUDENT IN AN ORGANIZED HEALTH CARE EDUCATION/TRAINING PROGRAM

## 2023-11-06 PROCEDURE — 99999 PR PBB SHADOW E&M-EST. PATIENT-LVL III: CPT | Mod: PBBFAC,HCNC,, | Performed by: PODIATRIST

## 2023-11-06 PROCEDURE — 83735 ASSAY OF MAGNESIUM: CPT | Mod: HCNC | Performed by: EMERGENCY MEDICINE

## 2023-11-06 PROCEDURE — 71046 X-RAY EXAM CHEST 2 VIEWS: CPT | Mod: S$GLB,,, | Performed by: RADIOLOGY

## 2023-11-06 PROCEDURE — 84484 ASSAY OF TROPONIN QUANT: CPT | Mod: 91,HCNC | Performed by: STUDENT IN AN ORGANIZED HEALTH CARE EDUCATION/TRAINING PROGRAM

## 2023-11-06 PROCEDURE — 84100 ASSAY OF PHOSPHORUS: CPT | Mod: HCNC | Performed by: STUDENT IN AN ORGANIZED HEALTH CARE EDUCATION/TRAINING PROGRAM

## 2023-11-06 PROCEDURE — 3008F PR BODY MASS INDEX (BMI) DOCUMENTED: ICD-10-PCS | Mod: HCNC,CPTII,S$GLB, | Performed by: PODIATRIST

## 2023-11-06 PROCEDURE — 85025 COMPLETE CBC W/AUTO DIFF WBC: CPT | Mod: 91,HCNC | Performed by: STUDENT IN AN ORGANIZED HEALTH CARE EDUCATION/TRAINING PROGRAM

## 2023-11-06 PROCEDURE — 83880 ASSAY OF NATRIURETIC PEPTIDE: CPT | Mod: HCNC | Performed by: EMERGENCY MEDICINE

## 2023-11-06 PROCEDURE — 25500020 PHARM REV CODE 255: Mod: HCNC | Performed by: EMERGENCY MEDICINE

## 2023-11-06 PROCEDURE — 71046 XR CHEST PA AND LATERAL: ICD-10-PCS | Mod: S$GLB,,, | Performed by: RADIOLOGY

## 2023-11-06 PROCEDURE — 99214 OFFICE O/P EST MOD 30 MIN: CPT | Mod: S$GLB,,, | Performed by: NURSE PRACTITIONER

## 2023-11-06 PROCEDURE — 3288F PR FALLS RISK ASSESSMENT DOCUMENTED: ICD-10-PCS | Mod: HCNC,CPTII,S$GLB, | Performed by: PODIATRIST

## 2023-11-06 PROCEDURE — 1101F PT FALLS ASSESS-DOCD LE1/YR: CPT | Mod: HCNC,CPTII,S$GLB, | Performed by: PODIATRIST

## 2023-11-06 PROCEDURE — 93010 ELECTROCARDIOGRAM REPORT: CPT | Mod: HCNC,76,, | Performed by: INTERNAL MEDICINE

## 2023-11-06 PROCEDURE — 99203 PR OFFICE/OUTPT VISIT, NEW, LEVL III, 30-44 MIN: ICD-10-PCS | Mod: HCNC,S$GLB,, | Performed by: PODIATRIST

## 2023-11-06 PROCEDURE — 85379 FIBRIN DEGRADATION QUANT: CPT | Mod: HCNC | Performed by: EMERGENCY MEDICINE

## 2023-11-06 PROCEDURE — 87811 SARS-COV-2 COVID19 W/OPTIC: CPT | Mod: QW,S$GLB,, | Performed by: NURSE PRACTITIONER

## 2023-11-06 RX ORDER — CALCIUM GLUCONATE 20 MG/ML
2 INJECTION, SOLUTION INTRAVENOUS
Status: DISCONTINUED | OUTPATIENT
Start: 2023-11-06 | End: 2023-11-13 | Stop reason: HOSPADM

## 2023-11-06 RX ORDER — ONDANSETRON HYDROCHLORIDE 2 MG/ML
4 INJECTION, SOLUTION INTRAVENOUS EVERY 8 HOURS PRN
Status: DISCONTINUED | OUTPATIENT
Start: 2023-11-06 | End: 2023-11-07 | Stop reason: SDUPTHER

## 2023-11-06 RX ORDER — DILTIAZEM HYDROCHLORIDE 5 MG/ML
20 INJECTION INTRAVENOUS
Status: COMPLETED | OUTPATIENT
Start: 2023-11-06 | End: 2023-11-06

## 2023-11-06 RX ORDER — LIDOCAINE HYDROCHLORIDE ANHYDROUS AND DEXTROSE MONOHYDRATE .8; 5 G/100ML; G/100ML
1 INJECTION, SOLUTION INTRAVENOUS CONTINUOUS
Status: DISCONTINUED | OUTPATIENT
Start: 2023-11-06 | End: 2023-11-09

## 2023-11-06 RX ORDER — FAMOTIDINE 10 MG/ML
20 INJECTION INTRAVENOUS DAILY
Status: DISCONTINUED | OUTPATIENT
Start: 2023-11-07 | End: 2023-11-13 | Stop reason: HOSPADM

## 2023-11-06 RX ORDER — NAPROXEN SODIUM 220 MG/1
81 TABLET, FILM COATED ORAL DAILY
Status: DISCONTINUED | OUTPATIENT
Start: 2023-11-07 | End: 2023-11-08

## 2023-11-06 RX ORDER — DILTIAZEM HYDROCHLORIDE 30 MG/1
30 TABLET, FILM COATED ORAL
Status: COMPLETED | OUTPATIENT
Start: 2023-11-06 | End: 2023-11-06

## 2023-11-06 RX ORDER — POTASSIUM CHLORIDE 7.45 MG/ML
60 INJECTION INTRAVENOUS
Status: DISCONTINUED | OUTPATIENT
Start: 2023-11-06 | End: 2023-11-09

## 2023-11-06 RX ORDER — CLOPIDOGREL BISULFATE 75 MG/1
75 TABLET ORAL DAILY
Status: DISCONTINUED | OUTPATIENT
Start: 2023-11-07 | End: 2023-11-07

## 2023-11-06 RX ORDER — IPRATROPIUM BROMIDE 0.5 MG/2.5ML
0.5 SOLUTION RESPIRATORY (INHALATION) EVERY 4 HOURS PRN
Status: DISCONTINUED | OUTPATIENT
Start: 2023-11-06 | End: 2023-11-13 | Stop reason: HOSPADM

## 2023-11-06 RX ORDER — ASPIRIN 325 MG
325 TABLET ORAL
Status: COMPLETED | OUTPATIENT
Start: 2023-11-06 | End: 2023-11-06

## 2023-11-06 RX ORDER — POTASSIUM CHLORIDE 7.45 MG/ML
80 INJECTION INTRAVENOUS
Status: DISCONTINUED | OUTPATIENT
Start: 2023-11-06 | End: 2023-11-09

## 2023-11-06 RX ORDER — SODIUM CHLORIDE 9 MG/ML
500 INJECTION, SOLUTION INTRAVENOUS
Status: DISCONTINUED | OUTPATIENT
Start: 2023-11-06 | End: 2023-11-06

## 2023-11-06 RX ORDER — IBUPROFEN 200 MG
24 TABLET ORAL
Status: DISCONTINUED | OUTPATIENT
Start: 2023-11-06 | End: 2023-11-13 | Stop reason: HOSPADM

## 2023-11-06 RX ORDER — ATENOLOL 25 MG/1
25 TABLET ORAL
Status: COMPLETED | OUTPATIENT
Start: 2023-11-06 | End: 2023-11-06

## 2023-11-06 RX ORDER — SODIUM CHLORIDE 9 MG/ML
1000 INJECTION, SOLUTION INTRAVENOUS
Status: ACTIVE | OUTPATIENT
Start: 2023-11-06 | End: 2023-11-07

## 2023-11-06 RX ORDER — CLOPIDOGREL BISULFATE 300 MG/1
300 TABLET, FILM COATED ORAL ONCE
Status: COMPLETED | OUTPATIENT
Start: 2023-11-06 | End: 2023-11-06

## 2023-11-06 RX ORDER — VALSARTAN 320 MG/1
320 TABLET ORAL DAILY
Qty: 30 TABLET | Refills: 0 | Status: ON HOLD | OUTPATIENT
Start: 2023-11-06 | End: 2023-11-13 | Stop reason: HOSPADM

## 2023-11-06 RX ORDER — INSULIN ASPART 100 [IU]/ML
0-5 INJECTION, SOLUTION INTRAVENOUS; SUBCUTANEOUS
Status: DISCONTINUED | OUTPATIENT
Start: 2023-11-06 | End: 2023-11-13 | Stop reason: HOSPADM

## 2023-11-06 RX ORDER — ACETAMINOPHEN 325 MG/1
650 TABLET ORAL EVERY 4 HOURS PRN
Status: DISCONTINUED | OUTPATIENT
Start: 2023-11-06 | End: 2023-11-08

## 2023-11-06 RX ORDER — MAGNESIUM SULFATE HEPTAHYDRATE 40 MG/ML
2 INJECTION, SOLUTION INTRAVENOUS
Status: DISCONTINUED | OUTPATIENT
Start: 2023-11-06 | End: 2023-11-13 | Stop reason: HOSPADM

## 2023-11-06 RX ORDER — ADENOSINE 3 MG/ML
6 INJECTION, SOLUTION INTRAVENOUS
Status: COMPLETED | OUTPATIENT
Start: 2023-11-06 | End: 2023-11-06

## 2023-11-06 RX ORDER — POTASSIUM CHLORIDE 20 MEQ/1
40 TABLET, EXTENDED RELEASE ORAL ONCE
Status: COMPLETED | OUTPATIENT
Start: 2023-11-06 | End: 2023-11-06

## 2023-11-06 RX ORDER — AMLODIPINE BESYLATE 10 MG/1
10 TABLET ORAL DAILY
Qty: 30 TABLET | Refills: 0 | Status: ON HOLD | OUTPATIENT
Start: 2023-11-06 | End: 2023-11-13 | Stop reason: HOSPADM

## 2023-11-06 RX ORDER — LIDOCAINE HYDROCHLORIDE 20 MG/ML
80 INJECTION INTRAVENOUS
Status: DISCONTINUED | OUTPATIENT
Start: 2023-11-06 | End: 2023-11-06

## 2023-11-06 RX ORDER — GLUCAGON 1 MG
1 KIT INJECTION
Status: DISCONTINUED | OUTPATIENT
Start: 2023-11-06 | End: 2023-11-13 | Stop reason: HOSPADM

## 2023-11-06 RX ORDER — CALCIUM GLUCONATE 20 MG/ML
1 INJECTION, SOLUTION INTRAVENOUS
Status: DISCONTINUED | OUTPATIENT
Start: 2023-11-06 | End: 2023-11-13 | Stop reason: HOSPADM

## 2023-11-06 RX ORDER — ATORVASTATIN CALCIUM 40 MG/1
40 TABLET, FILM COATED ORAL NIGHTLY
Status: DISCONTINUED | OUTPATIENT
Start: 2023-11-06 | End: 2023-11-13 | Stop reason: HOSPADM

## 2023-11-06 RX ORDER — TALC
6 POWDER (GRAM) TOPICAL NIGHTLY PRN
Status: DISCONTINUED | OUTPATIENT
Start: 2023-11-06 | End: 2023-11-13 | Stop reason: HOSPADM

## 2023-11-06 RX ORDER — LIDOCAINE HYDROCHLORIDE 20 MG/ML
100 INJECTION INTRAVENOUS
Status: COMPLETED | OUTPATIENT
Start: 2023-11-06 | End: 2023-11-06

## 2023-11-06 RX ORDER — SODIUM CHLORIDE 0.9 % (FLUSH) 0.9 %
10 SYRINGE (ML) INJECTION
Status: DISCONTINUED | OUTPATIENT
Start: 2023-11-06 | End: 2023-11-08

## 2023-11-06 RX ORDER — POTASSIUM CHLORIDE 7.45 MG/ML
40 INJECTION INTRAVENOUS
Status: DISCONTINUED | OUTPATIENT
Start: 2023-11-06 | End: 2023-11-09

## 2023-11-06 RX ORDER — PROCHLORPERAZINE EDISYLATE 5 MG/ML
5 INJECTION INTRAMUSCULAR; INTRAVENOUS EVERY 6 HOURS PRN
Status: DISCONTINUED | OUTPATIENT
Start: 2023-11-06 | End: 2023-11-08

## 2023-11-06 RX ORDER — HEPARIN SODIUM,PORCINE/D5W 25000/250
0-40 INTRAVENOUS SOLUTION INTRAVENOUS CONTINUOUS
Status: DISPENSED | OUTPATIENT
Start: 2023-11-06 | End: 2023-11-09

## 2023-11-06 RX ORDER — MAGNESIUM SULFATE HEPTAHYDRATE 40 MG/ML
4 INJECTION, SOLUTION INTRAVENOUS
Status: DISCONTINUED | OUTPATIENT
Start: 2023-11-06 | End: 2023-11-13 | Stop reason: HOSPADM

## 2023-11-06 RX ORDER — MAGNESIUM SULFATE HEPTAHYDRATE 500 MG/ML
INJECTION, SOLUTION INTRAMUSCULAR; INTRAVENOUS CODE/TRAUMA/SEDATION MEDICATION
Status: COMPLETED | OUTPATIENT
Start: 2023-11-06 | End: 2023-11-06

## 2023-11-06 RX ORDER — LEVALBUTEROL 1.25 MG/.5ML
1.25 SOLUTION, CONCENTRATE RESPIRATORY (INHALATION) EVERY 4 HOURS PRN
Status: DISCONTINUED | OUTPATIENT
Start: 2023-11-06 | End: 2023-11-13 | Stop reason: HOSPADM

## 2023-11-06 RX ORDER — IBUPROFEN 200 MG
16 TABLET ORAL
Status: DISCONTINUED | OUTPATIENT
Start: 2023-11-06 | End: 2023-11-13 | Stop reason: HOSPADM

## 2023-11-06 RX ORDER — POTASSIUM CHLORIDE 7.45 MG/ML
10 INJECTION INTRAVENOUS ONCE
Status: COMPLETED | OUTPATIENT
Start: 2023-11-06 | End: 2023-11-06

## 2023-11-06 RX ORDER — HYDROCHLOROTHIAZIDE 25 MG/1
25 TABLET ORAL DAILY
Qty: 30 TABLET | Refills: 0 | Status: SHIPPED | OUTPATIENT
Start: 2023-11-06 | End: 2023-12-18

## 2023-11-06 RX ORDER — SIMVASTATIN 20 MG/1
20 TABLET, FILM COATED ORAL DAILY
Qty: 30 TABLET | Refills: 0 | Status: ON HOLD | OUTPATIENT
Start: 2023-11-06 | End: 2023-11-13 | Stop reason: HOSPADM

## 2023-11-06 RX ORDER — CALCIUM GLUCONATE 20 MG/ML
3 INJECTION, SOLUTION INTRAVENOUS
Status: DISCONTINUED | OUTPATIENT
Start: 2023-11-06 | End: 2023-11-13 | Stop reason: HOSPADM

## 2023-11-06 RX ADMIN — POTASSIUM BICARBONATE 40 MEQ: 391 TABLET, EFFERVESCENT ORAL at 03:11

## 2023-11-06 RX ADMIN — MAGNESIUM SULFATE HEPTAHYDRATE 2 G: 500 INJECTION, SOLUTION INTRAMUSCULAR; INTRAVENOUS at 07:11

## 2023-11-06 RX ADMIN — DILTIAZEM HYDROCHLORIDE 20 MG: 5 INJECTION INTRAVENOUS at 03:11

## 2023-11-06 RX ADMIN — ADENOSINE 6 MG: 3 INJECTION INTRAVENOUS at 02:11

## 2023-11-06 RX ADMIN — ASPIRIN 325 MG ORAL TABLET 325 MG: 325 PILL ORAL at 04:11

## 2023-11-06 RX ADMIN — LIDOCAINE HYDROCHLORIDE 100 MG: 20 INJECTION, SOLUTION INTRAVENOUS at 08:11

## 2023-11-06 RX ADMIN — CLOPIDOGREL BISULFATE 300 MG: 300 TABLET, FILM COATED ORAL at 08:11

## 2023-11-06 RX ADMIN — HEPARIN SODIUM AND DEXTROSE 18 UNITS/KG/HR: 10000; 5 INJECTION INTRAVENOUS at 06:11

## 2023-11-06 RX ADMIN — IOHEXOL 75 ML: 350 INJECTION, SOLUTION INTRAVENOUS at 05:11

## 2023-11-06 RX ADMIN — ATENOLOL 25 MG: 25 TABLET ORAL at 04:11

## 2023-11-06 RX ADMIN — POTASSIUM CHLORIDE 10 MEQ: 7.46 INJECTION, SOLUTION INTRAVENOUS at 10:11

## 2023-11-06 RX ADMIN — DILTIAZEM HYDROCHLORIDE 30 MG: 30 TABLET, FILM COATED ORAL at 03:11

## 2023-11-06 RX ADMIN — DEXTROSE MONOHYDRATE 25 G: 25 INJECTION, SOLUTION INTRAVENOUS at 07:11

## 2023-11-06 RX ADMIN — INSULIN ASPART 1 UNITS: 100 INJECTION, SOLUTION INTRAVENOUS; SUBCUTANEOUS at 10:11

## 2023-11-06 RX ADMIN — LIDOCAINE HYDROCHLORIDE 1 MG/MIN: 8 INJECTION, SOLUTION INTRAVENOUS at 08:11

## 2023-11-06 RX ADMIN — ADENOSINE 6 MG: 3 INJECTION, SOLUTION INTRAVENOUS at 03:11

## 2023-11-06 RX ADMIN — ATORVASTATIN CALCIUM 40 MG: 40 TABLET, FILM COATED ORAL at 10:11

## 2023-11-06 RX ADMIN — POTASSIUM CHLORIDE 40 MEQ: 1500 TABLET, EXTENDED RELEASE ORAL at 08:11

## 2023-11-06 NOTE — Clinical Note
The closure device was deployed in the right radial artery. 14 cc's of air were inserted into the closure device. With patent hemostasis.

## 2023-11-06 NOTE — PATIENT INSTRUCTIONS
Please proceed to emergency room for further evaluation of your symptoms.        - Follow up with your PCP or specialty clinic as directed in the next 1-2 weeks if not improved or as needed.  You can call [phone] to schedule an appointment with the appropriate provider.    - Go to the ER or seek medical attention immediately if you develop new or worsening symptoms.    - You must understand that you have received an Urgent Care treatment only and that you may be released before all of your medical problems are known or treated.   - You, the patient, will arrange for follow up care as instructed.   - If your condition worsens or fails to improve we recommend that you receive another evaluation at the ER immediately or contact your PCP to discuss your concerns or return here.

## 2023-11-06 NOTE — Clinical Note
The catheter was inserted into the and was removed from the left ventricle. Hemodynamics were performed.  and Pullback was recorded.

## 2023-11-06 NOTE — ED NOTES
No response  with medication second dose given  with Cardio  present will be given additional medications.

## 2023-11-06 NOTE — ED PROVIDER NOTES
Encounter Date: 11/6/2023    SCRIBE #1 NOTE: I Mirna Li, am scribing for, and in the presence of,  Nicolas Arellano MD.     History     Chief Complaint   Patient presents with    Tachycardia     Pt to the ED sent from urgent care due to tachycardia. Pt reports going to urgent care for nasal congestion and because she has been out of her BP meds for 2 weeks. EKG was performed and HR noted to be 155. Pt reports intermittent shortness of breath. Pt denies chest pain.      69 yo F with a PMHx of DM, HTN, HLD, thrombocytemia, CVA, presenting to the ED for tachycardia. She reports she has been out of her antihypertensives x1w. Since missing her medications, she started experiencing persistent fatigue, cough, intermittent dyspnea. She states she has been staying hydrated. She denies any personal hx of CHF, PE or DVT. No other exacerbating or alleviating factors. Denies chest pain, or other associated symptoms. She was seen at  this morning and tested neg for COVID and flu, was sent here as they found her to be tachycardic at 155 bpm.    The history is provided by the patient.     Review of patient's allergies indicates:  No Known Allergies  Past Medical History:   Diagnosis Date    Acute respiratory failure with hypoxia 11/6/2023    Adult BMI 31.0-31.9 kg/sq m 12/3/2018    Anemia     CVA (cerebral vascular accident)     Residual weakness in the left arm and hand    Essential thrombocythemia     Hyperlipidemia associated with type 2 diabetes mellitus     Hypertension     Osteoporosis     Pulmonary embolism 11/6/2023    Type 2 diabetes mellitus      Past Surgical History:   Procedure Laterality Date    COLONOSCOPY N/A 1/4/2019    Procedure: COLONOSCOPY;  Surgeon: James Ozuna MD;  Location: [facility] ENDO;  Service: Endoscopy;  Laterality: N/A;    COLONOSCOPY N/A 3/1/2021    Procedure: COLONOSCOPY;  Surgeon: Nelida Cook MD;  Location: [facility] ENDO;  Service: Endoscopy;  Laterality: N/A;     ESOPHAGOGASTRODUODENOSCOPY N/A 3/1/2021    Procedure: EGD (ESOPHAGOGASTRODUODENOSCOPY);  Surgeon: Nelida Cook MD;  Location: [facility];  Service: Endoscopy;  Laterality: N/A;  rapid covid > 50 miles away, instructions mailed -ml    HEMORRHOID SURGERY       Family History   Problem Relation Age of Onset    Pancreatic cancer Mother     Emphysema Father     Cancer Father         Lung    Breast cancer Cousin     Breast cancer Cousin     Colon cancer Neg Hx     Esophageal cancer Neg Hx      Social History     Tobacco Use    Smoking status: Former     Types: Cigarettes    Smokeless tobacco: Never   Substance Use Topics    Alcohol use: No    Drug use: No     Review of Systems   Constitutional:  Positive for fatigue. Negative for chills and fever.   HENT:  Negative for congestion, rhinorrhea and sore throat.    Eyes:  Negative for visual disturbance.   Respiratory:  Positive for cough and shortness of breath.    Cardiovascular:  Negative for chest pain.   Gastrointestinal:  Negative for abdominal pain, diarrhea, nausea and vomiting.   Genitourinary:  Negative for dysuria, frequency and hematuria.   Musculoskeletal:  Negative for back pain.   Skin:  Negative for rash.   Neurological:  Negative for dizziness, weakness and headaches.       Physical Exam     Initial Vitals [11/06/23 1235]   BP Pulse Resp Temp SpO2   (!) 137/93 (!) 147 18 97.7 °F (36.5 °C) 99 %      MAP       --         Physical Exam    Nursing note and vitals reviewed.  Constitutional: She is not diaphoretic. No distress.   HENT:   Head: Normocephalic and atraumatic.   Mouth/Throat: Oropharynx is clear and moist.   Eyes: Conjunctivae and EOM are normal. No scleral icterus.   Neck: Neck supple. No JVD present.   Normal range of motion.  Cardiovascular:  Regular rhythm and intact distal pulses.   Tachycardia present.         Pulmonary/Chest: Breath sounds normal. No stridor. No respiratory distress.   Abdominal: Abdomen is soft. Bowel sounds are  normal. She exhibits no distension. There is no abdominal tenderness.   Musculoskeletal:         General: Edema (LLE>RLE) present. No tenderness. Normal range of motion.      Cervical back: Normal range of motion and neck supple.     Neurological: She is alert. She has normal strength. No cranial nerve deficit or sensory deficit. GCS score is 15. GCS eye subscore is 4. GCS verbal subscore is 5. GCS motor subscore is 6.   Skin: Skin is warm and dry.   Psychiatric: She has a normal mood and affect.       ED Course   Procedures  Labs Reviewed   CBC W/ AUTO DIFFERENTIAL - Abnormal; Notable for the following components:       Result Value    MCHC 31.3 (*)     RDW 15.3 (*)     Immature Grans (Abs) 0.05 (*)     Gran % 76.3 (*)     Lymph % 14.6 (*)     All other components within normal limits   COMPREHENSIVE METABOLIC PANEL - Abnormal; Notable for the following components:    Potassium 3.2 (*)     CO2 21 (*)     Glucose 173 (*)     eGFR 49 (*)     All other components within normal limits   TROPONIN I - Abnormal; Notable for the following components:    Troponin I 0.090 (*)     All other components within normal limits   TROPONIN I - Abnormal; Notable for the following components:    Troponin I 0.079 (*)     All other components within normal limits   B-TYPE NATRIURETIC PEPTIDE - Abnormal; Notable for the following components:    BNP 1,839 (*)     All other components within normal limits   URINALYSIS, REFLEX TO URINE CULTURE - Abnormal; Notable for the following components:    Protein, UA 1+ (*)     Glucose, UA 4+ (*)     Ketones, UA 2+ (*)     Leukocytes, UA Trace (*)     All other components within normal limits    Narrative:     Specimen Source->Urine   D DIMER, QUANTITATIVE - Abnormal; Notable for the following components:    D-Dimer 2.87 (*)     All other components within normal limits   CBC W/ AUTO DIFFERENTIAL - Abnormal; Notable for the following components:    MCHC 31.1 (*)     RDW 15.3 (*)     Immature  Granulocytes 1.5 (*)     Immature Grans (Abs) 0.13 (*)     nRBC 1 (*)     All other components within normal limits   LACTIC ACID, PLASMA - Abnormal; Notable for the following components:    Lactate (Lactic Acid) 3.0 (*)     All other components within normal limits   POCT GLUCOSE - Abnormal; Notable for the following components:    POCT Glucose 171 (*)     All other components within normal limits   ISTAT PROCEDURE - Abnormal; Notable for the following components:    POC Glucose 332 (*)     POC Sodium 134 (*)     POC TCO2 (MEASURED) 22 (*)     All other components within normal limits   MAGNESIUM   APTT   D DIMER, QUANTITATIVE   URINALYSIS MICROSCOPIC    Narrative:     Specimen Source->Urine   APTT   COMPREHENSIVE METABOLIC PANEL   MAGNESIUM   PHOSPHORUS   TROPONIN I   TYPE & SCREEN   POCT GLUCOSE MONITORING CONTINUOUS   ISTAT CHEM8   POCT GLUCOSE MONITORING CONTINUOUS   POCT GLUCOSE MONITORING CONTINUOUS     EKG Readings: (Independently Interpreted)   Initial Reading: No STEMI. Rhythm: Multifocal Atrial Tachycardia. Heart Rate: 163. Ectopy: PVCs. Conduction: Normal. ST Segments: Normal ST Segments.       Imaging Results              US Lower Extremity Veins Bilateral (Final result)  Result time 11/06/23 19:41:12      Final result by Kathy Lynch MD (11/06/23 19:41:12)                   Impression:      No evidence of lower extremity deep venous thrombosis.      Electronically signed by: Kathy Lynch MD  Date:    11/06/2023  Time:    19:41               Narrative:    EXAMINATION:  US LOWER EXTREMITY VEINS BILATERAL    CLINICAL HISTORY:  Other pulmonary embolism without acute cor pulmonale    TECHNIQUE:  Duplex and color flow Doppler evaluation of the bilateral lower extremity veins was performed.    COMPARISON:  None    FINDINGS:  No evidence of clot involving the bilateral common femoral, greater saphenous, femoral, popliteal, peroneal, anterior and posterior tibial veins.  All venous structures  demonstrate normal respiratory phasicity and augment adequately.  No evidence of soft tissue mass or Baker's cyst.                                        CTA Chest Non-Coronary (PE Studies) (Final result)  Result time 11/06/23 18:11:20      Final result by Antonio Parada MD (11/06/23 18:11:20)                   Impression:      1. Pulmonary emboli in segmental and subsegmental pulmonary arteries in the right middle lobe.  Follow-up recommended.  2. Mild-moderate bibasilar pleural effusions with mild associated bibasilar atelectasis.  3. Cholelithiasis.  4. This report was flagged in Epic as abnormal.  Dr. Arellano was notified of the findings at the time of interpretation by secure messaging.      Electronically signed by: Antonio Parada  Date:    11/06/2023  Time:    18:11               Narrative:    EXAMINATION:  CTA CHEST NON CORONARY (PE STUDIES)    CLINICAL HISTORY:  Pulmonary embolism (PE) suspected, positive D-dimer;    TECHNIQUE:  Low dose axial images, sagittal and coronal reformations were obtained from the thoracic inlet to the lung bases following the IV administration of 100 mL of Omnipaque 350.  Contrast timing was optimized to evaluate the pulmonary arteries.  MIP images were performed.    COMPARISON:  None    FINDINGS:  Pulmonary arteries:Pulmonary arteries are adequately enhanced.Filling defects in segmental and subsegmental right middle lobe pulmonary arteries consistent with pulmonary emboli.  Follow-up recommended.    Thoracic soft tissues: Unremarkable.    Aorta: Left-sided aortic arch.  No aneurysm or dissection.    Heart: Normal size.  Minimal pericardial fluid anteriorly..    Aliza/Mediastinum: No pathologic shania enlargement.    Airways: Patent.    Lungs/Pleura: Mild-moderate bibasilar pleural effusions with mild associated atelectasis the lung bases.    Esophagus: Unremarkable.    Upper Abdomen: Cholelithiasis with no evidence of acute cholecystitis.    Bones: No acute fracture. No  suspicious lytic or sclerotic lesions.                                       Medications   heparin 25,000 units in dextrose 5% 250 mL (100 units/mL) infusion HIGH INTENSITY nomogram - [facility] (18 Units/kg/hr × 66.7 kg (Adjusted) Intravenous Handoff 11/6/23 1913)   heparin 25,000 units in dextrose 5% (100 units/ml) IV bolus from bag - ADDITIONAL PRN BOLUS - 60 units/kg (has no administration in time range)   heparin 25,000 units in dextrose 5% (100 units/ml) IV bolus from bag - ADDITIONAL PRN BOLUS - 30 units/kg (has no administration in time range)   0.9%  NaCl infusion (1,000 mLs Intravenous Not Given 11/6/23 1930)   potassium chloride 10 mEq in 100 mL IVPB (has no administration in time range)   LIDOcaine 2000 mg in D5W 250 mL infusion (1 mg/min Intravenous New Bag 11/6/23 2010)   glucose chewable tablet 16 g (has no administration in time range)   glucose chewable tablet 24 g (has no administration in time range)   glucagon (human recombinant) injection 1 mg (has no administration in time range)   insulin aspart U-100 pen 0-5 Units (has no administration in time range)   dextrose 10% bolus 125 mL 125 mL (has no administration in time range)   dextrose 10% bolus 250 mL 250 mL (has no administration in time range)   atorvastatin tablet 40 mg (has no administration in time range)   clopidogreL tablet 75 mg (has no administration in time range)   aspirin chewable tablet 81 mg (has no administration in time range)   sodium chloride 0.9% flush 10 mL (has no administration in time range)   acetaminophen tablet 650 mg (has no administration in time range)   famotidine (PF) injection 20 mg (has no administration in time range)   ondansetron injection 4 mg (has no administration in time range)   prochlorperazine injection Soln 5 mg (has no administration in time range)   melatonin tablet 6 mg (has no administration in time range)   potassium chloride 10 mEq in 100 mL IVPB (has no administration in time range)     And    potassium chloride 10 mEq in 100 mL IVPB (has no administration in time range)     And   potassium chloride 10 mEq in 100 mL IVPB (has no administration in time range)   magnesium sulfate 2g in water 50mL IVPB (premix) (has no administration in time range)   magnesium sulfate 2g in water 50mL IVPB (premix) (has no administration in time range)   calcium gluconate 1 g in NS IVPB (premixed) (has no administration in time range)   calcium gluconate 1 g in NS IVPB (premixed) (has no administration in time range)   calcium gluconate 1 g in NS IVPB (premixed) (has no administration in time range)   potassium bicarbonate disintegrating tablet 40 mEq (40 mEq Oral Given 11/6/23 1506)   aspirin tablet 325 mg (325 mg Oral Given 11/6/23 1614)   adenosine injection 6 mg (6 mg Intravenous Given 11/6/23 1500)   adenosine injection 6 mg (6 mg Intravenous Given 11/6/23 1445)   diltiaZEM injection 20 mg (20 mg Intravenous Given 11/6/23 1504)   diltiaZEM tablet 30 mg (30 mg Oral Given 11/6/23 1506)   diltiaZEM injection 20 mg (20 mg Intravenous Given 11/6/23 1517)   atenoloL tablet 25 mg (25 mg Oral Given 11/6/23 1614)   iohexoL (OMNIPAQUE 350) injection 75 mL (75 mLs Intravenous Given 11/6/23 1704)   heparin 25,000 units in dextrose 5% (100 units/ml) IV bolus from bag INITIAL BOLUS (5,336 Units Intravenous Bolus from Bag 11/6/23 1850)   dextrose 50% injection (25 g Intravenous Given 11/6/23 1952)   magnesium sulfate 4 mEq/mL (50 %) injection (2 g Intravenous Given 11/6/23 1940)   potassium chloride SA CR tablet 40 mEq (40 mEq Oral Given 11/6/23 2037)   clopidogreL tablet 300 mg (300 mg Oral Given 11/6/23 2037)   LIDOcaine (cardiac) injection 100 mg (100 mg Intravenous Given 11/6/23 2015)     Medical Decision Making  Differential diagnosis include but are not limited to:   PNA, ACS, CHF, electrolyte abnormality, renal failure, viral syndrome, COVID, flu    Amount and/or Complexity of Data Reviewed  Labs: ordered. Decision-making  details documented in ED Course.  Radiology: ordered. Decision-making details documented in ED Course.  ECG/medicine tests: ordered and independent interpretation performed. Decision-making details documented in ED Course.    Risk  OTC drugs.  Prescription drug management.  Decision regarding hospitalization.    Critical Care  Total time providing critical care: 180 minutes          Scribe Attestation:   Scribe #1: I performed the above scribed service and the documentation accurately describes the services I performed. I attest to the accuracy of the note.      ED Course as of 11/06/23 2107 Mon Nov 06, 2023   1325 Patient is afebrile and in no acute distress at time history and physical.  She is tachycardic.  Is not hypotensive, tachypneic hypoxic or toxic appearing. [SG]   1437 CBC auto differential(!)  No leukocytosis or bandemia [SG]   1437 Comprehensive metabolic panel(!)  Mild hypokalemia, mild MAIA [SG]   1438 Troponin I #1(!)  Mildly elevated troponin.  Patient given aspirin [SG]   1438 B-Type natriuretic peptide (BNP)(!)  Concerning for CHF.  Patient is saturating adequately on room air. [SG]   1438 Consult: I have spoken with Dr. Gonzalez from the cardiology service regarding the patient's presentation and study results.  At this time, the recommendation is rhythm strip to better evaluate patient's cardiac rhythm..      [SG]   2101 Hematocrit: 39.6 [SG]      ED Course User Index  [SG] Nicolas Arellano MD        Patient given Cardizem and her home atenolol with significant improvement in tachycardia.  D-dimer noted to be significantly elevated.  Patient sent for CTA of the chest which demonstrates right middle lobe PE.  Patient started on heparin drip.  On reassessment patient is mildly hypotensive.  She continues to mentate well.  Hypotension likely related to antihypertensive medications.  Patient to be admitted to ICU for further management          INicolas , personally performed the services  described in this documentation. All medical record entries made by the scribe were at my direction and in my presence. I have reviewed the chart and agree that the record reflects my personal performance and is accurate and complete.    Clinical Impression:   Final diagnoses:  [R00.0] Tachycardia  [I50.9] CHF (congestive heart failure)  [I26.99] Pulmonary embolism        ED Disposition Condition    Admit                 Nicolas Arellano MD  11/06/23 1383

## 2023-11-06 NOTE — Clinical Note
The site was marked. The left chest was prepped. The site was prepped with ChloraPrep and Hibiclens. The site was clipped. The patient was draped.

## 2023-11-06 NOTE — Clinical Note
The lead was inserted under fluorscopic guidance, thresholds were tested, was sutured in place and was secured in place. The lead was inserted in the right ventricular apex. A new lead was attached to the right ventricle.      The right ventricular lead was then secured.

## 2023-11-06 NOTE — PROGRESS NOTES
"Subjective:      Patient ID: Harriett Oglesby is a 68 y.o. female.    Vitals:  height is 5' 5" (1.651 m) and weight is 81.2 kg (179 lb). Her oral temperature is 97.4 °F (36.3 °C). Her blood pressure is 130/87 and her pulse is 123 (abnormal). Her respiration is 20 and oxygen saturation is 97%.     Chief Complaint: Foot Swelling    68-year-old female with medical history as listed below presents to clinic for evaluation of chest congestion, shortness a breath on exertion, and bilateral swelling to feet.  Patient reports symptoms started roughly 7 days ago.  She states that she has been out of all of her blood pressure medications for the last 2 weeks.  She states that she has contacted her PCP, but has been unable to get medications filled.  She denies chest pain.  She reports needing amlodipine, hydrochlorothiazide, valsartan, and simvastatin filled.  Heart rate elevated on today's visit, states that she has seen a cardiologist, however can not recall when and where.  Chart shows patient taking atenolol, however patient does not recall taking this medication.  She does have a history of stroke, with left-sided deficits.  Denies headache or visual disturbances. She is awake and alert, answers questions appropriately, no acute distress noted at time of exam.    Past Medical History:  12/3/2018: Adult BMI 31.0-31.9 kg/sq m  No date: Anemia  No date: CVA (cerebral vascular accident)      Comment:  Residual weakness in the left arm and hand  No date: Essential thrombocythemia  No date: Hyperlipidemia associated with type 2 diabetes mellitus  No date: Hypertension  No date: Osteoporosis  No date: Type 2 diabetes mellitus      Edema  This is a new problem. The current episode started in the past 7 days. The problem occurs constantly. The problem has been unchanged. Associated symptoms include congestion, coughing and fatigue. Pertinent negatives include no chest pain, chills, diaphoresis, fever, nausea, sore throat or " vomiting. Nothing aggravates the symptoms. The treatment provided no relief.       Constitution: Positive for fatigue. Negative for activity change, appetite change, chills, sweating and fever.   HENT:  Positive for congestion. Negative for sore throat.    Cardiovascular:  Positive for sob on exertion. Negative for chest pain and palpitations.   Respiratory:  Positive for cough and shortness of breath.    Gastrointestinal:  Negative for nausea and vomiting.   Neurological:  Negative for dizziness.      Objective:     Physical Exam   Constitutional: She is oriented to person, place, and time.  Non-toxic appearance. She does not appear ill. No distress.   HENT:   Head: Normocephalic and atraumatic.   Ears:   Right Ear: External ear normal.   Left Ear: External ear normal.   Nose: Nose normal.   Mouth/Throat: No posterior oropharyngeal erythema.   Eyes: Conjunctivae are normal. Pupils are equal, round, and reactive to light. Right eye exhibits no discharge. Left eye exhibits no discharge.   Cardiovascular: An irregular rhythm present. Tachycardia present.   Murmur heard.     Comments: Mild swelling noted bilaterally to lower legs.   Pulmonary/Chest: Effort normal. No respiratory distress. She has decreased breath sounds in the right lower field and the left lower field.   Abdominal: Normal appearance.   Musculoskeletal:      Right lower leg: Edema present.      Left lower leg: Edema present.   Neurological: She is alert and oriented to person, place, and time.   Skin: Skin is not diaphoretic.   Psychiatric: Mood normal.   Nursing note and vitals reviewed.    Results for orders placed or performed in visit on 11/06/23   POCT Influenza A/B MOLECULAR   Result Value Ref Range    POC Molecular Influenza A Ag Negative Negative, Not Reported    POC Molecular Influenza B Ag Negative Negative, Not Reported     Acceptable Yes    SARS Coronavirus 2 Antigen, POCT Manual Read   Result Value Ref Range    SARS  Coronavirus 2 Antigen Negative Negative     Acceptable Yes      EKG: Sinus tachycardia at a rate of 120 beats per minute with PVCs.  No ST elevation.  Abnormal ECG, no previous in system for comparison.    X-Ray Chest PA And Lateral    Result Date: 11/6/2023  EXAMINATION: XR CHEST PA AND LATERAL TECHNIQUE: PA and lateral views of the chest were performed. COMPARISON: None FINDINGS: The cardiac silhouette is normal in size, midline. The pulmonary vascularity is normal. The pulmonary antonette appear normal bilaterally. There is blunting of the bilateral costal diaphragmatic angle concerning for bilateral small pleural effusions slightly greater on the left. Subsegmental increased attenuation at the bilateral lung bases slightly greater on the left possibly subsegmental atelectasis or airspace disease. No pneumothorax. The osseous structures appear within normal limits for age.     Abnormal blunting of the bilateral costal diaphragmatic angle suggestive of bilateral small pleural effusion worse on the left. Minimal subsegmental atelectasis or airspace disease at the bilateral lung basis, worse on the left. Electronically signed by: Elvia Joiner MD Date:    11/06/2023 Time:    11:54         Assessment:     1. Tachycardia    2. Cough, unspecified type    3. Encounter for medication refill    4. Abnormal EKG    5. Dyspnea, unspecified type        Plan:       Tachycardia  -     IN OFFICE EKG 12-LEAD (to Muse)    Cough, unspecified type  -     POCT Influenza A/B MOLECULAR  -     SARS Coronavirus 2 Antigen, POCT Manual Read  -     X-Ray Chest PA And Lateral; Future; Expected date: 11/06/2023    Encounter for medication refill  -     amLODIPine (NORVASC) 10 MG tablet; Take 1 tablet (10 mg total) by mouth once daily.  Dispense: 30 tablet; Refill: 0  -     hydroCHLOROthiazide (HYDRODIURIL) 25 MG tablet; Take 1 tablet (25 mg total) by mouth once daily.  Dispense: 30 tablet; Refill: 0  -     simvastatin (ZOCOR)  20 MG tablet; Take 1 tablet (20 mg total) by mouth once daily.  Dispense: 30 tablet; Refill: 0  -     valsartan (DIOVAN) 320 MG tablet; Take 1 tablet (320 mg total) by mouth once daily.  Dispense: 30 tablet; Refill: 0    Abnormal EKG  -     Refer to Emergency Dept.    Dyspnea, unspecified type  -     Refer to Emergency Dept.      - Negative COVID, negative flu, reviewed chest x-ray, concerning for pleural effusion.  Given abnormal ECG, patient has history, and physical exam, recommend emergency room for further evaluation with cardiac versus pulmonary workup.  Case discussed with supervising physician who agrees with plan.  Patient states that she will have her friend bring her to [facility] emergency room.  Brief report called to charge nurse.  Patient discharged from urgent Care in stable condition.    Patient Instructions   Please proceed to emergency room for further evaluation of your symptoms.        - Follow up with your PCP or specialty clinic as directed in the next 1-2 weeks if not improved or as needed.  You can call [phone] to schedule an appointment with the appropriate provider.    - Go to the ER or seek medical attention immediately if you develop new or worsening symptoms.    - You must understand that you have received an Urgent Care treatment only and that you may be released before all of your medical problems are known or treated.   - You, the patient, will arrange for follow up care as instructed.   - If your condition worsens or fails to improve we recommend that you receive another evaluation at the ER immediately or contact your PCP to discuss your concerns or return here.

## 2023-11-06 NOTE — ED TRIAGE NOTES
Pt presents to ED from  after they noticed tachycardia. Pt reports having congestion and productive cough x 1 week. Pt was tested for FLU and COVID at , both came back negative. Pt reports HX of HTN, Diabetes, previous stroke, L side paralysis, and a heart murmer. Pt takes her meds daily, but not today bc she has not eaten. Pt also reports not taking BP med sin 1 week. Pt tachycardic on monitor in 140s, all other VSS. Pt is on blood thinners. Denies any SOB, CP, N/V/D/C, last BM today and normal.

## 2023-11-06 NOTE — PROGRESS NOTES
"Subjective:      Patient ID: Harriett Oglesby is a 68 y.o. female.    Vitals:  height is 5' 5" (1.651 m) and weight is 81.2 kg (179 lb). Her oral temperature is 97.4 °F (36.3 °C). Her blood pressure is 130/87 and her pulse is 150 (abnormal). Her respiration is 20 and oxygen saturation is 97%.     Chief Complaint: Foot Swelling    HPI  ROS   Objective:     Physical Exam    Assessment:     No diagnosis found.    Plan:       There are no diagnoses linked to this encounter.                "

## 2023-11-06 NOTE — PROGRESS NOTES
Subjective:     Patient ID: Harriett Oglesby is a 68 y.o. female.    Chief Complaint: Diabetes Mellitus and Diabetic Foot Exam (8/11/2023 Dr. Esteves)    Harriett is a 68 y.o. female who presents to the clinic upon referral from Dr. Esteves  for evaluation and treatment of diabetic feet. Harriett has a past medical history of Adult BMI 31.0-31.9 kg/sq m (12/3/2018), Anemia, CVA (cerebral vascular accident), Essential thrombocythemia, Hyperlipidemia associated with type 2 diabetes mellitus, Hypertension, Osteoporosis, and Type 2 diabetes mellitus. Presents for diabetic foot risk assessment.     PCP: Zeinab Noble MD    Date Last Seen by PCP: per above    Current shoe gear: Tennis shoes    Hemoglobin A1C   Date Value Ref Range Status   05/31/2023 6.1 (H) 4.0 - 5.6 % Final     Comment:     ADA Screening Guidelines:  5.7-6.4%  Consistent with prediabetes  >or=6.5%  Consistent with diabetes    High levels of fetal hemoglobin interfere with the HbA1C  assay. Heterozygous hemoglobin variants (HbS, HgC, etc)do  not significantly interfere with this assay.   However, presence of multiple variants may affect accuracy.     11/02/2022 6.4 (H) 4.0 - 5.6 % Final     Comment:     ADA Screening Guidelines:  5.7-6.4%  Consistent with prediabetes  >or=6.5%  Consistent with diabetes    High levels of fetal hemoglobin interfere with the HbA1C  assay. Heterozygous hemoglobin variants (HbS, HgC, etc)do  not significantly interfere with this assay.   However, presence of multiple variants may affect accuracy.     01/26/2022 6.3 (H) 4.0 - 5.6 % Final     Comment:     ADA Screening Guidelines:  5.7-6.4%  Consistent with prediabetes  >or=6.5%  Consistent with diabetes    High levels of fetal hemoglobin interfere with the HbA1C  assay. Heterozygous hemoglobin variants (HbS, HgC, etc)do  not significantly interfere with this assay.   However, presence of multiple variants may affect accuracy.           Review of Systems    Constitutional: Negative for chills.   Cardiovascular:  Negative for chest pain and claudication.   Respiratory:  Negative for cough.    Skin:  Positive for color change, dry skin and nail changes.   Musculoskeletal:  Positive for joint pain.   Gastrointestinal:  Negative for nausea.   Neurological:  Positive for paresthesias. Negative for numbness.   Psychiatric/Behavioral:  The patient is not nervous/anxious.         Objective:     Physical Exam  Constitutional:       Appearance: She is well-developed.      Comments: Oriented to time, place, and person.   Cardiovascular:      Comments: DP and PT pulses are palpable bilaterally. 3 sec capillary refill time and toes and feet are warm to touch proximally .  There is  hair growth on the feet and toes b/l. There is no edema b/l. No spider veins or varicosities present b/l.     Musculoskeletal:      Comments: Equinus noted b/l ankles with < 10 deg DF noted. MMT 5/5 in DF/PF/Inv/Ev resistance with no reproduction of pain in any direction. Passive range of motion of ankle and pedal joints is painless b/l.     Feet:      Right foot:      Skin integrity: No callus or dry skin.      Left foot:      Skin integrity: No callus or dry skin.   Lymphadenopathy:      Comments: Negative lymphadenopathy bilateral popliteal fossa and tarsal tunnel.   Skin:     Comments: No open lesions, lacerations or wounds noted.Interdigital spaces clean, dry and intact b/l. No erythema noted to b/l foot.  Nails normal color and trophic qualities.     Neurological:      Mental Status: She is alert.      Comments: Light touch, proprioception, and sharp/dull sensation are all intact bilaterally. Protective threshold with the Strasburg-Wienstein monofilament is intact bilaterally.    Psychiatric:         Behavior: Behavior is cooperative.           Assessment:      Encounter Diagnosis   Name Primary?    Type 2 diabetes mellitus with microalbuminuria, without long-term current use of insulin      Plan:      Harriett was seen today for diabetes mellitus and diabetic foot exam.    Diagnoses and all orders for this visit:    Type 2 diabetes mellitus with microalbuminuria, without long-term current use of insulin  -     Ambulatory referral/consult to Podiatry      I counseled the patient on her conditions, their implications and medical management.        - Shoe inspection. Diabetic Foot Education. Patient reminded of the importance of good nutrition and blood sugar control to help prevent podiatric complications of diabetes. Patient instructed on proper foot hygeine. We discussed wearing proper shoe gear, daily foot inspections, never walking without protective shoe gear, caution putting sharp instruments to feet     - Discussed DM foot care:  Wear comfortable, proper fitting shoes. Wash feet daily. Dry well. After drying, apply moisturizer to feet (no lotion to webspaces). Inspect feet daily for skin breaks, blisters, swelling, or redness. Wear cotton socks (preferably white)  Change socks every day. Do NOT walk barefoot. Do NOT use heating pads or warm/hot water soaks     Nails 1-5 trimmed B/L    F/u one year DM foot exam sooner PRN.

## 2023-11-07 PROBLEM — J96.01 ACUTE RESPIRATORY FAILURE WITH HYPOXIA: Status: RESOLVED | Noted: 2023-11-06 | Resolved: 2023-11-07

## 2023-11-07 LAB
ALBUMIN SERPL BCP-MCNC: 3.7 G/DL (ref 3.5–5.2)
ALLENS TEST: ABNORMAL
ALP SERPL-CCNC: 161 U/L (ref 55–135)
ALT SERPL W/O P-5'-P-CCNC: 52 U/L (ref 10–44)
ANION GAP SERPL CALC-SCNC: 13 MMOL/L (ref 8–16)
ANION GAP SERPL CALC-SCNC: 18 MMOL/L (ref 8–16)
APTT PPP: 26.5 SEC (ref 21–32)
APTT PPP: 51.9 SEC (ref 21–32)
APTT PPP: 78.5 SEC (ref 21–32)
AST SERPL-CCNC: 55 U/L (ref 10–40)
BASOPHILS # BLD AUTO: 0.02 K/UL (ref 0–0.2)
BASOPHILS # BLD AUTO: 0.02 K/UL (ref 0–0.2)
BASOPHILS NFR BLD: 0.2 % (ref 0–1.9)
BASOPHILS NFR BLD: 0.2 % (ref 0–1.9)
BILIRUB DIRECT SERPL-MCNC: 0.6 MG/DL (ref 0.1–0.3)
BILIRUB SERPL-MCNC: 0.9 MG/DL (ref 0.1–1)
BUN SERPL-MCNC: 13 MG/DL (ref 8–23)
BUN SERPL-MCNC: 15 MG/DL (ref 8–23)
CALCIUM SERPL-MCNC: 8.9 MG/DL (ref 8.7–10.5)
CALCIUM SERPL-MCNC: 9.2 MG/DL (ref 8.7–10.5)
CHLORIDE SERPL-SCNC: 104 MMOL/L (ref 95–110)
CHLORIDE SERPL-SCNC: 104 MMOL/L (ref 95–110)
CHOLEST SERPL-MCNC: 141 MG/DL (ref 120–199)
CHOLEST/HDLC SERPL: 3.6 {RATIO} (ref 2–5)
CO2 SERPL-SCNC: 18 MMOL/L (ref 23–29)
CO2 SERPL-SCNC: 21 MMOL/L (ref 23–29)
CREAT SERPL-MCNC: 1.1 MG/DL (ref 0.5–1.4)
CREAT SERPL-MCNC: 1.1 MG/DL (ref 0.5–1.4)
DIFFERENTIAL METHOD BLD: ABNORMAL
DIFFERENTIAL METHOD BLD: ABNORMAL
EOSINOPHIL # BLD AUTO: 0 K/UL (ref 0–0.5)
EOSINOPHIL # BLD AUTO: 0 K/UL (ref 0–0.5)
EOSINOPHIL NFR BLD: 0.1 % (ref 0–8)
EOSINOPHIL NFR BLD: 0.1 % (ref 0–8)
ERYTHROCYTE [DISTWIDTH] IN BLOOD BY AUTOMATED COUNT: 15.4 % (ref 11.5–14.5)
ERYTHROCYTE [DISTWIDTH] IN BLOOD BY AUTOMATED COUNT: 15.4 % (ref 11.5–14.5)
EST. GFR  (NO RACE VARIABLE): 54 ML/MIN/1.73 M^2
EST. GFR  (NO RACE VARIABLE): 54 ML/MIN/1.73 M^2
ESTIMATED AVG GLUCOSE: 134 MG/DL (ref 68–131)
GLUCOSE SERPL-MCNC: 230 MG/DL (ref 70–110)
GLUCOSE SERPL-MCNC: 253 MG/DL (ref 70–110)
HBA1C MFR BLD: 6.3 % (ref 4–5.6)
HCT VFR BLD AUTO: 40.3 % (ref 37–48.5)
HCT VFR BLD AUTO: 40.3 % (ref 37–48.5)
HDLC SERPL-MCNC: 39 MG/DL (ref 40–75)
HDLC SERPL: 27.7 % (ref 20–50)
HGB BLD-MCNC: 12.7 G/DL (ref 12–16)
HGB BLD-MCNC: 12.7 G/DL (ref 12–16)
IMM GRANULOCYTES # BLD AUTO: 0.04 K/UL (ref 0–0.04)
IMM GRANULOCYTES # BLD AUTO: 0.04 K/UL (ref 0–0.04)
IMM GRANULOCYTES NFR BLD AUTO: 0.5 % (ref 0–0.5)
IMM GRANULOCYTES NFR BLD AUTO: 0.5 % (ref 0–0.5)
INR PPP: 1.1 (ref 0.8–1.2)
INR PPP: 1.1 (ref 0.8–1.2)
LACTATE SERPL-SCNC: 1.8 MMOL/L (ref 0.5–2.2)
LDLC SERPL CALC-MCNC: 81.8 MG/DL (ref 63–159)
LYMPHOCYTES # BLD AUTO: 0.9 K/UL (ref 1–4.8)
LYMPHOCYTES # BLD AUTO: 0.9 K/UL (ref 1–4.8)
LYMPHOCYTES NFR BLD: 11.3 % (ref 18–48)
LYMPHOCYTES NFR BLD: 11.3 % (ref 18–48)
MAGNESIUM SERPL-MCNC: 2.3 MG/DL (ref 1.6–2.6)
MAGNESIUM SERPL-MCNC: 2.3 MG/DL (ref 1.6–2.6)
MCH RBC QN AUTO: 28.7 PG (ref 27–31)
MCH RBC QN AUTO: 28.7 PG (ref 27–31)
MCHC RBC AUTO-ENTMCNC: 31.5 G/DL (ref 32–36)
MCHC RBC AUTO-ENTMCNC: 31.5 G/DL (ref 32–36)
MCV RBC AUTO: 91 FL (ref 82–98)
MCV RBC AUTO: 91 FL (ref 82–98)
MONOCYTES # BLD AUTO: 0.6 K/UL (ref 0.3–1)
MONOCYTES # BLD AUTO: 0.6 K/UL (ref 0.3–1)
MONOCYTES NFR BLD: 6.9 % (ref 4–15)
MONOCYTES NFR BLD: 6.9 % (ref 4–15)
NEUTROPHILS # BLD AUTO: 6.7 K/UL (ref 1.8–7.7)
NEUTROPHILS # BLD AUTO: 6.7 K/UL (ref 1.8–7.7)
NEUTROPHILS NFR BLD: 81 % (ref 38–73)
NEUTROPHILS NFR BLD: 81 % (ref 38–73)
NONHDLC SERPL-MCNC: 102 MG/DL
NRBC BLD-RTO: 0 /100 WBC
NRBC BLD-RTO: 0 /100 WBC
PHOSPHATE SERPL-MCNC: 4.2 MG/DL (ref 2.7–4.5)
PLATELET # BLD AUTO: 359 K/UL (ref 150–450)
PLATELET # BLD AUTO: 359 K/UL (ref 150–450)
PMV BLD AUTO: 11 FL (ref 9.2–12.9)
PMV BLD AUTO: 11 FL (ref 9.2–12.9)
POC ACTIVATED CLOTTING TIME K: 149 SEC (ref 74–137)
POCT GLUCOSE: 195 MG/DL (ref 70–110)
POCT GLUCOSE: 210 MG/DL (ref 70–110)
POCT GLUCOSE: 218 MG/DL (ref 70–110)
POCT GLUCOSE: 244 MG/DL (ref 70–110)
POTASSIUM SERPL-SCNC: 4.7 MMOL/L (ref 3.5–5.1)
POTASSIUM SERPL-SCNC: 4.8 MMOL/L (ref 3.5–5.1)
PROT SERPL-MCNC: 7.5 G/DL (ref 6–8.4)
PROTHROMBIN TIME: 11.8 SEC (ref 9–12.5)
PROTHROMBIN TIME: 11.8 SEC (ref 9–12.5)
RBC # BLD AUTO: 4.43 M/UL (ref 4–5.4)
RBC # BLD AUTO: 4.43 M/UL (ref 4–5.4)
SAMPLE: ABNORMAL
SITE: ABNORMAL
SODIUM SERPL-SCNC: 138 MMOL/L (ref 136–145)
SODIUM SERPL-SCNC: 140 MMOL/L (ref 136–145)
TRIGL SERPL-MCNC: 101 MG/DL (ref 30–150)
TROPONIN I SERPL DL<=0.01 NG/ML-MCNC: 0.08 NG/ML (ref 0–0.03)
WBC # BLD AUTO: 8.29 K/UL (ref 3.9–12.7)
WBC # BLD AUTO: 8.29 K/UL (ref 3.9–12.7)

## 2023-11-07 PROCEDURE — 80048 BASIC METABOLIC PNL TOTAL CA: CPT | Mod: 91,HCNC | Performed by: STUDENT IN AN ORGANIZED HEALTH CARE EDUCATION/TRAINING PROGRAM

## 2023-11-07 PROCEDURE — 85730 THROMBOPLASTIN TIME PARTIAL: CPT | Mod: HCNC | Performed by: INTERNAL MEDICINE

## 2023-11-07 PROCEDURE — 83735 ASSAY OF MAGNESIUM: CPT | Mod: 91,HCNC | Performed by: STUDENT IN AN ORGANIZED HEALTH CARE EDUCATION/TRAINING PROGRAM

## 2023-11-07 PROCEDURE — 99291 CRITICAL CARE FIRST HOUR: CPT | Mod: HCNC,,, | Performed by: INTERNAL MEDICINE

## 2023-11-07 PROCEDURE — 84100 ASSAY OF PHOSPHORUS: CPT | Mod: HCNC | Performed by: STUDENT IN AN ORGANIZED HEALTH CARE EDUCATION/TRAINING PROGRAM

## 2023-11-07 PROCEDURE — C1769 GUIDE WIRE: HCPCS | Mod: HCNC | Performed by: INTERNAL MEDICINE

## 2023-11-07 PROCEDURE — 99153 MOD SED SAME PHYS/QHP EA: CPT | Mod: HCNC | Performed by: INTERNAL MEDICINE

## 2023-11-07 PROCEDURE — 93005 ELECTROCARDIOGRAM TRACING: CPT | Mod: HCNC

## 2023-11-07 PROCEDURE — 93010 ELECTROCARDIOGRAM REPORT: CPT | Mod: HCNC,,, | Performed by: INTERNAL MEDICINE

## 2023-11-07 PROCEDURE — C1887 CATHETER, GUIDING: HCPCS | Mod: HCNC | Performed by: INTERNAL MEDICINE

## 2023-11-07 PROCEDURE — 93010 EKG 12-LEAD: ICD-10-PCS | Mod: HCNC,,, | Performed by: INTERNAL MEDICINE

## 2023-11-07 PROCEDURE — 25000003 PHARM REV CODE 250: Mod: HCNC | Performed by: INTERNAL MEDICINE

## 2023-11-07 PROCEDURE — 80048 BASIC METABOLIC PNL TOTAL CA: CPT | Mod: HCNC | Performed by: STUDENT IN AN ORGANIZED HEALTH CARE EDUCATION/TRAINING PROGRAM

## 2023-11-07 PROCEDURE — 99152 MOD SED SAME PHYS/QHP 5/>YRS: CPT | Mod: HCNC | Performed by: INTERNAL MEDICINE

## 2023-11-07 PROCEDURE — 99900035 HC TECH TIME PER 15 MIN (STAT): Mod: HCNC

## 2023-11-07 PROCEDURE — 85730 THROMBOPLASTIN TIME PARTIAL: CPT | Mod: 91,HCNC | Performed by: HOSPITALIST

## 2023-11-07 PROCEDURE — B2111ZZ FLUOROSCOPY OF MULTIPLE CORONARY ARTERIES USING LOW OSMOLAR CONTRAST: ICD-10-PCS | Performed by: INTERNAL MEDICINE

## 2023-11-07 PROCEDURE — 85610 PROTHROMBIN TIME: CPT | Mod: HCNC | Performed by: STUDENT IN AN ORGANIZED HEALTH CARE EDUCATION/TRAINING PROGRAM

## 2023-11-07 PROCEDURE — 4A023N8 MEASUREMENT OF CARDIAC SAMPLING AND PRESSURE, BILATERAL, PERCUTANEOUS APPROACH: ICD-10-PCS | Performed by: INTERNAL MEDICINE

## 2023-11-07 PROCEDURE — 25500020 PHARM REV CODE 255: Mod: HCNC | Performed by: INTERNAL MEDICINE

## 2023-11-07 PROCEDURE — 99291 PR CRITICAL CARE, E/M 30-74 MINUTES: ICD-10-PCS | Mod: HCNC,,, | Performed by: INTERNAL MEDICINE

## 2023-11-07 PROCEDURE — 63600175 PHARM REV CODE 636 W HCPCS: Mod: HCNC | Performed by: INTERNAL MEDICINE

## 2023-11-07 PROCEDURE — C1894 INTRO/SHEATH, NON-LASER: HCPCS | Mod: HCNC | Performed by: INTERNAL MEDICINE

## 2023-11-07 PROCEDURE — 83036 HEMOGLOBIN GLYCOSYLATED A1C: CPT | Mod: HCNC | Performed by: STUDENT IN AN ORGANIZED HEALTH CARE EDUCATION/TRAINING PROGRAM

## 2023-11-07 PROCEDURE — 80076 HEPATIC FUNCTION PANEL: CPT | Mod: HCNC | Performed by: STUDENT IN AN ORGANIZED HEALTH CARE EDUCATION/TRAINING PROGRAM

## 2023-11-07 PROCEDURE — 83605 ASSAY OF LACTIC ACID: CPT | Mod: HCNC | Performed by: STUDENT IN AN ORGANIZED HEALTH CARE EDUCATION/TRAINING PROGRAM

## 2023-11-07 PROCEDURE — 36415 COLL VENOUS BLD VENIPUNCTURE: CPT | Mod: HCNC | Performed by: HOSPITALIST

## 2023-11-07 PROCEDURE — 27000221 HC OXYGEN, UP TO 24 HOURS: Mod: HCNC

## 2023-11-07 PROCEDURE — 36415 COLL VENOUS BLD VENIPUNCTURE: CPT | Mod: HCNC | Performed by: STUDENT IN AN ORGANIZED HEALTH CARE EDUCATION/TRAINING PROGRAM

## 2023-11-07 PROCEDURE — 25000003 PHARM REV CODE 250: Mod: HCNC | Performed by: STUDENT IN AN ORGANIZED HEALTH CARE EDUCATION/TRAINING PROGRAM

## 2023-11-07 PROCEDURE — 99152 MOD SED SAME PHYS/QHP 5/>YRS: CPT | Mod: HCNC,,, | Performed by: INTERNAL MEDICINE

## 2023-11-07 PROCEDURE — 84484 ASSAY OF TROPONIN QUANT: CPT | Mod: HCNC | Performed by: STUDENT IN AN ORGANIZED HEALTH CARE EDUCATION/TRAINING PROGRAM

## 2023-11-07 PROCEDURE — 85025 COMPLETE CBC W/AUTO DIFF WBC: CPT | Mod: HCNC | Performed by: EMERGENCY MEDICINE

## 2023-11-07 PROCEDURE — 94761 N-INVAS EAR/PLS OXIMETRY MLT: CPT | Mod: HCNC

## 2023-11-07 PROCEDURE — 20000000 HC ICU ROOM: Mod: HCNC

## 2023-11-07 PROCEDURE — 93460 R&L HRT ART/VENTRICLE ANGIO: CPT | Mod: HCNC | Performed by: INTERNAL MEDICINE

## 2023-11-07 PROCEDURE — 94640 AIRWAY INHALATION TREATMENT: CPT | Mod: HCNC

## 2023-11-07 PROCEDURE — 83735 ASSAY OF MAGNESIUM: CPT | Mod: HCNC | Performed by: STUDENT IN AN ORGANIZED HEALTH CARE EDUCATION/TRAINING PROGRAM

## 2023-11-07 PROCEDURE — 63600175 PHARM REV CODE 636 W HCPCS: Mod: HCNC | Performed by: EMERGENCY MEDICINE

## 2023-11-07 PROCEDURE — 27201423 OPTIME MED/SURG SUP & DEVICES STERILE SUPPLY: Mod: HCNC | Performed by: INTERNAL MEDICINE

## 2023-11-07 PROCEDURE — 93460 PR CATH PLACE/CORON ANGIO, IMG SUPER/INTERP,R&L HRT CATH, L HRT VENTRIC: ICD-10-PCS | Mod: 26,HCNC,, | Performed by: INTERNAL MEDICINE

## 2023-11-07 PROCEDURE — 80061 LIPID PANEL: CPT | Mod: HCNC | Performed by: STUDENT IN AN ORGANIZED HEALTH CARE EDUCATION/TRAINING PROGRAM

## 2023-11-07 PROCEDURE — 93460 R&L HRT ART/VENTRICLE ANGIO: CPT | Mod: 26,HCNC,, | Performed by: INTERNAL MEDICINE

## 2023-11-07 PROCEDURE — 25000003 PHARM REV CODE 250: Mod: HCNC | Performed by: HOSPITALIST

## 2023-11-07 PROCEDURE — 85347 COAGULATION TIME ACTIVATED: CPT | Mod: HCNC | Performed by: INTERNAL MEDICINE

## 2023-11-07 PROCEDURE — 99152 PR MOD CONSCIOUS SEDATION, SAME PHYS, 5+ YRS, FIRST 15 MIN: ICD-10-PCS | Mod: HCNC,,, | Performed by: INTERNAL MEDICINE

## 2023-11-07 PROCEDURE — 25000242 PHARM REV CODE 250 ALT 637 W/ HCPCS: Mod: HCNC | Performed by: INTERNAL MEDICINE

## 2023-11-07 PROCEDURE — 85730 THROMBOPLASTIN TIME PARTIAL: CPT | Mod: 91,HCNC | Performed by: STUDENT IN AN ORGANIZED HEALTH CARE EDUCATION/TRAINING PROGRAM

## 2023-11-07 RX ORDER — MIDAZOLAM HYDROCHLORIDE 1 MG/ML
INJECTION, SOLUTION INTRAMUSCULAR; INTRAVENOUS
Status: DISCONTINUED | OUTPATIENT
Start: 2023-11-07 | End: 2023-11-07 | Stop reason: HOSPADM

## 2023-11-07 RX ORDER — SODIUM CHLORIDE 0.9 % (FLUSH) 0.9 %
10 SYRINGE (ML) INJECTION
Status: DISCONTINUED | OUTPATIENT
Start: 2023-11-07 | End: 2023-11-08

## 2023-11-07 RX ORDER — FENTANYL CITRATE 50 UG/ML
INJECTION, SOLUTION INTRAMUSCULAR; INTRAVENOUS
Status: DISCONTINUED | OUTPATIENT
Start: 2023-11-07 | End: 2023-11-07 | Stop reason: HOSPADM

## 2023-11-07 RX ORDER — ONDANSETRON HYDROCHLORIDE 2 MG/ML
4 INJECTION, SOLUTION INTRAVENOUS EVERY 12 HOURS PRN
Status: DISCONTINUED | OUTPATIENT
Start: 2023-11-07 | End: 2023-11-13 | Stop reason: HOSPADM

## 2023-11-07 RX ORDER — MORPHINE SULFATE 4 MG/ML
3 INJECTION, SOLUTION INTRAMUSCULAR; INTRAVENOUS
Status: DISCONTINUED | OUTPATIENT
Start: 2023-11-07 | End: 2023-11-08

## 2023-11-07 RX ORDER — OXYCODONE HYDROCHLORIDE 5 MG/1
5 TABLET ORAL EVERY 4 HOURS PRN
Status: DISCONTINUED | OUTPATIENT
Start: 2023-11-07 | End: 2023-11-08

## 2023-11-07 RX ORDER — DIPHENHYDRAMINE HCL 25 MG
50 CAPSULE ORAL ONCE
Status: CANCELLED | OUTPATIENT
Start: 2023-11-07 | End: 2023-11-07

## 2023-11-07 RX ORDER — LIDOCAINE HYDROCHLORIDE 10 MG/ML
INJECTION, SOLUTION EPIDURAL; INFILTRATION; INTRACAUDAL; PERINEURAL
Status: DISCONTINUED | OUTPATIENT
Start: 2023-11-07 | End: 2023-11-07 | Stop reason: HOSPADM

## 2023-11-07 RX ORDER — VERAPAMIL HYDROCHLORIDE 2.5 MG/ML
INJECTION, SOLUTION INTRAVENOUS
Status: DISCONTINUED | OUTPATIENT
Start: 2023-11-07 | End: 2023-11-07 | Stop reason: HOSPADM

## 2023-11-07 RX ORDER — NITROGLYCERIN 20 MG/100ML
INJECTION INTRAVENOUS
Status: DISCONTINUED | OUTPATIENT
Start: 2023-11-07 | End: 2023-11-07 | Stop reason: HOSPADM

## 2023-11-07 RX ORDER — ACETAMINOPHEN 325 MG/1
650 TABLET ORAL EVERY 4 HOURS PRN
Status: DISCONTINUED | OUTPATIENT
Start: 2023-11-07 | End: 2023-11-07 | Stop reason: SDUPTHER

## 2023-11-07 RX ADMIN — HEPARIN SODIUM AND DEXTROSE 18 UNITS/KG/HR: 10000; 5 INJECTION INTRAVENOUS at 04:11

## 2023-11-07 RX ADMIN — INSULIN ASPART 2 UNITS: 100 INJECTION, SOLUTION INTRAVENOUS; SUBCUTANEOUS at 08:11

## 2023-11-07 RX ADMIN — CLOPIDOGREL BISULFATE 75 MG: 75 TABLET ORAL at 08:11

## 2023-11-07 RX ADMIN — LEVALBUTEROL 1.25 MG: 1.25 SOLUTION, CONCENTRATE RESPIRATORY (INHALATION) at 01:11

## 2023-11-07 RX ADMIN — INSULIN ASPART 1 UNITS: 100 INJECTION, SOLUTION INTRAVENOUS; SUBCUTANEOUS at 08:11

## 2023-11-07 RX ADMIN — ASPIRIN 81 MG CHEWABLE TABLET 81 MG: 81 TABLET CHEWABLE at 08:11

## 2023-11-07 RX ADMIN — INSULIN DETEMIR 5 UNITS: 100 INJECTION, SOLUTION SUBCUTANEOUS at 08:11

## 2023-11-07 RX ADMIN — IPRATROPIUM BROMIDE 0.5 MG: 0.5 SOLUTION RESPIRATORY (INHALATION) at 01:11

## 2023-11-07 RX ADMIN — INSULIN ASPART 2 UNITS: 100 INJECTION, SOLUTION INTRAVENOUS; SUBCUTANEOUS at 05:11

## 2023-11-07 RX ADMIN — ATORVASTATIN CALCIUM 40 MG: 40 TABLET, FILM COATED ORAL at 08:11

## 2023-11-07 RX ADMIN — FAMOTIDINE 20 MG: 10 INJECTION INTRAVENOUS at 08:11

## 2023-11-07 NOTE — HOSPITAL COURSE
69 y/o female sent to ER from Urgent Care for tachycardia.  In the ER found to be in multifocal atrial tachycardia with prolonged QTC.  Patient was given Cardizem with some improvement in heart rate.  Subsequently CTA was done which revealed segmental and subsegmental pulmonary embolism on the right side. Echo was also done which demonstrated severe cardiomyopathy with EF of 20-25%.  Subsequently patient developed VFib and cardioverted followed by V-tach which was also cardioverted x1.  Started on lidocaine drip in the ER and admitted to ICU.  Also started on heparin drip.  Underwent LHC showing non ischemic cardiomyopathy.  Cards recommending AICD.  She has remained in NSR. AICD placed on 11/9.

## 2023-11-07 NOTE — NURSING
[facility]ensive [facility]  ICU Shift Summary  Date: 11/7/2023      Prehospitalization: Home  Admit Date / LOS : 11/6/2023/ 1 days    Diagnosis: Cardiac arrest    Consults:        Active: Cardio       Needed: Cardio     Code Status: Full Code   Advanced Directive: <no information>    LDA:  Lines/Drains/Airways       Peripheral Intravenous Line  Duration                  Peripheral IV - Single Lumen 11/06/23 1748 20 G Right Antecubital <1 day         Peripheral IV - Single Lumen 11/06/23 1853 20 G Anterior;Right Forearm <1 day                  Central Lines/Site/Justification:Patient Does Not Have Central Line  Urinary Cath/Order/Justification:Patient Does Not Have Urinary Catheter    Vasopressors/Infusions:    heparin (porcine) in D5W 15 Units/kg/hr (11/07/23 0400)    LIDOcaine 1 mg/min (11/07/23 0400)          GOALS: Volume/ Hemodynamic: N/A                     RASS: N/A    Pain Management: none       Pain Controlled: yes     Rhythm: NSR and ST    Respiratory Device: Nasal Cannula and Venti Mask                      Most Recent SBT/ SAT: N/A       MOVE Screen: PASS  ICU Liberation: no    VTE Prophylaxis: Pharm  Mobility: Bedrest  Stress Ulcer Prophylaxis: Yes    Isolation: No active isolations    Dietary:   Current Diet Order   Procedures    Diet NPO      Tolerance: not applicable  Advancement: no    I & O (24h):    Intake/Output Summary (Last 24 hours) at 11/7/2023 0439  Last data filed at 11/7/2023 0400  Gross per 24 hour   Intake 353.89 ml   Output --   Net 353.89 ml        Restraints: No    Significant Dates:  Post Op Date: N/A  Rescue Date: N/A  Imaging/ Diagnostics: N/A    Noteworthy Labs:  aPTT, Mg, K+, Troponin  COVID Test: (--)  CBC/Anemia Labs: Coags:    Recent Labs   Lab 11/06/23  1306 11/06/23 2017 11/06/23 2026   WBC 9.37  --  8.59   HGB 12.4  --  12.3   HCT 39.6 39 39.6     --  367   MCV 92  --  91   RDW 15.3*  --  15.3*    Recent Labs   Lab 11/06/23  1847 11/07/23  0100   APTT 24.2 78.5*     "    Chemistries:   Recent Labs   Lab 11/06/23  1306 11/06/23 2026 11/07/23  0100    136 138   K 3.2* 3.7 4.7    102 104   CO2 21* 19* 21*   BUN 14 12 13   CREATININE 1.2 1.1 1.1   CALCIUM 9.1 8.6* 8.9   PROT 7.4 6.4  --    BILITOT 0.7 0.8  --    ALKPHOS 122 144*  --    ALT 41 56*  --    AST 30 78*  --    MG 1.7 8.4* 2.3   PHOS  --  3.7  --         Cardiac Enzymes: Ejection Fractions:    Recent Labs     11/06/23 2026 11/07/23  0100   TROPONINI 0.085* 0.080*    No results found for: "EF"     POCT Glucose: HbA1c:    Recent Labs   Lab 11/06/23  1238 11/06/23  2243   POCTGLUCOSE 171* 226*    Hemoglobin A1C   Date Value Ref Range Status   05/31/2023 6.1 (H) 4.0 - 5.6 % Final     Comment:     ADA Screening Guidelines:  5.7-6.4%  Consistent with prediabetes  >or=6.5%  Consistent with diabetes    High levels of fetal hemoglobin interfere with the HbA1C  assay. Heterozygous hemoglobin variants (HbS, HgC, etc)do  not significantly interfere with this assay.   However, presence of multiple variants may affect accuracy.             ICU LOS 7h  Level of Care: Critical Care    Chart Check: 12 HR Done  Shift Summary/Plan for the shift: none   "

## 2023-11-07 NOTE — PROGRESS NOTES
Pt returned to unit from Cath Lab, AAOx3. No c/o pain at this time. R wrist with TR band in place, CDI. R Groin with puncture site gauze dressing CDI. (+) pulses noted. 12 lead EKG obtained. Pt instructed to keep R arm straight for 2 hours and to lie flat until 1630=pt stated understanding. No distress noted. Will continue to monitor.

## 2023-11-07 NOTE — PROGRESS NOTES
Pt has not urinated at this time during this shift. Bladder scan done at this time shows 310 cc. Pt states she does not want a hart cath. To give her 30 min and she will try to urinate on her own. Will continue to monitor.

## 2023-11-07 NOTE — HPI
This is a 68-year-old female with a past medical history of hypertension, type 2 diabetes, hyperlipidemia, CVA with left-sided deficits who presents with tachycardia.      The patient initially presented to urgent care with shortness of breaths and lower extremity edema that started 7 days prior to presentation.  She ran out of her blood pressure medications about 2 weeks prior.  Additional symptoms include cough, nasal congestion and fatigue.  She was noted to be tachycardic and was advised present to the ED.    In the ED, the patient was tachycardic (up to 160s), tachypneic but normotensive.  Labs were remarkable for an elevated BNP (1839), elevated troponin (0.090 > 0.079), elevated D-dimer (2.87).  EKGs showed MAT and prolonged QTc. CTA chest showed pulmonary emboli in segmental and subsegmental pulmonary artery in the right middle lobe, with mild-to-moderate bibasilar pleural effusions with associated bibasilar atelectasis. An echocardiogram showed an EF of 20-25%, PA pressure of 49 mmHg.  Patient was given aspirin 325 mg, adenosine 6 mg IV x2, atenolol 25 mg p.o., diltiazem 20 mg IV x2, 30 mg IV x1, potassium bicarbonate 40 mEq use and was started on heparin drip.    While in the ED, the patient went into V-fib arrest, underwent 1 round of CPR and was cardioverted @200 joules x1, with achievement of ROSC. Mag sulfate 2 g IV & an amp of D50% were administered. Cardiology recommended Plavix, lidocaine infusion and an angiogram in the AM. She subsequently went into Vtach and was cardioverted @200 joules x1. No Epi was given and the patient was not intubated.   Plavix, additional potassium and lidocaine bolus, followed by infusion were ordered. Patient became hypoxic and was placed on a NRB. She was admitted for further management.

## 2023-11-07 NOTE — HPI
Patient is a pleasant 68-year-old lady.  Had been feeling congested for a few weeks and ran out of her blood pressure medications for 2 weeks.  Went to urgent care where she was found to be tachycardic around 155 beats per minute.  Sent to ER.  In the ER found to be in multifocal atrial tachycardia with prolonged QTC.  BNP was elevated at 18 39, troponin was 0.09, D-dimer was elevated at 2.87.  EKGs personally reviewed and demonstrated multifocal atrial tachycardia.  Patient was given Cardizem with some improvement in heart rate.  Subsequently CTA was done which revealed segmental and subsegmental pulmonary embolism on the right side.  Personally reviewed CTA, RV to LV ratio is less than 0.9.  Echo was also done which demonstrated severe cardiomyopathy with EF of 20-25%.  Subsequently patient developed VFib and cardioverted followed by V-tach which was also cardioverted x1.  Started on lidocaine drip in the ER.  Denied any chest pains at rest on exertion.    Chest pain-free.  EKG without evidence of STEMI.

## 2023-11-07 NOTE — PROGRESS NOTES
TR band air release complete. TR band removed. Arden dressing and Tegaderm applied. Pt tolerated well. No bleeding noted. Will continue to monitor. Family x2 @ bs in attendance.

## 2023-11-07 NOTE — NURSING
[facility]  Nurses Note -- 4 Eyes      11/6/2023       Skin assessed on: Q Shift      [x] No Pressure Injuries Present    [x]Prevention Measures Documented    [] Yes LDA  for Pressure Injury Previously documented     [] Yes New Pressure Injury Discovered   [] LDA for New Pressure Injury Added      Attending RN:  Carrol Flowers RN     Second RN:  Ernesto Pacheco RN

## 2023-11-07 NOTE — SUBJECTIVE & OBJECTIVE
Interval History:  No further arrhythmias last night.  Chest pain-free.    Review of Systems   Constitutional: Positive for malaise/fatigue.   HENT: Negative.     Eyes: Negative.    Endocrine: Negative.    Hematologic/Lymphatic: Negative.    Skin: Negative.    Musculoskeletal: Negative.    Gastrointestinal: Negative.    Genitourinary: Negative.    Neurological: Negative.    Psychiatric/Behavioral: Negative.     Allergic/Immunologic: Negative.      Objective:     Vital Signs (Most Recent):  Temp: 97.5 °F (36.4 °C) (11/07/23 0701)  Pulse: 102 (11/07/23 1030)  Resp: (!) 23 (11/07/23 1030)  BP: 117/74 (11/07/23 1015)  SpO2: 100 % (11/07/23 1030) Vital Signs (24h Range):  Temp:  [97 °F (36.1 °C)-98.7 °F (37.1 °C)] 97.5 °F (36.4 °C)  Pulse:  [] 102  Resp:  [13-34] 23  SpO2:  [84 %-100 %] 100 %  BP: ()/() 117/74     Weight: 81 kg (178 lb 9.2 oz)  Body mass index is 29.72 kg/m².     SpO2: 100 %         Intake/Output Summary (Last 24 hours) at 11/7/2023 1100  Last data filed at 11/7/2023 1000  Gross per 24 hour   Intake 468.8 ml   Output 250 ml   Net 218.8 ml       Lines/Drains/Airways       Peripheral Intravenous Line  Duration                  Peripheral IV - Single Lumen 11/06/23 1748 20 G Right Antecubital <1 day         Peripheral IV - Single Lumen 11/06/23 1853 20 G Anterior;Right Forearm <1 day                       Physical Exam  Constitutional:       Appearance: Normal appearance. She is well-developed.   HENT:      Head: Normocephalic.   Eyes:      Pupils: Pupils are equal, round, and reactive to light.   Cardiovascular:      Rate and Rhythm: Normal rate and regular rhythm.   Pulmonary:      Effort: Pulmonary effort is normal.      Breath sounds: Normal breath sounds.   Abdominal:      General: Bowel sounds are normal.      Palpations: Abdomen is soft.      Tenderness: There is no abdominal tenderness.   Musculoskeletal:         General: Normal range of motion.      Cervical back: Normal range of  motion and neck supple.   Skin:     General: Skin is warm.   Neurological:      Mental Status: She is alert and oriented to person, place, and time.            Significant Labs: BMP:   Recent Labs   Lab 11/06/23 2026 11/07/23 0100 11/07/23  0841   * 253* 230*    138 140   K 3.7 4.7 4.8    104 104   CO2 19* 21* 18*   BUN 12 13 15   CREATININE 1.1 1.1 1.1   CALCIUM 8.6* 8.9 9.2   MG 8.4* 2.3 2.3   , CMP   Recent Labs   Lab 11/06/23  1306 11/06/23 2026 11/07/23 0100 11/07/23  0841    136 138 140   K 3.2* 3.7 4.7 4.8    102 104 104   CO2 21* 19* 21* 18*   * 333* 253* 230*   BUN 14 12 13 15   CREATININE 1.2 1.1 1.1 1.1   CALCIUM 9.1 8.6* 8.9 9.2   PROT 7.4 6.4  --  7.5   ALBUMIN 3.9 3.3*  --  3.7   BILITOT 0.7 0.8  --  0.9   ALKPHOS 122 144*  --  161*   AST 30 78*  --  55*   ALT 41 56*  --  52*   ANIONGAP 15 15 13 18*   , CBC   Recent Labs   Lab 11/06/23  1306 11/06/23 2017 11/06/23 2026 11/07/23  0841   WBC 9.37  --  8.59 8.29  8.29   HGB 12.4  --  12.3 12.7  12.7   HCT 39.6   < > 39.6 40.3  40.3     --  367 359  359    < > = values in this interval not displayed.   , INR   Recent Labs   Lab 11/07/23  0841   INR 1.1  1.1   , Lipid Panel   Recent Labs   Lab 11/07/23  0841   CHOL 141   HDL 39*   LDLCALC 81.8   TRIG 101   CHOLHDL 27.7   , Troponin   Recent Labs   Lab 11/06/23  1617 11/06/23 2026 11/07/23  0100   TROPONINI 0.079* 0.085* 0.080*   , and All pertinent lab results from the last 24 hours have been reviewed.    Significant Imaging: Echocardiogram: Transthoracic echo (TTE) complete (Cupid Only):   Results for orders placed or performed during the hospital encounter of 11/06/23   Echo   Result Value Ref Range    BSA 1.93 m2    LVOT stroke volume 34.63 cm3    LVIDd 5.47 3.5 - 6.0 cm    LV Systolic Volume 120.86 mL    LV Systolic Volume Index 63.9 mL/m2    LVIDs 5.05 (A) 2.1 - 4.0 cm    LV Diastolic Volume 145.56 mL    LV Diastolic Volume Index 77.02 mL/m2     IVS 0.69 0.6 - 1.1 cm    LVOT diameter 1.95 cm    LVOT area 3.0 cm2    FS 8 (A) 28 - 44 %    Left Ventricle Relative Wall Thickness 0.27 cm    Posterior Wall 0.74 0.6 - 1.1 cm    LV mass 137.75 g    LV Mass Index 73 g/m2    TDI LATERAL 0.11 m/s    TR Max Yosi 3.22 m/s    LVOT peak yosi 0.73 m/s    Left Ventricular Outflow Tract Mean Velocity 0.49 cm/s    Left Ventricular Outflow Tract Mean Gradient 1.09 mmHg    RVDD 3.60 cm    TAPSE 1.50 cm    LA size 3.79 cm    Left Atrium Minor Axis 5.10 cm    Left Atrium Major Axis 5.47 cm    RA Major Axis 4.12 cm    AV mean gradient 4 mmHg    AV peak gradient 7 mmHg    Ao peak yosi 1.36 m/s    Ao VTI 19.80 cm    LVOT peak VTI 11.60 cm    AV valve area 1.75 cm²    AV Velocity Ratio 0.54     AV index (prosthetic) 0.59     RACHID by Velocity Ratio 1.60 cm²    Triscuspid Valve Regurgitation Peak Gradient 41 mmHg    PV PEAK VELOCITY 0.79 m/s    PV peak gradient 2 mmHg    Sinus 2.15 cm    STJ 1.79 cm    IVC diameter 1.76 cm    ZLVIDS 3.46     ZLVIDD 0.33     LA Volume Index 46.8 mL/m2    LA volume 88.42 cm3    LA WIDTH 5.2 cm    RA Width 3.4 cm    TV resting pulmonary artery pressure 49 mmHg    RV TB RVSP 11 mmHg    Est. RA pres 8 mmHg    Narrative      Left Ventricle: The left ventricle is mildly dilated. Normal wall   thickness. There is severely reduced systolic function with a visually   estimated ejection fraction of 20 - 25%.    Right Ventricle: Normal right ventricular cavity size. Systolic   function is normal.    Left Atrium: Left atrium is severely dilated.    Right Atrium: Right atrium is moderately dilated.    Mitral Valve: There is mild regurgitation.    Tricuspid Valve: There is mild to moderate regurgitation.    Pulmonary Artery: The estimated pulmonary artery systolic pressure is   49 mmHg.    IVC/SVC: Intermediate venous pressure at 8 mmHg.    Pericardium: There is a trivial effusion.

## 2023-11-07 NOTE — PROGRESS NOTES
[facility] - [place] ICU  Cardiology  Progress Note    Patient Name: Harriett Oglesby  MRN: [id number]  Admission Date: 11/6/2023  Hospital Length of Stay: 1 days  Code Status: Full Code   Attending Physician: Damien Hernandez MD   Primary Care Physician: Zeinab Noble MD  Expected Discharge Date:   Principal Problem:Cardiac arrest    Subjective:       Interval History:  No further arrhythmias last night.  Chest pain-free.    Review of Systems   Constitutional: Positive for malaise/fatigue.   HENT: Negative.     Eyes: Negative.    Endocrine: Negative.    Hematologic/Lymphatic: Negative.    Skin: Negative.    Musculoskeletal: Negative.    Gastrointestinal: Negative.    Genitourinary: Negative.    Neurological: Negative.    Psychiatric/Behavioral: Negative.     Allergic/Immunologic: Negative.      Objective:     Vital Signs (Most Recent):  Temp: 97.5 °F (36.4 °C) (11/07/23 0701)  Pulse: 102 (11/07/23 1030)  Resp: (!) 23 (11/07/23 1030)  BP: 117/74 (11/07/23 1015)  SpO2: 100 % (11/07/23 1030) Vital Signs (24h Range):  Temp:  [97 °F (36.1 °C)-98.7 °F (37.1 °C)] 97.5 °F (36.4 °C)  Pulse:  [] 102  Resp:  [13-34] 23  SpO2:  [84 %-100 %] 100 %  BP: ()/() 117/74     Weight: 81 kg (178 lb 9.2 oz)  Body mass index is 29.72 kg/m².     SpO2: 100 %         Intake/Output Summary (Last 24 hours) at 11/7/2023 1100  Last data filed at 11/7/2023 1000  Gross per 24 hour   Intake 468.8 ml   Output 250 ml   Net 218.8 ml       Lines/Drains/Airways       Peripheral Intravenous Line  Duration                  Peripheral IV - Single Lumen 11/06/23 1748 20 G Right Antecubital <1 day         Peripheral IV - Single Lumen 11/06/23 1853 20 G Anterior;Right Forearm <1 day                       Physical Exam  Constitutional:       Appearance: Normal appearance. She is well-developed.   HENT:      Head: Normocephalic.   Eyes:      Pupils: Pupils are equal, round, and reactive to light.   Cardiovascular:      Rate and Rhythm: Normal rate  and regular rhythm.   Pulmonary:      Effort: Pulmonary effort is normal.      Breath sounds: Normal breath sounds.   Abdominal:      General: Bowel sounds are normal.      Palpations: Abdomen is soft.      Tenderness: There is no abdominal tenderness.   Musculoskeletal:         General: Normal range of motion.      Cervical back: Normal range of motion and neck supple.   Skin:     General: Skin is warm.   Neurological:      Mental Status: She is alert and oriented to person, place, and time.            Significant Labs: BMP:   Recent Labs   Lab 11/06/23 2026 11/07/23 0100 11/07/23  0841   * 253* 230*    138 140   K 3.7 4.7 4.8    104 104   CO2 19* 21* 18*   BUN 12 13 15   CREATININE 1.1 1.1 1.1   CALCIUM 8.6* 8.9 9.2   MG 8.4* 2.3 2.3   , CMP   Recent Labs   Lab 11/06/23  1306 11/06/23 2026 11/07/23 0100 11/07/23  0841    136 138 140   K 3.2* 3.7 4.7 4.8    102 104 104   CO2 21* 19* 21* 18*   * 333* 253* 230*   BUN 14 12 13 15   CREATININE 1.2 1.1 1.1 1.1   CALCIUM 9.1 8.6* 8.9 9.2   PROT 7.4 6.4  --  7.5   ALBUMIN 3.9 3.3*  --  3.7   BILITOT 0.7 0.8  --  0.9   ALKPHOS 122 144*  --  161*   AST 30 78*  --  55*   ALT 41 56*  --  52*   ANIONGAP 15 15 13 18*   , CBC   Recent Labs   Lab 11/06/23  1306 11/06/23 2017 11/06/23 2026 11/07/23  0841   WBC 9.37  --  8.59 8.29  8.29   HGB 12.4  --  12.3 12.7  12.7   HCT 39.6   < > 39.6 40.3  40.3     --  367 359  359    < > = values in this interval not displayed.   , INR   Recent Labs   Lab 11/07/23  0841   INR 1.1  1.1   , Lipid Panel   Recent Labs   Lab 11/07/23  0841   CHOL 141   HDL 39*   LDLCALC 81.8   TRIG 101   CHOLHDL 27.7   , Troponin   Recent Labs   Lab 11/06/23  1617 11/06/23  2026 11/07/23  0100   TROPONINI 0.079* 0.085* 0.080*   , and All pertinent lab results from the last 24 hours have been reviewed.    Significant Imaging: Echocardiogram: Transthoracic echo (TTE) complete (Cupid Only):   Results for orders  placed or performed during the hospital encounter of 11/06/23   Echo   Result Value Ref Range    BSA 1.93 m2    LVOT stroke volume 34.63 cm3    LVIDd 5.47 3.5 - 6.0 cm    LV Systolic Volume 120.86 mL    LV Systolic Volume Index 63.9 mL/m2    LVIDs 5.05 (A) 2.1 - 4.0 cm    LV Diastolic Volume 145.56 mL    LV Diastolic Volume Index 77.02 mL/m2    IVS 0.69 0.6 - 1.1 cm    LVOT diameter 1.95 cm    LVOT area 3.0 cm2    FS 8 (A) 28 - 44 %    Left Ventricle Relative Wall Thickness 0.27 cm    Posterior Wall 0.74 0.6 - 1.1 cm    LV mass 137.75 g    LV Mass Index 73 g/m2    TDI LATERAL 0.11 m/s    TR Max Yosi 3.22 m/s    LVOT peak yosi 0.73 m/s    Left Ventricular Outflow Tract Mean Velocity 0.49 cm/s    Left Ventricular Outflow Tract Mean Gradient 1.09 mmHg    RVDD 3.60 cm    TAPSE 1.50 cm    LA size 3.79 cm    Left Atrium Minor Axis 5.10 cm    Left Atrium Major Axis 5.47 cm    RA Major Axis 4.12 cm    AV mean gradient 4 mmHg    AV peak gradient 7 mmHg    Ao peak yosi 1.36 m/s    Ao VTI 19.80 cm    LVOT peak VTI 11.60 cm    AV valve area 1.75 cm²    AV Velocity Ratio 0.54     AV index (prosthetic) 0.59     RACHID by Velocity Ratio 1.60 cm²    Triscuspid Valve Regurgitation Peak Gradient 41 mmHg    PV PEAK VELOCITY 0.79 m/s    PV peak gradient 2 mmHg    Sinus 2.15 cm    STJ 1.79 cm    IVC diameter 1.76 cm    ZLVIDS 3.46     ZLVIDD 0.33     LA Volume Index 46.8 mL/m2    LA volume 88.42 cm3    LA WIDTH 5.2 cm    RA Width 3.4 cm    TV resting pulmonary artery pressure 49 mmHg    RV TB RVSP 11 mmHg    Est. RA pres 8 mmHg    Narrative      Left Ventricle: The left ventricle is mildly dilated. Normal wall   thickness. There is severely reduced systolic function with a visually   estimated ejection fraction of 20 - 25%.    Right Ventricle: Normal right ventricular cavity size. Systolic   function is normal.    Left Atrium: Left atrium is severely dilated.    Right Atrium: Right atrium is moderately dilated.    Mitral Valve: There is  mild regurgitation.    Tricuspid Valve: There is mild to moderate regurgitation.    Pulmonary Artery: The estimated pulmonary artery systolic pressure is   49 mmHg.    IVC/SVC: Intermediate venous pressure at 8 mmHg.    Pericardium: There is a trivial effusion.       Assessment and Plan:     Brief HPI:     * Cardiac arrest  VFib arrest.  Prolonged QTC.  On lidocaine drip.  Monitor closely in ICU.  Plan for cardiac catheterization in a.m.    11/7: Risks, benefits and alternatives of the catheterization procedure were discussed with the patient.The risks of coronary angiography include but are not limited to: bleeding, infection, death, heart attack, arrhythmia, kidney injury or failure, potential need for dialysis, allergic reactions, stroke, need for emergency surgery, hematoma, pseudoaneurysm etc.  Should stenting be indicated, the patient has agreed to dual anti-platelet therapy for 1-consecutive year with a drug-eluting stent and a minimum of 1-month with the use of a bare metal stent. Additionally, pt is aware that non-compliance is likely to result in stent clotting with heart attack, heart failure, and/or death  The risks of moderate sedation include hypotension, respiratory depression, arrhythmias, bronchospasm, and death. Informed consent was obtained and the  patient is agreeable to proceed with the procedure. Consent was placed on the chart.      CHF (congestive heart failure)  Patient is identified as having Combined Systolic and Diastolic heart failure that is Acute.Latest ECHO performed and demonstrates- Results for orders placed during the hospital encounter of 11/06/23    Echo    Interpretation Summary    Left Ventricle: The left ventricle is mildly dilated. Normal wall thickness. There is severely reduced systolic function with a visually estimated ejection fraction of 20 - 25%.    Right Ventricle: Normal right ventricular cavity size. Systolic function is normal.    Left Atrium: Left atrium  is severely dilated.    Right Atrium: Right atrium is moderately dilated.    Mitral Valve: There is mild regurgitation.    Tricuspid Valve: There is mild to moderate regurgitation.    Pulmonary Artery: The estimated pulmonary artery systolic pressure is 49 mmHg.    IVC/SVC: Intermediate venous pressure at 8 mmHg.    Pericardium: There is a trivial effusion.  . Continue Furosemide and monitor clinical status closely. Monitor on telemetry. Patient is on CHF pathway.  Monitor strict Is&Os and daily weights.  Place on fluid restriction of 1.5 L. Cardiology has been consulted. Continue to stress to patient importance of self efficacy and  on diet for CHF. Last BNP reviewed- and noted below   Recent Labs   Lab 11/06/23  1306   BNP 1,839*   .        Acute respiratory failure with hypoxia        Pulmonary embolism  CTA personally reviewed.  Segmental and subsegmental pulmonary embolism.  RV to LV ratio less than 0.9.  Will continue to monitor.  Currently no indication for thrombectomy.  No DVT on peripheral ultrasound.  Continue heparin drip, transition to Eliquis at time of discharge    Multifocal atrial tachycardia  On initial presentation.  Improving.    11/7: now in sinus    Hyperlipidemia associated with type 2 diabetes mellitus        History of stroke with current residual effects        Type 2 diabetes mellitus with microalbuminuria, without long-term current use of insulin        Hypertension, essential, benign            VTE Risk Mitigation (From admission, onward)         Ordered     IP VTE HIGH RISK PATIENT  Once         11/06/23 2047     Place sequential compression device  Until discontinued         11/06/23 2047     heparin 25,000 units in dextrose 5% (100 units/ml) IV bolus from bag - ADDITIONAL PRN BOLUS - 60 units/kg  As needed (PRN)        Question:  Heparin Infusion Adjustment (DO NOT MODIFY ANSWER)  Answer:  \\[email or web address]\epic\Images\Pharmacy\HeparinInfusions\heparin HIGH INTENSITY nomogram  for [facility] [id number]    11/06/23 1833     heparin 25,000 units in dextrose 5% (100 units/ml) IV bolus from bag - ADDITIONAL PRN BOLUS - 30 units/kg  As needed (PRN)        Question:  Heparin Infusion Adjustment (DO NOT MODIFY ANSWER)  Answer:  \\[email or web address]\epic\Images\Pharmacy\HeparinInfusions\heparin HIGH INTENSITY nomogram for [facility] [id number].pdf    11/06/23 1833     heparin 25,000 units in dextrose 5% 250 mL (100 units/mL) infusion HIGH INTENSITY nomogram - [facility]  Continuous        Question:  Begin at (units/kg/hr)  Answer:  18 11/06/23 1833                Asher Gonzalez MD  Cardiology  [facility] [place] - ICU      Critical Care Time:  35 minutes     Critical care was time spent personally by me on the following activities: development of treatment plan with patient or surrogate and bedside caregivers, discussions with consultants, evaluation of patient's response to treatment, examination of patient, ordering and performing treatments and interventions, ordering and review of laboratory studies, ordering and review of radiographic studies, pulse oximetry, re-evaluation of patient's condition. This critical care time did not overlap with that of any other provider or involve time for any procedures.

## 2023-11-07 NOTE — PHARMACY MED REC
"Admission Medication History     The home medication history was taken by Yulissa Alonso.    You may go to "Admission" then "Reconcile Home Medications" tabs to review and/or act upon these items.     The home medication list has been updated by the Pharmacy department.   Please read ALL comments highlighted in yellow.   Please address this information as you see fit.    Feel free to contact us if you have any questions or require assistance.        Medications listed below were obtained from: Patient/family and Analytic software- [employer]  (Not in a hospital admission)        Yulissa Alonso  [phone]               .          "

## 2023-11-07 NOTE — EICU
69 yo female ex smoker w/ PMHx HTN, DM, COPD, CVA here w. SOB noted to be in MAT/ tachycardic, treated w/ cardizem IV.     CTPE +ve for segmental and sub segmental B/: PE w/ some hemodynamic compromise and also R sided heart strain.     ECHO showing LV hypokinesis w/ EF 20-25%.   S/p VF arrest in the ED needing shock x 1 now.     Troponin trending down now.   BNP 1839    Can consider gentle IV hydration if urine output is low and encourage orals.     Cr 0.9 > 1.1    Now on lidocaine and heparin gtt.     Detailed review and care plan by bedside hospitalist and cardiologist noted.     Now pt is comfortable in the ICU, under observation.     Would watch for s/s bleeding.     Please call us for further assistance.

## 2023-11-07 NOTE — ASSESSMENT & PLAN NOTE
VFib arrest.  Prolonged QTC.  On lidocaine drip.  Monitor closely in ICU.  Plan for cardiac catheterization in a.m.

## 2023-11-07 NOTE — PLAN OF CARE
[facility] - Intensive Care  Initial Discharge Assessment       Primary Care Provider: Zeinab Noble MD  Case Management Assessment     PCP: See above. Patient's daughter reported that they were recently advised that Dr. Noble is on leave.  The patient will have to followup with another provider.  [facility] [address]  [id number]    Patient Arrived From: Home with family  Existing Help at Home: Daughter    Barriers to Discharge: None    Discharge Plan:    A. Home   B. Home Health      Patient from home and had no DME or Op services.  Patient when medically cleared patient will discharge to home.  Open to home health if ordered.  Daughter advised that we may not be able to secure a provider in the [facility].  SW agreed to send message to patient's provider to advise of the concern of no response for about a week from the PCP when patient ran out of blood pressure medication and request to speak with someone in the office.  Patient has no appointment time preference.        Admission Diagnosis: CHF (congestive heart failure) [I50.9]  Pulmonary embolism [I26.99]  Tachycardia [R00.0]    Admission Date: 11/6/2023  Expected Discharge Date:     Transition of Care Barriers: Other (see comments) (Lives outside of most service areas)    Payor: [employer] MEDICARE / Plan: [email or web address] HMO PPO SPECIAL NEEDS / Product Type: Medicare Advantage /     Extended Emergency Contact Information  Primary Emergency Contact: Radha Vizcaino   [facility]  Home Phone: [phone]  Work Phone: [phone]  Relation: Daughter    Discharge Plan A: Home with family  Discharge Plan B: Home Health      [id number] [place] [facility] [address]  [address] [place] [address]  Phone: [phone] Fax: [phone]    [facility] - [place] - [address]  [address]  [place] [address]  Phone: [phone] Fax: [phone]    [id number] [place] - [address] [place] [address]  Phone: [phone] Fax: [phone]      Initial Assessment (most recent)       Adult Discharge Assessment - 11/07/23 8744          Discharge Assessment    Assessment Type Discharge Planning Assessment     Confirmed/corrected address, phone number and insurance Yes     Confirmed Demographics Correct on Facesheet     Source of Information family;health record   Daughter;  Radha Vizcaino at the bedside.    When was your last doctors appointment? 08/23/23     Reason For Admission CHF     People in Home child(michela), adult     Facility Arrived From: Home     Do you expect to return to your current living situation? Yes     Do you have help at home or someone to help you manage your care at home? Yes     Who are your caregiver(s) and their phone number(s)? Family     Prior to hospitilization cognitive status: Alert/Oriented     Current cognitive status: Alert/Oriented     Equipment Currently Used at Home none     Readmission within 30 days? No     Patient currently being followed by outpatient case management? No     Do you currently have service(s) that help you manage your care at home? No     Do you take prescription medications? Yes     Do you have prescription coverage? Yes     Coverage [employer] MANAGED MEDICARE - [employer] [facility] [place] HMO PPO SPECIAL NEEDS     Do you have any problems affording any of your prescribed medications? No     Is the patient taking medications as prescribed? yes     Who is going to help you get home at discharge? Family     How do you get to doctors appointments? family or friend will provide     Are you on dialysis? No     Do you take coumadin? No     DME Needed Upon Discharge  other (see comments)   TBD    Discharge Plan discussed with: Patient;Adult children     Transition of Care Barriers Other (see comments)   Lives outside of most service areas    Discharge Plan A Home with family     Discharge Plan B Home Health

## 2023-11-07 NOTE — ED NOTES
Pt went into V-fib again. No pulse felt. CPR initiated. Pt was shocked and ROSC. Pt began talking. MD at bedside.

## 2023-11-07 NOTE — SUBJECTIVE & OBJECTIVE
Past Medical History:   Diagnosis Date    Acute respiratory failure with hypoxia 11/6/2023    Adult BMI 31.0-31.9 kg/sq m 12/3/2018    Anemia     CVA (cerebral vascular accident)     Residual weakness in the left arm and hand    Essential thrombocythemia     Hyperlipidemia associated with type 2 diabetes mellitus     Hypertension     Osteoporosis     Pulmonary embolism 11/6/2023    Type 2 diabetes mellitus        Past Surgical History:   Procedure Laterality Date    COLONOSCOPY N/A 1/4/2019    Procedure: COLONOSCOPY;  Surgeon: James Ozuna MD;  Location: [facility] ENDO;  Service: Endoscopy;  Laterality: N/A;    COLONOSCOPY N/A 3/1/2021    Procedure: COLONOSCOPY;  Surgeon: Nelida Cook MD;  Location: [facility] ENDO;  Service: Endoscopy;  Laterality: N/A;    ESOPHAGOGASTRODUODENOSCOPY N/A 3/1/2021    Procedure: EGD (ESOPHAGOGASTRODUODENOSCOPY);  Surgeon: Nelida Cook MD;  Location: [facility];  Service: Endoscopy;  Laterality: N/A;  rapid covid > 50 miles away, instructions mailed -ml    HEMORRHOID SURGERY         Review of patient's allergies indicates:  No Known Allergies    No current facility-administered medications on file prior to encounter.     Current Outpatient Medications on File Prior to Encounter   Medication Sig    amLODIPine (NORVASC) 10 MG tablet Take 1 tablet (10 mg total) by mouth once daily.    aspirin (ECOTRIN) 81 MG EC tablet Take 81 mg by mouth once daily.     atenoloL (TENORMIN) 25 MG tablet Take 1 tablet (25 mg total) by mouth once daily.    metFORMIN (GLUCOPHAGE) 1000 MG tablet Take 1 tablet (1,000 mg total) by mouth 2 (two) times daily. for diabetes.    valsartan (DIOVAN) 320 MG tablet Take 1 tablet (320 mg total) by mouth once daily.    cetirizine (ZYRTEC) 10 MG tablet Take 10 mg by mouth once daily.    cyclobenzaprine (FLEXERIL) 5 MG tablet Take 5 mg by mouth 2 (two) times daily as needed.    dapagliflozin (FARXIGA) 10 mg tablet Take 1 tablet (10 mg total) by mouth Daily.     hydroCHLOROthiazide (HYDRODIURIL) 25 MG tablet Take 1 tablet (25 mg total) by mouth once daily.    methylPREDNISolone acetate (DEPO-MEDROL) 40 mg/mL injection     simvastatin (ZOCOR) 20 MG tablet Take 1 tablet (20 mg total) by mouth once daily.    [DISCONTINUED] amLODIPine (NORVASC) 10 MG tablet Take 1 tablet (10 mg total) by mouth once daily.    [DISCONTINUED] blood sugar diagnostic Strp 1 strip by Misc.(Non-Drug; Combo Route) route once daily.    [DISCONTINUED] hydroCHLOROthiazide (HYDRODIURIL) 25 MG tablet Take 1 tablet (25 mg total) by mouth once daily.    [DISCONTINUED] simvastatin (ZOCOR) 20 MG tablet Take 1 tablet (20 mg total) by mouth once daily.    [DISCONTINUED] valsartan (DIOVAN) 320 MG tablet Take 1 tablet (320 mg total) by mouth once daily.     Family History       Problem Relation (Age of Onset)    Breast cancer Cousin, Cousin    Cancer Father    Emphysema Father    Pancreatic cancer Mother          Tobacco Use    Smoking status: Former     Types: Cigarettes    Smokeless tobacco: Never   Substance and Sexual Activity    Alcohol use: No    Drug use: No    Sexual activity: Not Currently     Review of Systems   Constitutional: Positive for malaise/fatigue.   HENT: Negative.     Eyes: Negative.    Cardiovascular:  Positive for dyspnea on exertion.   Respiratory:  Positive for shortness of breath.    Endocrine: Negative.    Hematologic/Lymphatic: Negative.    Skin: Negative.    Musculoskeletal: Negative.    Gastrointestinal: Negative.    Genitourinary: Negative.    Neurological: Negative.    Psychiatric/Behavioral: Negative.     Allergic/Immunologic: Negative.      Objective:     Vital Signs (Most Recent):  Temp: 98.7 °F (37.1 °C) (11/06/23 1857)  Pulse: 89 (11/06/23 2050)  Resp: (!) 24 (11/06/23 2050)  BP: 134/60 (11/06/23 2050)  SpO2: 99 % (11/06/23 2050) Vital Signs (24h Range):  Temp:  [97.4 °F (36.3 °C)-98.7 °F (37.1 °C)] 98.7 °F (37.1 °C)  Pulse:  [] 89  Resp:  [15-34] 24  SpO2:  [84 %-99 %] 99  "%  BP: ()/() 134/60     Weight: 81.2 kg (179 lb)  Body mass index is 29.79 kg/m².    SpO2: 99 %       No intake or output data in the 24 hours ending 11/06/23 2112    Lines/Drains/Airways       Peripheral Intravenous Line  Duration                  Peripheral IV - Single Lumen 11/06/23 1748 20 G Right Antecubital <1 day         Peripheral IV - Single Lumen 11/06/23 1853 20 G Anterior;Right Forearm <1 day                     Physical Exam  Constitutional:       Appearance: Normal appearance. She is well-developed.   HENT:      Head: Normocephalic.   Eyes:      Pupils: Pupils are equal, round, and reactive to light.   Cardiovascular:      Rate and Rhythm: Regular rhythm. Tachycardia present.   Pulmonary:      Effort: Pulmonary effort is normal.      Breath sounds: Normal breath sounds.   Abdominal:      General: Bowel sounds are normal.      Palpations: Abdomen is soft.      Tenderness: There is no abdominal tenderness.   Musculoskeletal:         General: Normal range of motion.      Cervical back: Normal range of motion and neck supple.   Skin:     General: Skin is warm.   Neurological:      Mental Status: She is alert and oriented to person, place, and time.          Significant Labs: BMP:   Recent Labs   Lab 11/06/23  1306   *      K 3.2*      CO2 21*   BUN 14   CREATININE 1.2   CALCIUM 9.1   MG 1.7   , CMP   Recent Labs   Lab 11/06/23  1306      K 3.2*      CO2 21*   *   BUN 14   CREATININE 1.2   CALCIUM 9.1   PROT 7.4   ALBUMIN 3.9   BILITOT 0.7   ALKPHOS 122   AST 30   ALT 41   ANIONGAP 15   , CBC   Recent Labs   Lab 11/06/23  1306 11/06/23 2017 11/06/23 2026   WBC 9.37  --  8.59   HGB 12.4  --  12.3   HCT 39.6   < > 39.6     --  367    < > = values in this interval not displayed.   , INR No results for input(s): "INR", "PROTIME" in the last 48 hours., Lipid Panel No results for input(s): "CHOL", "HDL", "LDLCALC", "TRIG", "CHOLHDL" in the last 48 hours., " Troponin   Recent Labs   Lab 11/06/23  1306 11/06/23  1617 11/06/23 2026   TROPONINI 0.090* 0.079* 0.085*   , and All pertinent lab results from the last 24 hours have been reviewed.    Significant Imaging: Echocardiogram: Transthoracic echo (TTE) complete (Cupid Only):   Results for orders placed or performed during the hospital encounter of 11/06/23   Echo   Result Value Ref Range    BSA 1.93 m2    LVOT stroke volume 34.63 cm3    LVIDd 5.47 3.5 - 6.0 cm    LV Systolic Volume 120.86 mL    LV Systolic Volume Index 63.9 mL/m2    LVIDs 5.05 (A) 2.1 - 4.0 cm    LV Diastolic Volume 145.56 mL    LV Diastolic Volume Index 77.02 mL/m2    IVS 0.69 0.6 - 1.1 cm    LVOT diameter 1.95 cm    LVOT area 3.0 cm2    FS 8 (A) 28 - 44 %    Left Ventricle Relative Wall Thickness 0.27 cm    Posterior Wall 0.74 0.6 - 1.1 cm    LV mass 137.75 g    LV Mass Index 73 g/m2    TDI LATERAL 0.11 m/s    TR Max Yosi 3.22 m/s    LVOT peak yosi 0.73 m/s    Left Ventricular Outflow Tract Mean Velocity 0.49 cm/s    Left Ventricular Outflow Tract Mean Gradient 1.09 mmHg    RVDD 3.60 cm    TAPSE 1.50 cm    LA size 3.79 cm    Left Atrium Minor Axis 5.10 cm    Left Atrium Major Axis 5.47 cm    RA Major Axis 4.12 cm    AV mean gradient 4 mmHg    AV peak gradient 7 mmHg    Ao peak yosi 1.36 m/s    Ao VTI 19.80 cm    LVOT peak VTI 11.60 cm    AV valve area 1.75 cm²    AV Velocity Ratio 0.54     AV index (prosthetic) 0.59     RACHID by Velocity Ratio 1.60 cm²    Triscuspid Valve Regurgitation Peak Gradient 41 mmHg    PV PEAK VELOCITY 0.79 m/s    PV peak gradient 2 mmHg    Sinus 2.15 cm    STJ 1.79 cm    IVC diameter 1.76 cm    ZLVIDS 3.46     ZLVIDD 0.33     LA Volume Index 46.8 mL/m2    LA volume 88.42 cm3    LA WIDTH 5.2 cm    RA Width 3.4 cm    TV resting pulmonary artery pressure 49 mmHg    RV TB RVSP 11 mmHg    Est. RA pres 8 mmHg    Narrative      Left Ventricle: The left ventricle is mildly dilated. Normal wall   thickness. There is severely reduced  systolic function with a visually   estimated ejection fraction of 20 - 25%.    Right Ventricle: Normal right ventricular cavity size. Systolic   function is normal.    Left Atrium: Left atrium is severely dilated.    Right Atrium: Right atrium is moderately dilated.    Mitral Valve: There is mild regurgitation.    Tricuspid Valve: There is mild to moderate regurgitation.    Pulmonary Artery: The estimated pulmonary artery systolic pressure is   49 mmHg.    IVC/SVC: Intermediate venous pressure at 8 mmHg.    Pericardium: There is a trivial effusion.

## 2023-11-07 NOTE — ASSESSMENT & PLAN NOTE
CTA chest showed pulmonary emboli in segmental and subsegmental pulmonary artery in the right middle lobe, with mild-to-moderate bibasilar pleural effusions with associated bibasilar atelectasis.    Continue heparin ggt   Consult cardiology

## 2023-11-07 NOTE — ASSESSMENT & PLAN NOTE
While in the ED, the patient went into V-fib arrest, underwent 1 round of CPR and was cardioverted @200 joules x1, with achievement of ROSC.   She subsequently went into Vtach and was cardioverted @200 joules x1. No Epi was given and the patient was not intubated.     Likely cause: prolonged QTc. Other differentials include ACS (considered less likely due to down trending troponin, no chest pain and unremarkable EKG), or PE (currently being treated for this). Could also be related to developing cardiogenic shock (received several doses of Cardizem, with a reduced EF).     Plan:   Continue lidocaine infusion   Aggressive electrolyte monitoring and management, Mag >2, K>4   Cardiology consult  NPO after MN for LHC   If recurrence of episodes, or worsening hypoxia, will electively intubate   Continue aspirin, plavix and heparin

## 2023-11-07 NOTE — ASSESSMENT & PLAN NOTE
Patient is identified as having Combined Systolic and Diastolic heart failure that is Acute.Latest ECHO performed and demonstrates- Results for orders placed during the hospital encounter of 11/06/23    Echo    Interpretation Summary    Left Ventricle: The left ventricle is mildly dilated. Normal wall thickness. There is severely reduced systolic function with a visually estimated ejection fraction of 20 - 25%.    Right Ventricle: Normal right ventricular cavity size. Systolic function is normal.    Left Atrium: Left atrium is severely dilated.    Right Atrium: Right atrium is moderately dilated.    Mitral Valve: There is mild regurgitation.    Tricuspid Valve: There is mild to moderate regurgitation.    Pulmonary Artery: The estimated pulmonary artery systolic pressure is 49 mmHg.    IVC/SVC: Intermediate venous pressure at 8 mmHg.    Pericardium: There is a trivial effusion.  . Continue Furosemide and monitor clinical status closely. Monitor on telemetry. Patient is on CHF pathway.  Monitor strict Is&Os and daily weights.  Place on fluid restriction of 1.5 L. Cardiology has been consulted. Continue to stress to patient importance of self efficacy and  on diet for CHF. Last BNP reviewed- and noted below   Recent Labs   Lab 11/06/23  1306   BNP 1,839*   .

## 2023-11-07 NOTE — SUBJECTIVE & OBJECTIVE
Past Medical History:   Diagnosis Date    Adult BMI 31.0-31.9 kg/sq m 12/3/2018    Anemia     CVA (cerebral vascular accident)     Residual weakness in the left arm and hand    Essential thrombocythemia     Hyperlipidemia associated with type 2 diabetes mellitus     Hypertension     Osteoporosis     Pulmonary embolism 11/6/2023    Type 2 diabetes mellitus        Past Surgical History:   Procedure Laterality Date    COLONOSCOPY N/A 1/4/2019    Procedure: COLONOSCOPY;  Surgeon: James Ozuna MD;  Location: [facility] ENDO;  Service: Endoscopy;  Laterality: N/A;    COLONOSCOPY N/A 3/1/2021    Procedure: COLONOSCOPY;  Surgeon: Nelida Cook MD;  Location: [facility] ENDO;  Service: Endoscopy;  Laterality: N/A;    ESOPHAGOGASTRODUODENOSCOPY N/A 3/1/2021    Procedure: EGD (ESOPHAGOGASTRODUODENOSCOPY);  Surgeon: Nelida Cook MD;  Location: [facility] ENDO;  Service: Endoscopy;  Laterality: N/A;  rapid covid > 50 miles away, instructions mailed -ml    HEMORRHOID SURGERY         Review of patient's allergies indicates:  No Known Allergies    No current facility-administered medications on file prior to encounter.     Current Outpatient Medications on File Prior to Encounter   Medication Sig    amLODIPine (NORVASC) 10 MG tablet Take 1 tablet (10 mg total) by mouth once daily.    aspirin (ECOTRIN) 81 MG EC tablet Take 81 mg by mouth once daily.     atenoloL (TENORMIN) 25 MG tablet Take 1 tablet (25 mg total) by mouth once daily.    metFORMIN (GLUCOPHAGE) 1000 MG tablet Take 1 tablet (1,000 mg total) by mouth 2 (two) times daily. for diabetes.    valsartan (DIOVAN) 320 MG tablet Take 1 tablet (320 mg total) by mouth once daily.    cetirizine (ZYRTEC) 10 MG tablet Take 10 mg by mouth once daily.    cyclobenzaprine (FLEXERIL) 5 MG tablet Take 5 mg by mouth 2 (two) times daily as needed.    dapagliflozin (FARXIGA) 10 mg tablet Take 1 tablet (10 mg total) by mouth Daily.    hydroCHLOROthiazide (HYDRODIURIL) 25 MG tablet Take 1 tablet (25  mg total) by mouth once daily.    methylPREDNISolone acetate (DEPO-MEDROL) 40 mg/mL injection     simvastatin (ZOCOR) 20 MG tablet Take 1 tablet (20 mg total) by mouth once daily.    [DISCONTINUED] amLODIPine (NORVASC) 10 MG tablet Take 1 tablet (10 mg total) by mouth once daily.    [DISCONTINUED] blood sugar diagnostic Strp 1 strip by Misc.(Non-Drug; Combo Route) route once daily.    [DISCONTINUED] hydroCHLOROthiazide (HYDRODIURIL) 25 MG tablet Take 1 tablet (25 mg total) by mouth once daily.    [DISCONTINUED] simvastatin (ZOCOR) 20 MG tablet Take 1 tablet (20 mg total) by mouth once daily.    [DISCONTINUED] valsartan (DIOVAN) 320 MG tablet Take 1 tablet (320 mg total) by mouth once daily.     Family History       Problem Relation (Age of Onset)    Breast cancer Cousin, Cousin    Cancer Father    Emphysema Father    Pancreatic cancer Mother          Tobacco Use    Smoking status: Former     Types: Cigarettes    Smokeless tobacco: Never   Substance and Sexual Activity    Alcohol use: No    Drug use: No    Sexual activity: Not Currently     Review of Systems   Constitutional:  Positive for fatigue.   HENT: Negative.     Eyes: Negative.    Respiratory:  Positive for cough and shortness of breath (chest congestion).    Cardiovascular: Negative.    Gastrointestinal: Negative.    Endocrine: Negative.    Genitourinary: Negative.    Musculoskeletal: Negative.    Skin: Negative.    Allergic/Immunologic: Negative.    Neurological: Negative.    Psychiatric/Behavioral: Negative.       Objective:     Vital Signs (Most Recent):  Temp: 98.7 °F (37.1 °C) (11/06/23 1857)  Pulse: 89 (11/06/23 2015)  Resp: (!) 33 (11/06/23 2015)  BP: 129/76 (11/06/23 2015)  SpO2: (!) 85 % (11/06/23 2015) Vital Signs (24h Range):  Temp:  [97.4 °F (36.3 °C)-98.7 °F (37.1 °C)] 98.7 °F (37.1 °C)  Pulse:  [] 89  Resp:  [15-33] 33  SpO2:  [85 %-99 %] 85 %  BP: ()/() 129/76     Weight: 81.2 kg (179 lb)  Body mass index is 29.79 kg/m².      Physical Exam  Vitals and nursing note reviewed.   Constitutional:       General: She is in acute distress.      Appearance: Normal appearance. She is ill-appearing.   HENT:      Head: Normocephalic and atraumatic.      Nose: Nose normal.      Mouth/Throat:      Mouth: Mucous membranes are moist.   Eyes:      Extraocular Movements: Extraocular movements intact.   Cardiovascular:      Rate and Rhythm: Tachycardia present.      Pulses: Normal pulses.      Heart sounds: No murmur heard.  Pulmonary:      Effort: Pulmonary effort is normal. No respiratory distress.      Breath sounds: Decreased air movement present.   Abdominal:      General: Abdomen is flat.      Palpations: Abdomen is soft.      Tenderness: There is no abdominal tenderness.   Musculoskeletal:      Right lower leg: No edema.      Left lower leg: No edema.   Skin:     General: Skin is warm.   Neurological:      General: No focal deficit present.      Mental Status: She is alert.                Significant Labs: All pertinent labs within the past 24 hours have been reviewed.    Significant Imaging: I have reviewed all pertinent imaging results/findings within the past 24 hours.

## 2023-11-07 NOTE — ASSESSMENT & PLAN NOTE
Chronic, controlled. Latest blood pressure and vitals reviewed-     Temp:  [97.4 °F (36.3 °C)-97.7 °F (36.5 °C)]   Pulse:  [115-169]   Resp:  [15-28]   BP: (129-165)/()   SpO2:  [95 %-99 %] .   Home meds for hypertension were reviewed and noted below.   Hypertension Medications             atenoloL (TENORMIN) 25 MG tablet Take 1 tablet (25 mg total) by mouth once daily.    amLODIPine (NORVASC) 10 MG tablet Take 1 tablet (10 mg total) by mouth once daily.    hydroCHLOROthiazide (HYDRODIURIL) 25 MG tablet Take 1 tablet (25 mg total) by mouth once daily.    valsartan (DIOVAN) 320 MG tablet Take 1 tablet (320 mg total) by mouth once daily.          While in the hospital, will manage blood pressure as follows;  Re-introduce BP medications if BP allows.     Will utilize p.r.n. blood pressure medication only if patient's blood pressure greater than 180/110 and she develops symptoms such as worsening chest pain or shortness of breath.

## 2023-11-07 NOTE — PROGRESS NOTES
[facility] Medicine  Progress Note    Patient Name: Harriett Oglesby  MRN: [id number]  Patient Class: IP- Inpatient   Admission Date: 11/6/2023  Length of Stay: 1 days  Attending Physician: Damien Hernandez MD  Primary Care Provider: Zeinab Noble MD        Subjective:     Principal Problem:Cardiac arrest        HPI:  This is a 68-year-old female with a past medical history of hypertension, type 2 diabetes, hyperlipidemia, CVA with left-sided deficits who presents with tachycardia.      The patient initially presented to urgent care with shortness of breaths and lower extremity edema that started 7 days prior to presentation.  She ran out of her blood pressure medications about 2 weeks prior.  Additional symptoms include cough, nasal congestion and fatigue.  She was noted to be tachycardic and was advised present to the ED.    In the ED, the patient was tachycardic (up to 160s), tachypneic but normotensive.  Labs were remarkable for an elevated BNP (1839), elevated troponin (0.090 > 0.079), elevated D-dimer (2.87).  EKGs showed MAT and prolonged QTc. CTA chest showed pulmonary emboli in segmental and subsegmental pulmonary artery in the right middle lobe, with mild-to-moderate bibasilar pleural effusions with associated bibasilar atelectasis. An echocardiogram showed an EF of 20-25%, PA pressure of 49 mmHg.  Patient was given aspirin 325 mg, adenosine 6 mg IV x2, atenolol 25 mg p.o., diltiazem 20 mg IV x2, 30 mg IV x1, potassium bicarbonate 40 mEq use and was started on heparin drip.    While in the ED, the patient went into V-fib arrest, underwent 1 round of CPR and was cardioverted @200 joules x1, with achievement of ROSC. Mag sulfate 2 g IV & an amp of D50% were administered. Cardiology recommended Plavix, lidocaine infusion and an angiogram in the AM. She subsequently went into Vtach and was cardioverted @200 joules x1. No Epi was given and the patient was not intubated.   Plavix,  additional potassium and lidocaine bolus, followed by infusion were ordered. Patient became hypoxic and was placed on a NRB. She was admitted for further management.        Overview/Hospital Course:  67 y/o female sent to ER from Urgent Care for tachycardia.  In the ER found to be in multifocal atrial tachycardia with prolonged QTC.  Patient was given Cardizem with some improvement in heart rate.  Subsequently CTA was done which revealed segmental and subsegmental pulmonary embolism on the right side. Echo was also done which demonstrated severe cardiomyopathy with EF of 20-25%.  Subsequently patient developed VFib and cardioverted followed by V-tach which was also cardioverted x1.  Started on lidocaine drip in the ER and admitted to ICU.  Also started on heparin drip.      Interval History: feeling better.     Review of Systems   HENT:  Negative for ear discharge and ear pain.    Eyes:  Negative for discharge and itching.   Endocrine: Negative for cold intolerance and heat intolerance.   Neurological:  Negative for seizures and syncope.     Objective:     Vital Signs (Most Recent):  Temp: 97.5 °F (36.4 °C) (11/07/23 1240)  Pulse: 79 (11/07/23 1400)  Resp: 13 (11/07/23 1400)  BP: 121/80 (11/07/23 1400)  SpO2: 99 % (11/07/23 1400) Vital Signs (24h Range):  Temp:  [97 °F (36.1 °C)-98.7 °F (37.1 °C)] 97.5 °F (36.4 °C)  Pulse:  [] 79  Resp:  [13-34] 13  SpO2:  [84 %-100 %] 99 %  BP: ()/(56-92) 121/80     Weight: 81 kg (178 lb 9.2 oz)  Body mass index is 29.72 kg/m².    Intake/Output Summary (Last 24 hours) at 11/7/2023 1445  Last data filed at 11/7/2023 1416  Gross per 24 hour   Intake 749.91 ml   Output 250 ml   Net 499.91 ml         Physical Exam  Vitals and nursing note reviewed.   Constitutional:       Appearance: Normal appearance. She is not toxic-appearing or diaphoretic.   HENT:      Head: Normocephalic and atraumatic.      Nose: Nose normal.      Mouth/Throat:      Mouth: Mucous membranes are moist.    Eyes:      Extraocular Movements: Extraocular movements intact.   Cardiovascular:      Rate and Rhythm: Normal rate and regular rhythm.      Pulses: Normal pulses.      Heart sounds: No murmur heard.  Pulmonary:      Effort: Pulmonary effort is normal. No respiratory distress.      Breath sounds: Decreased air movement present.   Abdominal:      General: Abdomen is flat.      Palpations: Abdomen is soft.      Tenderness: There is no abdominal tenderness.   Musculoskeletal:      Right lower leg: No edema.      Left lower leg: No edema.   Skin:     General: Skin is warm.   Neurological:      General: No focal deficit present.      Mental Status: She is alert.             Significant Labs: All pertinent labs within the past 24 hours have been reviewed.  BMP:   Recent Labs   Lab 11/07/23  0841   *      K 4.8      CO2 18*   BUN 15   CREATININE 1.1   CALCIUM 9.2   MG 2.3     CBC:   Recent Labs   Lab 11/06/23  1306 11/06/23 2017 11/06/23  2026 11/07/23  0841   WBC 9.37  --  8.59 8.29  8.29   HGB 12.4  --  12.3 12.7  12.7   HCT 39.6 39 39.6 40.3  40.3     --  367 359  359       Significant Imaging: I have reviewed all pertinent imaging results/findings within the past 24 hours.      Assessment/Plan:      * Cardiac arrest  While in the ED, the patient went into V-fib arrest, underwent 1 round of CPR and was cardioverted @200 joules x1, with achievement of ROSC.   She subsequently went into Vtach and was cardioverted @200 joules x1. No Epi was given and the patient was not intubated.     Cardiology consulted.  [facility] today.   Continue lidocaine infusion     CHF (congestive heart failure)  Patient is identified as having Systolic (HFrEF) heart failure that is Acute. CHF is currently controlled. Latest ECHO performed and demonstrates- Results for orders placed during the hospital encounter of 11/06/23    Echo    Interpretation Summary    Left Ventricle: The left ventricle is mildly dilated. Normal  wall thickness. There is severely reduced systolic function with a visually estimated ejection fraction of 20 - 25%.    Right Ventricle: Normal right ventricular cavity size. Systolic function is normal.    Left Atrium: Left atrium is severely dilated.    Right Atrium: Right atrium is moderately dilated.    Mitral Valve: There is mild regurgitation.    Tricuspid Valve: There is mild to moderate regurgitation.    Pulmonary Artery: The estimated pulmonary artery systolic pressure is 49 mmHg.    IVC/SVC: Intermediate venous pressure at 8 mmHg.    Pericardium: There is a trivial effusion.   Monitor on telemetry. Patient is off CHF pathway.  Monitor strict Is&Os and daily weights.  Place on fluid restriction of 1.5 L. Cardiology has been consulted. Continue to stress to patient importance of self efficacy and  on diet for CHF. Last BNP reviewed- and noted below   Recent Labs   Lab 11/06/23  1306   BNP 1,839*   .    Pulmonary embolism  CTA chest showed pulmonary emboli in segmental and subsegmental pulmonary artery in the right middle lobe, with mild-to-moderate bibasilar pleural effusions with associated bibasilar atelectasis.    Continue heparin ggt   Consult cardiology     Multifocal atrial tachycardia  Improved with diltiazem.   Telemetry monitoring   Consult cardiology       Advanced care planning/counseling discussion  Remains full code at this time.       Hyperlipidemia associated with type 2 diabetes mellitus  Resume statin    History of stroke with current residual effects  Resume home medications      Type 2 diabetes mellitus with microalbuminuria, without long-term current use of insulin  Glycemic protocol. LDSS  Uncontrolled with hyperglycemia.  Hold home oral medications.  Has remained hyperglycemic.  Will start nightly Levemir while hospitalized.      Hypertension, essential, benign  Chronic, controlled. Latest blood pressure and vitals reviewed-     Temp:  [97 °F (36.1 °C)-98.7 °F (37.1 °C)]    Pulse:  []   Resp:  [13-34]   BP: ()/(56-92)   SpO2:  [84 %-100 %] .   Home meds for hypertension were reviewed and noted below.   Hypertension Medications             atenoloL (TENORMIN) 25 MG tablet Take 1 tablet (25 mg total) by mouth once daily.    amLODIPine (NORVASC) 10 MG tablet Take 1 tablet (10 mg total) by mouth once daily.    hydroCHLOROthiazide (HYDRODIURIL) 25 MG tablet Take 1 tablet (25 mg total) by mouth once daily.    valsartan (DIOVAN) 320 MG tablet Take 1 tablet (320 mg total) by mouth once daily.      Currently normotensive off home medications.  Resume if BP increases.      VTE Risk Mitigation (From admission, onward)         Ordered     IP VTE HIGH RISK PATIENT  Once         11/06/23 2047     Place sequential compression device  Until discontinued         11/06/23 2047     heparin 25,000 units in dextrose 5% (100 units/ml) IV bolus from bag - ADDITIONAL PRN BOLUS - 60 units/kg  As needed (PRN)        Question:  Heparin Infusion Adjustment (DO NOT MODIFY ANSWER)  Answer:  \\[employer]\epic\Images\Pharmacy\HeparinInfusions\heparin HIGH INTENSITY nomogram for [facility] [id number]    11/06/23 1833     heparin 25,000 units in dextrose 5% (100 units/ml) IV bolus from bag - ADDITIONAL PRN BOLUS - 30 units/kg  As needed (PRN)        Question:  Heparin Infusion Adjustment (DO NOT MODIFY ANSWER)  Answer:  \[employer]\epic\Images\Pharmacy\HeparinInfusions\heparin HIGH INTENSITY nomogram for [facility] [id number]    11/06/23 1833     heparin 25,000 units in dextrose 5% 250 mL (100 units/mL) infusion HIGH INTENSITY nomogram - [facility]  Continuous        Question:  Begin at (units/kg/hr)  Answer:  18    11/06/23 1833                Discharge Planning   CHEYANNE:      Code Status: Full Code   Is the patient medically ready for discharge?:     Reason for patient still in hospital (select all that apply): Patient trending condition and Treatment               Critical care time spent on the evaluation and treatment of  severe organ dysfunction, review of pertinent labs and imaging studies, discussions with consulting providers and discussions with patient/family: 40 minutes.      Damien Hernandez MD  Department of Hospital Medicine   [facility] - Intensive Care

## 2023-11-07 NOTE — ASSESSMENT & PLAN NOTE
VFib arrest.  Prolonged QTC.  On lidocaine drip.  Monitor closely in ICU.  Plan for cardiac catheterization in a.m.    11/7: Risks, benefits and alternatives of the catheterization procedure were discussed with the patient.The risks of coronary angiography include but are not limited to: bleeding, infection, death, heart attack, arrhythmia, kidney injury or failure, potential need for dialysis, allergic reactions, stroke, need for emergency surgery, hematoma, pseudoaneurysm etc.  Should stenting be indicated, the patient has agreed to dual anti-platelet therapy for 1-consecutive year with a drug-eluting stent and a minimum of 1-month with the use of a bare metal stent. Additionally, pt is aware that non-compliance is likely to result in stent clotting with heart attack, heart failure, and/or death  The risks of moderate sedation include hypotension, respiratory depression, arrhythmias, bronchospasm, and death. Informed consent was obtained and the  patient is agreeable to proceed with the procedure. Consent was placed on the chart.

## 2023-11-07 NOTE — PLAN OF CARE
Pt remains in the ICU on 4L NC. NSR on the monitor. Lidocaine and Heparin infusing per order. TR band removed per order. Site CDI. R groin site CDI. Diet advanced and pt tolerating well. Family @ bs. POC reviewed. No injurys/ falls or skin breakdowns occurred.

## 2023-11-07 NOTE — ASSESSMENT & PLAN NOTE
Patient is identified as having Systolic (HFrEF) heart failure that is Acute. CHF is currently controlled. Latest ECHO performed and demonstrates- Results for orders placed during the hospital encounter of 11/06/23    Echo    Interpretation Summary    Left Ventricle: The left ventricle is mildly dilated. Normal wall thickness. There is severely reduced systolic function with a visually estimated ejection fraction of 20 - 25%.    Right Ventricle: Normal right ventricular cavity size. Systolic function is normal.    Left Atrium: Left atrium is severely dilated.    Right Atrium: Right atrium is moderately dilated.    Mitral Valve: There is mild regurgitation.    Tricuspid Valve: There is mild to moderate regurgitation.    Pulmonary Artery: The estimated pulmonary artery systolic pressure is 49 mmHg.    IVC/SVC: Intermediate venous pressure at 8 mmHg.    Pericardium: There is a trivial effusion.   Monitor on telemetry. Patient is off CHF pathway.  Monitor strict Is&Os and daily weights.  Place on fluid restriction of 1.5 L. Cardiology has been consulted. Continue to stress to patient importance of self efficacy and  on diet for CHF. Last BNP reviewed- and noted below   Recent Labs   Lab 11/06/23  1306   BNP 1,839*   .

## 2023-11-07 NOTE — PROGRESS NOTES
Pt transferred via bed to Cath Lab. Heparin drip stopped at this time as per order from Dr Gonzalez. No distress noted. Family @ side.

## 2023-11-07 NOTE — ASSESSMENT & PLAN NOTE
Patient with Hypoxic Respiratory failure which is Acute.  she is not on home oxygen. Supplemental oxygen was provided.   Likely in the setting of PE, volume overload   Diurese when able   Continue heparin ggt

## 2023-11-07 NOTE — PLAN OF CARE
CM to patient's room to complete DC needs assessment.  Patient off unit.  CM to follow up and assist with DC planning as needed.

## 2023-11-07 NOTE — CONSULTS
[facility] Emergency Dept  Cardiology  Consult Note    Patient Name: Harriett Oglesby  MRN: [id number]  Admission Date: 11/6/2023  Hospital Length of Stay: 0 days  Code Status: Full Code   Attending Provider: Nicolas Arellano MD   Consulting Provider: Asher Gonzalez MD  Primary Care Physician: Zeinab Noble MD  Principal Problem:Cardiac arrest    Patient information was obtained from patient and ER records.     Inpatient consult to Cardiology  Consult performed by: Asher Gonzalez MD  Consult ordered by: Nicolas Arellano MD        Subjective:     Chief Complaint:  Sob, tachy, pe, chf     HPI:   Patient is a pleasant 68-year-old lady.  Had been feeling congested for a few weeks and ran out of her blood pressure medications for 2 weeks.  Went to urgent care where she was found to be tachycardic around 155 beats per minute.  Sent to ER.  In the ER found to be in multifocal atrial tachycardia with prolonged QTC.  BNP was elevated at 18 39, troponin was 0.09, D-dimer was elevated at 2.87.  EKGs personally reviewed and demonstrated multifocal atrial tachycardia.  Patient was given Cardizem with some improvement in heart rate.  Subsequently CTA was done which revealed segmental and subsegmental pulmonary embolism on the right side.  Personally reviewed CTA, RV to LV ratio is less than 0.9.  Echo was also done which demonstrated severe cardiomyopathy with EF of 20-25%.  Subsequently patient developed VFib and cardioverted followed by V-tach which was also cardioverted x1.  Started on lidocaine drip in the ER.  Denied any chest pains at rest on exertion.    Chest pain-free.  EKG without evidence of STEMI.          Past Medical History:   Diagnosis Date    Acute respiratory failure with hypoxia 11/6/2023    Adult BMI 31.0-31.9 kg/sq m 12/3/2018    Anemia     CVA (cerebral vascular accident)     Residual weakness in the left arm and hand    Essential thrombocythemia     Hyperlipidemia associated with type 2  diabetes mellitus     Hypertension     Osteoporosis     Pulmonary embolism 11/6/2023    Type 2 diabetes mellitus        Past Surgical History:   Procedure Laterality Date    COLONOSCOPY N/A 1/4/2019    Procedure: COLONOSCOPY;  Surgeon: James Ozuna MD;  Location: [facility] ENDO;  Service: Endoscopy;  Laterality: N/A;    COLONOSCOPY N/A 3/1/2021    Procedure: COLONOSCOPY;  Surgeon: Nelida Cook MD;  Location: [facility] ENDO;  Service: Endoscopy;  Laterality: N/A;    ESOPHAGOGASTRODUODENOSCOPY N/A 3/1/2021    Procedure: EGD (ESOPHAGOGASTRODUODENOSCOPY);  Surgeon: Nelida Cook MD;  Location: [facility] ENDO;  Service: Endoscopy;  Laterality: N/A;  rapid covid > 50 miles away, instructions mailed -ml    HEMORRHOID SURGERY         Review of patient's allergies indicates:  No Known Allergies    No current facility-administered medications on file prior to encounter.     Current Outpatient Medications on File Prior to Encounter   Medication Sig    amLODIPine (NORVASC) 10 MG tablet Take 1 tablet (10 mg total) by mouth once daily.    aspirin (ECOTRIN) 81 MG EC tablet Take 81 mg by mouth once daily.     atenoloL (TENORMIN) 25 MG tablet Take 1 tablet (25 mg total) by mouth once daily.    metFORMIN (GLUCOPHAGE) 1000 MG tablet Take 1 tablet (1,000 mg total) by mouth 2 (two) times daily. for diabetes.    valsartan (DIOVAN) 320 MG tablet Take 1 tablet (320 mg total) by mouth once daily.    cetirizine (ZYRTEC) 10 MG tablet Take 10 mg by mouth once daily.    cyclobenzaprine (FLEXERIL) 5 MG tablet Take 5 mg by mouth 2 (two) times daily as needed.    dapagliflozin (FARXIGA) 10 mg tablet Take 1 tablet (10 mg total) by mouth Daily.    hydroCHLOROthiazide (HYDRODIURIL) 25 MG tablet Take 1 tablet (25 mg total) by mouth once daily.    methylPREDNISolone acetate (DEPO-MEDROL) 40 mg/mL injection     simvastatin (ZOCOR) 20 MG tablet Take 1 tablet (20 mg total) by mouth once daily.    [DISCONTINUED] amLODIPine (NORVASC) 10 MG  tablet Take 1 tablet (10 mg total) by mouth once daily.    [DISCONTINUED] blood sugar diagnostic Strp 1 strip by Misc.(Non-Drug; Combo Route) route once daily.    [DISCONTINUED] hydroCHLOROthiazide (HYDRODIURIL) 25 MG tablet Take 1 tablet (25 mg total) by mouth once daily.    [DISCONTINUED] simvastatin (ZOCOR) 20 MG tablet Take 1 tablet (20 mg total) by mouth once daily.    [DISCONTINUED] valsartan (DIOVAN) 320 MG tablet Take 1 tablet (320 mg total) by mouth once daily.     Family History       Problem Relation (Age of Onset)    Breast cancer Cousin, Cousin    Cancer Father    Emphysema Father    Pancreatic cancer Mother          Tobacco Use    Smoking status: Former     Types: Cigarettes    Smokeless tobacco: Never   Substance and Sexual Activity    Alcohol use: No    Drug use: No    Sexual activity: Not Currently     Review of Systems   Constitutional: Positive for malaise/fatigue.   HENT: Negative.     Eyes: Negative.    Cardiovascular:  Positive for dyspnea on exertion.   Respiratory:  Positive for shortness of breath.    Endocrine: Negative.    Hematologic/Lymphatic: Negative.    Skin: Negative.    Musculoskeletal: Negative.    Gastrointestinal: Negative.    Genitourinary: Negative.    Neurological: Negative.    Psychiatric/Behavioral: Negative.     Allergic/Immunologic: Negative.      Objective:     Vital Signs (Most Recent):  Temp: 98.7 °F (37.1 °C) (11/06/23 1857)  Pulse: 89 (11/06/23 2050)  Resp: (!) 24 (11/06/23 2050)  BP: 134/60 (11/06/23 2050)  SpO2: 99 % (11/06/23 2050) Vital Signs (24h Range):  Temp:  [97.4 °F (36.3 °C)-98.7 °F (37.1 °C)] 98.7 °F (37.1 °C)  Pulse:  [] 89  Resp:  [15-34] 24  SpO2:  [84 %-99 %] 99 %  BP: ()/() 134/60     Weight: 81.2 kg (179 lb)  Body mass index is 29.79 kg/m².    SpO2: 99 %       No intake or output data in the 24 hours ending 11/06/23 2112    Lines/Drains/Airways       Peripheral Intravenous Line  Duration                  Peripheral IV -  "Single Lumen 11/06/23 1748 20 G Right Antecubital <1 day         Peripheral IV - Single Lumen 11/06/23 1853 20 G Anterior;Right Forearm <1 day                     Physical Exam  Constitutional:       Appearance: Normal appearance. She is well-developed.   HENT:      Head: Normocephalic.   Eyes:      Pupils: Pupils are equal, round, and reactive to light.   Cardiovascular:      Rate and Rhythm: Regular rhythm. Tachycardia present.   Pulmonary:      Effort: Pulmonary effort is normal.      Breath sounds: Normal breath sounds.   Abdominal:      General: Bowel sounds are normal.      Palpations: Abdomen is soft.      Tenderness: There is no abdominal tenderness.   Musculoskeletal:         General: Normal range of motion.      Cervical back: Normal range of motion and neck supple.   Skin:     General: Skin is warm.   Neurological:      Mental Status: She is alert and oriented to person, place, and time.          Significant Labs: BMP:   Recent Labs   Lab 11/06/23  1306   *      K 3.2*      CO2 21*   BUN 14   CREATININE 1.2   CALCIUM 9.1   MG 1.7   , CMP   Recent Labs   Lab 11/06/23  1306      K 3.2*      CO2 21*   *   BUN 14   CREATININE 1.2   CALCIUM 9.1   PROT 7.4   ALBUMIN 3.9   BILITOT 0.7   ALKPHOS 122   AST 30   ALT 41   ANIONGAP 15   , CBC   Recent Labs   Lab 11/06/23  1306 11/06/23  2017 11/06/23 2026   WBC 9.37  --  8.59   HGB 12.4  --  12.3   HCT 39.6   < > 39.6     --  367    < > = values in this interval not displayed.   , INR No results for input(s): "INR", "PROTIME" in the last 48 hours., Lipid Panel No results for input(s): "CHOL", "HDL", "LDLCALC", "TRIG", "CHOLHDL" in the last 48 hours., Troponin   Recent Labs   Lab 11/06/23  1306 11/06/23  1617 11/06/23 2026   TROPONINI 0.090* 0.079* 0.085*   , and All pertinent lab results from the last 24 hours have been reviewed.    Significant Imaging: Echocardiogram: Transthoracic echo (TTE) complete (Cupid Only): "   Results for orders placed or performed during the hospital encounter of 11/06/23   Echo   Result Value Ref Range    BSA 1.93 m2    LVOT stroke volume 34.63 cm3    LVIDd 5.47 3.5 - 6.0 cm    LV Systolic Volume 120.86 mL    LV Systolic Volume Index 63.9 mL/m2    LVIDs 5.05 (A) 2.1 - 4.0 cm    LV Diastolic Volume 145.56 mL    LV Diastolic Volume Index 77.02 mL/m2    IVS 0.69 0.6 - 1.1 cm    LVOT diameter 1.95 cm    LVOT area 3.0 cm2    FS 8 (A) 28 - 44 %    Left Ventricle Relative Wall Thickness 0.27 cm    Posterior Wall 0.74 0.6 - 1.1 cm    LV mass 137.75 g    LV Mass Index 73 g/m2    TDI LATERAL 0.11 m/s    TR Max Yosi 3.22 m/s    LVOT peak yosi 0.73 m/s    Left Ventricular Outflow Tract Mean Velocity 0.49 cm/s    Left Ventricular Outflow Tract Mean Gradient 1.09 mmHg    RVDD 3.60 cm    TAPSE 1.50 cm    LA size 3.79 cm    Left Atrium Minor Axis 5.10 cm    Left Atrium Major Axis 5.47 cm    RA Major Axis 4.12 cm    AV mean gradient 4 mmHg    AV peak gradient 7 mmHg    Ao peak yosi 1.36 m/s    Ao VTI 19.80 cm    LVOT peak VTI 11.60 cm    AV valve area 1.75 cm²    AV Velocity Ratio 0.54     AV index (prosthetic) 0.59     RACHID by Velocity Ratio 1.60 cm²    Triscuspid Valve Regurgitation Peak Gradient 41 mmHg    PV PEAK VELOCITY 0.79 m/s    PV peak gradient 2 mmHg    Sinus 2.15 cm    STJ 1.79 cm    IVC diameter 1.76 cm    ZLVIDS 3.46     ZLVIDD 0.33     LA Volume Index 46.8 mL/m2    LA volume 88.42 cm3    LA WIDTH 5.2 cm    RA Width 3.4 cm    TV resting pulmonary artery pressure 49 mmHg    RV TB RVSP 11 mmHg    Est. RA pres 8 mmHg    Narrative      Left Ventricle: The left ventricle is mildly dilated. Normal wall   thickness. There is severely reduced systolic function with a visually   estimated ejection fraction of 20 - 25%.    Right Ventricle: Normal right ventricular cavity size. Systolic   function is normal.    Left Atrium: Left atrium is severely dilated.    Right Atrium: Right atrium is moderately dilated.     Mitral Valve: There is mild regurgitation.    Tricuspid Valve: There is mild to moderate regurgitation.    Pulmonary Artery: The estimated pulmonary artery systolic pressure is   49 mmHg.    IVC/SVC: Intermediate venous pressure at 8 mmHg.    Pericardium: There is a trivial effusion.       Assessment and Plan:     * Cardiac arrest  VFib arrest.  Prolonged QTC.  On lidocaine drip.  Monitor closely in ICU.  Plan for cardiac catheterization in a.m.    CHF (congestive heart failure)  Patient is identified as having Combined Systolic and Diastolic heart failure that is Acute.Latest ECHO performed and demonstrates- Results for orders placed during the hospital encounter of 11/06/23    Echo    Interpretation Summary    Left Ventricle: The left ventricle is mildly dilated. Normal wall thickness. There is severely reduced systolic function with a visually estimated ejection fraction of 20 - 25%.    Right Ventricle: Normal right ventricular cavity size. Systolic function is normal.    Left Atrium: Left atrium is severely dilated.    Right Atrium: Right atrium is moderately dilated.    Mitral Valve: There is mild regurgitation.    Tricuspid Valve: There is mild to moderate regurgitation.    Pulmonary Artery: The estimated pulmonary artery systolic pressure is 49 mmHg.    IVC/SVC: Intermediate venous pressure at 8 mmHg.    Pericardium: There is a trivial effusion.  . Continue Furosemide and monitor clinical status closely. Monitor on telemetry. Patient is on CHF pathway.  Monitor strict Is&Os and daily weights.  Place on fluid restriction of 1.5 L. Cardiology has been consulted. Continue to stress to patient importance of self efficacy and  on diet for CHF. Last BNP reviewed- and noted below   Recent Labs   Lab 11/06/23  1306   BNP 1,839*   .        Acute respiratory failure with hypoxia        Pulmonary embolism  CTA personally reviewed.  Segmental and subsegmental pulmonary embolism.  RV to LV ratio less  than 0.9.  Will continue to monitor.  Currently no indication for thrombectomy.  No DVT on peripheral ultrasound.  Continue heparin drip, transition to Eliquis at time of discharge    Multifocal atrial tachycardia  On initial presentation.  Improving.    Hyperlipidemia associated with type 2 diabetes mellitus        History of stroke with current residual effects        Type 2 diabetes mellitus with microalbuminuria, without long-term current use of insulin        Hypertension, essential, benign            VTE Risk Mitigation (From admission, onward)         Ordered     IP VTE HIGH RISK PATIENT  Once         11/06/23 2047     Place sequential compression device  Until discontinued         11/06/23 2047     heparin 25,000 units in dextrose 5% (100 units/ml) IV bolus from bag - ADDITIONAL PRN BOLUS - 60 units/kg  As needed (PRN)        Question:  Heparin Infusion Adjustment (DO NOT MODIFY ANSWER)  Answer:  \\[facility]\epic\Images\Pharmacy\HeparinInfusions\heparin HIGH INTENSITY nomogram for [facility] [id number]    11/06/23 1833     heparin 25,000 units in dextrose 5% (100 units/ml) IV bolus from bag - ADDITIONAL PRN BOLUS - 30 units/kg  As needed (PRN)        Question:  Heparin Infusion Adjustment (DO NOT MODIFY ANSWER)  Answer:  \\[facility]\epic\Images\Pharmacy\HeparinInfusions\heparin HIGH INTENSITY nomogram for [facility] [id number]    11/06/23 1833     heparin 25,000 units in dextrose 5% 250 mL (100 units/mL) infusion HIGH INTENSITY nomogram - [facility]  Continuous        Question:  Begin at (units/kg/hr)  Answer:  18    11/06/23 1833                Thank you for your consult. I will follow-up with patient. Please contact us if you have any additional questions.    Asher Gonzalez MD  Cardiology   [facility] [place] [place] Emergency Dept      Critical Care Time:  60 minutes     Critical care was time spent personally by me on the following activities: development of treatment plan with patient or surrogate and bedside caregivers, discussions  with consultants, evaluation of patient's response to treatment, examination of patient, ordering and performing treatments and interventions, ordering and review of laboratory studies, ordering and review of radiographic studies, pulse oximetry, re-evaluation of patient's condition. This critical care time did not overlap with that of any other provider or involve time for any procedures.

## 2023-11-07 NOTE — H&P
[facility] Emergency [facility] Medicine  History & Physical    Patient Name: Harriett Oglesby  MRN: [id number]  Patient Class: IP- Inpatient  Admission Date: 11/6/2023  Attending Physician: Nicolas Arellano MD   Primary Care Provider: Zeinab Noble MD         Patient information was obtained from patient, relative(s) and ER records.     Subjective:     Principal Problem:Cardiac arrest    Chief Complaint:   Chief Complaint   Patient presents with    Tachycardia     Pt to the ED sent from urgent care due to tachycardia. Pt reports going to urgent care for nasal congestion and because she has been out of her BP meds for 2 weeks. EKG was performed and HR noted to be 155. Pt reports intermittent shortness of breath. Pt denies chest pain.         HPI: This is a 68-year-old female with a past medical history of hypertension, type 2 diabetes, hyperlipidemia, CVA with left-sided deficits who presents with tachycardia.      The patient initially presented to urgent care with shortness of breaths and lower extremity edema that started 7 days prior to presentation.  She ran out of her blood pressure medications about 2 weeks prior.  Additional symptoms include cough, nasal congestion and fatigue.  She was noted to be tachycardic and was advised present to the ED.    In the ED, the patient was tachycardic (up to 160s), tachypneic but normotensive.  Labs were remarkable for an elevated BNP (1839), elevated troponin (0.090 > 0.079), elevated D-dimer (2.87).  EKGs showed MAT and prolonged QTc. CTA chest showed pulmonary emboli in segmental and subsegmental pulmonary artery in the right middle lobe, with mild-to-moderate bibasilar pleural effusions with associated bibasilar atelectasis. An echocardiogram showed an EF of 20-25%, PA pressure of 49 mmHg.  Patient was given aspirin 325 mg, adenosine 6 mg IV x2, atenolol 25 mg p.o., diltiazem 20 mg IV x2, 30 mg IV x1, potassium bicarbonate 40 mEq use and was started on heparin  drip.    While in the ED, the patient went into V-fib arrest, underwent 1 round of CPR and was cardioverted @200 joules x1, with achievement of ROSC. Mag sulfate 2 g IV & an amp of D50% were administered. Cardiology recommended Plavix, lidocaine infusion and an angiogram in the AM. She subsequently went into Vtach and was cardioverted @200 joules x1. No Epi was given and the patient was not intubated.   Plavix, additional potassium and lidocaine bolus, followed by infusion were ordered. Patient became hypoxic and was placed on a NRB. She was admitted for further management.        Past Medical History:   Diagnosis Date    Adult BMI 31.0-31.9 kg/sq m 12/3/2018    Anemia     CVA (cerebral vascular accident)     Residual weakness in the left arm and hand    Essential thrombocythemia     Hyperlipidemia associated with type 2 diabetes mellitus     Hypertension     Osteoporosis     Pulmonary embolism 11/6/2023    Type 2 diabetes mellitus        Past Surgical History:   Procedure Laterality Date    COLONOSCOPY N/A 1/4/2019    Procedure: COLONOSCOPY;  Surgeon: James Ozuna MD;  Location: [facility];  Service: Endoscopy;  Laterality: N/A;    COLONOSCOPY N/A 3/1/2021    Procedure: COLONOSCOPY;  Surgeon: Nelida Cook MD;  Location: [facility];  Service: Endoscopy;  Laterality: N/A;    ESOPHAGOGASTRODUODENOSCOPY N/A 3/1/2021    Procedure: EGD (ESOPHAGOGASTRODUODENOSCOPY);  Surgeon: Nelida Cook MD;  Location: [facility];  Service: Endoscopy;  Laterality: N/A;  rapid covid > 50 miles away, instructions mailed -ml    HEMORRHOID SURGERY         Review of patient's allergies indicates:  No Known Allergies    No current facility-administered medications on file prior to encounter.     Current Outpatient Medications on File Prior to Encounter   Medication Sig    amLODIPine (NORVASC) 10 MG tablet Take 1 tablet (10 mg total) by mouth once daily.    aspirin (ECOTRIN) 81 MG EC tablet Take 81 mg by mouth once  daily.     atenoloL (TENORMIN) 25 MG tablet Take 1 tablet (25 mg total) by mouth once daily.    metFORMIN (GLUCOPHAGE) 1000 MG tablet Take 1 tablet (1,000 mg total) by mouth 2 (two) times daily. for diabetes.    valsartan (DIOVAN) 320 MG tablet Take 1 tablet (320 mg total) by mouth once daily.    cetirizine (ZYRTEC) 10 MG tablet Take 10 mg by mouth once daily.    cyclobenzaprine (FLEXERIL) 5 MG tablet Take 5 mg by mouth 2 (two) times daily as needed.    dapagliflozin (FARXIGA) 10 mg tablet Take 1 tablet (10 mg total) by mouth Daily.    hydroCHLOROthiazide (HYDRODIURIL) 25 MG tablet Take 1 tablet (25 mg total) by mouth once daily.    methylPREDNISolone acetate (DEPO-MEDROL) 40 mg/mL injection     simvastatin (ZOCOR) 20 MG tablet Take 1 tablet (20 mg total) by mouth once daily.    [DISCONTINUED] amLODIPine (NORVASC) 10 MG tablet Take 1 tablet (10 mg total) by mouth once daily.    [DISCONTINUED] blood sugar diagnostic Strp 1 strip by Misc.(Non-Drug; Combo Route) route once daily.    [DISCONTINUED] hydroCHLOROthiazide (HYDRODIURIL) 25 MG tablet Take 1 tablet (25 mg total) by mouth once daily.    [DISCONTINUED] simvastatin (ZOCOR) 20 MG tablet Take 1 tablet (20 mg total) by mouth once daily.    [DISCONTINUED] valsartan (DIOVAN) 320 MG tablet Take 1 tablet (320 mg total) by mouth once daily.     Family History       Problem Relation (Age of Onset)    Breast cancer Cousin, Cousin    Cancer Father    Emphysema Father    Pancreatic cancer Mother          Tobacco Use    Smoking status: Former     Types: Cigarettes    Smokeless tobacco: Never   Substance and Sexual Activity    Alcohol use: No    Drug use: No    Sexual activity: Not Currently     Review of Systems   Constitutional:  Positive for fatigue.   HENT: Negative.     Eyes: Negative.    Respiratory:  Positive for cough and shortness of breath (chest congestion).    Cardiovascular: Negative.    Gastrointestinal: Negative.    Endocrine: Negative.     Genitourinary: Negative.    Musculoskeletal: Negative.    Skin: Negative.    Allergic/Immunologic: Negative.    Neurological: Negative.    Psychiatric/Behavioral: Negative.       Objective:     Vital Signs (Most Recent):  Temp: 98.7 °F (37.1 °C) (11/06/23 1857)  Pulse: 89 (11/06/23 2015)  Resp: (!) 33 (11/06/23 2015)  BP: 129/76 (11/06/23 2015)  SpO2: (!) 85 % (11/06/23 2015) Vital Signs (24h Range):  Temp:  [97.4 °F (36.3 °C)-98.7 °F (37.1 °C)] 98.7 °F (37.1 °C)  Pulse:  [] 89  Resp:  [15-33] 33  SpO2:  [85 %-99 %] 85 %  BP: ()/() 129/76     Weight: 81.2 kg (179 lb)  Body mass index is 29.79 kg/m².     Physical Exam  Vitals and nursing note reviewed.   Constitutional:       General: She is in acute distress.      Appearance: Normal appearance. She is ill-appearing.   HENT:      Head: Normocephalic and atraumatic.      Nose: Nose normal.      Mouth/Throat:      Mouth: Mucous membranes are moist.   Eyes:      Extraocular Movements: Extraocular movements intact.   Cardiovascular:      Rate and Rhythm: Tachycardia present.      Pulses: Normal pulses.      Heart sounds: No murmur heard.  Pulmonary:      Effort: Pulmonary effort is normal. No respiratory distress.      Breath sounds: Decreased air movement present.   Abdominal:      General: Abdomen is flat.      Palpations: Abdomen is soft.      Tenderness: There is no abdominal tenderness.   Musculoskeletal:      Right lower leg: No edema.      Left lower leg: No edema.   Skin:     General: Skin is warm.   Neurological:      General: No focal deficit present.      Mental Status: She is alert.                Significant Labs: All pertinent labs within the past 24 hours have been reviewed.    Significant Imaging: I have reviewed all pertinent imaging results/findings within the past 24 hours.    Assessment/Plan:     * Cardiac arrest  While in the ED, the patient went into V-fib arrest, underwent 1 round of CPR and was cardioverted @200 joules x1, with  achievement of ROSC.   She subsequently went into Vtach and was cardioverted @200 joules x1. No Epi was given and the patient was not intubated.     Likely cause: prolonged QTc. Other differentials include ACS (considered less likely due to down trending troponin, no chest pain and unremarkable EKG), or PE (currently being treated for this). Could also be related to developing cardiogenic shock (received several doses of Cardizem, with a reduced EF).     Plan:   Continue lidocaine infusion   Aggressive electrolyte monitoring and management, Mag >2, K>4   Cardiology consult  NPO after MN for LHC   If recurrence of episodes, or worsening hypoxia, will electively intubate   Continue aspirin, plavix and heparin     Acute respiratory failure with hypoxia  Patient with Hypoxic Respiratory failure which is Acute.  she is not on home oxygen. Supplemental oxygen was provided.   Likely in the setting of PE, volume overload   Diurese when able   Continue heparin ggt     Pulmonary embolism  CTA chest showed pulmonary emboli in segmental and subsegmental pulmonary artery in the right middle lobe, with mild-to-moderate bibasilar pleural effusions with associated bibasilar atelectasis.    Continue heparin ggt   Consult cardiology     Multifocal atrial tachycardia  Improved with diltiazem.   Telemetry monitoring   Consult cardiology       Advanced care planning/counseling discussion  Remains full code at this time.       Hyperlipidemia associated with type 2 diabetes mellitus  Resume statin    History of stroke with current residual effects  Resume home medications      Type 2 diabetes mellitus with microalbuminuria, without long-term current use of insulin  Glycemic protocol. LDSS      Hypertension, essential, benign  Chronic, controlled. Latest blood pressure and vitals reviewed-     Temp:  [97.4 °F (36.3 °C)-97.7 °F (36.5 °C)]   Pulse:  [115-169]   Resp:  [15-28]   BP: (129-165)/()   SpO2:  [95 %-99 %] .   Home meds for  hypertension were reviewed and noted below.   Hypertension Medications             atenoloL (TENORMIN) 25 MG tablet Take 1 tablet (25 mg total) by mouth once daily.    amLODIPine (NORVASC) 10 MG tablet Take 1 tablet (10 mg total) by mouth once daily.    hydroCHLOROthiazide (HYDRODIURIL) 25 MG tablet Take 1 tablet (25 mg total) by mouth once daily.    valsartan (DIOVAN) 320 MG tablet Take 1 tablet (320 mg total) by mouth once daily.          While in the hospital, will manage blood pressure as follows;  Re-introduce BP medications if BP allows.     Will utilize p.r.n. blood pressure medication only if patient's blood pressure greater than 180/110 and she develops symptoms such as worsening chest pain or shortness of breath.      VTE Risk Mitigation (From admission, onward)         Ordered     IP VTE HIGH RISK PATIENT  Once         11/06/23 2047     Place sequential compression device  Until discontinued         11/06/23 2047     heparin 25,000 units in dextrose 5% (100 units/ml) IV bolus from bag - ADDITIONAL PRN BOLUS - 60 units/kg  As needed (PRN)        Question:  Heparin Infusion Adjustment (DO NOT MODIFY ANSWER)  Answer:  [email or web address]\Images\Pharmacy\HeparinInfusions\heparin HIGH INTENSITY nomogram for [facility] [id number]    11/06/23 1833     heparin 25,000 units in dextrose 5% (100 units/ml) IV bolus from bag - ADDITIONAL PRN BOLUS - 30 units/kg  As needed (PRN)        Question:  Heparin Infusion Adjustment (DO NOT MODIFY ANSWER)  Answer:  [email or web address]\epic\Images\Pharmacy\HeparinInfusions\heparin HIGH INTENSITY nomogram for [facility] [id number].pdf    11/06/23 1833     heparin 25,000 units in dextrose 5% 250 mL (100 units/mL) infusion HIGH INTENSITY nomogram - [facility]  Continuous        Question:  Begin at (units/kg/hr)  Answer:  18    11/06/23 1833                               Favian Hdz MD  Department of Hospital Medicine  [facility] - [place] - Emergency Dept    N/A  Family History   Problem Relation Age of Onset     Pancreatic cancer Mother     Emphysema Father     Cancer Father         Lung    Breast cancer Cousin     Breast cancer Cousin     Colon cancer Neg Hx     Esophageal cancer Neg Hx      Pertinent information:

## 2023-11-07 NOTE — ASSESSMENT & PLAN NOTE
While in the ED, the patient went into V-fib arrest, underwent 1 round of CPR and was cardioverted @200 joules x1, with achievement of ROSC.   She subsequently went into Vtach and was cardioverted @200 joules x1. No Epi was given and the patient was not intubated.     Cardiology consulted.  C today.   Continue lidocaine infusion

## 2023-11-07 NOTE — PROGRESS NOTES
[facility] Intensive Care  ICU Shift Summary  Date: 11/7/2023      Prehospitalization: Home  Admit Date / LOS : 11/6/2023/ 1 days    Diagnosis: Cardiac arrest    Consults:        Active: Cardio       Needed: N/A     Code Status: Full Code   Advanced Directive: <no information>    LDA:  Lines/Drains/Airways       Peripheral Intravenous Line  Duration                  Peripheral IV - Single Lumen 11/06/23 1853 20 G Anterior;Right Forearm <1 day         Peripheral IV - Single Lumen 11/07/23 1605 20 G Anterior;Right Upper Arm <1 day                  Central Lines/Site/Justification:Patient Does Not Have Central Line  Urinary Cath/Order/Justification:Patient Does Not Have Urinary Catheter    Vasopressors/Infusions:    heparin (porcine) in D5W 18 Units/kg/hr (11/07/23 1648)    LIDOcaine 1 mg/min (11/07/23 1000)          GOALS: Volume/ Hemodynamic: N/A                     RASS: 0  alert and calm    Pain Management: none       Pain Controlled: not applicable     Rhythm: NSR    Respiratory Device: Nasal Cannula                      Most Recent SBT/ SAT: N/A       MOVE Screen: PT Consult  ICU Liberation: not applicable    VTE Prophylaxis: Pharm  Mobility: Bedrest  Stress Ulcer Prophylaxis: Yes    Isolation: No active isolations    Dietary:   Current Diet Order   Procedures    Diet diabetic Cardiac (Low Na/Chol); 2000 Calorie; Fluid - 1500mL; Standard Tray     Order Specific Question:   Additional Diet Options:     Answer:   Cardiac (Low Na/Chol)     Order Specific Question:   Total calories:     Answer:   2000 Calorie     Order Specific Question:   Fluid restriction:     Answer:   Fluid - 1500mL     Order Specific Question:   Tray type:     Answer:   Standard Tray      Tolerance: yes  Advancement: yes    I & O (24h):    Intake/Output Summary (Last 24 hours) at 11/7/2023 1721  Last data filed at 11/7/2023 1700  Gross per 24 hour   Intake 812.83 ml   Output 400 ml   Net 412.83 ml        Restraints: No    Significant  "Dates:  Post Op Date: N/A  Rescue Date: N/A  Imaging/ Diagnostics: N/A    Noteworthy Labs:  See labs     COVID Test: (--)  CBC/Anemia Labs: Coags:    Recent Labs   Lab 11/06/23 2026 11/07/23  0841   WBC 8.59 8.29  8.29   HGB 12.3 12.7  12.7   HCT 39.6 40.3  40.3    359  359   MCV 91 91  91   RDW 15.3* 15.4*  15.4*    Recent Labs   Lab 11/07/23  0841 11/07/23  1527   INR 1.1  1.1  --    APTT 51.9* 26.5        Chemistries:   Recent Labs   Lab 11/06/23 2026 11/07/23  0100 11/07/23  0841    138 140   K 3.7 4.7 4.8    104 104   CO2 19* 21* 18*   BUN 12 13 15   CREATININE 1.1 1.1 1.1   CALCIUM 8.6* 8.9 9.2   PROT 6.4  --  7.5   BILITOT 0.8  --  0.9   ALKPHOS 144*  --  161*   ALT 56*  --  52*   AST 78*  --  55*   MG 8.4* 2.3 2.3   PHOS 3.7  --  4.2        Cardiac Enzymes: Ejection Fractions:    Recent Labs     11/06/23 2026 11/07/23  0100   TROPONINI 0.085* 0.080*    No results found for: "EF"     POCT Glucose: HbA1c:    Recent Labs   Lab 11/07/23  0752 11/07/23  1241 11/07/23  1701   POCTGLUCOSE 210* 195* 244*    Hemoglobin A1C   Date Value Ref Range Status   11/07/2023 6.3 (H) 4.0 - 5.6 % Final     Comment:     ADA Screening Guidelines:  5.7-6.4%  Consistent with prediabetes  >or=6.5%  Consistent with diabetes    High levels of fetal hemoglobin interfere with the HbA1C  assay. Heterozygous hemoglobin variants (HbS, HgC, etc)do  not significantly interfere with this assay.   However, presence of multiple variants may affect accuracy.             ICU LOS 19h  Level of Care: Critical Care    Chart Check: 12 HR Done  Shift Summary/Plan for the shift: See care plan note     "

## 2023-11-07 NOTE — ASSESSMENT & PLAN NOTE
Chronic, controlled. Latest blood pressure and vitals reviewed-     Temp:  [97 °F (36.1 °C)-98.7 °F (37.1 °C)]   Pulse:  []   Resp:  [13-34]   BP: ()/(56-92)   SpO2:  [84 %-100 %] .   Home meds for hypertension were reviewed and noted below.   Hypertension Medications             atenoloL (TENORMIN) 25 MG tablet Take 1 tablet (25 mg total) by mouth once daily.    amLODIPine (NORVASC) 10 MG tablet Take 1 tablet (10 mg total) by mouth once daily.    hydroCHLOROthiazide (HYDRODIURIL) 25 MG tablet Take 1 tablet (25 mg total) by mouth once daily.    valsartan (DIOVAN) 320 MG tablet Take 1 tablet (320 mg total) by mouth once daily.      Currently normotensive off home medications.  Resume if BP increases.

## 2023-11-07 NOTE — NURSING
[facility] [place]  Nurses Note -- 4 Eyes      11/7/2023       Skin assessed on: Q Shift      [x] No Pressure Injuries Present    [x]Prevention Measures Documented    [] Yes LDA  for Pressure Injury Previously documented     [] Yes New Pressure Injury Discovered   [] LDA for New Pressure Injury Added      Attending RN:  Rebeca Melo RN     Second RN:  ALIDA Branham

## 2023-11-07 NOTE — ASSESSMENT & PLAN NOTE
CTA personally reviewed.  Segmental and subsegmental pulmonary embolism.  RV to LV ratio less than 0.9.  Will continue to monitor.  Currently no indication for thrombectomy.  No DVT on peripheral ultrasound.  Continue heparin drip, transition to Eliquis at time of discharge

## 2023-11-07 NOTE — ASSESSMENT & PLAN NOTE
Glycemic protocol. LDSS  Uncontrolled with hyperglycemia.  Hold home oral medications.  Has remained hyperglycemic.  Will start nightly Levemir while hospitalized.

## 2023-11-08 ENCOUNTER — TELEPHONE (OUTPATIENT)
Dept: FAMILY MEDICINE | Facility: CLINIC | Age: 68
End: 2023-11-08
Payer: MEDICARE

## 2023-11-08 PROBLEM — R41.0 ACUTE DELIRIUM: Status: ACTIVE | Noted: 2023-11-08

## 2023-11-08 PROBLEM — I49.01 VF (VENTRICULAR FIBRILLATION): Status: ACTIVE | Noted: 2023-11-06

## 2023-11-08 LAB
APTT PPP: 25.4 SEC (ref 21–32)
APTT PPP: 55.1 SEC (ref 21–32)
APTT PPP: 61.4 SEC (ref 21–32)
APTT PPP: 88.3 SEC (ref 21–32)
BASOPHILS # BLD AUTO: 0.04 K/UL (ref 0–0.2)
BASOPHILS NFR BLD: 0.6 % (ref 0–1.9)
DIFFERENTIAL METHOD BLD: ABNORMAL
EOSINOPHIL # BLD AUTO: 0 K/UL (ref 0–0.5)
EOSINOPHIL NFR BLD: 0.1 % (ref 0–8)
ERYTHROCYTE [DISTWIDTH] IN BLOOD BY AUTOMATED COUNT: 15.1 % (ref 11.5–14.5)
HCT VFR BLD AUTO: 35.8 % (ref 37–48.5)
HGB BLD-MCNC: 11.3 G/DL (ref 12–16)
IMM GRANULOCYTES # BLD AUTO: 0.03 K/UL (ref 0–0.04)
IMM GRANULOCYTES NFR BLD AUTO: 0.4 % (ref 0–0.5)
LYMPHOCYTES # BLD AUTO: 1.6 K/UL (ref 1–4.8)
LYMPHOCYTES NFR BLD: 22.3 % (ref 18–48)
MCH RBC QN AUTO: 28.3 PG (ref 27–31)
MCHC RBC AUTO-ENTMCNC: 31.6 G/DL (ref 32–36)
MCV RBC AUTO: 90 FL (ref 82–98)
MONOCYTES # BLD AUTO: 0.6 K/UL (ref 0.3–1)
MONOCYTES NFR BLD: 9.1 % (ref 4–15)
NEUTROPHILS # BLD AUTO: 4.7 K/UL (ref 1.8–7.7)
NEUTROPHILS NFR BLD: 67.5 % (ref 38–73)
NRBC BLD-RTO: 0 /100 WBC
PLATELET # BLD AUTO: 382 K/UL (ref 150–450)
PMV BLD AUTO: 11 FL (ref 9.2–12.9)
POCT GLUCOSE: 178 MG/DL (ref 70–110)
POCT GLUCOSE: 178 MG/DL (ref 70–110)
POCT GLUCOSE: 181 MG/DL (ref 70–110)
POCT GLUCOSE: 194 MG/DL (ref 70–110)
RBC # BLD AUTO: 3.99 M/UL (ref 4–5.4)
WBC # BLD AUTO: 6.95 K/UL (ref 3.9–12.7)

## 2023-11-08 PROCEDURE — 63600175 PHARM REV CODE 636 W HCPCS: Mod: HCNC | Performed by: INTERNAL MEDICINE

## 2023-11-08 PROCEDURE — 85730 THROMBOPLASTIN TIME PARTIAL: CPT | Mod: 91,HCNC | Performed by: HOSPITALIST

## 2023-11-08 PROCEDURE — 25000003 PHARM REV CODE 250: Mod: HCNC | Performed by: STUDENT IN AN ORGANIZED HEALTH CARE EDUCATION/TRAINING PROGRAM

## 2023-11-08 PROCEDURE — 02HV33Z INSERTION OF INFUSION DEVICE INTO SUPERIOR VENA CAVA, PERCUTANEOUS APPROACH: ICD-10-PCS | Performed by: HOSPITALIST

## 2023-11-08 PROCEDURE — C1751 CATH, INF, PER/CENT/MIDLINE: HCPCS | Mod: HCNC

## 2023-11-08 PROCEDURE — 63600175 PHARM REV CODE 636 W HCPCS: Mod: HCNC | Performed by: EMERGENCY MEDICINE

## 2023-11-08 PROCEDURE — 93005 ELECTROCARDIOGRAM TRACING: CPT | Mod: HCNC

## 2023-11-08 PROCEDURE — 80053 COMPREHEN METABOLIC PANEL: CPT | Mod: HCNC | Performed by: HOSPITALIST

## 2023-11-08 PROCEDURE — 27000221 HC OXYGEN, UP TO 24 HOURS: Mod: HCNC

## 2023-11-08 PROCEDURE — 20000000 HC ICU ROOM: Mod: HCNC

## 2023-11-08 PROCEDURE — 85025 COMPLETE CBC W/AUTO DIFF WBC: CPT | Mod: HCNC | Performed by: EMERGENCY MEDICINE

## 2023-11-08 PROCEDURE — 99291 CRITICAL CARE FIRST HOUR: CPT | Mod: HCNC,,, | Performed by: INTERNAL MEDICINE

## 2023-11-08 PROCEDURE — 93010 ELECTROCARDIOGRAM REPORT: CPT | Mod: HCNC,,, | Performed by: INTERNAL MEDICINE

## 2023-11-08 PROCEDURE — A4216 STERILE WATER/SALINE, 10 ML: HCPCS | Mod: HCNC | Performed by: HOSPITALIST

## 2023-11-08 PROCEDURE — 25000003 PHARM REV CODE 250: Mod: HCNC | Performed by: HOSPITALIST

## 2023-11-08 PROCEDURE — 36569 INSJ PICC 5 YR+ W/O IMAGING: CPT | Mod: HCNC

## 2023-11-08 PROCEDURE — 99291 PR CRITICAL CARE, E/M 30-74 MINUTES: ICD-10-PCS | Mod: HCNC,,, | Performed by: INTERNAL MEDICINE

## 2023-11-08 PROCEDURE — 63600175 PHARM REV CODE 636 W HCPCS: Mod: HCNC | Performed by: STUDENT IN AN ORGANIZED HEALTH CARE EDUCATION/TRAINING PROGRAM

## 2023-11-08 PROCEDURE — 93010 EKG 12-LEAD: ICD-10-PCS | Mod: HCNC,,, | Performed by: INTERNAL MEDICINE

## 2023-11-08 RX ORDER — CEFAZOLIN SODIUM 2 G/50ML
2 SOLUTION INTRAVENOUS
Status: CANCELLED | OUTPATIENT
Start: 2023-11-08

## 2023-11-08 RX ORDER — ACETAMINOPHEN 325 MG/1
650 TABLET ORAL EVERY 4 HOURS PRN
Status: DISCONTINUED | OUTPATIENT
Start: 2023-11-08 | End: 2023-11-13 | Stop reason: HOSPADM

## 2023-11-08 RX ORDER — AMLODIPINE BESYLATE 10 MG/1
10 TABLET ORAL DAILY
Qty: 90 TABLET | Refills: 3 | Status: SHIPPED | OUTPATIENT
Start: 2023-11-08 | End: 2023-12-18

## 2023-11-08 RX ORDER — SODIUM CHLORIDE 0.9 % (FLUSH) 0.9 %
10 SYRINGE (ML) INJECTION
Status: DISCONTINUED | OUTPATIENT
Start: 2023-11-08 | End: 2023-11-13 | Stop reason: HOSPADM

## 2023-11-08 RX ORDER — LORAZEPAM 2 MG/ML
2 INJECTION INTRAMUSCULAR ONCE
Status: COMPLETED | OUTPATIENT
Start: 2023-11-08 | End: 2023-11-08

## 2023-11-08 RX ORDER — HYDROCHLOROTHIAZIDE 25 MG/1
25 TABLET ORAL DAILY
Qty: 90 TABLET | Refills: 3 | OUTPATIENT
Start: 2023-11-08 | End: 2023-11-13

## 2023-11-08 RX ORDER — OLANZAPINE 10 MG/2ML
5 INJECTION, POWDER, FOR SOLUTION INTRAMUSCULAR EVERY 12 HOURS PRN
Status: DISCONTINUED | OUTPATIENT
Start: 2023-11-08 | End: 2023-11-13 | Stop reason: HOSPADM

## 2023-11-08 RX ORDER — SODIUM CHLORIDE 0.9 % (FLUSH) 0.9 %
10 SYRINGE (ML) INJECTION EVERY 6 HOURS
Status: DISCONTINUED | OUTPATIENT
Start: 2023-11-08 | End: 2023-11-13 | Stop reason: HOSPADM

## 2023-11-08 RX ORDER — VALSARTAN 320 MG/1
320 TABLET ORAL DAILY
Qty: 90 TABLET | Refills: 3 | OUTPATIENT
Start: 2023-11-08 | End: 2023-11-13

## 2023-11-08 RX ORDER — MUPIROCIN 20 MG/G
OINTMENT TOPICAL 2 TIMES DAILY
Status: DISCONTINUED | OUTPATIENT
Start: 2023-11-08 | End: 2023-11-13 | Stop reason: HOSPADM

## 2023-11-08 RX ORDER — LORAZEPAM 2 MG/ML
1 INJECTION INTRAMUSCULAR ONCE
Status: DISCONTINUED | OUTPATIENT
Start: 2023-11-08 | End: 2023-11-08

## 2023-11-08 RX ORDER — SIMVASTATIN 20 MG/1
20 TABLET, FILM COATED ORAL DAILY
Qty: 90 TABLET | Refills: 3 | OUTPATIENT
Start: 2023-11-08 | End: 2023-11-13

## 2023-11-08 RX ADMIN — HEPARIN SODIUM AND DEXTROSE 21 UNITS/KG/HR: 10000; 5 INJECTION INTRAVENOUS at 08:11

## 2023-11-08 RX ADMIN — FAMOTIDINE 20 MG: 10 INJECTION INTRAVENOUS at 09:11

## 2023-11-08 RX ADMIN — LIDOCAINE HYDROCHLORIDE 1 MG/MIN: 8 INJECTION, SOLUTION INTRAVENOUS at 08:11

## 2023-11-08 RX ADMIN — MUPIROCIN: 20 OINTMENT TOPICAL at 09:11

## 2023-11-08 RX ADMIN — LORAZEPAM 2 MG: 2 INJECTION INTRAMUSCULAR; INTRAVENOUS at 05:11

## 2023-11-08 RX ADMIN — INSULIN DETEMIR 5 UNITS: 100 INJECTION, SOLUTION SUBCUTANEOUS at 09:11

## 2023-11-08 RX ADMIN — Medication 10 ML: at 06:11

## 2023-11-08 RX ADMIN — ATORVASTATIN CALCIUM 40 MG: 40 TABLET, FILM COATED ORAL at 09:11

## 2023-11-08 RX ADMIN — Medication 10 ML: at 11:11

## 2023-11-08 RX ADMIN — Medication 10 ML: at 12:11

## 2023-11-08 RX ADMIN — HEPARIN SODIUM AND DEXTROSE 18 UNITS/KG/HR: 10000; 5 INJECTION INTRAVENOUS at 02:11

## 2023-11-08 NOTE — EICU
Alerted by bedside regarding patient eing combative and not allowing PICC line placement. Ativan 1 mg IV x 1 dose ordered to help with the procedure.     ADDENDUM :   Alerted by bedside that the patient pulled out IV line prior to getting ativan.   2mg IM ativan given x 1 dose

## 2023-11-08 NOTE — ASSESSMENT & PLAN NOTE
Patient disoriented and agitated overnight.  Probably related to ICU hospitalization.  Delirium precautions.  Zyprexa prn severe non redirectable agitation.

## 2023-11-08 NOTE — NURSING
Patient voided on bedpan - vaginal yeast noted when cleaning her up - states she has been bathing in the saltwater - [place] -

## 2023-11-08 NOTE — NURSING
"Patient sleepy this afternoon - upon entering the room noted her teeth were on the sheet - I asked why they were there she said " Toribio told me to take them out and put them there "  "

## 2023-11-08 NOTE — PROCEDURES
"Harriett Oglesby is a 68 y.o. female patient.    Temp: 97.9 °F (36.6 °C) (11/08/23 0400)  Pulse: (!) 116 (11/08/23 0600)  Resp: (!) 31 (11/08/23 0600)  BP: 109/70 (11/08/23 0600)  SpO2: (!) 86 % (11/08/23 0600)  Weight: 81 kg (178 lb 9.2 oz) (11/06/23 2130)  Height: 5' 5" (165.1 cm) (11/06/23 2130)    PICC  Date/Time: 11/8/2023 6:13 AM  Performed by: Christopher Alba RN  Consent Done: Yes  Time out: Immediately prior to procedure a time out was called to verify the correct patient, procedure, equipment, support staff and site/side marked as required  Indications: med administration and vascular access  Anesthesia: local infiltration  Local anesthetic: lidocaine 1% without epinephrine  Anesthetic Total (mL): 2  Preparation: skin prepped with ChloraPrep  Skin prep agent dried: skin prep agent completely dried prior to procedure  Sterile barriers: all five maximum sterile barriers used - cap, mask, sterile gown, sterile gloves, and large sterile sheet  Hand hygiene: hand hygiene performed prior to central venous catheter insertion  Location details: right basilic  Catheter type: triple lumen  Catheter size: 5 Fr  Catheter Length: 34cm    Ultrasound guidance: yes  Vessel Caliber: medium and patent, compressibility normal  Vascular Doppler: not done  Needle advanced into vessel with real time Ultrasound guidance.  Guidewire confirmed in vessel.  Sterile sheath used.  no esophageal manometryNumber of attempts: 1  Post-procedure: blood return through all ports, chlorhexidine patch and sterile dressing applied  Estimated blood loss (mL): 0            Name Christopher Alba RN  11/8/2023    "

## 2023-11-08 NOTE — ASSESSMENT & PLAN NOTE
While in the ED, the patient went into V-fib arrest, underwent 1 round of CPR and was cardioverted @200 joules x1, with achievement of ROSC.   She subsequently went into Vtach and was cardioverted @200 joules x1. No Epi was given and the patient was not intubated.     Cardiology consulted.  LHC showing non obstructive cardiomyopathy.  Continue lidocaine infusion.  Has remained in NSR.  Plan for AICD in Am.

## 2023-11-08 NOTE — NURSING
[facility]  Nurses Note -- 4 Eyes      11/7/2023       Skin assessed on: Q Shift      [x] No Pressure Injuries Present    [x]Prevention Measures Documented    [] Yes LDA  for Pressure Injury Previously documented     [] Yes New Pressure Injury Discovered   [] LDA for New Pressure Injury Added      Attending RN:  Carrol Flowers RN     Second RN:  ALIDA Jose

## 2023-11-08 NOTE — ASSESSMENT & PLAN NOTE
Patient is identified as having Systolic (HFrEF) heart failure that is Acute. CHF is currently controlled. Latest ECHO performed and demonstrates- Results for orders placed during the hospital encounter of 11/06/23    Echo    Interpretation Summary    Left Ventricle: The left ventricle is mildly dilated. Normal wall thickness. There is severely reduced systolic function with a visually estimated ejection fraction of 20 - 25%.    Right Ventricle: Normal right ventricular cavity size. Systolic function is normal.    Left Atrium: Left atrium is severely dilated.    Right Atrium: Right atrium is moderately dilated.    Mitral Valve: There is mild regurgitation.    Tricuspid Valve: There is mild to moderate regurgitation.    Pulmonary Artery: The estimated pulmonary artery systolic pressure is 49 mmHg.    IVC/SVC: Intermediate venous pressure at 8 mmHg.    Pericardium: There is a trivial effusion.   Monitor on telemetry. Patient is off CHF pathway.  Monitor strict Is&Os and daily weights.  Place on fluid restriction of 1.5 L. Cardiology has been consulted. Continue to stress to patient importance of self efficacy and  on diet for CHF. Last BNP reviewed- and noted below   Recent Labs   Lab 11/06/23  1306   BNP 1,839*   Non ischemic cardiomyopathy.  Cards following.

## 2023-11-08 NOTE — NURSING
Patient oriented but then talks about the chip that was implanted in her - referring to the picc line - then she will correct herself knowing that she was confused - can answer questions - but is confused unsure is this is baseline - will speak with the daughter when she returns

## 2023-11-08 NOTE — PROGRESS NOTES
[facility]  Cardiology  Progress Note    Patient Name: Harriett Oglesby  MRN: [id number]  Admission Date: 11/6/2023  Hospital Length of Stay: 2 days  Code Status: Full Code   Attending Physician: Damien Hernandez MD   Primary Care Physician: Zeinab Noble MD  Expected Discharge Date:   Principal Problem:VF (ventricular fibrillation)    Subjective:       Interval History:  No further episodes of VFib.  Case discussed with Dr. Da Silva.  Plan for AICD tomorrow    Review of Systems   Constitutional: Positive for malaise/fatigue.   HENT: Negative.     Eyes: Negative.    Endocrine: Negative.    Hematologic/Lymphatic: Negative.    Skin: Negative.    Musculoskeletal: Negative.    Gastrointestinal: Negative.    Genitourinary: Negative.    Neurological: Negative.    Psychiatric/Behavioral: Negative.     Allergic/Immunologic: Negative.      Objective:     Vital Signs (Most Recent):  Temp: 98.8 °F (37.1 °C) (11/08/23 0800)  Pulse: (!) 116 (11/08/23 0838)  Resp: (!) 46 (11/08/23 0838)  BP: 132/87 (11/08/23 0838)  SpO2: 97 % (11/08/23 0838) Vital Signs (24h Range):  Temp:  [97.5 °F (36.4 °C)-98.8 °F (37.1 °C)] 98.8 °F (37.1 °C)  Pulse:  [] 116  Resp:  [13-46] 46  SpO2:  [86 %-100 %] 97 %  BP: ()/(57-89) 132/87     Weight: 81 kg (178 lb 9.2 oz)  Body mass index is 29.72 kg/m².     SpO2: 97 %         Intake/Output Summary (Last 24 hours) at 11/8/2023 1009  Last data filed at 11/8/2023 0600  Gross per 24 hour   Intake 838.54 ml   Output 150 ml   Net 688.54 ml       Lines/Drains/Airways       Peripherally Inserted Central Catheter Line  Duration             PICC Triple Lumen 11/08/23 0613 right basilic <1 day              Peripheral Intravenous Line  Duration                  Peripheral IV - Single Lumen 11/07/23 1605 20 G Anterior;Right Upper Arm <1 day                       Physical Exam  Constitutional:       Appearance: Normal appearance. She is well-developed.   HENT:      Head: Normocephalic.    Eyes:      Pupils: Pupils are equal, round, and reactive to light.   Cardiovascular:      Rate and Rhythm: Normal rate and regular rhythm.   Pulmonary:      Effort: Pulmonary effort is normal.      Breath sounds: Normal breath sounds.   Abdominal:      General: Bowel sounds are normal.      Palpations: Abdomen is soft.      Tenderness: There is no abdominal tenderness.   Musculoskeletal:         General: Normal range of motion.      Cervical back: Normal range of motion and neck supple.   Skin:     General: Skin is warm.   Neurological:      Mental Status: She is alert and oriented to person, place, and time.            Significant Labs: BMP:   Recent Labs   Lab 11/06/23 2026 11/07/23  0100 11/07/23  0841   * 253* 230*    138 140   K 3.7 4.7 4.8    104 104   CO2 19* 21* 18*   BUN 12 13 15   CREATININE 1.1 1.1 1.1   CALCIUM 8.6* 8.9 9.2   MG 8.4* 2.3 2.3   , CMP   Recent Labs   Lab 11/06/23  1306 11/06/23 2026 11/07/23  0100 11/07/23  0841    136 138 140   K 3.2* 3.7 4.7 4.8    102 104 104   CO2 21* 19* 21* 18*   * 333* 253* 230*   BUN 14 12 13 15   CREATININE 1.2 1.1 1.1 1.1   CALCIUM 9.1 8.6* 8.9 9.2   PROT 7.4 6.4  --  7.5   ALBUMIN 3.9 3.3*  --  3.7   BILITOT 0.7 0.8  --  0.9   ALKPHOS 122 144*  --  161*   AST 30 78*  --  55*   ALT 41 56*  --  52*   ANIONGAP 15 15 13 18*   , CBC   Recent Labs   Lab 11/06/23 2026 11/07/23  0841 11/08/23  0657   WBC 8.59 8.29  8.29 6.95   HGB 12.3 12.7  12.7 11.3*   HCT 39.6 40.3  40.3 35.8*    359  359 382   , INR   Recent Labs   Lab 11/07/23  0841   INR 1.1  1.1   , Lipid Panel   Recent Labs   Lab 11/07/23  0841   CHOL 141   HDL 39*   LDLCALC 81.8   TRIG 101   CHOLHDL 27.7   , Troponin   Recent Labs   Lab 11/06/23  1617 11/06/23  2026 11/07/23  0100   TROPONINI 0.079* 0.085* 0.080*   , and All pertinent lab results from the last 24 hours have been reviewed.    Significant Imaging: Echocardiogram: Transthoracic echo (TTE)  complete (Cupid Only):   Results for orders placed or performed during the hospital encounter of 11/06/23   Echo   Result Value Ref Range    BSA 1.93 m2    LVOT stroke volume 34.63 cm3    LVIDd 5.47 3.5 - 6.0 cm    LV Systolic Volume 120.86 mL    LV Systolic Volume Index 63.9 mL/m2    LVIDs 5.05 (A) 2.1 - 4.0 cm    LV Diastolic Volume 145.56 mL    LV Diastolic Volume Index 77.02 mL/m2    IVS 0.69 0.6 - 1.1 cm    LVOT diameter 1.95 cm    LVOT area 3.0 cm2    FS 8 (A) 28 - 44 %    Left Ventricle Relative Wall Thickness 0.27 cm    Posterior Wall 0.74 0.6 - 1.1 cm    LV mass 137.75 g    LV Mass Index 73 g/m2    TDI LATERAL 0.11 m/s    TR Max Yosi 3.22 m/s    LVOT peak yosi 0.73 m/s    Left Ventricular Outflow Tract Mean Velocity 0.49 cm/s    Left Ventricular Outflow Tract Mean Gradient 1.09 mmHg    RVDD 3.60 cm    TAPSE 1.50 cm    LA size 3.79 cm    Left Atrium Minor Axis 5.10 cm    Left Atrium Major Axis 5.47 cm    RA Major Axis 4.12 cm    AV mean gradient 4 mmHg    AV peak gradient 7 mmHg    Ao peak yosi 1.36 m/s    Ao VTI 19.80 cm    LVOT peak VTI 11.60 cm    AV valve area 1.75 cm²    AV Velocity Ratio 0.54     AV index (prosthetic) 0.59     RACHID by Velocity Ratio 1.60 cm²    Triscuspid Valve Regurgitation Peak Gradient 41 mmHg    PV PEAK VELOCITY 0.79 m/s    PV peak gradient 2 mmHg    Sinus 2.15 cm    STJ 1.79 cm    IVC diameter 1.76 cm    ZLVIDS 3.46     ZLVIDD 0.33     LA Volume Index 46.8 mL/m2    LA volume 88.42 cm3    LA WIDTH 5.2 cm    RA Width 3.4 cm    TV resting pulmonary artery pressure 49 mmHg    RV TB RVSP 11 mmHg    Est. RA pres 8 mmHg    Narrative      Left Ventricle: The left ventricle is mildly dilated. Normal wall   thickness. There is severely reduced systolic function with a visually   estimated ejection fraction of 20 - 25%.    Right Ventricle: Normal right ventricular cavity size. Systolic   function is normal.    Left Atrium: Left atrium is severely dilated.    Right Atrium: Right atrium is  moderately dilated.    Mitral Valve: There is mild regurgitation.    Tricuspid Valve: There is mild to moderate regurgitation.    Pulmonary Artery: The estimated pulmonary artery systolic pressure is   49 mmHg.    IVC/SVC: Intermediate venous pressure at 8 mmHg.    Pericardium: There is a trivial effusion.       Assessment and Plan:     Brief HPI:     * VF (ventricular fibrillation)  VFib arrest.  Prolonged QTC.  On lidocaine drip.  Monitor closely in ICU.  Plan for cardiac catheterization in a.m.    11/7: Risks, benefits and alternatives of the catheterization procedure were discussed with the patient.The risks of coronary angiography include but are not limited to: bleeding, infection, death, heart attack, arrhythmia, kidney injury or failure, potential need for dialysis, allergic reactions, stroke, need for emergency surgery, hematoma, pseudoaneurysm etc.  Should stenting be indicated, the patient has agreed to dual anti-platelet therapy for 1-consecutive year with a drug-eluting stent and a minimum of 1-month with the use of a bare metal stent. Additionally, pt is aware that non-compliance is likely to result in stent clotting with heart attack, heart failure, and/or death  The risks of moderate sedation include hypotension, respiratory depression, arrhythmias, bronchospasm, and death. Informed consent was obtained and the  patient is agreeable to proceed with the procedure. Consent was placed on the chart.  Continue lidocaine drip  11/8:  Nonischemic cardiomyopathy.  No significant stenosis on angiogram yesterday.  Plan for AICD in a.m..  Continue lidocaine drip      CHF (congestive heart failure)  Patient is identified as having Combined Systolic and Diastolic heart failure that is Acute.Latest ECHO performed and demonstrates- Results for orders placed during the hospital encounter of 11/06/23    Echo    Interpretation Summary    Left Ventricle: The left ventricle is mildly dilated. Normal wall  thickness. There is severely reduced systolic function with a visually estimated ejection fraction of 20 - 25%.    Right Ventricle: Normal right ventricular cavity size. Systolic function is normal.    Left Atrium: Left atrium is severely dilated.    Right Atrium: Right atrium is moderately dilated.    Mitral Valve: There is mild regurgitation.    Tricuspid Valve: There is mild to moderate regurgitation.    Pulmonary Artery: The estimated pulmonary artery systolic pressure is 49 mmHg.    IVC/SVC: Intermediate venous pressure at 8 mmHg.    Pericardium: There is a trivial effusion.  . Continue Furosemide and monitor clinical status closely. Monitor on telemetry. Patient is on CHF pathway.  Monitor strict Is&Os and daily weights.  Place on fluid restriction of 1.5 L. Cardiology has been consulted. Continue to stress to patient importance of self efficacy and  on diet for CHF. Last BNP reviewed- and noted below   Recent Labs   Lab 11/06/23  1306   BNP 1,839*   .        Pulmonary embolism  CTA personally reviewed.  Segmental and subsegmental pulmonary embolism.  RV to LV ratio less than 0.9.  Will continue to monitor.  Currently no indication for thrombectomy.  No DVT on peripheral ultrasound.  Continue heparin drip, transition to Eliquis at time of discharge    Multifocal atrial tachycardia  On initial presentation.  Improving.    11/7: now in sinus    Hyperlipidemia associated with type 2 diabetes mellitus        History of stroke with current residual effects        Type 2 diabetes mellitus with microalbuminuria, without long-term current use of insulin        Hypertension, essential, benign            VTE Risk Mitigation (From admission, onward)         Ordered     IP VTE HIGH RISK PATIENT  Once         11/06/23 2047     Place sequential compression device  Until discontinued         11/06/23 2047     heparin 25,000 units in dextrose 5% (100 units/ml) IV bolus from bag - ADDITIONAL PRN BOLUS - 60  units/kg  As needed (PRN)        Question:  Heparin Infusion Adjustment (DO NOT MODIFY ANSWER)  Answer:  \\[email or web address]\epic\Images\Pharmacy\HeparinInfusions\heparin HIGH INTENSITY nomogram for [facility] [id number].pdf    11/06/23 1833     heparin 25,000 units in dextrose 5% (100 units/ml) IV bolus from bag - ADDITIONAL PRN BOLUS - 30 units/kg  As needed (PRN)        Question:  Heparin Infusion Adjustment (DO NOT MODIFY ANSWER)  Answer:  \[email or web address]\epic\Images\Pharmacy\HeparinInfusions\heparin HIGH INTENSITY nomogram for [facility] [id number].pdf    11/06/23 1833     heparin 25,000 units in dextrose 5% 250 mL (100 units/mL) infusion HIGH INTENSITY nomogram - [facility]  Continuous        Question:  Begin at (units/kg/hr)  Answer:  18    11/06/23 1833                Asher Gonzalez MD  Cardiology  [facility]    Critical Care Time:  35 minutes     Critical care was time spent personally by me on the following activities: development of treatment plan with patient or surrogate and bedside caregivers, discussions with consultants, evaluation of patient's response to treatment, examination of patient, ordering and performing treatments and interventions, ordering and review of laboratory studies, ordering and review of radiographic studies, pulse oximetry, re-evaluation of patient's condition. This critical care time did not overlap with that of any other provider or involve time for any procedures.

## 2023-11-08 NOTE — PROGRESS NOTES
[facility] Medicine  Progress Note    Patient Name: Harriett Oglesby  MRN: [id number]  Patient Class: IP- Inpatient   Admission Date: 11/6/2023  Length of Stay: 2 days  Attending Physician: Damien Hernandez MD  Primary Care Provider: Zeinab Noble MD        Subjective:     Principal Problem:VF (ventricular fibrillation)        HPI:  This is a 68-year-old female with a past medical history of hypertension, type 2 diabetes, hyperlipidemia, CVA with left-sided deficits who presents with tachycardia.      The patient initially presented to urgent care with shortness of breaths and lower extremity edema that started 7 days prior to presentation.  She ran out of her blood pressure medications about 2 weeks prior.  Additional symptoms include cough, nasal congestion and fatigue.  She was noted to be tachycardic and was advised present to the ED.    In the ED, the patient was tachycardic (up to 160s), tachypneic but normotensive.  Labs were remarkable for an elevated BNP (1839), elevated troponin (0.090 > 0.079), elevated D-dimer (2.87).  EKGs showed MAT and prolonged QTc. CTA chest showed pulmonary emboli in segmental and subsegmental pulmonary artery in the right middle lobe, with mild-to-moderate bibasilar pleural effusions with associated bibasilar atelectasis. An echocardiogram showed an EF of 20-25%, PA pressure of 49 mmHg.  Patient was given aspirin 325 mg, adenosine 6 mg IV x2, atenolol 25 mg p.o., diltiazem 20 mg IV x2, 30 mg IV x1, potassium bicarbonate 40 mEq use and was started on heparin drip.    While in the ED, the patient went into V-fib arrest, underwent 1 round of CPR and was cardioverted @200 joules x1, with achievement of ROSC. Mag sulfate 2 g IV & an amp of D50% were administered. Cardiology recommended Plavix, lidocaine infusion and an angiogram in the AM. She subsequently went into Vtach and was cardioverted @200 joules x1. No Epi was given and the patient was not  intubated.   Plavix, additional potassium and lidocaine bolus, followed by infusion were ordered. Patient became hypoxic and was placed on a NRB. She was admitted for further management.        Overview/Hospital Course:  69 y/o female sent to ER from Urgent Care for tachycardia.  In the ER found to be in multifocal atrial tachycardia with prolonged QTC.  Patient was given Cardizem with some improvement in heart rate.  Subsequently CTA was done which revealed segmental and subsegmental pulmonary embolism on the right side. Echo was also done which demonstrated severe cardiomyopathy with EF of 20-25%.  Subsequently patient developed VFib and cardioverted followed by V-tach which was also cardioverted x1.  Started on lidocaine drip in the ER and admitted to ICU.  Also started on heparin drip.  Underwent LHC showing non ischemic cardiomyopathy.  Cards recommending AICD.  She has remained in NSR.      Interval History: agitated and confused overnight.    Review of Systems   HENT:  Negative for ear discharge and ear pain.    Eyes:  Negative for discharge and itching.   Endocrine: Negative for cold intolerance and heat intolerance.   Neurological:  Negative for seizures and syncope.     Objective:     Vital Signs (Most Recent):  Temp: 98.6 °F (37 °C) (11/08/23 1100)  Pulse: 75 (11/08/23 1330)  Resp: (!) 21 (11/08/23 1330)  BP: 98/75 (11/08/23 1330)  SpO2: (!) 93 % (11/08/23 1330) Vital Signs (24h Range):  Temp:  [97.6 °F (36.4 °C)-98.8 °F (37.1 °C)] 98.6 °F (37 °C)  Pulse:  [] 75  Resp:  [17-49] 21  SpO2:  [85 %-100 %] 93 %  BP: ()/(57-97) 98/75     Weight: 81 kg (178 lb 9.2 oz)  Body mass index is 29.72 kg/m².    Intake/Output Summary (Last 24 hours) at 11/8/2023 1438  Last data filed at 11/8/2023 1136  Gross per 24 hour   Intake 617.43 ml   Output 300 ml   Net 317.43 ml           Physical Exam  Vitals and nursing note reviewed.   Constitutional:       Appearance: Normal appearance. She is not toxic-appearing  or diaphoretic.   HENT:      Head: Normocephalic and atraumatic.      Nose: Nose normal.      Mouth/Throat:      Mouth: Mucous membranes are moist.   Eyes:      Extraocular Movements: Extraocular movements intact.   Cardiovascular:      Rate and Rhythm: Normal rate and regular rhythm.      Pulses: Normal pulses.      Heart sounds: No murmur heard.  Pulmonary:      Effort: Pulmonary effort is normal. No respiratory distress.      Breath sounds: Decreased air movement present.   Abdominal:      General: Abdomen is flat.      Palpations: Abdomen is soft.      Tenderness: There is no abdominal tenderness.   Musculoskeletal:      Right lower leg: No edema.      Left lower leg: No edema.   Skin:     General: Skin is warm.   Neurological:      General: No focal deficit present.      Mental Status: She is alert.      Cranial Nerves: No cranial nerve deficit.             Significant Labs: All pertinent labs within the past 24 hours have been reviewed.  BMP:   Recent Labs   Lab 11/07/23  0841   *      K 4.8      CO2 18*   BUN 15   CREATININE 1.1   CALCIUM 9.2   MG 2.3       CBC:   Recent Labs   Lab 11/06/23 2026 11/07/23  0841 11/08/23  0657   WBC 8.59 8.29  8.29 6.95   HGB 12.3 12.7  12.7 11.3*   HCT 39.6 40.3  40.3 35.8*    359  359 382         Significant Imaging: I have reviewed all pertinent imaging results/findings within the past 24 hours.      Assessment/Plan:      * VF (ventricular fibrillation)  While in the ED, the patient went into V-fib arrest, underwent 1 round of CPR and was cardioverted @200 joules x1, with achievement of ROSC.   She subsequently went into Vtach and was cardioverted @200 joules x1. No Epi was given and the patient was not intubated.     Cardiology consulted.  LHC showing non obstructive cardiomyopathy.  Continue lidocaine infusion.  Has remained in NSR.  Plan for AICD in Am.    Acute delirium  Patient disoriented and agitated overnight.  Probably related to ICU  hospitalization.  Delirium precautions.  Zyprexa prn severe non redirectable agitation.      CHF (congestive heart failure)  Patient is identified as having Systolic (HFrEF) heart failure that is Acute. CHF is currently controlled. Latest ECHO performed and demonstrates- Results for orders placed during the hospital encounter of 11/06/23    Echo    Interpretation Summary    Left Ventricle: The left ventricle is mildly dilated. Normal wall thickness. There is severely reduced systolic function with a visually estimated ejection fraction of 20 - 25%.    Right Ventricle: Normal right ventricular cavity size. Systolic function is normal.    Left Atrium: Left atrium is severely dilated.    Right Atrium: Right atrium is moderately dilated.    Mitral Valve: There is mild regurgitation.    Tricuspid Valve: There is mild to moderate regurgitation.    Pulmonary Artery: The estimated pulmonary artery systolic pressure is 49 mmHg.    IVC/SVC: Intermediate venous pressure at 8 mmHg.    Pericardium: There is a trivial effusion.   Monitor on telemetry. Patient is off CHF pathway.  Monitor strict Is&Os and daily weights.  Place on fluid restriction of 1.5 L. Cardiology has been consulted. Continue to stress to patient importance of self efficacy and  on diet for CHF. Last BNP reviewed- and noted below   Recent Labs   Lab 11/06/23  1306   BNP 1,839*   Non ischemic cardiomyopathy.  Cards following.    Pulmonary embolism  CTA chest showed pulmonary emboli in segmental and subsegmental pulmonary artery in the right middle lobe, with mild-to-moderate bibasilar pleural effusions with associated bibasilar atelectasis.    Continue heparin ggt   Transition to Eliquis post AICD placement.    Multifocal atrial tachycardia  Improved with diltiazem.   Telemetry monitoring   Cardiology consulted.  Now in NSR.      Advanced care planning/counseling discussion  Remains full code at this time.       Hyperlipidemia associated with  type 2 diabetes mellitus  Resume statin    History of stroke with current residual effects  Resume home medications      Type 2 diabetes mellitus with microalbuminuria, without long-term current use of insulin  Glycemic protocol. LDSS  Uncontrolled with hyperglycemia.  Hold home oral medications.  Has remained hyperglycemic.  Started nightly Levemir while hospitalized.      Hypertension, essential, benign  Chronic, controlled. Latest blood pressure and vitals reviewed-     Temp:  [97.6 °F (36.4 °C)-98.8 °F (37.1 °C)]   Pulse:  []   Resp:  [17-49]   BP: ()/(57-97)   SpO2:  [85 %-100 %] .   Home meds for hypertension were reviewed and noted below.   Hypertension Medications             atenoloL (TENORMIN) 25 MG tablet Take 1 tablet (25 mg total) by mouth once daily.    amLODIPine (NORVASC) 10 MG tablet Take 1 tablet (10 mg total) by mouth once daily.    hydroCHLOROthiazide (HYDRODIURIL) 25 MG tablet Take 1 tablet (25 mg total) by mouth once daily.    valsartan (DIOVAN) 320 MG tablet Take 1 tablet (320 mg total) by mouth once daily.      Currently normotensive off home medications.  Resume if BP increases.      VTE Risk Mitigation (From admission, onward)         Ordered     IP VTE HIGH RISK PATIENT  Once         11/06/23 2047     Place sequential compression device  Until discontinued         11/06/23 2047     heparin 25,000 units in dextrose 5% (100 units/ml) IV bolus from bag - ADDITIONAL PRN BOLUS - 60 units/kg  As needed (PRN)        Question:  Heparin Infusion Adjustment (DO NOT MODIFY ANSWER)  Answer:  \\[facility]\Images\Pharmacy\HeparinInfusions\heparin HIGH INTENSITY nomogram for [facility] [id number].pdf    11/06/23 1833     heparin 25,000 units in dextrose 5% (100 units/ml) IV bolus from bag - ADDITIONAL PRN BOLUS - 30 units/kg  As needed (PRN)        Question:  Heparin Infusion Adjustment (DO NOT MODIFY ANSWER)  Answer:  \\[facility]\Images\Pharmacy\HeparinInfusions\heparin HIGH INTENSITY  nomogram for [facility] [id number].pdf    11/06/23 1833     heparin 25,000 units in dextrose 5% 250 mL (100 units/mL) infusion HIGH INTENSITY nomogram - [facility]  Continuous        Question:  Begin at (units/kg/hr)  Answer:  18 11/06/23 1833                Discharge Planning   CHEYANNE:      Code Status: Full Code   Is the patient medically ready for discharge?:     Reason for patient still in hospital (select all that apply): Patient trending condition and Treatment  Discharge Plan A: Home with family          Damien Hernandez MD  Department of Hospital Medicine   [facility] - Int[facility] Care

## 2023-11-08 NOTE — ASSESSMENT & PLAN NOTE
Glycemic protocol. LDSS  Uncontrolled with hyperglycemia.  Hold home oral medications.  Has remained hyperglycemic.  Started nightly Levemir while hospitalized.

## 2023-11-08 NOTE — TELEPHONE ENCOUNTER
----- Message from ELAINA Silvestre sent at 11/7/2023  5:43 PM CST -----  Regarding: Family Requesting Contact Call  Patient's daughter stated that she was concerned that patient was without her blood pressure medication for a week due to lack of response from PCP's office.  Daughter would like to speak with someone in Dr. Noble' office to discuss concern.  Daughter's name is Radha Vizcaino @ [phone] or [phone] between the hours of 6a-3p.    Thanks  Tabitha Beth LMSW, [facility]  [phone]

## 2023-11-08 NOTE — SUBJECTIVE & OBJECTIVE
Interval History:  No further episodes of VFib.  Case discussed with Dr. Da Silva.  Plan for AICD tomorrow    Review of Systems   Constitutional: Positive for malaise/fatigue.   HENT: Negative.     Eyes: Negative.    Endocrine: Negative.    Hematologic/Lymphatic: Negative.    Skin: Negative.    Musculoskeletal: Negative.    Gastrointestinal: Negative.    Genitourinary: Negative.    Neurological: Negative.    Psychiatric/Behavioral: Negative.     Allergic/Immunologic: Negative.      Objective:     Vital Signs (Most Recent):  Temp: 98.8 °F (37.1 °C) (11/08/23 0800)  Pulse: (!) 116 (11/08/23 0838)  Resp: (!) 46 (11/08/23 0838)  BP: 132/87 (11/08/23 0838)  SpO2: 97 % (11/08/23 0838) Vital Signs (24h Range):  Temp:  [97.5 °F (36.4 °C)-98.8 °F (37.1 °C)] 98.8 °F (37.1 °C)  Pulse:  [] 116  Resp:  [13-46] 46  SpO2:  [86 %-100 %] 97 %  BP: ()/(57-89) 132/87     Weight: 81 kg (178 lb 9.2 oz)  Body mass index is 29.72 kg/m².     SpO2: 97 %         Intake/Output Summary (Last 24 hours) at 11/8/2023 1009  Last data filed at 11/8/2023 0600  Gross per 24 hour   Intake 838.54 ml   Output 150 ml   Net 688.54 ml       Lines/Drains/Airways       Peripherally Inserted Central Catheter Line  Duration             PICC Triple Lumen 11/08/23 0613 right basilic <1 day              Peripheral Intravenous Line  Duration                  Peripheral IV - Single Lumen 11/07/23 1605 20 G Anterior;Right Upper Arm <1 day                       Physical Exam  Constitutional:       Appearance: Normal appearance. She is well-developed.   HENT:      Head: Normocephalic.   Eyes:      Pupils: Pupils are equal, round, and reactive to light.   Cardiovascular:      Rate and Rhythm: Normal rate and regular rhythm.   Pulmonary:      Effort: Pulmonary effort is normal.      Breath sounds: Normal breath sounds.   Abdominal:      General: Bowel sounds are normal.      Palpations: Abdomen is soft.      Tenderness: There is no abdominal tenderness.    Musculoskeletal:         General: Normal range of motion.      Cervical back: Normal range of motion and neck supple.   Skin:     General: Skin is warm.   Neurological:      Mental Status: She is alert and oriented to person, place, and time.            Significant Labs: BMP:   Recent Labs   Lab 11/06/23 2026 11/07/23  0100 11/07/23  0841   * 253* 230*    138 140   K 3.7 4.7 4.8    104 104   CO2 19* 21* 18*   BUN 12 13 15   CREATININE 1.1 1.1 1.1   CALCIUM 8.6* 8.9 9.2   MG 8.4* 2.3 2.3   , CMP   Recent Labs   Lab 11/06/23  1306 11/06/23 2026 11/07/23  0100 11/07/23  0841    136 138 140   K 3.2* 3.7 4.7 4.8    102 104 104   CO2 21* 19* 21* 18*   * 333* 253* 230*   BUN 14 12 13 15   CREATININE 1.2 1.1 1.1 1.1   CALCIUM 9.1 8.6* 8.9 9.2   PROT 7.4 6.4  --  7.5   ALBUMIN 3.9 3.3*  --  3.7   BILITOT 0.7 0.8  --  0.9   ALKPHOS 122 144*  --  161*   AST 30 78*  --  55*   ALT 41 56*  --  52*   ANIONGAP 15 15 13 18*   , CBC   Recent Labs   Lab 11/06/23 2026 11/07/23  0841 11/08/23  0657   WBC 8.59 8.29  8.29 6.95   HGB 12.3 12.7  12.7 11.3*   HCT 39.6 40.3  40.3 35.8*    359  359 382   , INR   Recent Labs   Lab 11/07/23  0841   INR 1.1  1.1   , Lipid Panel   Recent Labs   Lab 11/07/23  0841   CHOL 141   HDL 39*   LDLCALC 81.8   TRIG 101   CHOLHDL 27.7   , Troponin   Recent Labs   Lab 11/06/23  1617 11/06/23 2026 11/07/23  0100   TROPONINI 0.079* 0.085* 0.080*   , and All pertinent lab results from the last 24 hours have been reviewed.    Significant Imaging: Echocardiogram: Transthoracic echo (TTE) complete (Cupid Only):   Results for orders placed or performed during the hospital encounter of 11/06/23   Echo   Result Value Ref Range    BSA 1.93 m2    LVOT stroke volume 34.63 cm3    LVIDd 5.47 3.5 - 6.0 cm    LV Systolic Volume 120.86 mL    LV Systolic Volume Index 63.9 mL/m2    LVIDs 5.05 (A) 2.1 - 4.0 cm    LV Diastolic Volume 145.56 mL    LV Diastolic Volume Index  77.02 mL/m2    IVS 0.69 0.6 - 1.1 cm    LVOT diameter 1.95 cm    LVOT area 3.0 cm2    FS 8 (A) 28 - 44 %    Left Ventricle Relative Wall Thickness 0.27 cm    Posterior Wall 0.74 0.6 - 1.1 cm    LV mass 137.75 g    LV Mass Index 73 g/m2    TDI LATERAL 0.11 m/s    TR Max Yosi 3.22 m/s    LVOT peak yosi 0.73 m/s    Left Ventricular Outflow Tract Mean Velocity 0.49 cm/s    Left Ventricular Outflow Tract Mean Gradient 1.09 mmHg    RVDD 3.60 cm    TAPSE 1.50 cm    LA size 3.79 cm    Left Atrium Minor Axis 5.10 cm    Left Atrium Major Axis 5.47 cm    RA Major Axis 4.12 cm    AV mean gradient 4 mmHg    AV peak gradient 7 mmHg    Ao peak yosi 1.36 m/s    Ao VTI 19.80 cm    LVOT peak VTI 11.60 cm    AV valve area 1.75 cm²    AV Velocity Ratio 0.54     AV index (prosthetic) 0.59     RACHID by Velocity Ratio 1.60 cm²    Triscuspid Valve Regurgitation Peak Gradient 41 mmHg    PV PEAK VELOCITY 0.79 m/s    PV peak gradient 2 mmHg    Sinus 2.15 cm    STJ 1.79 cm    IVC diameter 1.76 cm    ZLVIDS 3.46     ZLVIDD 0.33     LA Volume Index 46.8 mL/m2    LA volume 88.42 cm3    LA WIDTH 5.2 cm    RA Width 3.4 cm    TV resting pulmonary artery pressure 49 mmHg    RV TB RVSP 11 mmHg    Est. RA pres 8 mmHg    Narrative      Left Ventricle: The left ventricle is mildly dilated. Normal wall   thickness. There is severely reduced systolic function with a visually   estimated ejection fraction of 20 - 25%.    Right Ventricle: Normal right ventricular cavity size. Systolic   function is normal.    Left Atrium: Left atrium is severely dilated.    Right Atrium: Right atrium is moderately dilated.    Mitral Valve: There is mild regurgitation.    Tricuspid Valve: There is mild to moderate regurgitation.    Pulmonary Artery: The estimated pulmonary artery systolic pressure is   49 mmHg.    IVC/SVC: Intermediate venous pressure at 8 mmHg.    Pericardium: There is a trivial effusion.

## 2023-11-08 NOTE — EICU
Alerted by bedside regarding patient with poor peripheral access, o heparin infusion.    PICC team consulted.

## 2023-11-08 NOTE — PLAN OF CARE
Patient remains in ICU on Nasal cannula. VSS. Patient was calm at the beginning of shift, later becomes agitated, said don't want the multiple pricks.  Only one peripheral line, so informed to EICU Dr Montana, PICC line ordered. Patient refused for PICC line, Inj Ativan IM given as per order and PICC line inserted. Patient and patient's family updated with POC.  Problem: Adult Inpatient Plan of Care  Goal: Plan of Care Review  Outcome: Ongoing, Progressing  Goal: Patient-Specific Goal (Individualized)  Outcome: Ongoing, Progressing  Goal: Absence of Hospital-Acquired Illness or Injury  Outcome: Ongoing, Progressing  Goal: Optimal Comfort and Wellbeing  Outcome: Ongoing, Progressing  Goal: Readiness for Transition of Care  Outcome: Ongoing, Progressing     Problem: Diabetes Comorbidity  Goal: Blood Glucose Level Within Targeted Range  Outcome: Ongoing, Progressing     Problem: Skin Injury Risk Increased  Goal: Skin Health and Integrity  Outcome: Ongoing, Progressing

## 2023-11-08 NOTE — SUBJECTIVE & OBJECTIVE
Interval History: agitated and confused overnight.    Review of Systems   HENT:  Negative for ear discharge and ear pain.    Eyes:  Negative for discharge and itching.   Endocrine: Negative for cold intolerance and heat intolerance.   Neurological:  Negative for seizures and syncope.     Objective:     Vital Signs (Most Recent):  Temp: 98.6 °F (37 °C) (11/08/23 1100)  Pulse: 75 (11/08/23 1330)  Resp: (!) 21 (11/08/23 1330)  BP: 98/75 (11/08/23 1330)  SpO2: (!) 93 % (11/08/23 1330) Vital Signs (24h Range):  Temp:  [97.6 °F (36.4 °C)-98.8 °F (37.1 °C)] 98.6 °F (37 °C)  Pulse:  [] 75  Resp:  [17-49] 21  SpO2:  [85 %-100 %] 93 %  BP: ()/(57-97) 98/75     Weight: 81 kg (178 lb 9.2 oz)  Body mass index is 29.72 kg/m².    Intake/Output Summary (Last 24 hours) at 11/8/2023 1438  Last data filed at 11/8/2023 1136  Gross per 24 hour   Intake 617.43 ml   Output 300 ml   Net 317.43 ml           Physical Exam  Vitals and nursing note reviewed.   Constitutional:       Appearance: Normal appearance. She is not toxic-appearing or diaphoretic.   HENT:      Head: Normocephalic and atraumatic.      Nose: Nose normal.      Mouth/Throat:      Mouth: Mucous membranes are moist.   Eyes:      Extraocular Movements: Extraocular movements intact.   Cardiovascular:      Rate and Rhythm: Normal rate and regular rhythm.      Pulses: Normal pulses.      Heart sounds: No murmur heard.  Pulmonary:      Effort: Pulmonary effort is normal. No respiratory distress.      Breath sounds: Decreased air movement present.   Abdominal:      General: Abdomen is flat.      Palpations: Abdomen is soft.      Tenderness: There is no abdominal tenderness.   Musculoskeletal:      Right lower leg: No edema.      Left lower leg: No edema.   Skin:     General: Skin is warm.   Neurological:      General: No focal deficit present.      Mental Status: She is alert.      Cranial Nerves: No cranial nerve deficit.             Significant Labs: All pertinent  labs within the past 24 hours have been reviewed.  BMP:   Recent Labs   Lab 11/07/23  0841   *      K 4.8      CO2 18*   BUN 15   CREATININE 1.1   CALCIUM 9.2   MG 2.3       CBC:   Recent Labs   Lab 11/06/23 2026 11/07/23  0841 11/08/23  0657   WBC 8.59 8.29  8.29 6.95   HGB 12.3 12.7  12.7 11.3*   HCT 39.6 40.3  40.3 35.8*    359  359 382         Significant Imaging: I have reviewed all pertinent imaging results/findings within the past 24 hours.

## 2023-11-08 NOTE — ASSESSMENT & PLAN NOTE
CTA chest showed pulmonary emboli in segmental and subsegmental pulmonary artery in the right middle lobe, with mild-to-moderate bibasilar pleural effusions with associated bibasilar atelectasis.    Continue heparin ggt   Transition to Eliquis post AICD placement.

## 2023-11-08 NOTE — ASSESSMENT & PLAN NOTE
Chronic, controlled. Latest blood pressure and vitals reviewed-     Temp:  [97.6 °F (36.4 °C)-98.8 °F (37.1 °C)]   Pulse:  []   Resp:  [17-49]   BP: ()/(57-97)   SpO2:  [85 %-100 %] .   Home meds for hypertension were reviewed and noted below.   Hypertension Medications             atenoloL (TENORMIN) 25 MG tablet Take 1 tablet (25 mg total) by mouth once daily.    amLODIPine (NORVASC) 10 MG tablet Take 1 tablet (10 mg total) by mouth once daily.    hydroCHLOROthiazide (HYDRODIURIL) 25 MG tablet Take 1 tablet (25 mg total) by mouth once daily.    valsartan (DIOVAN) 320 MG tablet Take 1 tablet (320 mg total) by mouth once daily.      Currently normotensive off home medications.  Resume if BP increases.

## 2023-11-08 NOTE — NURSING
[facility] - [place], [place] Intensive Care  ICU Shift Summary  Date: 11/8/2023      Prehospitalization: Home  Admit Date / LOS : 11/6/2023/ 2 days    Diagnosis: Cardiac arrest    Consults:        Active: Cardio       Needed: N/A     Code Status: Full Code   Advanced Directive: <no information>    LDA:  Lines/Drains/Airways       Peripheral Intravenous Line  Duration                  Peripheral IV - Single Lumen 11/07/23 1605 20 G Anterior;Right Upper Arm <1 day                  Central Lines/Site/Justification:Patient Does Not Have Central Line  Urinary Cath/Order/Justification:Patient Does Not Have Urinary Catheter    Vasopressors/Infusions:    heparin (porcine) in D5W 18 Units/kg/hr (11/07/23 1648)    LIDOcaine 1 mg/min (11/07/23 1000)          GOALS: Volume/ Hemodynamic: N/A                     RASS: N/A    Pain Management: none       Pain Controlled: no     Rhythm: NSR and ST    Respiratory Device: Nasal Cannula                      Most Recent SBT/ SAT: Does not meet criteria       MOVE Screen: FAIL  ICU Liberation: not applicable    VTE Prophylaxis: Pharm  Mobility: Bedrest  Stress Ulcer Prophylaxis: Yes    Isolation: No active isolations    Dietary:   Current Diet Order   Procedures    Diet diabetic Cardiac (Low Na/Chol); 2000 Calorie; Fluid - 1500mL; Standard Tray     Order Specific Question:   Additional Diet Options:     Answer:   Cardiac (Low Na/Chol)     Order Specific Question:   Total calories:     Answer:   2000 Calorie     Order Specific Question:   Fluid restriction:     Answer:   Fluid - 1500mL     Order Specific Question:   Tray type:     Answer:   Standard Tray      Tolerance: not applicable  Advancement: no    I & O (24h):    Intake/Output Summary (Last 24 hours) at 11/8/2023 0501  Last data filed at 11/8/2023 0200  Gross per 24 hour   Intake 857.92 ml   Output 150 ml   Net 707.92 ml        Restraints: No    Significant Dates:  Post Op Date: N/A  Rescue Date: N/A  Imaging/ Diagnostics: N/A    Noteworthy Labs:   "none    COVID Test: (--)  CBC/Anemia Labs: Coags:    Recent Labs   Lab 11/06/23 2026 11/07/23  0841   WBC 8.59 8.29  8.29   HGB 12.3 12.7  12.7   HCT 39.6 40.3  40.3    359  359   MCV 91 91  91   RDW 15.3* 15.4*  15.4*    Recent Labs   Lab 11/07/23  0841 11/07/23  1527 11/07/23  2346   INR 1.1  1.1  --   --    APTT 51.9* 26.5 55.1*        Chemistries:   Recent Labs   Lab 11/06/23 2026 11/07/23  0100 11/07/23  0841    138 140   K 3.7 4.7 4.8    104 104   CO2 19* 21* 18*   BUN 12 13 15   CREATININE 1.1 1.1 1.1   CALCIUM 8.6* 8.9 9.2   PROT 6.4  --  7.5   BILITOT 0.8  --  0.9   ALKPHOS 144*  --  161*   ALT 56*  --  52*   AST 78*  --  55*   MG 8.4* 2.3 2.3   PHOS 3.7  --  4.2        Cardiac Enzymes: Ejection Fractions:    Recent Labs     11/06/23 2026 11/07/23  0100   TROPONINI 0.085* 0.080*    No results found for: "EF"     POCT Glucose: HbA1c:    Recent Labs   Lab 11/07/23  1241 11/07/23  1701 11/07/23 2056   POCTGLUCOSE 195* 244* 218*    Hemoglobin A1C   Date Value Ref Range Status   11/07/2023 6.3 (H) 4.0 - 5.6 % Final     Comment:     ADA Screening Guidelines:  5.7-6.4%  Consistent with prediabetes  >or=6.5%  Consistent with diabetes    High levels of fetal hemoglobin interfere with the HbA1C  assay. Heterozygous hemoglobin variants (HbS, HgC, etc)do  not significantly interfere with this assay.   However, presence of multiple variants may affect accuracy.             ICU LOS 1d 7h  Level of Care: Critical Care    Chart Check: 12 HR Done  Shift Summary/Plan for the shift: none   "

## 2023-11-08 NOTE — TELEPHONE ENCOUNTER
----- Message from Marleni Yates sent at 11/8/2023  2:10 PM CST -----  Type:  Patient Returning Call    Who Called: Radha/ Daughter     Who Left Message for Patient: Digna Ford     Does the patient know what this is regarding?:Yes     Would the patient rather a call back or a response via [facility]? Call     Best Call Back Number: [phone] (cell)

## 2023-11-08 NOTE — NURSING
[facility], [facility]  Nurses Note -- 4 Eyes      11/8/2023       Skin assessed on: Q Shift      [x] No Pressure Injuries Present    [x]Prevention Measures Documented    [] Yes LDA  for Pressure Injury Previously documented     [] Yes New Pressure Injury Discovered   [] LDA for New Pressure Injury Added      Attending RN:  Oksana Ascencio RN     Second RN:  Lali IRWIN

## 2023-11-08 NOTE — ASSESSMENT & PLAN NOTE
VFib arrest.  Prolonged QTC.  On lidocaine drip.  Monitor closely in ICU.  Plan for cardiac catheterization in a.m.    11/7: Risks, benefits and alternatives of the catheterization procedure were discussed with the patient.The risks of coronary angiography include but are not limited to: bleeding, infection, death, heart attack, arrhythmia, kidney injury or failure, potential need for dialysis, allergic reactions, stroke, need for emergency surgery, hematoma, pseudoaneurysm etc.  Should stenting be indicated, the patient has agreed to dual anti-platelet therapy for 1-consecutive year with a drug-eluting stent and a minimum of 1-month with the use of a bare metal stent. Additionally, pt is aware that non-compliance is likely to result in stent clotting with heart attack, heart failure, and/or death  The risks of moderate sedation include hypotension, respiratory depression, arrhythmias, bronchospasm, and death. Informed consent was obtained and the  patient is agreeable to proceed with the procedure. Consent was placed on the chart.  Continue lidocaine drip  11/8:  Nonischemic cardiomyopathy.  No significant stenosis on angiogram yesterday.  Plan for AICD in a.m..  Continue lidocaine drip

## 2023-11-09 PROBLEM — Z95.810 AICD (AUTOMATIC CARDIOVERTER/DEFIBRILLATOR) PRESENT: Status: ACTIVE | Noted: 2023-11-09

## 2023-11-09 LAB
ALBUMIN SERPL BCP-MCNC: 3 G/DL (ref 3.5–5.2)
ALP SERPL-CCNC: 224 U/L (ref 55–135)
ALT SERPL W/O P-5'-P-CCNC: 53 U/L (ref 10–44)
ANION GAP SERPL CALC-SCNC: 12 MMOL/L (ref 8–16)
APTT PPP: 27.8 SEC (ref 21–32)
APTT PPP: 62.7 SEC (ref 21–32)
APTT PPP: >150 SEC (ref 21–32)
AST SERPL-CCNC: 59 U/L (ref 10–40)
BASOPHILS # BLD AUTO: 0.04 K/UL (ref 0–0.2)
BASOPHILS NFR BLD: 0.5 % (ref 0–1.9)
BILIRUB SERPL-MCNC: 0.7 MG/DL (ref 0.1–1)
BUN SERPL-MCNC: 18 MG/DL (ref 8–23)
CALCIUM SERPL-MCNC: 8.3 MG/DL (ref 8.7–10.5)
CHLORIDE SERPL-SCNC: 103 MMOL/L (ref 95–110)
CO2 SERPL-SCNC: 23 MMOL/L (ref 23–29)
CREAT SERPL-MCNC: 0.9 MG/DL (ref 0.5–1.4)
DIFFERENTIAL METHOD BLD: ABNORMAL
EOSINOPHIL # BLD AUTO: 0.1 K/UL (ref 0–0.5)
EOSINOPHIL NFR BLD: 0.6 % (ref 0–8)
ERYTHROCYTE [DISTWIDTH] IN BLOOD BY AUTOMATED COUNT: 15.2 % (ref 11.5–14.5)
EST. GFR  (NO RACE VARIABLE): >60 ML/MIN/1.73 M^2
GLUCOSE SERPL-MCNC: 189 MG/DL (ref 70–110)
HCT VFR BLD AUTO: 35.3 % (ref 37–48.5)
HGB BLD-MCNC: 11.1 G/DL (ref 12–16)
IMM GRANULOCYTES # BLD AUTO: 0.04 K/UL (ref 0–0.04)
IMM GRANULOCYTES NFR BLD AUTO: 0.5 % (ref 0–0.5)
INR PPP: 1.2 (ref 0.8–1.2)
LYMPHOCYTES # BLD AUTO: 2.4 K/UL (ref 1–4.8)
LYMPHOCYTES NFR BLD: 28.3 % (ref 18–48)
MCH RBC QN AUTO: 28.5 PG (ref 27–31)
MCHC RBC AUTO-ENTMCNC: 31.4 G/DL (ref 32–36)
MCV RBC AUTO: 91 FL (ref 82–98)
MONOCYTES # BLD AUTO: 0.7 K/UL (ref 0.3–1)
MONOCYTES NFR BLD: 8.4 % (ref 4–15)
NEUTROPHILS # BLD AUTO: 5.3 K/UL (ref 1.8–7.7)
NEUTROPHILS NFR BLD: 61.7 % (ref 38–73)
NRBC BLD-RTO: 0 /100 WBC
PLATELET # BLD AUTO: 369 K/UL (ref 150–450)
PMV BLD AUTO: 11 FL (ref 9.2–12.9)
POCT GLUCOSE: 154 MG/DL (ref 70–110)
POCT GLUCOSE: 214 MG/DL (ref 70–110)
POCT GLUCOSE: 222 MG/DL (ref 70–110)
POCT GLUCOSE: 253 MG/DL (ref 70–110)
POTASSIUM SERPL-SCNC: 3.5 MMOL/L (ref 3.5–5.1)
PROT SERPL-MCNC: 5.8 G/DL (ref 6–8.4)
PROTHROMBIN TIME: 12.6 SEC (ref 9–12.5)
RBC # BLD AUTO: 3.9 M/UL (ref 4–5.4)
SODIUM SERPL-SCNC: 138 MMOL/L (ref 136–145)
WBC # BLD AUTO: 8.55 K/UL (ref 3.9–12.7)

## 2023-11-09 PROCEDURE — 33249 INSJ/RPLCMT DEFIB W/LEAD(S): CPT | Mod: HCNC | Performed by: INTERNAL MEDICINE

## 2023-11-09 PROCEDURE — 25000003 PHARM REV CODE 250: Mod: HCNC | Performed by: INTERNAL MEDICINE

## 2023-11-09 PROCEDURE — 85610 PROTHROMBIN TIME: CPT | Mod: HCNC | Performed by: INTERNAL MEDICINE

## 2023-11-09 PROCEDURE — 99152 MOD SED SAME PHYS/QHP 5/>YRS: CPT | Mod: HCNC | Performed by: INTERNAL MEDICINE

## 2023-11-09 PROCEDURE — 63600175 PHARM REV CODE 636 W HCPCS: Mod: HCNC | Performed by: HOSPITALIST

## 2023-11-09 PROCEDURE — 25000003 PHARM REV CODE 250: Mod: HCNC | Performed by: HOSPITALIST

## 2023-11-09 PROCEDURE — 36415 COLL VENOUS BLD VENIPUNCTURE: CPT | Mod: HCNC | Performed by: INTERNAL MEDICINE

## 2023-11-09 PROCEDURE — 99024 POSTOP FOLLOW-UP VISIT: CPT | Mod: HCNC,,, | Performed by: INTERNAL MEDICINE

## 2023-11-09 PROCEDURE — 94761 N-INVAS EAR/PLS OXIMETRY MLT: CPT | Mod: HCNC

## 2023-11-09 PROCEDURE — C1721 AICD, DUAL CHAMBER: HCPCS | Mod: HCNC | Performed by: INTERNAL MEDICINE

## 2023-11-09 PROCEDURE — 25000003 PHARM REV CODE 250: Mod: HCNC | Performed by: STUDENT IN AN ORGANIZED HEALTH CARE EDUCATION/TRAINING PROGRAM

## 2023-11-09 PROCEDURE — A4216 STERILE WATER/SALINE, 10 ML: HCPCS | Mod: HCNC | Performed by: HOSPITALIST

## 2023-11-09 PROCEDURE — 0JH608Z INSERTION OF DEFIBRILLATOR GENERATOR INTO CHEST SUBCUTANEOUS TISSUE AND FASCIA, OPEN APPROACH: ICD-10-PCS | Performed by: INTERNAL MEDICINE

## 2023-11-09 PROCEDURE — 63600175 PHARM REV CODE 636 W HCPCS: Mod: HCNC | Performed by: ANESTHESIOLOGY

## 2023-11-09 PROCEDURE — 27000221 HC OXYGEN, UP TO 24 HOURS: Mod: HCNC

## 2023-11-09 PROCEDURE — 99152 PR MOD CONSCIOUS SEDATION, SAME PHYS, 5+ YRS, FIRST 15 MIN: ICD-10-PCS | Mod: HCNC,,, | Performed by: INTERNAL MEDICINE

## 2023-11-09 PROCEDURE — 85730 THROMBOPLASTIN TIME PARTIAL: CPT | Mod: 91,HCNC | Performed by: HOSPITALIST

## 2023-11-09 PROCEDURE — 85025 COMPLETE CBC W/AUTO DIFF WBC: CPT | Mod: HCNC | Performed by: HOSPITALIST

## 2023-11-09 PROCEDURE — 02HK3KZ INSERTION OF DEFIBRILLATOR LEAD INTO RIGHT VENTRICLE, PERCUTANEOUS APPROACH: ICD-10-PCS | Performed by: INTERNAL MEDICINE

## 2023-11-09 PROCEDURE — 63600175 PHARM REV CODE 636 W HCPCS: Mod: HCNC | Performed by: INTERNAL MEDICINE

## 2023-11-09 PROCEDURE — 99900035 HC TECH TIME PER 15 MIN (STAT): Mod: HCNC

## 2023-11-09 PROCEDURE — 99153 MOD SED SAME PHYS/QHP EA: CPT | Mod: HCNC | Performed by: INTERNAL MEDICINE

## 2023-11-09 PROCEDURE — C1894 INTRO/SHEATH, NON-LASER: HCPCS | Mod: HCNC | Performed by: INTERNAL MEDICINE

## 2023-11-09 PROCEDURE — C1777 LEAD, AICD, ENDO SINGLE COIL: HCPCS | Mod: HCNC | Performed by: INTERNAL MEDICINE

## 2023-11-09 PROCEDURE — 99024 PR POST-OP FOLLOW-UP VISIT: ICD-10-PCS | Mod: HCNC,,, | Performed by: INTERNAL MEDICINE

## 2023-11-09 PROCEDURE — 85730 THROMBOPLASTIN TIME PARTIAL: CPT | Mod: HCNC | Performed by: INTERNAL MEDICINE

## 2023-11-09 PROCEDURE — 21400001 HC TELEMETRY ROOM: Mod: HCNC

## 2023-11-09 PROCEDURE — 33249 INSJ/RPLCMT DEFIB W/LEAD(S): CPT | Mod: HCNC,,, | Performed by: INTERNAL MEDICINE

## 2023-11-09 PROCEDURE — 33249 PR ICD INSERT SNGL/DUAL W/LEADS: ICD-10-PCS | Mod: HCNC,,, | Performed by: INTERNAL MEDICINE

## 2023-11-09 PROCEDURE — 99152 MOD SED SAME PHYS/QHP 5/>YRS: CPT | Mod: HCNC,,, | Performed by: INTERNAL MEDICINE

## 2023-11-09 DEVICE — VR IMPLANTABLE CARDIOVERTER DEFIBRILLATOR VVEV VVIR
Type: IMPLANTABLE DEVICE | Site: CHEST  WALL | Status: FUNCTIONAL
Brand: GALLANT™

## 2023-11-09 DEVICE — DEFIBRILLATION LEAD
Type: IMPLANTABLE DEVICE | Site: CHEST  WALL | Status: FUNCTIONAL
Brand: DURATA™

## 2023-11-09 RX ORDER — HYDRALAZINE HYDROCHLORIDE 20 MG/ML
10 INJECTION INTRAMUSCULAR; INTRAVENOUS EVERY 6 HOURS PRN
Status: DISCONTINUED | OUTPATIENT
Start: 2023-11-09 | End: 2023-11-13 | Stop reason: HOSPADM

## 2023-11-09 RX ORDER — HYDRALAZINE HYDROCHLORIDE 20 MG/ML
20 INJECTION INTRAMUSCULAR; INTRAVENOUS EVERY 6 HOURS PRN
Status: DISCONTINUED | OUTPATIENT
Start: 2023-11-09 | End: 2023-11-13 | Stop reason: HOSPADM

## 2023-11-09 RX ORDER — CARVEDILOL 12.5 MG/1
12.5 TABLET ORAL 2 TIMES DAILY WITH MEALS
Status: DISCONTINUED | OUTPATIENT
Start: 2023-11-09 | End: 2023-11-13 | Stop reason: HOSPADM

## 2023-11-09 RX ORDER — MIDAZOLAM HYDROCHLORIDE 1 MG/ML
INJECTION, SOLUTION INTRAMUSCULAR; INTRAVENOUS
Status: DISCONTINUED | OUTPATIENT
Start: 2023-11-09 | End: 2023-11-09 | Stop reason: HOSPADM

## 2023-11-09 RX ORDER — LIDOCAINE HYDROCHLORIDE AND EPINEPHRINE 20; 10 MG/ML; UG/ML
INJECTION, SOLUTION INFILTRATION; PERINEURAL
Status: DISCONTINUED | OUTPATIENT
Start: 2023-11-09 | End: 2023-11-09 | Stop reason: HOSPADM

## 2023-11-09 RX ORDER — ACETAMINOPHEN 325 MG/1
650 TABLET ORAL EVERY 4 HOURS PRN
Status: DISCONTINUED | OUTPATIENT
Start: 2023-11-09 | End: 2023-11-09

## 2023-11-09 RX ORDER — FENTANYL CITRATE 50 UG/ML
INJECTION, SOLUTION INTRAMUSCULAR; INTRAVENOUS
Status: DISCONTINUED | OUTPATIENT
Start: 2023-11-09 | End: 2023-11-09 | Stop reason: HOSPADM

## 2023-11-09 RX ORDER — CARVEDILOL 3.12 MG/1
3.12 TABLET ORAL 2 TIMES DAILY
Status: DISCONTINUED | OUTPATIENT
Start: 2023-11-09 | End: 2023-11-09

## 2023-11-09 RX ORDER — CEPHALEXIN 500 MG/1
500 CAPSULE ORAL EVERY 6 HOURS
Status: COMPLETED | OUTPATIENT
Start: 2023-11-09 | End: 2023-11-13

## 2023-11-09 RX ORDER — HEPARIN SODIUM,PORCINE/D5W 25000/250
0-40 INTRAVENOUS SOLUTION INTRAVENOUS CONTINUOUS
Status: DISCONTINUED | OUTPATIENT
Start: 2023-11-09 | End: 2023-11-13

## 2023-11-09 RX ORDER — HYDROCODONE BITARTRATE AND ACETAMINOPHEN 5; 325 MG/1; MG/1
1 TABLET ORAL EVERY 4 HOURS PRN
Status: DISCONTINUED | OUTPATIENT
Start: 2023-11-09 | End: 2023-11-09

## 2023-11-09 RX ORDER — HYDROCODONE BITARTRATE AND ACETAMINOPHEN 10; 325 MG/1; MG/1
1 TABLET ORAL EVERY 4 HOURS PRN
Status: DISCONTINUED | OUTPATIENT
Start: 2023-11-09 | End: 2023-11-13 | Stop reason: HOSPADM

## 2023-11-09 RX ORDER — POTASSIUM CHLORIDE 29.8 MG/ML
40 INJECTION INTRAVENOUS ONCE
Status: COMPLETED | OUTPATIENT
Start: 2023-11-09 | End: 2023-11-09

## 2023-11-09 RX ORDER — VALSARTAN 80 MG/1
160 TABLET ORAL DAILY
Status: DISCONTINUED | OUTPATIENT
Start: 2023-11-09 | End: 2023-11-13 | Stop reason: HOSPADM

## 2023-11-09 RX ORDER — HYDRALAZINE HYDROCHLORIDE 20 MG/ML
10 INJECTION INTRAMUSCULAR; INTRAVENOUS EVERY 8 HOURS PRN
Status: DISCONTINUED | OUTPATIENT
Start: 2023-11-09 | End: 2023-11-09

## 2023-11-09 RX ADMIN — CARVEDILOL 12.5 MG: 12.5 TABLET, FILM COATED ORAL at 06:11

## 2023-11-09 RX ADMIN — Medication 10 ML: at 06:11

## 2023-11-09 RX ADMIN — ATORVASTATIN CALCIUM 40 MG: 40 TABLET, FILM COATED ORAL at 09:11

## 2023-11-09 RX ADMIN — CEPHALEXIN 500 MG: 500 CAPSULE ORAL at 06:11

## 2023-11-09 RX ADMIN — CEPHALEXIN 500 MG: 500 CAPSULE ORAL at 09:11

## 2023-11-09 RX ADMIN — Medication 6 MG: at 09:11

## 2023-11-09 RX ADMIN — INSULIN ASPART 2 UNITS: 100 INJECTION, SOLUTION INTRAVENOUS; SUBCUTANEOUS at 09:11

## 2023-11-09 RX ADMIN — VALSARTAN 160 MG: 80 TABLET, FILM COATED ORAL at 03:11

## 2023-11-09 RX ADMIN — INSULIN ASPART 2 UNITS: 100 INJECTION, SOLUTION INTRAVENOUS; SUBCUTANEOUS at 03:11

## 2023-11-09 RX ADMIN — HEPARIN SODIUM 18 UNITS/KG/HR: 10000 INJECTION, SOLUTION INTRAVENOUS at 03:11

## 2023-11-09 RX ADMIN — MUPIROCIN: 20 OINTMENT TOPICAL at 09:11

## 2023-11-09 RX ADMIN — INSULIN ASPART 3 UNITS: 100 INJECTION, SOLUTION INTRAVENOUS; SUBCUTANEOUS at 11:11

## 2023-11-09 RX ADMIN — POTASSIUM CHLORIDE 40 MEQ: 29.8 INJECTION, SOLUTION INTRAVENOUS at 01:11

## 2023-11-09 RX ADMIN — Medication 10 ML: at 11:11

## 2023-11-09 RX ADMIN — FAMOTIDINE 20 MG: 10 INJECTION INTRAVENOUS at 09:11

## 2023-11-09 NOTE — NURSING
[facility] - [place] Intensive Care  ICU Shift Summary  Date: 11/9/2023      Prehospitalization: Home  Admit Date / LOS : 11/6/2023/ 3 days    Diagnosis: VF (ventricular fibrillation)    Consults:        Active: Cardio       Needed: N/A     Code Status: Full Code   Advanced Directive: <no information>    LDA:  Lines/Drains/Airways       Peripherally Inserted Central Catheter Line  Duration             PICC Triple Lumen 11/08/23 0613 right basilic <1 day                  Central Lines/Site/Justification: Poor venous acces, multiple pricks  Urinary Cath/Order/Justification:Patient Does Not Have Urinary Catheter    Vasopressors/Infusions:    LIDOcaine 1 mg/min (11/09/23 0300)          GOALS: Volume/ Hemodynamic: N/A                     RASS: N/A    Pain Management: none       Pain Controlled: not applicable     Rhythm: NSR and ST    Respiratory Device: Room Air                      Most Recent SBT/ SAT: Does not meet criteria       MOVE Screen: PASS  ICU Liberation: not applicable    VTE Prophylaxis: Pharm  Mobility: Bedrest  Stress Ulcer Prophylaxis: Yes    Isolation: No active isolations    Dietary:   Current Diet Order   Procedures    Diet Cardiac    Diet NPO Except for: Medication     Order Specific Question:   Except for     Answer:   Medication      Tolerance: not applicable  Advancement: no    I & O (24h):    Intake/Output Summary (Last 24 hours) at 11/9/2023 0400  Last data filed at 11/9/2023 0300  Gross per 24 hour   Intake 608.83 ml   Output 150 ml   Net 458.83 ml        Restraints: No    Significant Dates:  Post Op Date: N/A  Rescue Date: N/A  Imaging/ Diagnostics: N/A    Noteworthy Labs:  none    COVID Test: (--)  CBC/Anemia Labs: Coags:    Recent Labs   Lab 11/08/23  0657 11/09/23  0237   WBC 6.95 8.55   HGB 11.3* 11.1*   HCT 35.8* 35.3*    369   MCV 90 91   RDW 15.1* 15.2*    Recent Labs   Lab 11/07/23  0841 11/07/23  1527 11/08/23  1801 11/08/23  2355   INR 1.1  1.1  --   --   --    APTT 51.9*   < > 61.4*  "62.7*    < > = values in this interval not displayed.        Chemistries:   Recent Labs   Lab 11/06/23 2026 11/07/23  0100 11/07/23  0841 11/08/23  2355    138 140 138   K 3.7 4.7 4.8 3.5    104 104 103   CO2 19* 21* 18* 23   BUN 12 13 15 18   CREATININE 1.1 1.1 1.1 0.9   CALCIUM 8.6* 8.9 9.2 8.3*   PROT 6.4  --  7.5 5.8*   BILITOT 0.8  --  0.9 0.7   ALKPHOS 144*  --  161* 224*   ALT 56*  --  52* 53*   AST 78*  --  55* 59*   MG 8.4* 2.3 2.3  --    PHOS 3.7  --  4.2  --         Cardiac Enzymes: Ejection Fractions:    Recent Labs     11/06/23 2026 11/07/23  0100   TROPONINI 0.085* 0.080*    No results found for: "EF"     POCT Glucose: HbA1c:    Recent Labs   Lab 11/08/23  1120 11/08/23  1700 11/08/23  2108   POCTGLUCOSE 178* 194* 181*    Hemoglobin A1C   Date Value Ref Range Status   11/07/2023 6.3 (H) 4.0 - 5.6 % Final     Comment:     ADA Screening Guidelines:  5.7-6.4%  Consistent with prediabetes  >or=6.5%  Consistent with diabetes    High levels of fetal hemoglobin interfere with the HbA1C  assay. Heterozygous hemoglobin variants (HbS, HgC, etc)do  not significantly interfere with this assay.   However, presence of multiple variants may affect accuracy.             ICU LOS 2d 6h  Level of Care: Critical Care    Chart Check: 12 HR Done  Shift Summary/Plan for the shift: none   "

## 2023-11-09 NOTE — ASSESSMENT & PLAN NOTE
Chronic, controlled. Latest blood pressure and vitals reviewed-     Temp:  [97.6 °F (36.4 °C)-98.6 °F (37 °C)]   Pulse:  []   Resp:  [19-37]   BP: ()/()   SpO2:  [91 %-100 %] .   Home meds for hypertension were reviewed and noted below.   Hypertension Medications             atenoloL (TENORMIN) 25 MG tablet Take 1 tablet (25 mg total) by mouth once daily.    amLODIPine (NORVASC) 10 MG tablet Take 1 tablet (10 mg total) by mouth once daily.    hydroCHLOROthiazide (HYDRODIURIL) 25 MG tablet Take 1 tablet (25 mg total) by mouth once daily.    valsartan (DIOVAN) 320 MG tablet Take 1 tablet (320 mg total) by mouth once daily.      Will start Coreg.  Resume Valsartan if tolerated by BP.

## 2023-11-09 NOTE — PROCEDURES
Procedures    Pre-sedation Assessment:    1. ASA Score: ASA 3 - Patient with moderate systemic disease with functional limitations  2. Mallampati Class: II (hard and soft palate, upper portion of tonsils anduvula visible)  3. Patient or family history of any reaction to anesthesia or sedation: No  4. Plan for Sedation: Moderate  5. H&P within 30 days of the procedure and updated within 24 hrs of admission or registration: Yes

## 2023-11-09 NOTE — ASSESSMENT & PLAN NOTE
CTA chest showed pulmonary emboli in segmental and subsegmental pulmonary artery in the right middle lobe, with mild-to-moderate bibasilar pleural effusions with associated bibasilar atelectasis.    Continue heparin ggt   Transition to Eliquis tomorrow.

## 2023-11-09 NOTE — NURSING
[facility], [facility] - [place]  Nurses Note -- 4 Eyes      11/9/2023       Skin assessed on: Q Shift      [x] No Pressure Injuries Present    [x]Prevention Measures Documented    [] Yes LDA  for Pressure Injury Previously documented     [] Yes New Pressure Injury Discovered   [] LDA for New Pressure Injury Added      Attending RN:  MARYJANE TAO RN     Second RN:  Carrol

## 2023-11-09 NOTE — SUBJECTIVE & OBJECTIVE
Interval History: sleepy following procedure.    Review of Systems   HENT:  Negative for ear discharge and ear pain.    Eyes:  Negative for discharge and itching.   Endocrine: Negative for cold intolerance and heat intolerance.   Neurological:  Negative for seizures and syncope.     Objective:     Vital Signs (Most Recent):  Temp: 97.9 °F (36.6 °C) (11/09/23 1200)  Pulse: (!) 114 (11/09/23 1215)  Resp: (!) 26 (11/09/23 1215)  BP: 139/76 (11/09/23 1215)  SpO2: 99 % (11/09/23 1215) Vital Signs (24h Range):  Temp:  [97.6 °F (36.4 °C)-98.6 °F (37 °C)] 97.9 °F (36.6 °C)  Pulse:  [] 114  Resp:  [19-37] 26  SpO2:  [91 %-100 %] 99 %  BP: ()/() 139/76     Weight: 82 kg (180 lb 12.4 oz)  Body mass index is 30.08 kg/m².    Intake/Output Summary (Last 24 hours) at 11/9/2023 1307  Last data filed at 11/9/2023 0700  Gross per 24 hour   Intake 546.35 ml   Output 275 ml   Net 271.35 ml           Physical Exam  Vitals and nursing note reviewed.   Constitutional:       Appearance: Normal appearance. She is not toxic-appearing or diaphoretic.   HENT:      Head: Normocephalic and atraumatic.      Nose: Nose normal.      Mouth/Throat:      Mouth: Mucous membranes are moist.   Eyes:      Extraocular Movements: Extraocular movements intact.   Cardiovascular:      Rate and Rhythm: Normal rate and regular rhythm.      Pulses: Normal pulses.      Heart sounds: No murmur heard.  Pulmonary:      Effort: Pulmonary effort is normal. No respiratory distress.      Breath sounds: Decreased air movement present.   Abdominal:      General: Abdomen is flat.      Palpations: Abdomen is soft.      Tenderness: There is no abdominal tenderness.   Musculoskeletal:      Right lower leg: No edema.      Left lower leg: No edema.   Skin:     General: Skin is warm.   Neurological:      General: No focal deficit present.      Mental Status: She is alert.      Cranial Nerves: No cranial nerve deficit.             Significant Labs: All pertinent  labs within the past 24 hours have been reviewed.  BMP:   Recent Labs   Lab 11/08/23  2355   *      K 3.5      CO2 23   BUN 18   CREATININE 0.9   CALCIUM 8.3*       CBC:   Recent Labs   Lab 11/08/23  0657 11/09/23  0237   WBC 6.95 8.55   HGB 11.3* 11.1*   HCT 35.8* 35.3*    369         Significant Imaging: I have reviewed all pertinent imaging results/findings within the past 24 hours.

## 2023-11-09 NOTE — PROGRESS NOTES
[facility] Medicine  Progress Note    Patient Name: Harriett Oglesby  MRN: [id number]  Patient Class: IP- Inpatient   Admission Date: 11/6/2023  Length of Stay: 3 days  Attending Physician: Damien Hernandez MD  Primary Care Provider: Zeinab Noble MD        Subjective:     Principal Problem:VF (ventricular fibrillation)        HPI:  This is a 68-year-old female with a past medical history of hypertension, type 2 diabetes, hyperlipidemia, CVA with left-sided deficits who presents with tachycardia.      The patient initially presented to urgent care with shortness of breaths and lower extremity edema that started 7 days prior to presentation.  She ran out of her blood pressure medications about 2 weeks prior.  Additional symptoms include cough, nasal congestion and fatigue.  She was noted to be tachycardic and was advised present to the ED.    In the ED, the patient was tachycardic (up to 160s), tachypneic but normotensive.  Labs were remarkable for an elevated BNP (1839), elevated troponin (0.090 > 0.079), elevated D-dimer (2.87).  EKGs showed MAT and prolonged QTc. CTA chest showed pulmonary emboli in segmental and subsegmental pulmonary artery in the right middle lobe, with mild-to-moderate bibasilar pleural effusions with associated bibasilar atelectasis. An echocardiogram showed an EF of 20-25%, PA pressure of 49 mmHg.  Patient was given aspirin 325 mg, adenosine 6 mg IV x2, atenolol 25 mg p.o., diltiazem 20 mg IV x2, 30 mg IV x1, potassium bicarbonate 40 mEq use and was started on heparin drip.    While in the ED, the patient went into V-fib arrest, underwent 1 round of CPR and was cardioverted @200 joules x1, with achievement of ROSC. Mag sulfate 2 g IV & an amp of D50% were administered. Cardiology recommended Plavix, lidocaine infusion and an angiogram in the AM. She subsequently went into Vtach and was cardioverted @200 joules x1. No Epi was given and the patient was not  intubated.   Plavix, additional potassium and lidocaine bolus, followed by infusion were ordered. Patient became hypoxic and was placed on a NRB. She was admitted for further management.        Overview/Hospital Course:  69 y/o female sent to ER from Urgent Care for tachycardia.  In the ER found to be in multifocal atrial tachycardia with prolonged QTC.  Patient was given Cardizem with some improvement in heart rate.  Subsequently CTA was done which revealed segmental and subsegmental pulmonary embolism on the right side. Echo was also done which demonstrated severe cardiomyopathy with EF of 20-25%.  Subsequently patient developed VFib and cardioverted followed by V-tach which was also cardioverted x1.  Started on lidocaine drip in the ER and admitted to ICU.  Also started on heparin drip.  Underwent LHC showing non ischemic cardiomyopathy.  Cards recommending AICD.  She has remained in NSR. AICD placed on 11/9.      Interval History: sleepy following procedure.    Review of Systems   HENT:  Negative for ear discharge and ear pain.    Eyes:  Negative for discharge and itching.   Endocrine: Negative for cold intolerance and heat intolerance.   Neurological:  Negative for seizures and syncope.     Objective:     Vital Signs (Most Recent):  Temp: 97.9 °F (36.6 °C) (11/09/23 1200)  Pulse: (!) 114 (11/09/23 1215)  Resp: (!) 26 (11/09/23 1215)  BP: 139/76 (11/09/23 1215)  SpO2: 99 % (11/09/23 1215) Vital Signs (24h Range):  Temp:  [97.6 °F (36.4 °C)-98.6 °F (37 °C)] 97.9 °F (36.6 °C)  Pulse:  [] 114  Resp:  [19-37] 26  SpO2:  [91 %-100 %] 99 %  BP: ()/() 139/76     Weight: 82 kg (180 lb 12.4 oz)  Body mass index is 30.08 kg/m².    Intake/Output Summary (Last 24 hours) at 11/9/2023 1307  Last data filed at 11/9/2023 0700  Gross per 24 hour   Intake 546.35 ml   Output 275 ml   Net 271.35 ml           Physical Exam  Vitals and nursing note reviewed.   Constitutional:       Appearance: Normal appearance. She  is not toxic-appearing or diaphoretic.   HENT:      Head: Normocephalic and atraumatic.      Nose: Nose normal.      Mouth/Throat:      Mouth: Mucous membranes are moist.   Eyes:      Extraocular Movements: Extraocular movements intact.   Cardiovascular:      Rate and Rhythm: Normal rate and regular rhythm.      Pulses: Normal pulses.      Heart sounds: No murmur heard.  Pulmonary:      Effort: Pulmonary effort is normal. No respiratory distress.      Breath sounds: Decreased air movement present.   Abdominal:      General: Abdomen is flat.      Palpations: Abdomen is soft.      Tenderness: There is no abdominal tenderness.   Musculoskeletal:      Right lower leg: No edema.      Left lower leg: No edema.   Skin:     General: Skin is warm.   Neurological:      General: No focal deficit present.      Mental Status: She is alert.      Cranial Nerves: No cranial nerve deficit.             Significant Labs: All pertinent labs within the past 24 hours have been reviewed.  BMP:   Recent Labs   Lab 11/08/23  2355   *      K 3.5      CO2 23   BUN 18   CREATININE 0.9   CALCIUM 8.3*       CBC:   Recent Labs   Lab 11/08/23  0657 11/09/23  0237   WBC 6.95 8.55   HGB 11.3* 11.1*   HCT 35.8* 35.3*    369         Significant Imaging: I have reviewed all pertinent imaging results/findings within the past 24 hours.      Assessment/Plan:      * VF (ventricular fibrillation)  While in the ED, the patient went into V-fib arrest, underwent 1 round of CPR and was cardioverted @200 joules x1, with achievement of ROSC.   She subsequently went into Vtach and was cardioverted @200 joules x1. No Epi was given and the patient was not intubated.     Cardiology consulted.  LHC showing non obstructive cardiomyopathy.  Has remained in NSR.  S/P AICD placement.  Stop Lidocaine infusion.  PT/OT evaluation.    Acute delirium  Patient disoriented and agitated overnight.  Probably related to ICU hospitalization.  Delirium  precautions.  Zyprexa prn severe non redirectable agitation.      CHF (congestive heart failure)  Patient is identified as having Systolic (HFrEF) heart failure that is Acute. CHF is currently controlled. Latest ECHO performed and demonstrates- Results for orders placed during the hospital encounter of 11/06/23    Echo    Interpretation Summary    Left Ventricle: The left ventricle is mildly dilated. Normal wall thickness. There is severely reduced systolic function with a visually estimated ejection fraction of 20 - 25%.    Right Ventricle: Normal right ventricular cavity size. Systolic function is normal.    Left Atrium: Left atrium is severely dilated.    Right Atrium: Right atrium is moderately dilated.    Mitral Valve: There is mild regurgitation.    Tricuspid Valve: There is mild to moderate regurgitation.    Pulmonary Artery: The estimated pulmonary artery systolic pressure is 49 mmHg.    IVC/SVC: Intermediate venous pressure at 8 mmHg.    Pericardium: There is a trivial effusion.   Monitor on telemetry. Patient is off CHF pathway.  Monitor strict Is&Os and daily weights.  Place on fluid restriction of 1.5 L. Cardiology has been consulted. Continue to stress to patient importance of self efficacy and  on diet for CHF. Last BNP reviewed- and noted below   Recent Labs   Lab 11/06/23  1306   BNP 1,839*   Non ischemic cardiomyopathy.  Start B blocker.  Seems euvolemic at this time.    Pulmonary embolism  CTA chest showed pulmonary emboli in segmental and subsegmental pulmonary artery in the right middle lobe, with mild-to-moderate bibasilar pleural effusions with associated bibasilar atelectasis.    Continue heparin ggt   Transition to Eliquis tomorrow.    Multifocal atrial tachycardia  Improved with diltiazem.   Telemetry monitoring   Cardiology consulted.  Now with sinus tachy.  Start B blocker.      Advanced care planning/counseling discussion  Remains full code at this time.        Hyperlipidemia associated with type 2 diabetes mellitus  Resume statin    History of stroke with current residual effects  Resume home medications      Type 2 diabetes mellitus with microalbuminuria, without long-term current use of insulin  Glycemic protocol. LDSS  Uncontrolled with hyperglycemia.  Hold home oral medications.  Has remained hyperglycemic.  Started nightly Levemir while hospitalized.      Hypertension, essential, benign  Chronic, controlled. Latest blood pressure and vitals reviewed-     Temp:  [97.6 °F (36.4 °C)-98.6 °F (37 °C)]   Pulse:  []   Resp:  [19-37]   BP: ()/()   SpO2:  [91 %-100 %] .   Home meds for hypertension were reviewed and noted below.   Hypertension Medications             atenoloL (TENORMIN) 25 MG tablet Take 1 tablet (25 mg total) by mouth once daily.    amLODIPine (NORVASC) 10 MG tablet Take 1 tablet (10 mg total) by mouth once daily.    hydroCHLOROthiazide (HYDRODIURIL) 25 MG tablet Take 1 tablet (25 mg total) by mouth once daily.    valsartan (DIOVAN) 320 MG tablet Take 1 tablet (320 mg total) by mouth once daily.      Will start Coreg.  Resume Valsartan if tolerated by BP.      VTE Risk Mitigation (From admission, onward)         Ordered     heparin 25,000 units in dextrose 5% 250 mL (100 units/mL) infusion HIGH INTENSITY nomogram - [facility]  Continuous        Question:  Begin at (units/kg/hr)  Answer:  18    11/09/23 1238     heparin 25,000 units in dextrose 5% (100 units/ml) IV bolus from bag - ADDITIONAL PRN BOLUS - 60 units/kg  As needed (PRN)        Question:  Heparin Infusion Adjustment (DO NOT MODIFY ANSWER)  Answer:  [email or web address]\epic\Images\Pharmacy\HeparinInfusions\heparin HIGH INTENSITY nomogram for [facility] [id number].pdf    11/09/23 1238     heparin 25,000 units in dextrose 5% (100 units/ml) IV bolus from bag - ADDITIONAL PRN BOLUS - 30 units/kg  As needed (PRN)        Question:  Heparin Infusion Adjustment (DO NOT MODIFY ANSWER)  Answer:   [email or web address]\epic\Images\Pharmacy\HeparinInfusions\heparin HIGH INTENSITY nomogram for [facility] [id number].pdf    11/09/23 1238     IP VTE HIGH RISK PATIENT  Once         11/06/23 2047     Place sequential compression device  Until discontinued         11/06/23 2047     heparin 25,000 units in dextrose 5% (100 units/ml) IV bolus from bag - ADDITIONAL PRN BOLUS - 60 units/kg  As needed (PRN)        Question:  Heparin Infusion Adjustment (DO NOT MODIFY ANSWER)  Answer:  [email or web address]\epic\Images\Pharmacy\HeparinInfusions\heparin HIGH INTENSITY nomogram for [facility] [id number].pdf    11/06/23 1833     heparin 25,000 units in dextrose 5% (100 units/ml) IV bolus from bag - ADDITIONAL PRN BOLUS - 30 units/kg  As needed (PRN)        Question:  Heparin Infusion Adjustment (DO NOT MODIFY ANSWER)  Answer:  \\[email or web address]\epic\Images\Pharmacy\HeparinInfusions\heparin HIGH INTENSITY nomogram for [facility] [id number].pdf    11/06/23 1833     heparin 25,000 units in dextrose 5% 250 mL (100 units/mL) infusion HIGH INTENSITY nomogram - [facility]  Continuous        Question:  Begin at (units/kg/hr)  Answer:  18    11/06/23 1833                Discharge Planning   CHEYANNE:      Code Status: Full Code   Is the patient medically ready for discharge?:     Reason for patient still in hospital (select all that apply): Patient trending condition  Discharge Plan A: Home with family              Damien Hernandez MD  Department of [facility] Medicine   [facility] Intensive Care

## 2023-11-09 NOTE — ASSESSMENT & PLAN NOTE
Improved with diltiazem.   Telemetry monitoring   Cardiology consulted.  Now with sinus tachy.  Start B blocker.

## 2023-11-09 NOTE — NURSING
[facility]  Nurses Note -- 4 Eyes      11/8/2023       Skin assessed on: Q Shift      [x] No Pressure Injuries Present    [x]Prevention Measures Documented    [] Yes LDA  for Pressure Injury Previously documented     [] Yes New Pressure Injury Discovered   [] LDA for New Pressure Injury Added      Attending RN:  Carrol Flowers RN     Second RN:  ALIDA Gandhi

## 2023-11-09 NOTE — PROCEDURES
Procedures    AICD   Dr Da Silva  Pre-op Dx VF, NICM  Post-op Dx same  Specimen none  EBL < 50 cc    11/9/23 [place] single AICD placed left SC vein    Post-op CXR and po keflex  Sling overnight then PRN  Resume heparin this afternoon if site stable  Staples out 2 weeks

## 2023-11-09 NOTE — PROVIDER TRANSFER
Transfer Note    67 y/o female sent to ER from Urgent Care for tachycardia.  In the ER found to be in multifocal atrial tachycardia with prolonged QTC.  Patient was given Cardizem with some improvement in heart rate.  Subsequently CTA was done which revealed segmental and subsegmental pulmonary embolism on the right side. Echo was also done which demonstrated severe cardiomyopathy with EF of 20-25%.  Subsequently patient developed VFib and cardioverted followed by V-tach which was also cardioverted x1.  Started on lidocaine drip in the ER and admitted to ICU.  Also started on heparin drip for PE.  Underwent LHC showing non ischemic cardiomyopathy.  Cards recommending AICD.  She has remained in NSR. AICD placed on 11/9.  Lidocaine drip stopped today.  Change Heparin to Eliquis tomorrow.  PT/OT evaluation.  Home soon depending on  how she does with therapy.  She did have some delirium that is improving.

## 2023-11-09 NOTE — PLAN OF CARE
Patient remains in ICU on 2L NC. Patient looked confused, oriented to self, time but disoriented to place and situation but was oriented x4 at the end of shift.. Plan for AICD placement today. Patient is NPO after midnight. Inj Heparin gtt stopped after 1 am. Inj Lidocaine on continuous infusion. PICC line in place. POC updated with patient and patient's daughter.  Problem: Adult Inpatient Plan of Care  Goal: Plan of Care Review  Outcome: Ongoing, Progressing  Goal: Patient-Specific Goal (Individualized)  Outcome: Ongoing, Progressing  Goal: Absence of Hospital-Acquired Illness or Injury  Outcome: Ongoing, Progressing  Goal: Optimal Comfort and Wellbeing  Outcome: Ongoing, Progressing  Goal: Readiness for Transition of Care  Outcome: Ongoing, Progressing     Problem: Diabetes Comorbidity  Goal: Blood Glucose Level Within Targeted Range  Outcome: Ongoing, Progressing     Problem: Skin Injury Risk Increased  Goal: Skin Health and Integrity  Outcome: Ongoing, Progressing     Problem: Infection  Goal: Absence of Infection Signs and Symptoms  Outcome: Ongoing, Progressing

## 2023-11-09 NOTE — ASSESSMENT & PLAN NOTE
Patient is identified as having Systolic (HFrEF) heart failure that is Acute. CHF is currently controlled. Latest ECHO performed and demonstrates- Results for orders placed during the hospital encounter of 11/06/23    Echo    Interpretation Summary    Left Ventricle: The left ventricle is mildly dilated. Normal wall thickness. There is severely reduced systolic function with a visually estimated ejection fraction of 20 - 25%.    Right Ventricle: Normal right ventricular cavity size. Systolic function is normal.    Left Atrium: Left atrium is severely dilated.    Right Atrium: Right atrium is moderately dilated.    Mitral Valve: There is mild regurgitation.    Tricuspid Valve: There is mild to moderate regurgitation.    Pulmonary Artery: The estimated pulmonary artery systolic pressure is 49 mmHg.    IVC/SVC: Intermediate venous pressure at 8 mmHg.    Pericardium: There is a trivial effusion.   Monitor on telemetry. Patient is off CHF pathway.  Monitor strict Is&Os and daily weights.  Place on fluid restriction of 1.5 L. Cardiology has been consulted. Continue to stress to patient importance of self efficacy and  on diet for CHF. Last BNP reviewed- and noted below   Recent Labs   Lab 11/06/23  1306   BNP 1,839*   Non ischemic cardiomyopathy.  Start B blocker.  Seems euvolemic at this time.

## 2023-11-09 NOTE — ASSESSMENT & PLAN NOTE
While in the ED, the patient went into V-fib arrest, underwent 1 round of CPR and was cardioverted @200 joules x1, with achievement of ROSC.   She subsequently went into Vtach and was cardioverted @200 joules x1. No Epi was given and the patient was not intubated.     Cardiology consulted.  LHC showing non obstructive cardiomyopathy.  Has remained in NSR.  S/P AICD placement.  Stop Lidocaine infusion.  PT/OT evaluation.

## 2023-11-10 LAB
ANION GAP SERPL CALC-SCNC: 12 MMOL/L (ref 8–16)
APTT PPP: 43.6 SEC (ref 21–32)
APTT PPP: 46.9 SEC (ref 21–32)
APTT PPP: 46.9 SEC (ref 21–32)
BASOPHILS # BLD AUTO: 0.01 K/UL (ref 0–0.2)
BASOPHILS NFR BLD: 0.2 % (ref 0–1.9)
BUN SERPL-MCNC: 15 MG/DL (ref 8–23)
CALCIUM SERPL-MCNC: 8.6 MG/DL (ref 8.7–10.5)
CHLORIDE SERPL-SCNC: 103 MMOL/L (ref 95–110)
CO2 SERPL-SCNC: 22 MMOL/L (ref 23–29)
CREAT SERPL-MCNC: 0.8 MG/DL (ref 0.5–1.4)
DIFFERENTIAL METHOD BLD: ABNORMAL
EOSINOPHIL # BLD AUTO: 0.1 K/UL (ref 0–0.5)
EOSINOPHIL NFR BLD: 1.3 % (ref 0–8)
ERYTHROCYTE [DISTWIDTH] IN BLOOD BY AUTOMATED COUNT: 14.8 % (ref 11.5–14.5)
EST. GFR  (NO RACE VARIABLE): >60 ML/MIN/1.73 M^2
GLUCOSE SERPL-MCNC: 214 MG/DL (ref 70–110)
HCT VFR BLD AUTO: 33.8 % (ref 37–48.5)
HGB BLD-MCNC: 10.8 G/DL (ref 12–16)
IMM GRANULOCYTES # BLD AUTO: 0.04 K/UL (ref 0–0.04)
IMM GRANULOCYTES NFR BLD AUTO: 0.6 % (ref 0–0.5)
LYMPHOCYTES # BLD AUTO: 1.3 K/UL (ref 1–4.8)
LYMPHOCYTES NFR BLD: 19.9 % (ref 18–48)
MAGNESIUM SERPL-MCNC: 1.6 MG/DL (ref 1.6–2.6)
MCH RBC QN AUTO: 28.7 PG (ref 27–31)
MCHC RBC AUTO-ENTMCNC: 32 G/DL (ref 32–36)
MCV RBC AUTO: 90 FL (ref 82–98)
MONOCYTES # BLD AUTO: 0.4 K/UL (ref 0.3–1)
MONOCYTES NFR BLD: 6.9 % (ref 4–15)
NEUTROPHILS # BLD AUTO: 4.5 K/UL (ref 1.8–7.7)
NEUTROPHILS NFR BLD: 71.1 % (ref 38–73)
NRBC BLD-RTO: 0 /100 WBC
PHOSPHATE SERPL-MCNC: 3 MG/DL (ref 2.7–4.5)
PLATELET # BLD AUTO: 270 K/UL (ref 150–450)
PMV BLD AUTO: 10.9 FL (ref 9.2–12.9)
POCT GLUCOSE: 192 MG/DL (ref 70–110)
POCT GLUCOSE: 198 MG/DL (ref 70–110)
POCT GLUCOSE: 236 MG/DL (ref 70–110)
POTASSIUM SERPL-SCNC: 3.8 MMOL/L (ref 3.5–5.1)
RBC # BLD AUTO: 3.76 M/UL (ref 4–5.4)
SODIUM SERPL-SCNC: 137 MMOL/L (ref 136–145)
WBC # BLD AUTO: 6.34 K/UL (ref 3.9–12.7)

## 2023-11-10 PROCEDURE — 83735 ASSAY OF MAGNESIUM: CPT | Mod: HCNC | Performed by: FAMILY MEDICINE

## 2023-11-10 PROCEDURE — 99900035 HC TECH TIME PER 15 MIN (STAT): Mod: HCNC

## 2023-11-10 PROCEDURE — 97530 THERAPEUTIC ACTIVITIES: CPT | Mod: HCNC

## 2023-11-10 PROCEDURE — 25000003 PHARM REV CODE 250: Mod: HCNC | Performed by: STUDENT IN AN ORGANIZED HEALTH CARE EDUCATION/TRAINING PROGRAM

## 2023-11-10 PROCEDURE — 63600175 PHARM REV CODE 636 W HCPCS: Mod: HCNC | Performed by: HOSPITALIST

## 2023-11-10 PROCEDURE — 25000003 PHARM REV CODE 250: Mod: HCNC | Performed by: HOSPITALIST

## 2023-11-10 PROCEDURE — 84100 ASSAY OF PHOSPHORUS: CPT | Mod: HCNC | Performed by: FAMILY MEDICINE

## 2023-11-10 PROCEDURE — 97161 PT EVAL LOW COMPLEX 20 MIN: CPT | Mod: HCNC

## 2023-11-10 PROCEDURE — 99233 PR SUBSEQUENT HOSPITAL CARE,LEVL III: ICD-10-PCS | Mod: 24,HCNC,, | Performed by: INTERNAL MEDICINE

## 2023-11-10 PROCEDURE — 97165 OT EVAL LOW COMPLEX 30 MIN: CPT | Mod: HCNC

## 2023-11-10 PROCEDURE — A4216 STERILE WATER/SALINE, 10 ML: HCPCS | Mod: HCNC | Performed by: HOSPITALIST

## 2023-11-10 PROCEDURE — 21400001 HC TELEMETRY ROOM: Mod: HCNC

## 2023-11-10 PROCEDURE — 94761 N-INVAS EAR/PLS OXIMETRY MLT: CPT | Mod: HCNC

## 2023-11-10 PROCEDURE — 99233 SBSQ HOSP IP/OBS HIGH 50: CPT | Mod: 24,HCNC,, | Performed by: INTERNAL MEDICINE

## 2023-11-10 PROCEDURE — 85730 THROMBOPLASTIN TIME PARTIAL: CPT | Mod: HCNC | Performed by: FAMILY MEDICINE

## 2023-11-10 PROCEDURE — 85730 THROMBOPLASTIN TIME PARTIAL: CPT | Mod: 91,HCNC | Performed by: HOSPITALIST

## 2023-11-10 PROCEDURE — 80048 BASIC METABOLIC PNL TOTAL CA: CPT | Mod: HCNC | Performed by: FAMILY MEDICINE

## 2023-11-10 PROCEDURE — 85025 COMPLETE CBC W/AUTO DIFF WBC: CPT | Mod: HCNC | Performed by: HOSPITALIST

## 2023-11-10 PROCEDURE — 97535 SELF CARE MNGMENT TRAINING: CPT | Mod: HCNC

## 2023-11-10 RX ORDER — HYDROCODONE BITARTRATE AND ACETAMINOPHEN 10; 325 MG/1; MG/1
1 TABLET ORAL EVERY 12 HOURS PRN
Qty: 8 TABLET | Refills: 0 | Status: SHIPPED | OUTPATIENT
Start: 2023-11-10 | End: 2024-02-22

## 2023-11-10 RX ORDER — CEPHALEXIN 500 MG/1
500 CAPSULE ORAL EVERY 6 HOURS
Qty: 12 CAPSULE | Refills: 0 | Status: SHIPPED | OUTPATIENT
Start: 2023-11-10 | End: 2023-11-22

## 2023-11-10 RX ADMIN — Medication 10 ML: at 05:11

## 2023-11-10 RX ADMIN — ATORVASTATIN CALCIUM 40 MG: 40 TABLET, FILM COATED ORAL at 08:11

## 2023-11-10 RX ADMIN — MUPIROCIN: 20 OINTMENT TOPICAL at 08:11

## 2023-11-10 RX ADMIN — INSULIN ASPART 2 UNITS: 100 INJECTION, SOLUTION INTRAVENOUS; SUBCUTANEOUS at 12:11

## 2023-11-10 RX ADMIN — Medication 10 ML: at 06:11

## 2023-11-10 RX ADMIN — Medication 10 ML: at 12:11

## 2023-11-10 RX ADMIN — HEPARIN SODIUM 14 UNITS/KG/HR: 10000 INJECTION, SOLUTION INTRAVENOUS at 05:11

## 2023-11-10 RX ADMIN — VALSARTAN 160 MG: 80 TABLET, FILM COATED ORAL at 08:11

## 2023-11-10 RX ADMIN — CARVEDILOL 12.5 MG: 12.5 TABLET, FILM COATED ORAL at 08:11

## 2023-11-10 RX ADMIN — CEPHALEXIN 500 MG: 500 CAPSULE ORAL at 06:11

## 2023-11-10 RX ADMIN — FAMOTIDINE 20 MG: 10 INJECTION INTRAVENOUS at 08:11

## 2023-11-10 RX ADMIN — CEPHALEXIN 500 MG: 500 CAPSULE ORAL at 05:11

## 2023-11-10 RX ADMIN — CARVEDILOL 12.5 MG: 12.5 TABLET, FILM COATED ORAL at 03:11

## 2023-11-10 RX ADMIN — CEPHALEXIN 500 MG: 500 CAPSULE ORAL at 12:11

## 2023-11-10 RX ADMIN — HYDROCODONE BITARTRATE AND ACETAMINOPHEN 1 TABLET: 10; 325 TABLET ORAL at 02:11

## 2023-11-10 RX ADMIN — CEPHALEXIN 500 MG: 500 CAPSULE ORAL at 11:11

## 2023-11-10 NOTE — ASSESSMENT & PLAN NOTE
CTA chest showed pulmonary emboli in segmental and subsegmental pulmonary artery in the right middle lobe, with mild-to-moderate bibasilar pleural effusions with associated bibasilar atelectasis.    Continue heparin ggt   Transition to Eliquis upon dc

## 2023-11-10 NOTE — SUBJECTIVE & OBJECTIVE
Review of Systems   Gastrointestinal:  Negative for melena.   Genitourinary:  Negative for hematuria.     Objective:     Vital Signs (Most Recent):  Temp: 97.8 °F (36.6 °C) (11/10/23 0800)  Pulse: 103 (11/10/23 0845)  Resp: (!) 26 (11/10/23 0845)  BP: 127/76 (11/10/23 0836)  SpO2: 96 % (11/10/23 0845) Vital Signs (24h Range):  Temp:  [97.6 °F (36.4 °C)-98.4 °F (36.9 °C)] 97.8 °F (36.6 °C)  Pulse:  [] 103  Resp:  [19-41] 26  SpO2:  [88 %-100 %] 96 %  BP: ()/() 127/76     Weight: 82 kg (180 lb 12.4 oz)  Body mass index is 30.08 kg/m².     SpO2: 96 %         Intake/Output Summary (Last 24 hours) at 11/10/2023 1218  Last data filed at 11/10/2023 0900  Gross per 24 hour   Intake 430.78 ml   Output 500 ml   Net -69.22 ml       Lines/Drains/Airways       Peripherally Inserted Central Catheter Line  Duration             PICC Triple Lumen 11/08/23 0613 right basilic 2 days                       Physical Exam  Constitutional:       General: She is not in acute distress.     Appearance: She is well-developed. She is not ill-appearing, toxic-appearing or diaphoretic.   HENT:      Head: Normocephalic and atraumatic.   Eyes:      General: No scleral icterus.     Extraocular Movements: Extraocular movements intact.      Conjunctiva/sclera: Conjunctivae normal.      Pupils: Pupils are equal, round, and reactive to light.   Neck:      Vascular: No JVD.      Trachea: No tracheal deviation.   Cardiovascular:      Rate and Rhythm: Normal rate and regular rhythm.      Heart sounds: S1 normal and S2 normal. No murmur heard.     No friction rub. No gallop.      Comments: L ICD site dressing c/d/i  Pulmonary:      Effort: Pulmonary effort is normal. No respiratory distress.      Breath sounds: Normal breath sounds. No stridor. No wheezing, rhonchi or rales.   Chest:      Chest wall: No tenderness.   Abdominal:      General: There is no distension.      Palpations: Abdomen is soft.   Musculoskeletal:         General: No  swelling or tenderness. Normal range of motion.      Cervical back: Normal range of motion and neck supple. No rigidity.      Right lower leg: No edema.      Left lower leg: No edema.   Skin:     General: Skin is warm and dry.      Coloration: Skin is not jaundiced.   Neurological:      General: No focal deficit present.      Mental Status: She is alert and oriented to person, place, and time.      Cranial Nerves: No cranial nerve deficit.   Psychiatric:         Mood and Affect: Mood normal.         Behavior: Behavior normal.            Current Medications:   atorvastatin  40 mg Oral QHS    carvediloL  12.5 mg Oral BID WM    cephALEXin  500 mg Oral Q6H    famotidine (PF)  20 mg Intravenous Daily    insulin detemir U-100  8 Units Subcutaneous QHS    mupirocin   Nasal BID    sodium chloride 0.9%  10 mL Intravenous Q6H    valsartan  160 mg Oral Daily      heparin (porcine) in D5W 14 Units/kg/hr (11/10/23 0900)     acetaminophen, calcium gluconate IVPB, calcium gluconate IVPB, calcium gluconate IVPB, dextrose 10%, dextrose 10%, glucagon (human recombinant), glucose, glucose, heparin (PORCINE), heparin (PORCINE), heparin (PORCINE), heparin (PORCINE), hydrALAZINE, hydrALAZINE, HYDROcodone-acetaminophen, insulin aspart U-100, levalbuterol **AND** ipratropium, magnesium sulfate IVPB, magnesium sulfate IVPB, melatonin, OLANZapine, ondansetron, Flushing PICC/Midline Protocol **AND** sodium chloride 0.9% **AND** sodium chloride 0.9%    Laboratory (all labs reviewed):  CBC:  Recent Labs   Lab 11/06/23 2026 11/07/23  0841 11/08/23  0657 11/09/23  0237 11/10/23  0602   WBC 8.59 8.29  8.29 6.95 8.55 6.34   Hemoglobin 12.3 12.7  12.7 11.3 L 11.1 L 10.8 L   Hematocrit 39.6 40.3  40.3 35.8 L 35.3 L 33.8 L   Platelets 367 359  359 382 369 270       CHEMISTRIES:  Recent Labs   Lab 02/22/21  0844 05/17/21  0832 06/23/21  0800 08/02/21  0901 01/26/22  1040 11/02/22  0913 11/06/23  1306 11/06/23  2026 11/07/23  0100 11/07/23  0841  11/08/23  2355   Glucose 152 H 120 H 118 H 152 H 122 H   < > 173 H 333 H 253 H 230 H 189 H   Sodium 140 141 144 141 139   < > 139 136 138 140 138   Potassium 3.7 3.5 3.7 3.6 3.7   < > 3.2 L 3.7 4.7 4.8 3.5   BUN 17 12 13 17 14   < > 14 12 13 15 18   Creatinine 0.9 0.8 0.8 0.9 0.9   < > 1.2 1.1 1.1 1.1 0.9   eGFR if non African American >60 >60 >60 >60 >60  --   --   --   --   --   --    eGFR  --   --   --   --   --    < > 49 A 54 A 54 A 54 A >60   Calcium 9.9 10.1 9.5 10.1 10.0   < > 9.1 8.6 L 8.9 9.2 8.3 L   Magnesium  --   --   --   --   --   --  1.7 8.4 HH 2.3 2.3  --     < > = values in this interval not displayed.       CARDIAC BIOMARKERS:  Recent Labs   Lab 11/06/23  1306 11/06/23  1617 11/06/23 2026 11/07/23  0100   Troponin I 0.090 H 0.079 H 0.085 H 0.080 H       COAGS:  Recent Labs   Lab 11/07/23  0841 11/09/23  1510   INR 1.1  1.1 1.2       LIPIDS/LFTS:  Recent Labs   Lab 11/19/20  0926 02/22/21  0844 06/23/21  0800 08/02/21  0901 01/26/22  1040 11/02/22  0913 05/31/23  0928 11/06/23  1306 11/06/23  2026 11/07/23  0841 11/08/23  2355   Cholesterol 126  --  122  --  134  --  130  --   --  141  --    Triglycerides 127  --  102  --  134  --  151 H  --   --  101  --    HDL 49  --  48  --  49  --  44  --   --  39 L  --    LDL Cholesterol 51.6 L  --  53.6 L  --  58.2 L  --  55.8 L  --   --  81.8  --    Non-HDL Cholesterol 77  --  74  --  85  --  86  --   --  102  --    AST 11   < > 11   < > 14   < > 10 30 78 H 55 H 59 H   ALT 7 L   < > 10   < > 12   < > 8 L 41 56 H 52 H 53 H    < > = values in this interval not displayed.       BNP:  Recent Labs   Lab 11/06/23  1306   BNP 1,839 H       TSH:  Recent Labs   Lab 11/19/20  0926 11/02/22  0913 05/31/23  0928   TSH 1.694 1.833 1.555       Free T4:

## 2023-11-10 NOTE — PT/OT/SLP EVAL
Physical Therapy Evaluation    Patient Name:  Harriett Oglesby   MRN:  [id number]    Recommendations:     Discharge Recommendations: Low Intensity Therapy (with caregiver supervision/assistance)   Discharge Equipment Recommendations: cane, straight   Barriers to discharge: Inaccessible home    Assessment:     Harriett Oglesby is a 68 y.o. female admitted with a medical diagnosis of VF (ventricular fibrillation).  She presents with the following impairments/functional limitations: weakness, impaired endurance, impaired self care skills, impaired functional mobility, gait instability, impaired balance, decreased upper extremity function, decreased lower extremity function, decreased safety awareness, pain, impaired joint extensibility, impaired muscle length.    Rehab Prognosis: Good; patient would benefit from acute skilled PT services to address these deficits and reach maximum level of function.    Recent Surgery: Procedure(s) (LRB):  INSERTION, ICD GENERATOR, SINGLE CHAMBER (Left) 1 Day Post-Op    Plan:     During this hospitalization, patient to be seen daily to address the identified rehab impairments via gait training, therapeutic activities, therapeutic exercises and progress toward the following goals:    Plan of Care Expires:  11/24/23    Subjective     Chief Complaint: Pt reported weakness and fatigue.  Patient/Family Comments/goals: Pt agreeable to therapy.  Pt would like to be independent.   Pain/Comfort:  Pain Rating 1:  (pain to L chest 2* AICD placement)  Pain Addressed 1: Pre-medicate for activity, Reposition, Distraction, Cessation of Activity      Living Environment:  Pt lives alone in a mobile home with ~6 JUSTIN and B HR at entry in [place], [place].  Prior to admission, patients level of function was independent.  Pt has not been driving 2* no vehicle.  Equipment used at home: none.  Upon discharge, patient will have assistance from sister, dtr, son, and grand-dtr.    Objective:     Communicated with ICU  nurse Mayo prior to session.  Patient found HOB elevated with blood pressure cuff, pulse ox (continuous), telemetry, PICC line upon PT entry to room.    General Precautions: Standard, fall, diabetic, pacemaker  Orthopedic Precautions:N/A   Braces: N/A  Respiratory Status: Room air    Exams:  Cognitive Exam:  Patient is oriented to Person, Place, and Time.  Pt able to follow 2 step commands.   Gross Motor Coordination:  WFL  Postural Exam:  Patient presented with the following abnormalities:    -       Rounded shoulders  -       Forward head  Sensation:    -       Intact  light/touch BLE  Skin Integrity/Edema:      -       Skin integrity: Visible skin intact  -       Edema: Mild BLE  BLE ROM: WFL  BLE Strength: WFL    Functional Mobility:  Bed Mobility:     Scooting: minimum assistance  Supine to Sit: minimum assistance  Transfers:     Sit to Stand: minimum assistance with hand-held assist x3 trials   Bed to Chair: contact guard assistance and minimum assistance with no AD and hand-held assist  using  Step Transfer  Gait: Pt ambulated ~60 ft with min A-CGA pushing IV pole in ICU hallway.  Pt with mild unsteady gait, decreased step length, and decreased paxton.  Pt with decreased endurance, required brief standing rest breaks.  V/S stable except BP slightly elevated.    Balance: Pt with fair dynamic standing balance.       AM-PAC 6 CLICK MOBILITY  Total Score:17       Treatment & Education:  Balance Training  Static Standing:  Patient performed static standing on level surface  using  R hand held assistance  with Minimal Assistance-CGA and minimal verbal cues for postural control, balance, and standing tolerance x3 trials.  Initially, pt with c/o dizziness and weakness.  Over time, pt with increased standing tolerance and fair static standing balance.      Pt/dtr educated on pacemaker precautions: no overhead movement with LUE and no pushing/pulling with LUE.  However, due to h/o CVA with nonfunctional LUE 2*  contractures there's no issues with pacemaker precautions at this time.     Pt educated on acute skilled PT services and goals.  Pt encouraged OOB>chair with nursing assistance while in the hospital.  Pt/family verbalized good understanding.     Patient left up in chair reclined with all lines intact, call button in reach, ICU nurse Maria Esther notified, and dtr/grand-dtr present.    GOALS:   Multidisciplinary Problems       Physical Therapy Goals          Problem: Physical Therapy    Goal Priority Disciplines Outcome Goal Variances Interventions   Physical Therapy Goal     PT, PT/OT Ongoing, Progressing     Description: Goals to be met by: 23     Patient will increase functional independence with mobility by performin. Supine to sit with Modified Two Dot  2. Rolling to Left and Right with Modified Two Dot  3. Sit to stand transfer with Modified Two Dot  4. Bed to chair transfer with Modified Two Dot   5. Gait x250 feet with Modified Two Dot using Single-point Cane   6. Lower extremity exercise program 2 sets x15 reps per handout, with independence                         History:     Past Medical History:   Diagnosis Date    Acute respiratory failure with hypoxia 2023    Adult BMI 31.0-31.9 kg/sq m 12/3/2018    Anemia     CHF (congestive heart failure) 2023    CVA (cerebral vascular accident)     Residual weakness in the left arm and hand    Essential thrombocythemia     Hyperlipidemia associated with type 2 diabetes mellitus     Hypertension     Osteoporosis     Pulmonary embolism 2023    Type 2 diabetes mellitus        Past Surgical History:   Procedure Laterality Date    CATHETERIZATION OF BOTH LEFT AND RIGHT HEART N/A 2023    Procedure: CATHETERIZATION, HEART, BOTH LEFT AND RIGHT;  Surgeon: Asher Gonzalez MD;  Location: [facility] CATH LAB;  Service: Cardiology;  Laterality: N/A;    COLONOSCOPY N/A 2019    Procedure: COLONOSCOPY;  Surgeon: James Ozuna  MD;  Location: [facility] ENDO;  Service: Endoscopy;  Laterality: N/A;    COLONOSCOPY N/A 3/1/2021    Procedure: COLONOSCOPY;  Surgeon: Nelida Cook MD;  Location: [facility] ENDO;  Service: Endoscopy;  Laterality: N/A;    ESOPHAGOGASTRODUODENOSCOPY N/A 3/1/2021    Procedure: EGD (ESOPHAGOGASTRODUODENOSCOPY);  Surgeon: Nelida Cook MD;  Location: [facility] ENDO;  Service: Endoscopy;  Laterality: N/A;  rapid covid > 50 miles away, instructions mailed -ml    HEMORRHOID SURGERY      INSERTION, PULSE GENERATOR, ICD, SINGLE CHAMBER Left 11/9/2023    Procedure: INSERTION, ICD GENERATOR, SINGLE CHAMBER;  Surgeon: Chilo Da Silva MD;  Location: [facility] CATH LAB;  Service: Cardiology;  Laterality: Left;       Time Tracking:     PT Received On: 11/10/23  PT Start Time: 1128     PT Stop Time: 1157  PT Total Time (min): 29 min     Billable Minutes: Evaluation 19 min co-eval with OT and Therapeutic Activity 10 min      11/10/2023

## 2023-11-10 NOTE — NURSING
[facility] [place]  Nurses Note -- 4 Eyes      11/9/2023       Skin assessed on: Q Shift      [x] No Pressure Injuries Present    [x]Prevention Measures Documented    [] Yes LDA  for Pressure Injury Previously documented     [] Yes New Pressure Injury Discovered   [] LDA for New Pressure Injury Added      Attending RN:  ALIDA BAILEY      Second RN:  ALIDA HENDERSON

## 2023-11-10 NOTE — PLAN OF CARE
Met with patient and daughter at bedside.  Daughter stated that her brother, patient's son is coming in town to stay with her for 2-3 weeks to help her at home.       11/10/23 1151   Discharge Reassessment   Assessment Type Discharge Planning Reassessment   Did the patient's condition or plan change since previous assessment? Yes   Discharge Plan discussed with: Adult children;Patient   Communicated CHEYANNE with patient/caregiver Yes   Discharge Plan A Home Health  (if available in [place], [place])   Discharge Plan B Home   DME Needed Upon Discharge  none   Transition of Care Barriers None   Why the patient remains in the hospital Requires continued medical care

## 2023-11-10 NOTE — PROGRESS NOTES
[facility]  Cardiology  Progress Note    Patient Name: Harriett Oglesby  MRN: [id number]  Admission Date: 11/6/2023  Hospital Length of Stay: 3 days  Code Status: Full Code   Attending Physician: Damien Hernandez MD   Primary Care Physician: Zeinab Noble MD  Expected Discharge Date:   Principal Problem:VF (ventricular fibrillation)    Subjective:       Interval History:  Patient underwent AICD implantation today.      Review of Systems   Constitutional: Positive for malaise/fatigue.   HENT: Negative.     Eyes: Negative.    Endocrine: Negative.    Hematologic/Lymphatic: Negative.    Skin: Negative.    Musculoskeletal: Negative.    Gastrointestinal: Negative.    Genitourinary: Negative.    Neurological: Negative.    Psychiatric/Behavioral: Negative.     Allergic/Immunologic: Negative.      Objective:     Vital Signs (Most Recent):  Temp: 97.6 °F (36.4 °C) (11/09/23 1512)  Pulse: (!) 118 (11/09/23 1732)  Resp: (!) 25 (11/09/23 1732)  BP: (!) 155/123 (11/09/23 1732)  SpO2: (!) 93 % (11/09/23 1732) Vital Signs (24h Range):  Temp:  [97.6 °F (36.4 °C)-98.1 °F (36.7 °C)] 97.6 °F (36.4 °C)  Pulse:  [101-139] 118  Resp:  [19-35] 25  SpO2:  [93 %-100 %] 93 %  BP: ()/() 155/123     Weight: 82 kg (180 lb 12.4 oz)  Body mass index is 30.08 kg/m².     SpO2: (!) 93 %         Intake/Output Summary (Last 24 hours) at 11/9/2023 1815  Last data filed at 11/9/2023 1700  Gross per 24 hour   Intake 706.6 ml   Output 425 ml   Net 281.6 ml         Lines/Drains/Airways       Peripherally Inserted Central Catheter Line  Duration             PICC Triple Lumen 11/08/23 0613 right basilic 1 day                       Physical Exam  Constitutional:       Appearance: Normal appearance. She is well-developed.   HENT:      Head: Normocephalic.   Eyes:      Pupils: Pupils are equal, round, and reactive to light.   Cardiovascular:      Rate and Rhythm: Normal rate and regular rhythm.   Pulmonary:      Effort: Pulmonary  "effort is normal.      Breath sounds: Normal breath sounds.   Abdominal:      General: Bowel sounds are normal.      Palpations: Abdomen is soft.      Tenderness: There is no abdominal tenderness.   Musculoskeletal:         General: Normal range of motion.      Cervical back: Normal range of motion and neck supple.   Skin:     General: Skin is warm.   Neurological:      Mental Status: She is alert and oriented to person, place, and time.            Significant Labs: BMP:   Recent Labs   Lab 11/08/23  2355   *      K 3.5      CO2 23   BUN 18   CREATININE 0.9   CALCIUM 8.3*     , CMP   Recent Labs   Lab 11/08/23  2355      K 3.5      CO2 23   *   BUN 18   CREATININE 0.9   CALCIUM 8.3*   PROT 5.8*   ALBUMIN 3.0*   BILITOT 0.7   ALKPHOS 224*   AST 59*   ALT 53*   ANIONGAP 12     , CBC   Recent Labs   Lab 11/08/23  0657 11/09/23  0237   WBC 6.95 8.55   HGB 11.3* 11.1*   HCT 35.8* 35.3*    369     , INR   Recent Labs   Lab 11/09/23  1510   INR 1.2     , Lipid Panel   No results for input(s): "CHOL", "HDL", "LDLCALC", "TRIG", "CHOLHDL" in the last 48 hours.  , Troponin   No results for input(s): "TROPONINI" in the last 48 hours.  , and All pertinent lab results from the last 24 hours have been reviewed.    Significant Imaging: Echocardiogram: Transthoracic echo (TTE) complete (Cupid Only):   Results for orders placed or performed during the hospital encounter of 11/06/23   Echo   Result Value Ref Range    BSA 1.93 m2    LVOT stroke volume 34.63 cm3    LVIDd 5.47 3.5 - 6.0 cm    LV Systolic Volume 120.86 mL    LV Systolic Volume Index 63.9 mL/m2    LVIDs 5.05 (A) 2.1 - 4.0 cm    LV Diastolic Volume 145.56 mL    LV Diastolic Volume Index 77.02 mL/m2    IVS 0.69 0.6 - 1.1 cm    LVOT diameter 1.95 cm    LVOT area 3.0 cm2    FS 8 (A) 28 - 44 %    Left Ventricle Relative Wall Thickness 0.27 cm    Posterior Wall 0.74 0.6 - 1.1 cm    LV mass 137.75 g    LV Mass Index 73 g/m2    TDI " LATERAL 0.11 m/s    TR Max Yosi 3.22 m/s    LVOT peak yosi 0.73 m/s    Left Ventricular Outflow Tract Mean Velocity 0.49 cm/s    Left Ventricular Outflow Tract Mean Gradient 1.09 mmHg    RVDD 3.60 cm    TAPSE 1.50 cm    LA size 3.79 cm    Left Atrium Minor Axis 5.10 cm    Left Atrium Major Axis 5.47 cm    RA Major Axis 4.12 cm    AV mean gradient 4 mmHg    AV peak gradient 7 mmHg    Ao peak yosi 1.36 m/s    Ao VTI 19.80 cm    LVOT peak VTI 11.60 cm    AV valve area 1.75 cm²    AV Velocity Ratio 0.54     AV index (prosthetic) 0.59     RACHID by Velocity Ratio 1.60 cm²    Triscuspid Valve Regurgitation Peak Gradient 41 mmHg    PV PEAK VELOCITY 0.79 m/s    PV peak gradient 2 mmHg    Sinus 2.15 cm    STJ 1.79 cm    IVC diameter 1.76 cm    ZLVIDS 3.46     ZLVIDD 0.33     LA Volume Index 46.8 mL/m2    LA volume 88.42 cm3    LA WIDTH 5.2 cm    RA Width 3.4 cm    TV resting pulmonary artery pressure 49 mmHg    RV TB RVSP 11 mmHg    Est. RA pres 8 mmHg    Narrative      Left Ventricle: The left ventricle is mildly dilated. Normal wall   thickness. There is severely reduced systolic function with a visually   estimated ejection fraction of 20 - 25%.    Right Ventricle: Normal right ventricular cavity size. Systolic   function is normal.    Left Atrium: Left atrium is severely dilated.    Right Atrium: Right atrium is moderately dilated.    Mitral Valve: There is mild regurgitation.    Tricuspid Valve: There is mild to moderate regurgitation.    Pulmonary Artery: The estimated pulmonary artery systolic pressure is   49 mmHg.    IVC/SVC: Intermediate venous pressure at 8 mmHg.    Pericardium: There is a trivial effusion.       Assessment and Plan:     Brief HPI:     * VF (ventricular fibrillation)  VFib arrest.  Prolonged QTC.  On lidocaine drip.  Monitor closely in ICU.  Plan for cardiac catheterization in a.m.    11/7: Risks, benefits and alternatives of the catheterization procedure were discussed with the patient.The  risks of coronary angiography include but are not limited to: bleeding, infection, death, heart attack, arrhythmia, kidney injury or failure, potential need for dialysis, allergic reactions, stroke, need for emergency surgery, hematoma, pseudoaneurysm etc.  Should stenting be indicated, the patient has agreed to dual anti-platelet therapy for 1-consecutive year with a drug-eluting stent and a minimum of 1-month with the use of a bare metal stent. Additionally, pt is aware that non-compliance is likely to result in stent clotting with heart attack, heart failure, and/or death  The risks of moderate sedation include hypotension, respiratory depression, arrhythmias, bronchospasm, and death. Informed consent was obtained and the  patient is agreeable to proceed with the procedure. Consent was placed on the chart.  Continue lidocaine drip  11/8:  Nonischemic cardiomyopathy.  No significant stenosis on angiogram yesterday.  Plan for AICD in a.m..  Continue lidocaine drip      11/9: s/p aicd today.     CHF (congestive heart failure)  Patient is identified as having Combined Systolic and Diastolic heart failure that is Acute.Latest ECHO performed and demonstrates- Results for orders placed during the hospital encounter of 11/06/23    Echo    Interpretation Summary    Left Ventricle: The left ventricle is mildly dilated. Normal wall thickness. There is severely reduced systolic function with a visually estimated ejection fraction of 20 - 25%.    Right Ventricle: Normal right ventricular cavity size. Systolic function is normal.    Left Atrium: Left atrium is severely dilated.    Right Atrium: Right atrium is moderately dilated.    Mitral Valve: There is mild regurgitation.    Tricuspid Valve: There is mild to moderate regurgitation.    Pulmonary Artery: The estimated pulmonary artery systolic pressure is 49 mmHg.    IVC/SVC: Intermediate venous pressure at 8 mmHg.    Pericardium: There is a trivial effusion.  .  Continue Furosemide and monitor clinical status closely. Monitor on telemetry. Patient is on CHF pathway.  Monitor strict Is&Os and daily weights.  Place on fluid restriction of 1.5 L. Cardiology has been consulted. Continue to stress to patient importance of self efficacy and  on diet for CHF. Last BNP reviewed- and noted below   Recent Labs   Lab 11/06/23  1306   BNP 1,839*   .    euvolumic on exam. Continue gdmt    Pulmonary embolism  CTA personally reviewed.  Segmental and subsegmental pulmonary embolism.  RV to LV ratio less than 0.9.  Will continue to monitor.  Currently no indication for thrombectomy.  No DVT on peripheral ultrasound.  Continue heparin drip, transition to Eliquis at time of discharge        Multifocal atrial tachycardia  On initial presentation.  Improving.    11/7: now in sinus    Hyperlipidemia associated with type 2 diabetes mellitus        History of stroke with current residual effects        Type 2 diabetes mellitus with microalbuminuria, without long-term current use of insulin        Hypertension, essential, benign            VTE Risk Mitigation (From admission, onward)         Ordered     heparin 25,000 units in dextrose 5% 250 mL (100 units/mL) infusion HIGH INTENSITY nomogram - [facility]  Continuous        Question:  Begin at (units/kg/hr)  Answer:  18    11/09/23 1238     heparin 25,000 units in dextrose 5% (100 units/ml) IV bolus from bag - ADDITIONAL PRN BOLUS - 60 units/kg  As needed (PRN)        Question:  Heparin Infusion Adjustment (DO NOT MODIFY ANSWER)  Answer:  \[employer]\epic\Images\Pharmacy\HeparinInfusions\heparin HIGH INTENSITY nomogram for [facility] [id number]    11/09/23 1238     heparin 25,000 units in dextrose 5% (100 units/ml) IV bolus from bag - ADDITIONAL PRN BOLUS - 30 units/kg  As needed (PRN)        Question:  Heparin Infusion Adjustment (DO NOT MODIFY ANSWER)  Answer:  \[email or web address]\epic\Images\Pharmacy\HeparinInfusions\heparin HIGH INTENSITY nomogram for [facility]  [id number]    11/09/23 1238     IP VTE HIGH RISK PATIENT  Once         11/06/23 2047     Place sequential compression device  Until discontinued         11/06/23 2047     heparin 25,000 units in dextrose 5% (100 units/ml) IV bolus from bag - ADDITIONAL PRN BOLUS - 60 units/kg  As needed (PRN)        Question:  Heparin Infusion Adjustment (DO NOT MODIFY ANSWER)  Answer:  \\[email or web address]\epic\Images\Pharmacy\HeparinInfusions\heparin HIGH INTENSITY nomogram for [facility] [id number]    11/06/23 1833     heparin 25,000 units in dextrose 5% (100 units/ml) IV bolus from bag - ADDITIONAL PRN BOLUS - 30 units/kg  As needed (PRN)        Question:  Heparin Infusion Adjustment (DO NOT MODIFY ANSWER)  Answer:  \\[email or web address]\epic\Images\Pharmacy\HeparinInfusions\heparin HIGH INTENSITY nomogram for [facility] [id number]    11/06/23 1833     heparin 25,000 units in dextrose 5% 250 mL (100 units/mL) infusion HIGH INTENSITY nomogram - [facility]  Continuous        Question:  Begin at (units/kg/hr)  Answer:  18    11/06/23 1833                Asher Gonzalez MD  Cardiology  [facility] - Intensive Care      Critical Care Time:  35 minutes     Critical care was time spent personally by me on the following activities: development of treatment plan with patient or surrogate and bedside caregivers, discussions with consultants, evaluation of patient's response to treatment, examination of patient, ordering and performing treatments and interventions, ordering and review of laboratory studies, ordering and review of radiographic studies, pulse oximetry, re-evaluation of patient's condition. This critical care time did not overlap with that of any other provider or involve time for any procedures.

## 2023-11-10 NOTE — ASSESSMENT & PLAN NOTE
Patient is identified as having Combined Systolic and Diastolic heart failure that is Acute.Latest ECHO performed and demonstrates- Results for orders placed during the hospital encounter of 11/06/23    Echo    Interpretation Summary    Left Ventricle: The left ventricle is mildly dilated. Normal wall thickness. There is severely reduced systolic function with a visually estimated ejection fraction of 20 - 25%.    Right Ventricle: Normal right ventricular cavity size. Systolic function is normal.    Left Atrium: Left atrium is severely dilated.    Right Atrium: Right atrium is moderately dilated.    Mitral Valve: There is mild regurgitation.    Tricuspid Valve: There is mild to moderate regurgitation.    Pulmonary Artery: The estimated pulmonary artery systolic pressure is 49 mmHg.    IVC/SVC: Intermediate venous pressure at 8 mmHg.    Pericardium: There is a trivial effusion.  . Continue Furosemide and monitor clinical status closely. Monitor on telemetry. Patient is on CHF pathway.  Monitor strict Is&Os and daily weights.  Place on fluid restriction of 1.5 L. Cardiology has been consulted. Continue to stress to patient importance of self efficacy and  on diet for CHF. Last BNP reviewed- and noted below   Recent Labs   Lab 11/06/23  1306   BNP 1,839*   .    euvolumic on exam. Continue gdmt

## 2023-11-10 NOTE — NURSING
12 Hour Chart check complete.  Orders reviewed No Omitted orders. Lab orders reviewed.  / No duplicate orders / No Benzo's, Opioids, narcotics which are NOT in use and should be considered for DC.

## 2023-11-10 NOTE — SUBJECTIVE & OBJECTIVE
Interval History:  Patient underwent AICD implantation today.      Review of Systems   Constitutional: Positive for malaise/fatigue.   HENT: Negative.     Eyes: Negative.    Endocrine: Negative.    Hematologic/Lymphatic: Negative.    Skin: Negative.    Musculoskeletal: Negative.    Gastrointestinal: Negative.    Genitourinary: Negative.    Neurological: Negative.    Psychiatric/Behavioral: Negative.     Allergic/Immunologic: Negative.      Objective:     Vital Signs (Most Recent):  Temp: 97.6 °F (36.4 °C) (11/09/23 1512)  Pulse: (!) 118 (11/09/23 1732)  Resp: (!) 25 (11/09/23 1732)  BP: (!) 155/123 (11/09/23 1732)  SpO2: (!) 93 % (11/09/23 1732) Vital Signs (24h Range):  Temp:  [97.6 °F (36.4 °C)-98.1 °F (36.7 °C)] 97.6 °F (36.4 °C)  Pulse:  [101-139] 118  Resp:  [19-35] 25  SpO2:  [93 %-100 %] 93 %  BP: ()/() 155/123     Weight: 82 kg (180 lb 12.4 oz)  Body mass index is 30.08 kg/m².     SpO2: (!) 93 %         Intake/Output Summary (Last 24 hours) at 11/9/2023 1815  Last data filed at 11/9/2023 1700  Gross per 24 hour   Intake 706.6 ml   Output 425 ml   Net 281.6 ml         Lines/Drains/Airways       Peripherally Inserted Central Catheter Line  Duration             PICC Triple Lumen 11/08/23 0613 right basilic 1 day                       Physical Exam  Constitutional:       Appearance: Normal appearance. She is well-developed.   HENT:      Head: Normocephalic.   Eyes:      Pupils: Pupils are equal, round, and reactive to light.   Cardiovascular:      Rate and Rhythm: Normal rate and regular rhythm.   Pulmonary:      Effort: Pulmonary effort is normal.      Breath sounds: Normal breath sounds.   Abdominal:      General: Bowel sounds are normal.      Palpations: Abdomen is soft.      Tenderness: There is no abdominal tenderness.   Musculoskeletal:         General: Normal range of motion.      Cervical back: Normal range of motion and neck supple.   Skin:     General: Skin is warm.   Neurological:       "Mental Status: She is alert and oriented to person, place, and time.            Significant Labs: BMP:   Recent Labs   Lab 11/08/23  2355   *      K 3.5      CO2 23   BUN 18   CREATININE 0.9   CALCIUM 8.3*     , CMP   Recent Labs   Lab 11/08/23  2355      K 3.5      CO2 23   *   BUN 18   CREATININE 0.9   CALCIUM 8.3*   PROT 5.8*   ALBUMIN 3.0*   BILITOT 0.7   ALKPHOS 224*   AST 59*   ALT 53*   ANIONGAP 12     , CBC   Recent Labs   Lab 11/08/23  0657 11/09/23  0237   WBC 6.95 8.55   HGB 11.3* 11.1*   HCT 35.8* 35.3*    369     , INR   Recent Labs   Lab 11/09/23  1510   INR 1.2     , Lipid Panel   No results for input(s): "CHOL", "HDL", "LDLCALC", "TRIG", "CHOLHDL" in the last 48 hours.  , Troponin   No results for input(s): "TROPONINI" in the last 48 hours.  , and All pertinent lab results from the last 24 hours have been reviewed.    Significant Imaging: Echocardiogram: Transthoracic echo (TTE) complete (Cupid Only):   Results for orders placed or performed during the hospital encounter of 11/06/23   Echo   Result Value Ref Range    BSA 1.93 m2    LVOT stroke volume 34.63 cm3    LVIDd 5.47 3.5 - 6.0 cm    LV Systolic Volume 120.86 mL    LV Systolic Volume Index 63.9 mL/m2    LVIDs 5.05 (A) 2.1 - 4.0 cm    LV Diastolic Volume 145.56 mL    LV Diastolic Volume Index 77.02 mL/m2    IVS 0.69 0.6 - 1.1 cm    LVOT diameter 1.95 cm    LVOT area 3.0 cm2    FS 8 (A) 28 - 44 %    Left Ventricle Relative Wall Thickness 0.27 cm    Posterior Wall 0.74 0.6 - 1.1 cm    LV mass 137.75 g    LV Mass Index 73 g/m2    TDI LATERAL 0.11 m/s    TR Max Yosi 3.22 m/s    LVOT peak yosi 0.73 m/s    Left Ventricular Outflow Tract Mean Velocity 0.49 cm/s    Left Ventricular Outflow Tract Mean Gradient 1.09 mmHg    RVDD 3.60 cm    TAPSE 1.50 cm    LA size 3.79 cm    Left Atrium Minor Axis 5.10 cm    Left Atrium Major Axis 5.47 cm    RA Major Axis 4.12 cm    AV mean gradient 4 mmHg    AV peak gradient 7 " mmHg    Ao peak frank 1.36 m/s    Ao VTI 19.80 cm    LVOT peak VTI 11.60 cm    AV valve area 1.75 cm²    AV Velocity Ratio 0.54     AV index (prosthetic) 0.59     RACHID by Velocity Ratio 1.60 cm²    Triscuspid Valve Regurgitation Peak Gradient 41 mmHg    PV PEAK VELOCITY 0.79 m/s    PV peak gradient 2 mmHg    Sinus 2.15 cm    STJ 1.79 cm    IVC diameter 1.76 cm    ZLVIDS 3.46     ZLVIDD 0.33     LA Volume Index 46.8 mL/m2    LA volume 88.42 cm3    LA WIDTH 5.2 cm    RA Width 3.4 cm    TV resting pulmonary artery pressure 49 mmHg    RV TB RVSP 11 mmHg    Est. RA pres 8 mmHg    Narrative      Left Ventricle: The left ventricle is mildly dilated. Normal wall   thickness. There is severely reduced systolic function with a visually   estimated ejection fraction of 20 - 25%.    Right Ventricle: Normal right ventricular cavity size. Systolic   function is normal.    Left Atrium: Left atrium is severely dilated.    Right Atrium: Right atrium is moderately dilated.    Mitral Valve: There is mild regurgitation.    Tricuspid Valve: There is mild to moderate regurgitation.    Pulmonary Artery: The estimated pulmonary artery systolic pressure is   49 mmHg.    IVC/SVC: Intermediate venous pressure at 8 mmHg.    Pericardium: There is a trivial effusion.

## 2023-11-10 NOTE — PLAN OF CARE
Problem: Adult Inpatient Plan of Care  Goal: Plan of Care Review  Outcome: Ongoing, Progressing  Goal: Patient-Specific Goal (Individualized)  Outcome: Ongoing, Progressing  Goal: Absence of Hospital-Acquired Illness or Injury  Outcome: Ongoing, Progressing  Goal: Optimal Comfort and Wellbeing  Outcome: Ongoing, Progressing  Goal: Readiness for Transition of Care  Outcome: Ongoing, Progressing     Problem: Diabetes Comorbidity  Goal: Blood Glucose Level Within Targeted Range  Outcome: Ongoing, Progressing     Problem: Skin Injury Risk Increased  Goal: Skin Health and Integrity  Outcome: Ongoing, Progressing     Problem: Infection  Goal: Absence of Infection Signs and Symptoms  Outcome: Ongoing, Progressing     Problem: Fall Injury Risk  Goal: Absence of Fall and Fall-Related Injury  Outcome: Ongoing, Progressing

## 2023-11-10 NOTE — NURSING TRANSFER
Nursing Transfer Note      11/10/2023   1:00 PM    Nurse giving handoff: ALIDA Mayo  Nurse receiving handoff: ALIDA Guerrero    Reason patient is being transferred: Step down    Transfer To: Telemetry    Transfer via wheelchair    Transfer with cardiac monitoring    Transported by RNs    Transfer Vital Signs:  Blood Pressure:133/80  Heart Rate:97  O2:97  Temperature:97.8  Respirations:25    4eyes on Skin: yes    Medicines sent: Insulin and Keflex    Any special needs or follow-up needed: no    Patient belongings transferred with patient: Yes    Chart send with patient: Yes    Notified: family    Patient reassessed at: 11/10/23 @ 3027 (date, time)  1  Upon arrival to floor: cardiac monitor applied, patient oriented to room, call bell in reach, and bed in lowest position

## 2023-11-10 NOTE — CARE UPDATE
and   Will increase correg to 12.5 mg BID        Festus Francois MD  Board-Certified Internal Medicine Attending  Section Head of [facility]

## 2023-11-10 NOTE — PT/OT/SLP EVAL
Occupational Therapy Evaluation     Name: Harriett Oglesby  MRN: [id number]  Admitting Diagnosis: VF (ventricular fibrillation)  Recent Surgery: Procedure(s) (LRB):  INSERTION, ICD GENERATOR, SINGLE CHAMBER (Left) 1 Day Post-Op    Recommendations:     Discharge Recommendations: Low Intensity Therapy (patient may need to go to daughter' s home to receive HHOT)  Level of Assistance Recommended: 24 hours light assistance  Discharge Equipment Recommendations: cane, straight  Barriers to discharge: None    Assessment:     Harriett Oglesby is a 68 y.o. female with a medical diagnosis of VF (ventricular fibrillation). She presents with performance deficits affecting function including weakness, impaired endurance, impaired self care skills, impaired functional mobility, impaired balance, decreased lower extremity function, decreased safety awareness, impaired cardiopulmonary response to activity. Patient in bed with HOB elevated and lines attached.     Rehab Prognosis: Good; patient would benefit from acute OT services to address these deficits and reach maximum level of function.    Plan:     Patient to be seen  (3-5x/wk) to address the above listed problems via self-care/home management, therapeutic activities, therapeutic exercises  Plan of Care Expires: 11/24/23  Plan of Care Reviewed with: patient, daughter    Subjective     Chief Complaint: left sided chest pain   Patient Comments/Goals: to return home with no help   Pain/Comfort:  Pain Rating 1: other (see comments) (not rated, c/o pain at chest with new AICD placement)  Location - Side 1: Left  Location - Orientation 1: upper  Location 1: chest  Pain Addressed 1: Reposition, Distraction, Cessation of Activity  Pain Addressed 2: Distraction, Cessation of Activity    Patients cultural, spiritual, Roman Catholic conflicts given the current situation: no    Social History:  Living Environment: Patient lives alone in a mobile home with number of outside stair(s): 6 with B  HR   Prior Level of Function: Prior to admission, patient was independent  Roles and Routines: Patient had not been driving during the past year and retired prior to admission.  Equipment Used at Home: none  DME owned (not currently used): none  Assistance Upon Discharge: family    Objective:     Communicated with RN, Maria Esther,  prior to session. Patient found HOB elevated with blood pressure cuff, pulse ox (continuous), telemetry, PICC line upon OT entry to room.    General Precautions: Standard, fall, diabetic, pacemaker   Orthopedic Precautions: N/A   Braces: N/A    Respiratory Status: Room air    Occupational Performance    Gait belt applied - Yes    Bed Mobility:   Rolling/Turning to Right with contact guard assistance  Scooting anteriorly to EOB to have both feet planted on floor: contact guard assistance  Supine to sit from right side of bed with contact guard assistance    Functional Mobility/Transfers:  Sit <> Stand Transfer with contact guard assistance with hand-held assist  Bed <> Chair Transfer using Step Transfer technique with contact guard assistance with hand-held assist  Functional Mobility: Patient walked in room and then sat in chair and then walked  in hallway with CGA with PT while occupational therapy followed with a chair closely behind her.     Activities of Daily Living:  Upper Body Dressing: min assistance due to left hemiplegia and new AICD placement   Lower Body Dressing:  max assist to don socks     Cognitive/Visual Perceptual:  Cognitive/Psychosocial Skills:    -     Oriented to: Person, Place, Time, Situation  -     Follows Commands/attention: Follows multistep  commands  -     Communication: clear/fluent  -     Memory: No Deficits noted  -     Safety awareness/insight to disability: intact  -     Mood/Affect/Coping skills/emotional control: Appropriate to situation  Visual/Perceptual:    -     Intact  -     Wears glasses    Physical Exam:  Balance:    -     Sitting: stand by  assistance  -     Standing: contact guard assistance  Postural examination/scapula alignment:    -       No postural abnormalities identified  Skin integrity: Visible skin intact  Edema:  None noted  Sensation:    -       Intact  Motor Planning: Intact  Dominant hand: Right  Upper Extremity Range of Motion:     -       Right Upper Extremity: WNL  -       Left Upper Extremity: patient has limited AROM/PROM due to premorbid CVA with non-fixed contractures and no overhead movement of left shoulder due to new AICD placement.   Upper Extremity Strength:    -       Right Upper Extremity: WNL  -       Left Upper Extremity:  patient has premorbid CVA with non-fixed contractures, but NICOLA due to new AICD pacemaker placement    Strength:    -       Right Upper Extremity: WNL  -       Left Upper Extremity:  patient has premorbid CVA with non-fixed contractures and poor    Fine Motor Coordination:    -       Intact: right upper extremity; impaired: left upper extremity due to non-fixed contracture of left hand with difficulty with finger extension.   Gross motor coordination:   within functional limitation right upper extremity; impaired: left upper extremity due to premorbid CVA with non-fixed contractures       AMPAC 6 Click ADL:  AMPAC Total Score: 18    Treatment & Education:  Patient educated on role of OT, POC, and goals for therapy  Patient educated on importance of OOB activities with staff member assistance and sitting OOB majority of the day  Patient may need DME when she returns home including BSC if her endurance does not increase.   Patient educated to no lift up left arm above shoulder level due to new AICD placement for up to 4 weeks, but patient also has pre-morbid non-fixed contractures of left arm, so cannot lift it above head.     Patient clear to ambulate to/from bathroom with RN/PCT, assist x1  .    Patient left up in chair with all lines intact, call button in reach, RN notified, and family  present.    GOALS:   Multidisciplinary Problems       Occupational Therapy Goals       Not on file                    History:     Past Medical History:   Diagnosis Date    Acute respiratory failure with hypoxia 11/6/2023    Adult BMI 31.0-31.9 kg/sq m 12/3/2018    Anemia     CHF (congestive heart failure) 11/6/2023    CVA (cerebral vascular accident)     Residual weakness in the left arm and hand    Essential thrombocythemia     Hyperlipidemia associated with type 2 diabetes mellitus     Hypertension     Osteoporosis     Pulmonary embolism 11/6/2023    Type 2 diabetes mellitus          Past Surgical History:   Procedure Laterality Date    CATHETERIZATION OF BOTH LEFT AND RIGHT HEART N/A 11/7/2023    Procedure: CATHETERIZATION, HEART, BOTH LEFT AND RIGHT;  Surgeon: Asher Gonzalez MD;  Location: [facility] CATH LAB;  Service: Cardiology;  Laterality: N/A;    COLONOSCOPY N/A 1/4/2019    Procedure: COLONOSCOPY;  Surgeon: James Ozuna MD;  Location: [facility] ENDO;  Service: Endoscopy;  Laterality: N/A;    COLONOSCOPY N/A 3/1/2021    Procedure: COLONOSCOPY;  Surgeon: Nelida Cook MD;  Location: [facility] ENDO;  Service: Endoscopy;  Laterality: N/A;    ESOPHAGOGASTRODUODENOSCOPY N/A 3/1/2021    Procedure: EGD (ESOPHAGOGASTRODUODENOSCOPY);  Surgeon: Nelida Cook MD;  Location: [facility] ENDO;  Service: Endoscopy;  Laterality: N/A;  rapid covid > 50 miles away, instructions mailed -ml    HEMORRHOID SURGERY      INSERTION, PULSE GENERATOR, ICD, SINGLE CHAMBER Left 11/9/2023    Procedure: INSERTION, ICD GENERATOR, SINGLE CHAMBER;  Surgeon: Chilo Da Silva MD;  Location: [facility] CATH LAB;  Service: Cardiology;  Laterality: Left;       Time Tracking:     OT Date of Treatment: 11/10/23  OT Start Time: 1130  OT Stop Time: 1200  OT Total Time (min): 30 min    Billable Minutes: Evaluation 15  and Therapeutic Activity 15     11/10/2023      Co-eval with PT

## 2023-11-10 NOTE — ASSESSMENT & PLAN NOTE
Chronic, controlled. Latest blood pressure and vitals reviewed-     Temp:  [97.7 °F (36.5 °C)-98.6 °F (37 °C)]   Pulse:  []   Resp:  [17-41]   BP: ()/()   SpO2:  [88 %-100 %] .     Continue current regimen

## 2023-11-10 NOTE — PROGRESS NOTES
[facility]  Cardiology  Progress Note    Patient Name: Harriett Oglesby  MRN: [id number]  Admission Date: 11/6/2023  Hospital Length of Stay: 4 days  Code Status: Full Code   Attending Physician: Jemima Wallis MD   Primary Care Physician: Zeinab Noble MD  Expected Discharge Date:   Principal Problem:VF (ventricular fibrillation)    Subjective:     Interval Hx: pt seen in ICU, case d/w Saw Hernandez and Avinash.  No cp/sob.    Tele: SR 90s (personally reviewed and interpreted)          Review of Systems   Gastrointestinal:  Negative for melena.   Genitourinary:  Negative for hematuria.     Objective:     Vital Signs (Most Recent):  Temp: 97.8 °F (36.6 °C) (11/10/23 0800)  Pulse: 103 (11/10/23 0845)  Resp: (!) 26 (11/10/23 0845)  BP: 127/76 (11/10/23 0836)  SpO2: 96 % (11/10/23 0845) Vital Signs (24h Range):  Temp:  [97.6 °F (36.4 °C)-98.4 °F (36.9 °C)] 97.8 °F (36.6 °C)  Pulse:  [] 103  Resp:  [19-41] 26  SpO2:  [88 %-100 %] 96 %  BP: ()/() 127/76     Weight: 82 kg (180 lb 12.4 oz)  Body mass index is 30.08 kg/m².     SpO2: 96 %         Intake/Output Summary (Last 24 hours) at 11/10/2023 1218  Last data filed at 11/10/2023 0900  Gross per 24 hour   Intake 430.78 ml   Output 500 ml   Net -69.22 ml       Lines/Drains/Airways       Peripherally Inserted Central Catheter Line  Duration             PICC Triple Lumen 11/08/23 0613 right basilic 2 days                       Physical Exam  Constitutional:       General: She is not in acute distress.     Appearance: She is well-developed. She is not ill-appearing, toxic-appearing or diaphoretic.   HENT:      Head: Normocephalic and atraumatic.   Eyes:      General: No scleral icterus.     Extraocular Movements: Extraocular movements intact.      Conjunctiva/sclera: Conjunctivae normal.      Pupils: Pupils are equal, round, and reactive to light.   Neck:      Vascular: No JVD.      Trachea: No tracheal deviation.   Cardiovascular:       Rate and Rhythm: Normal rate and regular rhythm.      Heart sounds: S1 normal and S2 normal. No murmur heard.     No friction rub. No gallop.      Comments: L ICD site dressing c/d/i  Pulmonary:      Effort: Pulmonary effort is normal. No respiratory distress.      Breath sounds: Normal breath sounds. No stridor. No wheezing, rhonchi or rales.   Chest:      Chest wall: No tenderness.   Abdominal:      General: There is no distension.      Palpations: Abdomen is soft.   Musculoskeletal:         General: No swelling or tenderness. Normal range of motion.      Cervical back: Normal range of motion and neck supple. No rigidity.      Right lower leg: No edema.      Left lower leg: No edema.   Skin:     General: Skin is warm and dry.      Coloration: Skin is not jaundiced.   Neurological:      General: No focal deficit present.      Mental Status: She is alert and oriented to person, place, and time.      Cranial Nerves: No cranial nerve deficit.   Psychiatric:         Mood and Affect: Mood normal.         Behavior: Behavior normal.            Current Medications:   atorvastatin  40 mg Oral QHS    carvediloL  12.5 mg Oral BID WM    cephALEXin  500 mg Oral Q6H    famotidine (PF)  20 mg Intravenous Daily    insulin detemir U-100  8 Units Subcutaneous QHS    mupirocin   Nasal BID    sodium chloride 0.9%  10 mL Intravenous Q6H    valsartan  160 mg Oral Daily      heparin (porcine) in D5W 14 Units/kg/hr (11/10/23 0900)     acetaminophen, calcium gluconate IVPB, calcium gluconate IVPB, calcium gluconate IVPB, dextrose 10%, dextrose 10%, glucagon (human recombinant), glucose, glucose, heparin (PORCINE), heparin (PORCINE), heparin (PORCINE), heparin (PORCINE), hydrALAZINE, hydrALAZINE, HYDROcodone-acetaminophen, insulin aspart U-100, levalbuterol **AND** ipratropium, magnesium sulfate IVPB, magnesium sulfate IVPB, melatonin, OLANZapine, ondansetron, Flushing PICC/Midline Protocol **AND** sodium chloride 0.9% **AND**  sodium chloride 0.9%    Laboratory (all labs reviewed):  CBC:  Recent Labs   Lab 11/06/23 2026 11/07/23  0841 11/08/23  0657 11/09/23  0237 11/10/23  0602   WBC 8.59 8.29  8.29 6.95 8.55 6.34   Hemoglobin 12.3 12.7  12.7 11.3 L 11.1 L 10.8 L   Hematocrit 39.6 40.3  40.3 35.8 L 35.3 L 33.8 L   Platelets 367 359  359 382 369 270       CHEMISTRIES:  Recent Labs   Lab 02/22/21  0844 05/17/21  0832 06/23/21  0800 08/02/21  0901 01/26/22  1040 11/02/22  0913 11/06/23  1306 11/06/23 2026 11/07/23  0100 11/07/23  0841 11/08/23  2355   Glucose 152 H 120 H 118 H 152 H 122 H   < > 173 H 333 H 253 H 230 H 189 H   Sodium 140 141 144 141 139   < > 139 136 138 140 138   Potassium 3.7 3.5 3.7 3.6 3.7   < > 3.2 L 3.7 4.7 4.8 3.5   BUN 17 12 13 17 14   < > 14 12 13 15 18   Creatinine 0.9 0.8 0.8 0.9 0.9   < > 1.2 1.1 1.1 1.1 0.9   eGFR if non African American >60 >60 >60 >60 >60  --   --   --   --   --   --    eGFR  --   --   --   --   --    < > 49 A 54 A 54 A 54 A >60   Calcium 9.9 10.1 9.5 10.1 10.0   < > 9.1 8.6 L 8.9 9.2 8.3 L   Magnesium  --   --   --   --   --   --  1.7 8.4 HH 2.3 2.3  --     < > = values in this interval not displayed.       CARDIAC BIOMARKERS:  Recent Labs   Lab 11/06/23  1306 11/06/23  1617 11/06/23 2026 11/07/23  0100   Troponin I 0.090 H 0.079 H 0.085 H 0.080 H       COAGS:  Recent Labs   Lab 11/07/23  0841 11/09/23  1510   INR 1.1  1.1 1.2       LIPIDS/LFTS:  Recent Labs   Lab 11/19/20  0926 02/22/21  0844 06/23/21  0800 08/02/21  0901 01/26/22  1040 11/02/22  0913 05/31/23  0928 11/06/23  1306 11/06/23  2026 11/07/23  0841 11/08/23  2355   Cholesterol 126  --  122  --  134  --  130  --   --  141  --    Triglycerides 127  --  102  --  134  --  151 H  --   --  101  --    HDL 49  --  48  --  49  --  44  --   --  39 L  --    LDL Cholesterol 51.6 L  --  53.6 L  --  58.2 L  --  55.8 L  --   --  81.8  --    Non-HDL Cholesterol 77  --  74  --  85  --  86  --   --  102  --    AST 11   < > 11   < > 14   <  > 10 30 78 H 55 H 59 H   ALT 7 L   < > 10   < > 12   < > 8 L 41 56 H 52 H 53 H    < > = values in this interval not displayed.       BNP:  Recent Labs   Lab 11/06/23  1306   BNP 1,839 H       TSH:  Recent Labs   Lab 11/19/20  0926 11/02/22  0913 05/31/23  0928   TSH 1.694 1.833 1.555       Free T4:              Assessment and Plan:     * VF (ventricular fibrillation)  VFib arrest.  Prolonged QTC.  On lidocaine drip.  Monitor closely in ICU.  Plan for cardiac catheterization in a.m.    11/7: Risks, benefits and alternatives of the catheterization procedure were discussed with the patient.The risks of coronary angiography include but are not limited to: bleeding, infection, death, heart attack, arrhythmia, kidney injury or failure, potential need for dialysis, allergic reactions, stroke, need for emergency surgery, hematoma, pseudoaneurysm etc.  Should stenting be indicated, the patient has agreed to dual anti-platelet therapy for 1-consecutive year with a drug-eluting stent and a minimum of 1-month with the use of a bare metal stent. Additionally, pt is aware that non-compliance is likely to result in stent clotting with heart attack, heart failure, and/or death  The risks of moderate sedation include hypotension, respiratory depression, arrhythmias, bronchospasm, and death. Informed consent was obtained and the  patient is agreeable to proceed with the procedure. Consent was placed on the chart.  Continue lidocaine drip  11/8:  Nonischemic cardiomyopathy.  No significant stenosis on angiogram yesterday.  Plan for AICD in a.m..  Continue lidocaine drip      11/9: s/p aicd.     CHF (congestive heart failure)  Patient is identified as having Combined Systolic and Diastolic heart failure that is Acute.Latest ECHO performed and demonstrates- Results for orders placed during the hospital encounter of 11/06/23    Echo    Interpretation Summary    Left Ventricle: The left ventricle is mildly dilated. Normal wall thickness.  There is severely reduced systolic function with a visually estimated ejection fraction of 20 - 25%.    Right Ventricle: Normal right ventricular cavity size. Systolic function is normal.    Left Atrium: Left atrium is severely dilated.    Right Atrium: Right atrium is moderately dilated.    Mitral Valve: There is mild regurgitation.    Tricuspid Valve: There is mild to moderate regurgitation.    Pulmonary Artery: The estimated pulmonary artery systolic pressure is 49 mmHg.    IVC/SVC: Intermediate venous pressure at 8 mmHg.    Pericardium: There is a trivial effusion.  . Continue Furosemide and monitor clinical status closely. Monitor on telemetry. Patient is on CHF pathway.  Monitor strict Is&Os and daily weights.  Place on fluid restriction of 1.5 L. Cardiology has been consulted. Continue to stress to patient importance of self efficacy and  on diet for CHF. Last BNP reviewed- and noted below   Recent Labs   Lab 11/06/23  1306   BNP 1,839*   .    euvolumic on exam. Continue gdmt    Pulmonary embolism  CTA personally reviewed.  Segmental and subsegmental pulmonary embolism.  RV to LV ratio less than 0.9.  Will continue to monitor.  Currently no indication for thrombectomy.  No DVT on peripheral ultrasound.  Continue heparin drip, transition to Eliquis at time of discharge        Multifocal atrial tachycardia  In SR    Hyperlipidemia associated with type 2 diabetes mellitus  Cont statin      History of stroke with current residual effects        Type 2 diabetes mellitus with microalbuminuria, without long-term current use of insulin  Per IM      Hypertension, essential, benign  Cont med rx          VTE Risk Mitigation (From admission, onward)         Ordered     heparin 25,000 units in dextrose 5% 250 mL (100 units/mL) infusion HIGH INTENSITY nomogram - [facility]  Continuous        Question:  Begin at (units/kg/hr)  Answer:  18    11/09/23 4898     heparin 25,000 units in dextrose 5% (100 units/ml) IV  bolus from bag - ADDITIONAL PRN BOLUS - 60 units/kg  As needed (PRN)        Question:  Heparin Infusion Adjustment (DO NOT MODIFY ANSWER)  Answer:  [email or web address]\epic\Images\Pharmacy\HeparinInfusions\heparin HIGH INTENSITY nomogram for [facility] [id number].pdf    11/09/23 1238     heparin 25,000 units in dextrose 5% (100 units/ml) IV bolus from bag - ADDITIONAL PRN BOLUS - 30 units/kg  As needed (PRN)        Question:  Heparin Infusion Adjustment (DO NOT MODIFY ANSWER)  Answer:  [email or web address]\epic\Images\Pharmacy\HeparinInfusions\heparin HIGH INTENSITY nomogram for [facility] [id number].pdf    11/09/23 1238     IP VTE HIGH RISK PATIENT  Once         11/06/23 2047     Place sequential compression device  Until discontinued         11/06/23 2047     heparin 25,000 units in dextrose 5% (100 units/ml) IV bolus from bag - ADDITIONAL PRN BOLUS - 60 units/kg  As needed (PRN)        Question:  Heparin Infusion Adjustment (DO NOT MODIFY ANSWER)  Answer:  [email or web address]\epic\Images\Pharmacy\HeparinInfusions\heparin HIGH INTENSITY nomogram for [facility] [id number].pdf    11/06/23 1833     heparin 25,000 units in dextrose 5% (100 units/ml) IV bolus from bag - ADDITIONAL PRN BOLUS - 30 units/kg  As needed (PRN)        Question:  Heparin Infusion Adjustment (DO NOT MODIFY ANSWER)  Answer:  [email or web address]\epic\Images\Pharmacy\HeparinInfusions\heparin HIGH INTENSITY nomogram for [facility] [id number].pdf    11/06/23 1833     heparin 25,000 units in dextrose 5% 250 mL (100 units/mL) infusion HIGH INTENSITY nomogram - [facility]  Continuous        Question:  Begin at (units/kg/hr)  Answer:  18    11/06/23 1833              Dispo planning appropriate.  Initiate eliquis (PE dosing) at discharge.  Pt to follow up with Dr. Gonzalez.  Cardiology will sign off, pls call with questions.    Tomasz Bee MD  Cardiology  [facility]

## 2023-11-10 NOTE — ASSESSMENT & PLAN NOTE
Patient's FSGs are controlled on current medication regimen.  Last A1c reviewed-   Lab Results   Component Value Date    HGBA1C 6.3 (H) 11/07/2023     Most recent fingerstick glucose reviewed-   Recent Labs   Lab 11/09/23  2148 11/10/23  0841 11/10/23  1206   POCTGLUCOSE 154* 198* 236*     Current correctional scale  Low  Maintain anti-hyperglycemic dose as follows-   Antihyperglycemics (From admission, onward)    Start     Stop Route Frequency Ordered    11/09/23 2100  insulin detemir U-100 (Levemir) pen 8 Units         -- SubQ Nightly 11/09/23 1321    11/06/23 2141  insulin aspart U-100 pen 0-5 Units         -- SubQ Before meals & nightly PRN 11/06/23 2041        Hold Oral hypoglycemics while patient is in the hospital.

## 2023-11-10 NOTE — ASSESSMENT & PLAN NOTE
Patient disoriented and agitated    Probably related to ICU hospitalization.  Delirium precautions.  Zyprexa prn severe non redirectable agitation.  No acute issues at this time

## 2023-11-10 NOTE — ASSESSMENT & PLAN NOTE
VFib arrest.  Prolonged QTC.  On lidocaine drip.  Monitor closely in ICU.  Plan for cardiac catheterization in a.m.    11/7: Risks, benefits and alternatives of the catheterization procedure were discussed with the patient.The risks of coronary angiography include but are not limited to: bleeding, infection, death, heart attack, arrhythmia, kidney injury or failure, potential need for dialysis, allergic reactions, stroke, need for emergency surgery, hematoma, pseudoaneurysm etc.  Should stenting be indicated, the patient has agreed to dual anti-platelet therapy for 1-consecutive year with a drug-eluting stent and a minimum of 1-month with the use of a bare metal stent. Additionally, pt is aware that non-compliance is likely to result in stent clotting with heart attack, heart failure, and/or death  The risks of moderate sedation include hypotension, respiratory depression, arrhythmias, bronchospasm, and death. Informed consent was obtained and the  patient is agreeable to proceed with the procedure. Consent was placed on the chart.  Continue lidocaine drip  11/8:  Nonischemic cardiomyopathy.  No significant stenosis on angiogram yesterday.  Plan for AICD in a.m..  Continue lidocaine drip      11/9: s/p aicd today.

## 2023-11-10 NOTE — ASSESSMENT & PLAN NOTE
While in the ED, the patient went into V-fib arrest, underwent 1 round of CPR and was cardioverted @200 joules x1, with achievement of ROSC.   She subsequently went into Vtach and was cardioverted @200 joules x1. No Epi was given and the patient was not intubated.     Cardiology following.  LHC showed non obstructive cardiomyopathy.  S/P AICD placement 11/9, Lidocaine infusion DC'd.

## 2023-11-10 NOTE — NURSING
[facility] [place]  Nurses Note -- 4 Eyes      11/10/2023       Skin assessed on: Transfer      [x] No Pressure Injuries Present    []Prevention Measures Documented    [] Yes LDA  for Pressure Injury Previously documented     [] Yes New Pressure Injury Discovered   [] LDA for New Pressure Injury Added      Attending RN:  Cesar Case, RN     Second RN:  Maria Esther

## 2023-11-10 NOTE — ASSESSMENT & PLAN NOTE
Patient is identified as having Systolic (HFrEF) heart failure that is Acute. CHF is currently controlled. Latest ECHO performed and demonstrates- Results for orders placed during the hospital encounter of 11/06/23    Echo    Interpretation Summary    Left Ventricle: The left ventricle is mildly dilated. Normal wall thickness. There is severely reduced systolic function with a visually estimated ejection fraction of 20 - 25%.    Right Ventricle: Normal right ventricular cavity size. Systolic function is normal.    Left Atrium: Left atrium is severely dilated.    Right Atrium: Right atrium is moderately dilated.    Mitral Valve: There is mild regurgitation.    Tricuspid Valve: There is mild to moderate regurgitation.    Pulmonary Artery: The estimated pulmonary artery systolic pressure is 49 mmHg.    IVC/SVC: Intermediate venous pressure at 8 mmHg.    Pericardium: There is a trivial effusion.   Monitor on telemetry. Patient is off CHF pathway.  Monitor strict Is&Os and daily weights.  Place on fluid restriction of 1.5 L. Cardiology has been consulted. Continue to stress to patient importance of self efficacy and  on diet for CHF. Last BNP reviewed- and noted below   Recent Labs   Lab 11/06/23  1306   BNP 1,839*     Non ischemic cardiomyopathy.  Continue GDMT  Appreciate cardiology assistance

## 2023-11-10 NOTE — SUBJECTIVE & OBJECTIVE
Interval History: NAEON. No acute concerns.     Review of Systems   Constitutional:  Negative for chills and fever.   Respiratory:  Negative for shortness of breath.    Cardiovascular:  Negative for chest pain.   Gastrointestinal:  Negative for abdominal pain.   Genitourinary:  Negative for dysuria.   Neurological:  Negative for headaches.   Psychiatric/Behavioral:  Negative for confusion.      Objective:     Vital Signs (Most Recent):  Temp: 98.6 °F (37 °C) (11/10/23 1342)  Pulse: 101 (11/10/23 1342)  Resp: 18 (11/10/23 1342)  BP: 116/67 (11/10/23 1342)  SpO2: 97 % (11/10/23 134) Vital Signs (24h Range):  Temp:  [97.7 °F (36.5 °C)-98.6 °F (37 °C)] 98.6 °F (37 °C)  Pulse:  [] 101  Resp:  [17-41] 18  SpO2:  [88 %-100 %] 97 %  BP: ()/() 116/67     Weight: 82 kg (180 lb 12.4 oz)  Body mass index is 30.08 kg/m².    Intake/Output Summary (Last 24 hours) at 11/10/2023 1544  Last data filed at 11/10/2023 1200  Gross per 24 hour   Intake 347.23 ml   Output 350 ml   Net -2.77 ml         Physical Exam  Vitals and nursing note reviewed.   Constitutional:       General: She is not in acute distress.     Appearance: She is well-developed. She is obese.   HENT:      Head: Normocephalic and atraumatic.   Eyes:      Conjunctiva/sclera: Conjunctivae normal.   Neck:      Vascular: No JVD.   Cardiovascular:      Rate and Rhythm: Normal rate and regular rhythm.      Heart sounds: Normal heart sounds.   Pulmonary:      Effort: Pulmonary effort is normal.      Breath sounds: Normal breath sounds.   Chest:      Comments: Dressing over L ICD site  Abdominal:      General: Bowel sounds are normal. There is no distension.      Palpations: Abdomen is soft.      Tenderness: There is no abdominal tenderness.   Musculoskeletal:      Cervical back: Neck supple.      Right lower le+ Pitting Edema present.      Left lower le+ Pitting Edema present.   Neurological:      Mental Status: She is alert.   Psychiatric:          Behavior: Behavior normal.             Significant Labs: All pertinent labs within the past 24 hours have been reviewed.  BMP:   Recent Labs   Lab 11/10/23  1221   *      K 3.8      CO2 22*   BUN 15   CREATININE 0.8   CALCIUM 8.6*   MG 1.6     CBC:   Recent Labs   Lab 11/09/23  0237 11/10/23  0602   WBC 8.55 6.34   HGB 11.1* 10.8*   HCT 35.3* 33.8*    270     Magnesium:   Recent Labs   Lab 11/10/23  1221   MG 1.6       Significant Imaging: I have reviewed all pertinent imaging results/findings within the past 24 hours.

## 2023-11-10 NOTE — ASSESSMENT & PLAN NOTE
VFib arrest.  Prolonged QTC.  On lidocaine drip.  Monitor closely in ICU.  Plan for cardiac catheterization in a.m.    11/7: Risks, benefits and alternatives of the catheterization procedure were discussed with the patient.The risks of coronary angiography include but are not limited to: bleeding, infection, death, heart attack, arrhythmia, kidney injury or failure, potential need for dialysis, allergic reactions, stroke, need for emergency surgery, hematoma, pseudoaneurysm etc.  Should stenting be indicated, the patient has agreed to dual anti-platelet therapy for 1-consecutive year with a drug-eluting stent and a minimum of 1-month with the use of a bare metal stent. Additionally, pt is aware that non-compliance is likely to result in stent clotting with heart attack, heart failure, and/or death  The risks of moderate sedation include hypotension, respiratory depression, arrhythmias, bronchospasm, and death. Informed consent was obtained and the  patient is agreeable to proceed with the procedure. Consent was placed on the chart.  Continue lidocaine drip  11/8:  Nonischemic cardiomyopathy.  No significant stenosis on angiogram yesterday.  Plan for AICD in a.m..  Continue lidocaine drip      11/9: s/p aicd.

## 2023-11-10 NOTE — NURSING
Pt arrives to TELE from ICU. Hep drip verified with ICU nurse. Pt oriented to room and call light. All questions answered. NADN at this time. Call light within reach, wheels locked, siderails up x2, pt instructed to call before trying to get up. Pt verbalizes understanding.

## 2023-11-10 NOTE — HOSPITAL COURSE
Interval Hx: pt seen in ICU, case d/w Saw Hernandez and Avinash.  No cp/sob.    Tele: SR 90s (personally reviewed and interpreted)

## 2023-11-10 NOTE — NURSING
Heparin greater than 150. Heparin stopped per ordered nanogram. Physician () notified. No new orders. Continuing to follow nanogram per protocol. Pt is doing well at this. No signs of bleeding noted.

## 2023-11-10 NOTE — NURSING
[facility] [place]  Nurses Note -- 4 Eyes      11/10/2023       Skin assessed on: Q Shift      [x] No Pressure Injuries Present    [x]Prevention Measures Documented    [] Yes LDA  for Pressure Injury Previously documented     [] Yes New Pressure Injury Discovered   [] LDA for New Pressure Injury Added      Attending RN:  Maria Esther Escalona RN     Second RN:  ALIDA BAILEY

## 2023-11-10 NOTE — PLAN OF CARE
Problem: Physical Therapy  Goal: Physical Therapy Goal  Description: Goals to be met by: 23     Patient will increase functional independence with mobility by performin. Supine to sit with Modified [place]  2. Rolling to Left and Right with Modified [place]  3. Sit to stand transfer with Modified [place]  4. Bed to chair transfer with Modified [place]   5. Gait x250 feet with Modified [place] using Single-point Cane   6. Lower extremity exercise program 2 sets x15 reps per handout, with independence    Outcome: Ongoing, Progressing     Pt ambulated ~60 ft with min A-CGA pushing IV pole.

## 2023-11-10 NOTE — NURSING
Pt having some runs of ST, as high as around 133. She is in bed, resting, denies CP or any other distress. MD alerted and asked if ok to give 515pm carvedilol now.

## 2023-11-10 NOTE — PROGRESS NOTES
[facility] Medicine  Progress Note    Patient Name: Harriett Oglesby  MRN: [id number]  Patient Class: IP- Inpatient   Admission Date: 11/6/2023  Length of Stay: 4 days  Attending Physician: Jemima Wallis MD  Primary Care Provider: Zeinab Noble MD      Subjective:     Principal Problem:VF (ventricular fibrillation)      HPI:  This is a 68-year-old female with a past medical history of hypertension, type 2 diabetes, hyperlipidemia, CVA with left-sided deficits who presents with tachycardia.      The patient initially presented to urgent care with shortness of breaths and lower extremity edema that started 7 days prior to presentation.  She ran out of her blood pressure medications about 2 weeks prior.  Additional symptoms include cough, nasal congestion and fatigue.  She was noted to be tachycardic and was advised present to the ED.    In the ED, the patient was tachycardic (up to 160s), tachypneic but normotensive.  Labs were remarkable for an elevated BNP (1839), elevated troponin (0.090 > 0.079), elevated D-dimer (2.87).  EKGs showed MAT and prolonged QTc. CTA chest showed pulmonary emboli in segmental and subsegmental pulmonary artery in the right middle lobe, with mild-to-moderate bibasilar pleural effusions with associated bibasilar atelectasis. An echocardiogram showed an EF of 20-25%, PA pressure of 49 mmHg.  Patient was given aspirin 325 mg, adenosine 6 mg IV x2, atenolol 25 mg p.o., diltiazem 20 mg IV x2, 30 mg IV x1, potassium bicarbonate 40 mEq use and was started on heparin drip.    While in the ED, the patient went into V-fib arrest, underwent 1 round of CPR and was cardioverted @200 joules x1, with achievement of ROSC. Mag sulfate 2 g IV & an amp of D50% were administered. Cardiology recommended Plavix, lidocaine infusion and an angiogram in the AM. She subsequently went into Vtach and was cardioverted @200 joules x1. No Epi was given and the patient was not intubated.    Plavix, additional potassium and lidocaine bolus, followed by infusion were ordered. Patient became hypoxic and was placed on a NRB. She was admitted for further management.        Overview/Hospital Course:  67 y/o female sent to ER from Urgent Care for tachycardia.  In the ER found to be in multifocal atrial tachycardia with prolonged QTC.  Patient was given Cardizem with some improvement in heart rate.  Subsequently CTA was done which revealed segmental and subsegmental pulmonary embolism on the right side. Echo was also done which demonstrated severe cardiomyopathy with EF of 20-25%.  Subsequently patient developed VFib and cardioverted followed by V-tach which was also cardioverted x1.  Started on lidocaine drip in the ER and admitted to ICU.  Also started on heparin drip.  Underwent LHC showing non ischemic cardiomyopathy.  Cards recommending AICD.  She has remained in NSR. AICD placed on 11/9.      Interval History: NAEON. No acute concerns.     Review of Systems   Constitutional:  Negative for chills and fever.   Respiratory:  Negative for shortness of breath.    Cardiovascular:  Negative for chest pain.   Gastrointestinal:  Negative for abdominal pain.   Genitourinary:  Negative for dysuria.   Neurological:  Negative for headaches.   Psychiatric/Behavioral:  Negative for confusion.      Objective:     Vital Signs (Most Recent):  Temp: 98.6 °F (37 °C) (11/10/23 1342)  Pulse: 101 (11/10/23 1342)  Resp: 18 (11/10/23 1342)  BP: 116/67 (11/10/23 1342)  SpO2: 97 % (11/10/23 1342) Vital Signs (24h Range):  Temp:  [97.7 °F (36.5 °C)-98.6 °F (37 °C)] 98.6 °F (37 °C)  Pulse:  [] 101  Resp:  [17-41] 18  SpO2:  [88 %-100 %] 97 %  BP: ()/() 116/67     Weight: 82 kg (180 lb 12.4 oz)  Body mass index is 30.08 kg/m².    Intake/Output Summary (Last 24 hours) at 11/10/2023 1544  Last data filed at 11/10/2023 1200  Gross per 24 hour   Intake 347.23 ml   Output 350 ml   Net -2.77 ml         Physical  Exam  Vitals and nursing note reviewed.   Constitutional:       General: She is not in acute distress.     Appearance: She is well-developed. She is obese.   HENT:      Head: Normocephalic and atraumatic.   Eyes:      Conjunctiva/sclera: Conjunctivae normal.   Neck:      Vascular: No JVD.   Cardiovascular:      Rate and Rhythm: Normal rate and regular rhythm.      Heart sounds: Normal heart sounds.   Pulmonary:      Effort: Pulmonary effort is normal.      Breath sounds: Normal breath sounds.   Chest:      Comments: Dressing over L ICD site  Abdominal:      General: Bowel sounds are normal. There is no distension.      Palpations: Abdomen is soft.      Tenderness: There is no abdominal tenderness.   Musculoskeletal:      Cervical back: Neck supple.      Right lower le+ Pitting Edema present.      Left lower le+ Pitting Edema present.   Neurological:      Mental Status: She is alert.   Psychiatric:         Behavior: Behavior normal.             Significant Labs: All pertinent labs within the past 24 hours have been reviewed.  BMP:   Recent Labs   Lab 11/10/23  1221   *      K 3.8      CO2 22*   BUN 15   CREATININE 0.8   CALCIUM 8.6*   MG 1.6     CBC:   Recent Labs   Lab 23  0237 11/10/23  0602   WBC 8.55 6.34   HGB 11.1* 10.8*   HCT 35.3* 33.8*    270     Magnesium:   Recent Labs   Lab 11/10/23  1221   MG 1.6       Significant Imaging: I have reviewed all pertinent imaging results/findings within the past 24 hours.      Assessment/Plan:      * VF (ventricular fibrillation)  While in the ED, the patient went into V-fib arrest, underwent 1 round of CPR and was cardioverted @200 joules x1, with achievement of ROSC.   She subsequently went into Vtach and was cardioverted @200 joules x1. No Epi was given and the patient was not intubated.     Cardiology following.  C showed non obstructive cardiomyopathy.  S/P AICD placement , Lidocaine infusion DC'd.    AICD (automatic  cardioverter/defibrillator) present  Surgical site care      Acute delirium  Patient disoriented and agitated    Probably related to ICU hospitalization.  Delirium precautions.  Zyprexa prn severe non redirectable agitation.  No acute issues at this time      CHF (congestive heart failure)  Patient is identified as having Systolic (HFrEF) heart failure that is Acute. CHF is currently controlled. Latest ECHO performed and demonstrates- Results for orders placed during the hospital encounter of 11/06/23    Echo    Interpretation Summary    Left Ventricle: The left ventricle is mildly dilated. Normal wall thickness. There is severely reduced systolic function with a visually estimated ejection fraction of 20 - 25%.    Right Ventricle: Normal right ventricular cavity size. Systolic function is normal.    Left Atrium: Left atrium is severely dilated.    Right Atrium: Right atrium is moderately dilated.    Mitral Valve: There is mild regurgitation.    Tricuspid Valve: There is mild to moderate regurgitation.    Pulmonary Artery: The estimated pulmonary artery systolic pressure is 49 mmHg.    IVC/SVC: Intermediate venous pressure at 8 mmHg.    Pericardium: There is a trivial effusion.   Monitor on telemetry. Patient is off CHF pathway.  Monitor strict Is&Os and daily weights.  Place on fluid restriction of 1.5 L. Cardiology has been consulted. Continue to stress to patient importance of self efficacy and  on diet for CHF. Last BNP reviewed- and noted below   Recent Labs   Lab 11/06/23  1306   BNP 1,839*     Non ischemic cardiomyopathy.  Continue GDMT  Appreciate cardiology assistance     Pulmonary embolism  CTA chest showed pulmonary emboli in segmental and subsegmental pulmonary artery in the right middle lobe, with mild-to-moderate bibasilar pleural effusions with associated bibasilar atelectasis.    Continue heparin ggt   Transition to Eliquis upon dc    Multifocal atrial tachycardia  Improved with  diltiazem.   Now on Coreg as well  Cards following      Advanced care planning/counseling discussion  Remains full code at this time.       Hyperlipidemia associated with type 2 diabetes mellitus  Continue statin    History of stroke with current residual effects  Stable  Fall precautions      Type 2 diabetes mellitus with microalbuminuria, without long-term current use of insulin  Patient's FSGs are controlled on current medication regimen.  Last A1c reviewed-   Lab Results   Component Value Date    HGBA1C 6.3 (H) 11/07/2023     Most recent fingerstick glucose reviewed-   Recent Labs   Lab 11/09/23  2148 11/10/23  0841 11/10/23  1206   POCTGLUCOSE 154* 198* 236*     Current correctional scale  Low  Maintain anti-hyperglycemic dose as follows-   Antihyperglycemics (From admission, onward)    Start     Stop Route Frequency Ordered    11/09/23 2100  insulin detemir U-100 (Levemir) pen 8 Units         -- SubQ Nightly 11/09/23 1321    11/06/23 2141  insulin aspart U-100 pen 0-5 Units         -- SubQ Before meals & nightly PRN 11/06/23 2041        Hold Oral hypoglycemics while patient is in the hospital.        Hypertension, essential, benign  Chronic, controlled. Latest blood pressure and vitals reviewed-     Temp:  [97.7 °F (36.5 °C)-98.6 °F (37 °C)]   Pulse:  []   Resp:  [17-41]   BP: ()/()   SpO2:  [88 %-100 %] .     Continue current regimen      VTE Risk Mitigation (From admission, onward)         Ordered     heparin 25,000 units in dextrose 5% 250 mL (100 units/mL) infusion HIGH INTENSITY nomogram - [facility]  Continuous        Question:  Begin at (units/kg/hr)  Answer:  18    11/09/23 1238     heparin 25,000 units in dextrose 5% (100 units/ml) IV bolus from bag - ADDITIONAL PRN BOLUS - 60 units/kg  As needed (PRN)        Question:  Heparin Infusion Adjustment (DO NOT MODIFY ANSWER)  Answer:  [email or web address]\epic\Images\Pharmacy\HeparinInfusions\heparin HIGH INTENSITY nomogram for [facility] [id number]     11/09/23 1238     heparin 25,000 units in dextrose 5% (100 units/ml) IV bolus from bag - ADDITIONAL PRN BOLUS - 30 units/kg  As needed (PRN)        Question:  Heparin Infusion Adjustment (DO NOT MODIFY ANSWER)  Answer:  [email or web address]\epic\Images\Pharmacy\HeparinInfusions\heparin HIGH INTENSITY nomogram for [facility] [id number]    11/09/23 1238     IP VTE HIGH RISK PATIENT  Once         11/06/23 2047     Place sequential compression device  Until discontinued         11/06/23 2047     heparin 25,000 units in dextrose 5% (100 units/ml) IV bolus from bag - ADDITIONAL PRN BOLUS - 60 units/kg  As needed (PRN)        Question:  Heparin Infusion Adjustment (DO NOT MODIFY ANSWER)  Answer:  [email or web address]\epic\Images\Pharmacy\HeparinInfusions\heparin HIGH INTENSITY nomogram for [facility] [id number]    11/06/23 1833     heparin 25,000 units in dextrose 5% (100 units/ml) IV bolus from bag - ADDITIONAL PRN BOLUS - 30 units/kg  As needed (PRN)        Question:  Heparin Infusion Adjustment (DO NOT MODIFY ANSWER)  Answer:  [email or web address]\epic\Images\Pharmacy\HeparinInfusions\heparin HIGH INTENSITY nomogram for [facility] [id number].pdf    11/06/23 1833     heparin 25,000 units in dextrose 5% 250 mL (100 units/mL) infusion HIGH INTENSITY nomogram - [facility]  Continuous        Question:  Begin at (units/kg/hr)  Answer:  18    11/06/23 1833                Discharge Planning   CHEYANNE:      Code Status: Full Code   Is the patient medically ready for discharge?:     Reason for patient still in hospital (select all that apply): Patient trending condition, Treatment and Pending disposition  Discharge Plan A: Home Health (if available in [place], [place]). Daughter says patient cannot come live with her locally as she is staying with someone. No one in [place] living with patient. May need to get her in SNF.           Jemima Wallis MD  Department of Hospital Medicine   [facility]

## 2023-11-11 PROBLEM — R41.0 ACUTE DELIRIUM: Status: RESOLVED | Noted: 2023-11-08 | Resolved: 2023-11-11

## 2023-11-11 LAB
APTT PPP: 36.7 SEC (ref 21–32)
APTT PPP: 36.7 SEC (ref 21–32)
APTT PPP: 37.5 SEC (ref 21–32)
APTT PPP: 44.1 SEC (ref 21–32)
APTT PPP: 51.9 SEC (ref 21–32)
BASOPHILS # BLD AUTO: 0.03 K/UL (ref 0–0.2)
BASOPHILS NFR BLD: 0.5 % (ref 0–1.9)
DIFFERENTIAL METHOD BLD: ABNORMAL
EOSINOPHIL # BLD AUTO: 0.1 K/UL (ref 0–0.5)
EOSINOPHIL NFR BLD: 1.9 % (ref 0–8)
ERYTHROCYTE [DISTWIDTH] IN BLOOD BY AUTOMATED COUNT: 14.9 % (ref 11.5–14.5)
HCT VFR BLD AUTO: 36.8 % (ref 37–48.5)
HGB BLD-MCNC: 11.5 G/DL (ref 12–16)
IMM GRANULOCYTES # BLD AUTO: 0.04 K/UL (ref 0–0.04)
IMM GRANULOCYTES NFR BLD AUTO: 0.6 % (ref 0–0.5)
LYMPHOCYTES # BLD AUTO: 1.4 K/UL (ref 1–4.8)
LYMPHOCYTES NFR BLD: 22 % (ref 18–48)
MCH RBC QN AUTO: 28.1 PG (ref 27–31)
MCHC RBC AUTO-ENTMCNC: 31.3 G/DL (ref 32–36)
MCV RBC AUTO: 90 FL (ref 82–98)
MONOCYTES # BLD AUTO: 0.6 K/UL (ref 0.3–1)
MONOCYTES NFR BLD: 10 % (ref 4–15)
NEUTROPHILS # BLD AUTO: 4.2 K/UL (ref 1.8–7.7)
NEUTROPHILS NFR BLD: 65 % (ref 38–73)
NRBC BLD-RTO: 0 /100 WBC
PLATELET # BLD AUTO: 318 K/UL (ref 150–450)
PMV BLD AUTO: 11.1 FL (ref 9.2–12.9)
POCT GLUCOSE: 132 MG/DL (ref 70–110)
POCT GLUCOSE: 158 MG/DL (ref 70–110)
POCT GLUCOSE: 161 MG/DL (ref 70–110)
POCT GLUCOSE: 199 MG/DL (ref 70–110)
RBC # BLD AUTO: 4.09 M/UL (ref 4–5.4)
WBC # BLD AUTO: 6.41 K/UL (ref 3.9–12.7)

## 2023-11-11 PROCEDURE — 25000003 PHARM REV CODE 250: Mod: HCNC | Performed by: HOSPITALIST

## 2023-11-11 PROCEDURE — 85730 THROMBOPLASTIN TIME PARTIAL: CPT | Mod: HCNC | Performed by: HOSPITALIST

## 2023-11-11 PROCEDURE — 85025 COMPLETE CBC W/AUTO DIFF WBC: CPT | Mod: HCNC | Performed by: HOSPITALIST

## 2023-11-11 PROCEDURE — 97110 THERAPEUTIC EXERCISES: CPT | Mod: HCNC,CQ

## 2023-11-11 PROCEDURE — 85730 THROMBOPLASTIN TIME PARTIAL: CPT | Mod: 91,HCNC | Performed by: FAMILY MEDICINE

## 2023-11-11 PROCEDURE — 25000003 PHARM REV CODE 250: Mod: HCNC | Performed by: STUDENT IN AN ORGANIZED HEALTH CARE EDUCATION/TRAINING PROGRAM

## 2023-11-11 PROCEDURE — 63600175 PHARM REV CODE 636 W HCPCS: Mod: HCNC | Performed by: HOSPITALIST

## 2023-11-11 PROCEDURE — 97530 THERAPEUTIC ACTIVITIES: CPT | Mod: HCNC,CQ

## 2023-11-11 PROCEDURE — A4216 STERILE WATER/SALINE, 10 ML: HCPCS | Mod: HCNC | Performed by: HOSPITALIST

## 2023-11-11 PROCEDURE — 36415 COLL VENOUS BLD VENIPUNCTURE: CPT | Mod: HCNC | Performed by: HOSPITALIST

## 2023-11-11 PROCEDURE — 21400001 HC TELEMETRY ROOM: Mod: HCNC

## 2023-11-11 RX ADMIN — FAMOTIDINE 20 MG: 10 INJECTION INTRAVENOUS at 08:11

## 2023-11-11 RX ADMIN — CARVEDILOL 12.5 MG: 12.5 TABLET, FILM COATED ORAL at 08:11

## 2023-11-11 RX ADMIN — HEPARIN SODIUM 16 UNITS/KG/HR: 10000 INJECTION, SOLUTION INTRAVENOUS at 07:11

## 2023-11-11 RX ADMIN — Medication 10 ML: at 06:11

## 2023-11-11 RX ADMIN — VALSARTAN 160 MG: 80 TABLET, FILM COATED ORAL at 08:11

## 2023-11-11 RX ADMIN — CEPHALEXIN 500 MG: 500 CAPSULE ORAL at 11:11

## 2023-11-11 RX ADMIN — MUPIROCIN: 20 OINTMENT TOPICAL at 08:11

## 2023-11-11 RX ADMIN — CEPHALEXIN 500 MG: 500 CAPSULE ORAL at 06:11

## 2023-11-11 RX ADMIN — ATORVASTATIN CALCIUM 40 MG: 40 TABLET, FILM COATED ORAL at 09:11

## 2023-11-11 RX ADMIN — CEPHALEXIN 500 MG: 500 CAPSULE ORAL at 05:11

## 2023-11-11 RX ADMIN — Medication 10 ML: at 12:11

## 2023-11-11 RX ADMIN — CARVEDILOL 12.5 MG: 12.5 TABLET, FILM COATED ORAL at 09:11

## 2023-11-11 RX ADMIN — MUPIROCIN: 20 OINTMENT TOPICAL at 09:11

## 2023-11-11 NOTE — PT/OT/SLP PROGRESS
Physical Therapy Treatment    Patient Name:  Harriett Oglesby   MRN:  [id number]    Recommendations:     Discharge Recommendations: Low Intensity Therapy  Discharge Equipment Recommendations: cane, straight  Barriers to discharge: Inaccessible home    Assessment:     Harriett Oglesby is a 68 y.o. female admitted with a medical diagnosis of VF (ventricular fibrillation).  She presents with the following impairments/functional limitations: weakness, impaired endurance, impaired functional mobility, gait instability, impaired balance, decreased coordination, decreased upper extremity function, decreased lower extremity function, decreased safety awareness, pain, impaired fine motor, decreased ROM, impaired skin, edema, impaired cardiopulmonary response to activity.    Rehab Prognosis: Good; patient would benefit from acute skilled PT services to address these deficits and reach maximum level of function.    Recent Surgery: Procedure(s) (LRB):  INSERTION, ICD GENERATOR, SINGLE CHAMBER (Left) 2 Days Post-Op    Plan:     During this hospitalization, patient to be seen daily to address the identified rehab impairments via gait training, therapeutic activities, therapeutic exercises and progress toward the following goals:    Plan of Care Expires:  11/24/23    Subjective     Chief Complaint: pain to L chest with activity  Patient/Family Comments/goals: Pt agreed to participate.  Pain/Comfort:  Pain Rating 1:  (not rated, c/o pain at chest with new AICD placement)  Location - Side 1: Left  Location 1: chest  Pain Addressed 1: Reposition, Distraction, Cessation of Activity, Nurse notified      Objective:     Communicated with nurse Post prior to session.  Patient found HOB elevated with telemetry, peripheral IV, PureWick, Daughter present upon PT entry to room.     General Precautions: Standard, fall, diabetic, pacemaker  Orthopedic Precautions: N/A  Braces: N/A  Respiratory Status: Room air     Functional Mobility:  Bed  Mobility:     Scooting: anterior scoot toward EOB with minimum assistance  Supine to Sit: contact guard assistance with HOB elevated   Transfers:  gait belt donned prior OOB activity   Sit to Stand: from EOB with contact guard assistance with no AD, pt pushing IV pole using RUE  Gait: Pt ambulated ~130 ft with CGA/Min A, pt was pushing the IV pole using RUE for balance support, BS Chair followed. Pt with decreased paxton/step length, weakness, impaired endurance; v/t cues for , PLB tech, 3-4 standing rest breaks in between 2* fatigue; pt required increased time to complete task  Balance: Good Sitting; Fair Standing      AM-PAC 6 CLICK MOBILITY  Turning over in bed (including adjusting bedclothes, sheets and blankets)?: 3  Sitting down on and standing up from a chair with arms (e.g., wheelchair, bedside commode, etc.): 3  Moving from lying on back to sitting on the side of the bed?: 3  Moving to and from a bed to a chair (including a wheelchair)?: 3  Need to walk in hospital room?: 3  Climbing 3-5 steps with a railing?: 2  Basic Mobility Total Score: 17       Treatment & Education:  Educated pt on benefit of OOB activity with hospital staff assistance and performing BLE ex throughout the day, pt verbalized understanding.    BLE ex in reclined 10 reps SAQ, GS, 20 reps AP    Also educated pt and Daughter to elevated BLE above heart level and perform AP throughout the day to reduce swelling, pt and Daughter verbalized understanding.    Patient left up in chair on green cushion in reclined position with tray table in front, BLE offloaded/elevated by pillows, all lines intact, call button in reach, nurse notified, and Daughter present.    GOALS:   Multidisciplinary Problems       Physical Therapy Goals          Problem: Physical Therapy    Goal Priority Disciplines Outcome Goal Variances Interventions   Physical Therapy Goal     PT, PT/OT Ongoing, Progressing     Description: Goals to be met by: 11/24/23      Patient will increase functional independence with mobility by performin. Supine to sit with Modified Latta  2. Rolling to Left and Right with Modified Latta  3. Sit to stand transfer with Modified Latta  4. Bed to chair transfer with Modified Latta   5. Gait x250 feet with Modified Latta using Single-point Cane   6. Lower extremity exercise program 2 sets x15 reps per handout, with independence                         Time Tracking:     PT Received On: 23  PT Start Time: 1014     PT Stop Time: 1100  PT Total Time (min): 46 min     Billable Minutes: Therapeutic Activity 30 min and Therapeutic Exercise 16 min    Treatment Type: Treatment  PT/PTA: PTA     Number of PTA visits since last PT visit: 1     2023

## 2023-11-11 NOTE — PROGRESS NOTES
[facility] Medicine  Progress Note    Patient Name: Harriett Oglesby  MRN: [id number]  Patient Class: IP- Inpatient   Admission Date: 11/6/2023  Length of Stay: 5 days  Attending Physician: Jemima Wallis MD  Primary Care Provider: Zeinab Noble MD      Subjective:     Principal Problem:VF (ventricular fibrillation)      HPI:  This is a 68-year-old female with a past medical history of hypertension, type 2 diabetes, hyperlipidemia, CVA with left-sided deficits who presents with tachycardia.      The patient initially presented to urgent care with shortness of breaths and lower extremity edema that started 7 days prior to presentation.  She ran out of her blood pressure medications about 2 weeks prior.  Additional symptoms include cough, nasal congestion and fatigue.  She was noted to be tachycardic and was advised present to the ED.    In the ED, the patient was tachycardic (up to 160s), tachypneic but normotensive.  Labs were remarkable for an elevated BNP (1839), elevated troponin (0.090 > 0.079), elevated D-dimer (2.87).  EKGs showed MAT and prolonged QTc. CTA chest showed pulmonary emboli in segmental and subsegmental pulmonary artery in the right middle lobe, with mild-to-moderate bibasilar pleural effusions with associated bibasilar atelectasis. An echocardiogram showed an EF of 20-25%, PA pressure of 49 mmHg.  Patient was given aspirin 325 mg, adenosine 6 mg IV x2, atenolol 25 mg p.o., diltiazem 20 mg IV x2, 30 mg IV x1, potassium bicarbonate 40 mEq use and was started on heparin drip.    While in the ED, the patient went into V-fib arrest, underwent 1 round of CPR and was cardioverted @200 joules x1, with achievement of ROSC. Mag sulfate 2 g IV & an amp of D50% were administered. Cardiology recommended Plavix, lidocaine infusion and an angiogram in the AM. She subsequently went into Vtach and was cardioverted @200 joules x1. No Epi was given and the patient was not intubated.    Plavix, additional potassium and lidocaine bolus, followed by infusion were ordered. Patient became hypoxic and was placed on a NRB. She was admitted for further management.      Overview/Hospital Course:  69 y/o female sent to ER from Urgent Care for tachycardia.  In the ER found to be in multifocal atrial tachycardia with prolonged QTC.  Patient was given Cardizem with some improvement in heart rate.  Subsequently CTA was done which revealed segmental and subsegmental pulmonary embolism on the right side. Echo was also done which demonstrated severe cardiomyopathy with EF of 20-25%.  Subsequently patient developed VFib and cardioverted followed by V-tach which was also cardioverted x1.  Started on lidocaine drip in the ER and admitted to ICU.  Also started on heparin drip.  Underwent LHC showing non ischemic cardiomyopathy.  Cards recommending AICD.  She has remained in NSR. AICD placed on 11/9.    Interval History: NAEON. No acute concerns.     Review of Systems   Constitutional:  Negative for chills and fever.   Respiratory:  Negative for shortness of breath.    Cardiovascular:  Negative for chest pain.   Gastrointestinal:  Negative for abdominal pain.   Genitourinary:  Negative for dysuria.   Neurological:  Negative for headaches.   Psychiatric/Behavioral:  Negative for confusion.      Objective:     Vital Signs (Most Recent):  Temp: 97.7 °F (36.5 °C) (11/11/23 1127)  Pulse: 97 (11/11/23 1127)  Resp: 18 (11/11/23 1127)  BP: (!) 138/91 (11/11/23 1127)  SpO2: 97 % (11/11/23 1127) Vital Signs (24h Range):  Temp:  [97.4 °F (36.3 °C)-97.9 °F (36.6 °C)] 97.7 °F (36.5 °C)  Pulse:  [] 97  Resp:  [17-20] 18  SpO2:  [95 %-98 %] 97 %  BP: (125-162)/(80-91) 138/91     Weight: 82.8 kg (182 lb 8.7 oz)  Body mass index is 30.38 kg/m².    Intake/Output Summary (Last 24 hours) at 11/11/2023 1535  Last data filed at 11/11/2023 1127  Gross per 24 hour   Intake 532.8 ml   Output 0 ml   Net 532.8 ml           Physical  Exam  Vitals and nursing note reviewed.   Constitutional:       General: She is not in acute distress.     Appearance: She is well-developed. She is obese.   HENT:      Head: Normocephalic and atraumatic.   Eyes:      Conjunctiva/sclera: Conjunctivae normal.   Neck:      Vascular: No JVD.   Cardiovascular:      Rate and Rhythm: Normal rate and regular rhythm.      Heart sounds: Normal heart sounds.   Pulmonary:      Effort: Pulmonary effort is normal.      Breath sounds: Normal breath sounds.   Chest:      Comments: Dressing over L ICD site  Abdominal:      General: Bowel sounds are normal. There is no distension.      Palpations: Abdomen is soft.      Tenderness: There is no abdominal tenderness.   Musculoskeletal:      Cervical back: Neck supple.      Right lower le+ Pitting Edema present.      Left lower le+ Pitting Edema present.   Neurological:      Mental Status: She is alert.   Psychiatric:         Behavior: Behavior normal.             Significant Labs: All pertinent labs within the past 24 hours have been reviewed.  BMP:   Recent Labs   Lab 11/10/23  1221   *      K 3.8      CO2 22*   BUN 15   CREATININE 0.8   CALCIUM 8.6*   MG 1.6       CBC:   Recent Labs   Lab 11/10/23  0602 23  0400   WBC 6.34 6.41   HGB 10.8* 11.5*   HCT 33.8* 36.8*    318       Magnesium:   Recent Labs   Lab 11/10/23  1221   MG 1.6         Significant Imaging: I have reviewed all pertinent imaging results/findings within the past 24 hours.    Assessment/Plan:      * VF (ventricular fibrillation)  While in the ED, the patient went into V-fib arrest, underwent 1 round of CPR and was cardioverted @200 joules x1, with achievement of ROSC.   She subsequently went into Vtach and was cardioverted @200 joules x1. No Epi was given and the patient was not intubated.     Cardiology following.  C showed non obstructive cardiomyopathy.  S/P AICD placement , Lidocaine infusion DC'd.    AICD (automatic  cardioverter/defibrillator) present  Surgical site care      CHF (congestive heart failure)  Patient is identified as having Systolic (HFrEF) heart failure that is Acute. CHF is currently controlled. Latest ECHO performed and demonstrates- Results for orders placed during the hospital encounter of 11/06/23    Echo    Interpretation Summary    Left Ventricle: The left ventricle is mildly dilated. Normal wall thickness. There is severely reduced systolic function with a visually estimated ejection fraction of 20 - 25%.    Right Ventricle: Normal right ventricular cavity size. Systolic function is normal.    Left Atrium: Left atrium is severely dilated.    Right Atrium: Right atrium is moderately dilated.    Mitral Valve: There is mild regurgitation.    Tricuspid Valve: There is mild to moderate regurgitation.    Pulmonary Artery: The estimated pulmonary artery systolic pressure is 49 mmHg.    IVC/SVC: Intermediate venous pressure at 8 mmHg.    Pericardium: There is a trivial effusion.   Monitor on telemetry. Patient is off CHF pathway.  Monitor strict Is&Os and daily weights.  Place on fluid restriction of 1.5 L. Cardiology has been consulted. Continue to stress to patient importance of self efficacy and  on diet for CHF. Last BNP reviewed- and noted below   Recent Labs   Lab 11/06/23  1306   BNP 1,839*       Non ischemic cardiomyopathy.  Continue GDMT  Appreciate cardiology assistance     Pulmonary embolism  CTA chest showed pulmonary emboli in segmental and subsegmental pulmonary artery in the right middle lobe, with mild-to-moderate bibasilar pleural effusions with associated bibasilar atelectasis.    Continue heparin ggt   Transition to Eliquis upon dc    Multifocal atrial tachycardia  Improved with diltiazem.   Now on Coreg as well  Cards following      Advanced care planning/counseling discussion  Remains full code at this time.       Hyperlipidemia associated with type 2 diabetes mellitus  Continue  statin    History of stroke with current residual effects  Stable  Fall precautions      Type 2 diabetes mellitus with microalbuminuria, without long-term current use of insulin  Patient's FSGs are controlled on current medication regimen.  Last A1c reviewed-   Lab Results   Component Value Date    HGBA1C 6.3 (H) 11/07/2023     Most recent fingerstick glucose reviewed-   Recent Labs   Lab 11/10/23  1622 11/11/23  0735 11/11/23  1129   POCTGLUCOSE 192* 132* 199*       Current correctional scale  Low  Maintain anti-hyperglycemic dose as follows-   Antihyperglycemics (From admission, onward)      Start     Stop Route Frequency Ordered    11/09/23 2100  insulin detemir U-100 (Levemir) pen 8 Units         -- SubQ Nightly 11/09/23 1321    11/06/23 2141  insulin aspart U-100 pen 0-5 Units         -- SubQ Before meals & nightly PRN 11/06/23 2041          Hold Oral hypoglycemics while patient is in the hospital.        Hypertension, essential, benign  Chronic, controlled. Latest blood pressure and vitals reviewed-     Temp:  [97.4 °F (36.3 °C)-97.9 °F (36.6 °C)]   Pulse:  []   Resp:  [17-20]   BP: (125-162)/(80-91)   SpO2:  [95 %-98 %] .     Continue current regimen      VTE Risk Mitigation (From admission, onward)           Ordered     heparin 25,000 units in dextrose 5% 250 mL (100 units/mL) infusion HIGH INTENSITY nomogram - [facility]  Continuous        Question:  Begin at (units/kg/hr)  Answer:  18    11/09/23 1238     heparin 25,000 units in dextrose 5% (100 units/ml) IV bolus from bag - ADDITIONAL PRN BOLUS - 60 units/kg  As needed (PRN)        Question:  Heparin Infusion Adjustment (DO NOT MODIFY ANSWER)  Answer:  [email or web address]\epic\Images\Pharmacy\HeparinInfusions\heparin HIGH INTENSITY nomogram for [facility] [id number].pdf    11/09/23 1238     heparin 25,000 units in dextrose 5% (100 units/ml) IV bolus from bag - ADDITIONAL PRN BOLUS - 30 units/kg  As needed (PRN)        Question:  Heparin Infusion Adjustment (DO NOT MODIFY  ANSWER)  Answer:  [email or web address]\epic\Images\Pharmacy\HeparinInfusions\heparin HIGH INTENSITY nomogram for [facility] [id number].pdf    11/09/23 1238     IP VTE HIGH RISK PATIENT  Once         11/06/23 2047     Place sequential compression device  Until discontinued         11/06/23 2047     heparin 25,000 units in dextrose 5% (100 units/ml) IV bolus from bag - ADDITIONAL PRN BOLUS - 60 units/kg  As needed (PRN)        Question:  Heparin Infusion Adjustment (DO NOT MODIFY ANSWER)  Answer:  [email or web address]\epic\Images\Pharmacy\HeparinInfusions\heparin HIGH INTENSITY nomogram for [facility] [id number].pdf    11/06/23 1833     heparin 25,000 units in dextrose 5% (100 units/ml) IV bolus from bag - ADDITIONAL PRN BOLUS - 30 units/kg  As needed (PRN)        Question:  Heparin Infusion Adjustment (DO NOT MODIFY ANSWER)  Answer:  \\[email or web address]\epic\Images\Pharmacy\HeparinInfusions\heparin HIGH INTENSITY nomogram for [facility] [id number].pdf    11/06/23 1833     heparin 25,000 units in dextrose 5% 250 mL (100 units/mL) infusion HIGH INTENSITY nomogram - [facility]  Continuous        Question:  Begin at (units/kg/hr)  Answer:  18    11/06/23 1833                    Discharge Planning   CHEYANNE:      Code Status: Full Code   Is the patient medically ready for discharge?:     Reason for patient still in hospital (select all that apply): Pending disposition. Needs SNF or to be able to go by family for HH.  Discharge Plan A: Home Health (if available in [place], [place])          Jemima Wallis MD  Department of [facility] Medicine   [facility]

## 2023-11-11 NOTE — NURSING
[facility]  Nurses Note -- 4 Eyes      11/11/2023       Skin assessed on: Q Shift      [x] No Pressure Injuries Present    [x]Prevention Measures Documented    [] Yes LDA  for Pressure Injury Previously documented     [] Yes New Pressure Injury Discovered   [] LDA for New Pressure Injury Added      Attending RN:  Deirdre Fuentes RN     Second RN:  ALIDA Dupont

## 2023-11-11 NOTE — NURSING
PER handoff received from Clarita Dupont   Pt resting in bed quietly. NAD noted. No c/o pain.   Fall and safety precautions maintained. Bed alarm activated and audible.. Bed locked in lowest position, with side rails up x2. Call bell and personal items within reach

## 2023-11-11 NOTE — PLAN OF CARE
Problem: Physical Therapy  Goal: Physical Therapy Goal  Description: Goals to be met by: 23     Patient will increase functional independence with mobility by performin. Supine to sit with Modified [place]  2. Rolling to Left and Right with Modified [place]  3. Sit to stand transfer with Modified [place]  4. Bed to chair transfer with Modified [place]   5. Gait x250 feet with Modified [place] using Single-point Cane   6. Lower extremity exercise program 2 sets x15 reps per handout, with independence    Outcome: Ongoing, Progressing

## 2023-11-11 NOTE — ASSESSMENT & PLAN NOTE
Patient's FSGs are controlled on current medication regimen.  Last A1c reviewed-   Lab Results   Component Value Date    HGBA1C 6.3 (H) 11/07/2023     Most recent fingerstick glucose reviewed-   Recent Labs   Lab 11/10/23  1622 11/11/23  0735 11/11/23  1129   POCTGLUCOSE 192* 132* 199*       Current correctional scale  Low  Maintain anti-hyperglycemic dose as follows-   Antihyperglycemics (From admission, onward)    Start     Stop Route Frequency Ordered    11/09/23 2100  insulin detemir U-100 (Levemir) pen 8 Units         -- SubQ Nightly 11/09/23 1321    11/06/23 2141  insulin aspart U-100 pen 0-5 Units         -- SubQ Before meals & nightly PRN 11/06/23 2041        Hold Oral hypoglycemics while patient is in the hospital.

## 2023-11-11 NOTE — PLAN OF CARE
Problem: Diabetes Comorbidity  Goal: Blood Glucose Level Within Targeted Range  Outcome: Ongoing, Progressing  Intervention: Monitor and Manage Glycemia  Flowsheets (Taken 11/10/2023 1840)  Glycemic Management: blood glucose monitored     Problem: Infection  Goal: Absence of Infection Signs and Symptoms  Outcome: Ongoing, Progressing     Problem: Adjustment to Illness (Heart Failure)  Goal: Optimal Coping  Outcome: Ongoing, Progressing  Intervention: Support Psychosocial Response  Flowsheets (Taken 11/10/2023 1840)  Supportive Measures:   active listening utilized   decision-making supported   positive reinforcement provided   problem-solving facilitated   relaxation techniques promoted  Family/Support System Care:   caregiver stress acknowledged   self-care encouraged  Life Transition/Adjustment: advance care planning facilitated     Problem: Dysrhythmia (Heart Failure)  Goal: Stable Heart Rate and Rhythm  Outcome: Ongoing, Progressing

## 2023-11-11 NOTE — NURSING
Nurses Note -- 4 Eyes      11/11/2023   7:11 AM      Skin assessed during: Q Shift Change      [x] No Altered Skin Integrity Present    [x]Prevention Measures Documented      [] Yes- Altered Skin Integrity Present or Discovered   [] LDA Added if Not in Epic (Describe Wound)   [] New Altered Skin Integrity was Present on Admit and Documented in LDA   [] Wound Image Taken    Wound Care Consulted? No    Attending Nurse:  Kiah Valdez RN/Staff Member:  Cesar

## 2023-11-11 NOTE — PLAN OF CARE
Problem: Adult Inpatient Plan of Care  Goal: Plan of Care Review  Flowsheets (Taken 11/11/2023 0712)  Plan of Care Reviewed With:   patient   family  Goal: Absence of Hospital-Acquired Illness or Injury  Intervention: Identify and Manage Fall Risk  Flowsheets (Taken 11/11/2023 0712)  Safety Promotion/Fall Prevention:   assistive device/personal item within reach   side rails raised x 2  Intervention: Prevent Skin Injury  Flowsheets (Taken 11/11/2023 0712)  Body Position: turned  Intervention: Prevent and Manage VTE (Venous Thromboembolism) Risk  Flowsheets (Taken 11/11/2023 0712)  Activity Management: Rolling - L1  Intervention: Prevent Infection  Flowsheets (Taken 11/11/2023 0712)  Infection Prevention: hand hygiene promoted  Goal: Optimal Comfort and Wellbeing  Intervention: Monitor Pain and Promote Comfort  Flowsheets (Taken 11/11/2023 0712)  Pain Management Interventions: care clustered     Problem: Diabetes Comorbidity  Goal: Blood Glucose Level Within Targeted Range  Intervention: Monitor and Manage Glycemia  Flowsheets (Taken 11/11/2023 0712)  Glycemic Management: blood glucose monitored     Problem: Skin Injury Risk Increased  Goal: Skin Health and Integrity  Intervention: Optimize Skin Protection  Flowsheets (Taken 11/11/2023 0712)  Head of Bed (HOB) Positioning: HOB elevated     Problem: Fall Injury Risk  Goal: Absence of Fall and Fall-Related Injury  Intervention: Identify and Manage Contributors  Flowsheets (Taken 11/11/2023 0712)  Medication Review/Management: medications reviewed  Intervention: Promote Injury-Free Environment  Flowsheets (Taken 11/11/2023 0712)  Safety Promotion/Fall Prevention:   assistive device/personal item within reach   side rails raised x 2     Problem: Adjustment to Illness (Heart Failure)  Goal: Optimal Coping  Intervention: Support Psychosocial Response  Flowsheets (Taken 11/11/2023 0712)  Supportive Measures: active listening utilized     Problem: Cardiac Output Decreased  (Heart Failure)  Goal: Optimal Cardiac Output  Intervention: Optimize Cardiac Output  Flowsheets (Taken 11/11/2023 0712)  Environmental Support: calm environment promoted     Problem: Functional Ability Impaired (Heart Failure)  Goal: Optimal Functional Ability  Intervention: Optimize Functional Ability  Flowsheets (Taken 11/11/2023 0712)  Activity Management: Rolling - L1

## 2023-11-11 NOTE — SUBJECTIVE & OBJECTIVE
Interval History: NAEON. No acute concerns.     Review of Systems   Constitutional:  Negative for chills and fever.   Respiratory:  Negative for shortness of breath.    Cardiovascular:  Negative for chest pain.   Gastrointestinal:  Negative for abdominal pain.   Genitourinary:  Negative for dysuria.   Neurological:  Negative for headaches.   Psychiatric/Behavioral:  Negative for confusion.      Objective:     Vital Signs (Most Recent):  Temp: 97.7 °F (36.5 °C) (23)  Pulse: 97 (23)  Resp: 18 (23)  BP: (!) 138/91 (23)  SpO2: 97 % (23) Vital Signs (24h Range):  Temp:  [97.4 °F (36.3 °C)-97.9 °F (36.6 °C)] 97.7 °F (36.5 °C)  Pulse:  [] 97  Resp:  [17-20] 18  SpO2:  [95 %-98 %] 97 %  BP: (125-162)/(80-91) 138/91     Weight: 82.8 kg (182 lb 8.7 oz)  Body mass index is 30.38 kg/m².    Intake/Output Summary (Last 24 hours) at 2023 1535  Last data filed at 2023 1127  Gross per 24 hour   Intake 532.8 ml   Output 0 ml   Net 532.8 ml           Physical Exam  Vitals and nursing note reviewed.   Constitutional:       General: She is not in acute distress.     Appearance: She is well-developed. She is obese.   HENT:      Head: Normocephalic and atraumatic.   Eyes:      Conjunctiva/sclera: Conjunctivae normal.   Neck:      Vascular: No JVD.   Cardiovascular:      Rate and Rhythm: Normal rate and regular rhythm.      Heart sounds: Normal heart sounds.   Pulmonary:      Effort: Pulmonary effort is normal.      Breath sounds: Normal breath sounds.   Chest:      Comments: Dressing over L ICD site  Abdominal:      General: Bowel sounds are normal. There is no distension.      Palpations: Abdomen is soft.      Tenderness: There is no abdominal tenderness.   Musculoskeletal:      Cervical back: Neck supple.      Right lower le+ Pitting Edema present.      Left lower le+ Pitting Edema present.   Neurological:      Mental Status: She is alert.   Psychiatric:          Behavior: Behavior normal.             Significant Labs: All pertinent labs within the past 24 hours have been reviewed.  BMP:   Recent Labs   Lab 11/10/23  1221   *      K 3.8      CO2 22*   BUN 15   CREATININE 0.8   CALCIUM 8.6*   MG 1.6       CBC:   Recent Labs   Lab 11/10/23  0602 11/11/23  0400   WBC 6.34 6.41   HGB 10.8* 11.5*   HCT 33.8* 36.8*    318       Magnesium:   Recent Labs   Lab 11/10/23  1221   MG 1.6         Significant Imaging: I have reviewed all pertinent imaging results/findings within the past 24 hours.

## 2023-11-11 NOTE — ASSESSMENT & PLAN NOTE
Chronic, controlled. Latest blood pressure and vitals reviewed-     Temp:  [97.4 °F (36.3 °C)-97.9 °F (36.6 °C)]   Pulse:  []   Resp:  [17-20]   BP: (125-162)/(80-91)   SpO2:  [95 %-98 %] .     Continue current regimen

## 2023-11-12 LAB
APTT PPP: 45.6 SEC (ref 21–32)
POCT GLUCOSE: 127 MG/DL (ref 70–110)
POCT GLUCOSE: 142 MG/DL (ref 70–110)
POCT GLUCOSE: 182 MG/DL (ref 70–110)
POCT GLUCOSE: 219 MG/DL (ref 70–110)

## 2023-11-12 PROCEDURE — 97116 GAIT TRAINING THERAPY: CPT | Mod: HCNC,CQ

## 2023-11-12 PROCEDURE — 21400001 HC TELEMETRY ROOM: Mod: HCNC

## 2023-11-12 PROCEDURE — 25000003 PHARM REV CODE 250: Mod: HCNC | Performed by: HOSPITALIST

## 2023-11-12 PROCEDURE — 97530 THERAPEUTIC ACTIVITIES: CPT | Mod: HCNC,CQ

## 2023-11-12 PROCEDURE — 25000003 PHARM REV CODE 250: Mod: HCNC | Performed by: STUDENT IN AN ORGANIZED HEALTH CARE EDUCATION/TRAINING PROGRAM

## 2023-11-12 PROCEDURE — 85730 THROMBOPLASTIN TIME PARTIAL: CPT | Mod: HCNC | Performed by: HOSPITALIST

## 2023-11-12 PROCEDURE — 63600175 PHARM REV CODE 636 W HCPCS: Mod: HCNC | Performed by: HOSPITALIST

## 2023-11-12 PROCEDURE — A4216 STERILE WATER/SALINE, 10 ML: HCPCS | Mod: HCNC | Performed by: HOSPITALIST

## 2023-11-12 PROCEDURE — 99900035 HC TECH TIME PER 15 MIN (STAT): Mod: HCNC

## 2023-11-12 RX ADMIN — INSULIN ASPART 2 UNITS: 100 INJECTION, SOLUTION INTRAVENOUS; SUBCUTANEOUS at 04:11

## 2023-11-12 RX ADMIN — FAMOTIDINE 20 MG: 10 INJECTION INTRAVENOUS at 09:11

## 2023-11-12 RX ADMIN — Medication 10 ML: at 06:11

## 2023-11-12 RX ADMIN — MUPIROCIN: 20 OINTMENT TOPICAL at 08:11

## 2023-11-12 RX ADMIN — CEPHALEXIN 500 MG: 500 CAPSULE ORAL at 05:11

## 2023-11-12 RX ADMIN — CARVEDILOL 12.5 MG: 12.5 TABLET, FILM COATED ORAL at 08:11

## 2023-11-12 RX ADMIN — ATORVASTATIN CALCIUM 40 MG: 40 TABLET, FILM COATED ORAL at 08:11

## 2023-11-12 RX ADMIN — MUPIROCIN: 20 OINTMENT TOPICAL at 09:11

## 2023-11-12 RX ADMIN — Medication 10 ML: at 12:11

## 2023-11-12 RX ADMIN — HEPARIN SODIUM 16 UNITS/KG/HR: 10000 INJECTION, SOLUTION INTRAVENOUS at 08:11

## 2023-11-12 RX ADMIN — Medication 6 MG: at 08:11

## 2023-11-12 RX ADMIN — VALSARTAN 160 MG: 80 TABLET, FILM COATED ORAL at 09:11

## 2023-11-12 RX ADMIN — CEPHALEXIN 500 MG: 500 CAPSULE ORAL at 11:11

## 2023-11-12 RX ADMIN — CARVEDILOL 12.5 MG: 12.5 TABLET, FILM COATED ORAL at 09:11

## 2023-11-12 NOTE — ASSESSMENT & PLAN NOTE
Patient's FSGs are controlled on current medication regimen.  Last A1c reviewed-   Lab Results   Component Value Date    HGBA1C 6.3 (H) 11/07/2023     Most recent fingerstick glucose reviewed-   Recent Labs   Lab 11/11/23  1129 11/11/23  1616 11/11/23 2011 11/12/23  0727   POCTGLUCOSE 199* 158* 161* 127*     Current correctional scale  Low  Maintain anti-hyperglycemic dose as follows-   Antihyperglycemics (From admission, onward)    Start     Stop Route Frequency Ordered    11/09/23 2100  insulin detemir U-100 (Levemir) pen 8 Units         -- SubQ Nightly 11/09/23 1321    11/06/23 2141  insulin aspart U-100 pen 0-5 Units         -- SubQ Before meals & nightly PRN 11/06/23 2041        Hold Oral hypoglycemics while patient is in the hospital.

## 2023-11-12 NOTE — SUBJECTIVE & OBJECTIVE
Interval History: No acute events. Remains on heparin gtt. Granddaughter at bedside.     Review of Systems   Constitutional:  Negative for chills and fever.   Respiratory:  Negative for chest tightness.    Cardiovascular:  Negative for chest pain.   Gastrointestinal:  Negative for abdominal pain.     Objective:     Vital Signs (Most Recent):  Temp: 97.8 °F (36.6 °C) (11/12/23 0728)  Pulse: 97 (11/12/23 0728)  Resp: 18 (11/12/23 0728)  BP: 133/80 (11/12/23 0728)  SpO2: 96 % (11/12/23 0728) Vital Signs (24h Range):  Temp:  [97.3 °F (36.3 °C)-98.6 °F (37 °C)] 97.8 °F (36.6 °C)  Pulse:  [] 97  Resp:  [18] 18  SpO2:  [93 %-97 %] 96 %  BP: (112-143)/(68-91) 133/80     Weight: 82.6 kg (182 lb 1.6 oz)  Body mass index is 30.3 kg/m².    Intake/Output Summary (Last 24 hours) at 11/12/2023 0757  Last data filed at 11/12/2023 0642  Gross per 24 hour   Intake 308.4 ml   Output --   Net 308.4 ml         Physical Exam  Vitals and nursing note reviewed.   Constitutional:       General: She is awake. She is not in acute distress.     Appearance: Normal appearance. She is well-developed and well-groomed. She is not ill-appearing, toxic-appearing or diaphoretic.   HENT:      Head: Normocephalic and atraumatic.   Eyes:      General: No scleral icterus.  Cardiovascular:      Rate and Rhythm: Normal rate.   Pulmonary:      Effort: No tachypnea or respiratory distress.   Musculoskeletal:      Right lower leg: No edema.      Left lower leg: No edema.   Skin:     Coloration: Skin is not jaundiced.   Neurological:      General: No focal deficit present.      Mental Status: She is alert and oriented to person, place, and time. Mental status is at baseline.   Psychiatric:         Attention and Perception: Attention normal.         Mood and Affect: Mood and affect normal.         Speech: Speech normal.         Behavior: Behavior normal. Behavior is cooperative.         Thought Content: Thought content normal.         Cognition and Memory:  Cognition and memory normal. Cognition is not impaired. Memory is not impaired.         Judgment: Judgment normal.             Significant Labs: All pertinent labs within the past 24 hours have been reviewed.    Significant Imaging: I have reviewed all pertinent imaging results/findings within the past 24 hours.

## 2023-11-12 NOTE — ASSESSMENT & PLAN NOTE
Chronic, controlled. Latest blood pressure and vitals reviewed-     Temp:  [97.3 °F (36.3 °C)-98.6 °F (37 °C)]   Pulse:  []   Resp:  [18]   BP: (112-143)/(68-91)   SpO2:  [93 %-97 %] .     Continue current regimen

## 2023-11-12 NOTE — NURSING
Nurses Note -- 4 Eyes      11/12/2023   4:13 AM      Skin assessed during: Q Shift Change      [x] No Altered Skin Integrity Present    [x]Prevention Measures Documented      [] Yes- Altered Skin Integrity Present or Discovered   [] LDA Added if Not in Epic (Describe Wound)   [] New Altered Skin Integrity was Present on Admit and Documented in LDA   [] Wound Image Taken    Wound Care Consulted? No    Attending Nurse:  Kiah Valdez RN/Staff Member:  Deirdre

## 2023-11-12 NOTE — PLAN OF CARE
Problem: Physical Therapy  Goal: Physical Therapy Goal  Description: Goals to be met by: 23     Patient will increase functional independence with mobility by performin. Supine to sit with Modified Ridge  2. Rolling to Left and Right with Modified Ridge  3. Sit to stand transfer with Modified Ridge  4. Bed to chair transfer with Modified Ridge   5. Gait x250 feet with Modified Ridge using Single-point Cane   6. Lower extremity exercise program 2 sets x15 reps per handout, with independence    Outcome: Ongoing, Progressing     Pt tolerated treatment good today. Pt ambulated ~56ft x2 trials with no AD, CGA with standing rest break in between. Pt's O2 sats 98% throughout session. Pt is making progress towards goals. Pt would continue to benefit from skilled PT services as tolerated by pt.

## 2023-11-12 NOTE — NURSING
PER handoff received from ALIDA Dupont   Pt resting in bed quietly. NAD noted. No c/o pain.   Fall and safety precautions maintained. Bed alarm activated and audible.. Bed locked in lowest position, with side rails up x2. Call bell and personal items within reach

## 2023-11-12 NOTE — PLAN OF CARE
Problem: Adult Inpatient Plan of Care  Goal: Plan of Care Review  Outcome: Ongoing, Progressing  Goal: Patient-Specific Goal (Individualized)  Outcome: Ongoing, Progressing  Goal: Absence of Hospital-Acquired Illness or Injury  Outcome: Ongoing, Progressing  Goal: Optimal Comfort and Wellbeing  Outcome: Ongoing, Progressing  Goal: Readiness for Transition of Care  Outcome: Ongoing, Progressing     Problem: Diabetes Comorbidity  Goal: Blood Glucose Level Within Targeted Range  Outcome: Ongoing, Progressing     Problem: Skin Injury Risk Increased  Goal: Skin Health and Integrity  Outcome: Ongoing, Progressing     Problem: Infection  Goal: Absence of Infection Signs and Symptoms  Outcome: Ongoing, Progressing     Problem: Fall Injury Risk  Goal: Absence of Fall and Fall-Related Injury  Outcome: Ongoing, Progressing     Problem: Adjustment to Illness (Heart Failure)  Goal: Optimal Coping  Outcome: Ongoing, Progressing     Problem: Cardiac Output Decreased (Heart Failure)  Goal: Optimal Cardiac Output  Outcome: Ongoing, Progressing     Problem: Dysrhythmia (Heart Failure)  Goal: Stable Heart Rate and Rhythm  Outcome: Ongoing, Progressing     Problem: Fluid Imbalance (Heart Failure)  Goal: Fluid Balance  Outcome: Ongoing, Progressing     Problem: Functional Ability Impaired (Heart Failure)  Goal: Optimal Functional Ability  Outcome: Ongoing, Progressing     Problem: Oral Intake Inadequate (Heart Failure)  Goal: Optimal Nutrition Intake  Outcome: Ongoing, Progressing     Problem: Respiratory Compromise (Heart Failure)  Goal: Effective Oxygenation and Ventilation  Outcome: Ongoing, Progressing     Problem: Sleep Disordered Breathing (Heart Failure)  Goal: Effective Breathing Pattern During Sleep  Outcome: Ongoing, Progressing

## 2023-11-12 NOTE — NURSING
[facility] [facility] [place] 4 [place]      11/12/2023       Skin assessed on: Q Shift      [x] No Pressure Injuries Present    [x]Prevention Measures Documented    [] Yes LDA  for Pressure Injury Previously documented     [] Yes New Pressure Injury Discovered   [] LDA for New Pressure Injury Added      Attending RN:  Deirdre Fuentes RN     Second RN:  ALIDA Dupont

## 2023-11-12 NOTE — PT/OT/SLP PROGRESS
Physical Therapy Treatment    Patient Name:  Harreitt Oglesby   MRN:  [id number]    Recommendations:     Discharge Recommendations: Low Intensity Therapy with caregiver supervision/assistance  Discharge Equipment Recommendations: cane, straight  Barriers to discharge: Inaccessible home    Assessment:     Harriett Oglesby is a 68 y.o. female admitted with a medical diagnosis of VF (ventricular fibrillation).  She presents with the following impairments/functional limitations: weakness, impaired endurance, impaired self care skills, impaired functional mobility, gait instability, impaired balance, decreased safety awareness, impaired skin, decreased lower extremity function, decreased upper extremity function, decreased coordination, decreased ROM, impaired cardiopulmonary response to activity.    Pt tolerated treatment good today. Pt ambulated ~56ft x2 trials with no AD, CGA with standing rest break in between. Pt's O2 sats 98% throughout session. Pt is making progress towards goals. Pt would continue to benefit from skilled PT services as tolerated by pt.     Rehab Prognosis: Good; patient would benefit from acute skilled PT services to address these deficits and reach maximum level of function.    Recent Surgery: Procedure(s) (LRB):  INSERTION, ICD GENERATOR, SINGLE CHAMBER (Left) 3 Days Post-Op    Plan:     During this hospitalization, patient to be seen daily to address the identified rehab impairments via gait training, therapeutic activities, therapeutic exercises and progress toward the following goals:    Plan of Care Expires:  11/24/23    Subjective     Chief Complaint: pt upset due to family disagreement  Patient/Family Comments/goals: Pt agreeable to therapy session. Pt reports she wanted the doctor to talk to her daughter  Pain/Comfort:  Pain Rating 1: 0/10      Objective:     Communicated with pt's nurse, Britni, prior to session.  Patient found HOB elevated with peripheral IV, telemetry, Yolande  (Sling and Swathe) upon PT entry to room.     General Precautions: Standard, fall, diabetic, pacemaker  Orthopedic Precautions: N/A  Braces: Sling and swathe  Respiratory Status: Room air   Donned back gown, non-skid socks and gait belt prior to OOB activity.     Functional Mobility:  Bed Mobility:     Scooting: contact guard assistance  Supine to Sit: contact guard assistance  Transfers:  x2 trials from EOB   Sit to Stand:  contact guard assistance with no AD  Gait: Pt ambulated ~56 ft x2 trials with no AD, CGA. Pt required standing rest break in between trials due to fatigue. Pt with decreased step length, velocity of limb, postural control, safety, endurance, and balance.   Balance: good sitting and fair + standing      AM-PAC 6 CLICK MOBILITY  Turning over in bed (including adjusting bedclothes, sheets and blankets)?: 3  Sitting down on and standing up from a chair with arms (e.g., wheelchair, bedside commode, etc.): 3  Moving from lying on back to sitting on the side of the bed?: 3  Moving to and from a bed to a chair (including a wheelchair)?: 3  Need to walk in hospital room?: 3  Climbing 3-5 steps with a railing?: 2  Basic Mobility Total Score: 17       Treatment & Education:  Pt received handout for HEP with education provided on all exercises and verbalization of understanding.  Encouraged to perform in a chair or in bed. Omitted bridges on h/o.  Pt performed ankle pumps, quad sets, and glute sets.    Required assistance for donning of back gown and readjusting sling.   Pt encouraged to call nursing staff with need to use the rest room. Pure wick not applied. Daughter and pt verbalized understanding.   Pt educated on PTA role/POC.   Importance of OOB activity with staff assistance.  Importance of sitting up in the chair throughout the day as tolerated, especially for meals   Safety during functional t/f and mobility with use of staff  White board updated   Multiple self-care tasks/functional mobility  completed- assistance level noted above   All questions/concerns answered within scope of practice   Pt encouraged to use call button for assistance for all OOB/OOchair activity 2/2 fall risk. Pt verbalized understanding. Tray table and all needs within reach.    Patient left  reclined in BSchair with pressure cushion, B heels elevated, sling and swathe  with all lines intact, call button in reach, pt's nurse, Britni, notified, and daughter present..    GOALS:   Multidisciplinary Problems       Physical Therapy Goals          Problem: Physical Therapy    Goal Priority Disciplines Outcome Goal Variances Interventions   Physical Therapy Goal     PT, PT/OT Ongoing, Progressing     Description: Goals to be met by: 23     Patient will increase functional independence with mobility by performin. Supine to sit with Modified Rich  2. Rolling to Left and Right with Modified Rich  3. Sit to stand transfer with Modified Rich  4. Bed to chair transfer with Modified Rich   5. Gait x250 feet with Modified Rich using Single-point Cane   6. Lower extremity exercise program 2 sets x15 reps per handout, with independence                         Time Tracking:     PT Received On: 23  PT Start Time: 838     PT Stop Time: 913  PT Total Time (min): 35 min     Billable Minutes: Gait Training 20 and Therapeutic Activity 15    Treatment Type: Treatment  PT/PTA: PTA     Number of PTA visits since last PT visit: 2     2023

## 2023-11-12 NOTE — PROGRESS NOTES
[facility] Medicine  Telemedicine Progress Note    Patient Name: Harriett Oglesby  MRN: [id number]  Patient Class: IP- Inpatient   Admission Date: 11/6/2023  Length of Stay: 6 days  Attending Physician: Tere Chavarria MD  Primary Care Provider: Zeinab Noble MD          Subjective:     Principal Problem:VF (ventricular fibrillation)        HPI:  This is a 68-year-old female with a past medical history of hypertension, type 2 diabetes, hyperlipidemia, CVA with left-sided deficits who presents with tachycardia.      The patient initially presented to urgent care with shortness of breaths and lower extremity edema that started 7 days prior to presentation.  She ran out of her blood pressure medications about 2 weeks prior.  Additional symptoms include cough, nasal congestion and fatigue.  She was noted to be tachycardic and was advised present to the ED.    In the ED, the patient was tachycardic (up to 160s), tachypneic but normotensive.  Labs were remarkable for an elevated BNP (1839), elevated troponin (0.090 > 0.079), elevated D-dimer (2.87).  EKGs showed MAT and prolonged QTc. CTA chest showed pulmonary emboli in segmental and subsegmental pulmonary artery in the right middle lobe, with mild-to-moderate bibasilar pleural effusions with associated bibasilar atelectasis. An echocardiogram showed an EF of 20-25%, PA pressure of 49 mmHg.  Patient was given aspirin 325 mg, adenosine 6 mg IV x2, atenolol 25 mg p.o., diltiazem 20 mg IV x2, 30 mg IV x1, potassium bicarbonate 40 mEq use and was started on heparin drip.    While in the ED, the patient went into V-fib arrest, underwent 1 round of CPR and was cardioverted @200 joules x1, with achievement of ROSC. Mag sulfate 2 g IV & an amp of D50% were administered. Cardiology recommended Plavix, lidocaine infusion and an angiogram in the AM. She subsequently went into Vtach and was cardioverted @200 joules x1. No Epi was given and the patient was  not intubated.   Plavix, additional potassium and lidocaine bolus, followed by infusion were ordered. Patient became hypoxic and was placed on a NRB. She was admitted for further management.        Overview/Hospital Course:  67 y/o female sent to ER from Urgent Care for tachycardia.  In the ER found to be in multifocal atrial tachycardia with prolonged QTC.  Patient was given Cardizem with some improvement in heart rate.  Subsequently CTA was done which revealed segmental and subsegmental pulmonary embolism on the right side. Echo was also done which demonstrated severe cardiomyopathy with EF of 20-25%.  Subsequently patient developed VFib and cardioverted followed by V-tach which was also cardioverted x1.  Started on lidocaine drip in the ER and admitted to ICU.  Also started on heparin drip.  Underwent LHC showing non ischemic cardiomyopathy.  Cards recommending AICD.  She has remained in NSR. AICD placed on 11/9.      Interval History: No acute events. Remains on heparin gtt. Granddaughter at bedside.     Review of Systems   Constitutional:  Negative for chills and fever.   Respiratory:  Negative for chest tightness.    Cardiovascular:  Negative for chest pain.   Gastrointestinal:  Negative for abdominal pain.     Objective:     Vital Signs (Most Recent):  Temp: 97.8 °F (36.6 °C) (11/12/23 0728)  Pulse: 97 (11/12/23 0728)  Resp: 18 (11/12/23 0728)  BP: 133/80 (11/12/23 0728)  SpO2: 96 % (11/12/23 0728) Vital Signs (24h Range):  Temp:  [97.3 °F (36.3 °C)-98.6 °F (37 °C)] 97.8 °F (36.6 °C)  Pulse:  [] 97  Resp:  [18] 18  SpO2:  [93 %-97 %] 96 %  BP: (112-143)/(68-91) 133/80     Weight: 82.6 kg (182 lb 1.6 oz)  Body mass index is 30.3 kg/m².    Intake/Output Summary (Last 24 hours) at 11/12/2023 2666  Last data filed at 11/12/2023 0642  Gross per 24 hour   Intake 308.4 ml   Output --   Net 308.4 ml         Physical Exam  Vitals and nursing note reviewed.   Constitutional:       General: She is awake. She is  not in acute distress.     Appearance: Normal appearance. She is well-developed and well-groomed. She is not ill-appearing, toxic-appearing or diaphoretic.   HENT:      Head: Normocephalic and atraumatic.   Eyes:      General: No scleral icterus.  Cardiovascular:      Rate and Rhythm: Normal rate.   Pulmonary:      Effort: No tachypnea or respiratory distress.   Musculoskeletal:      Right lower leg: No edema.      Left lower leg: No edema.   Skin:     Coloration: Skin is not jaundiced.   Neurological:      General: No focal deficit present.      Mental Status: She is alert and oriented to person, place, and time. Mental status is at baseline.   Psychiatric:         Attention and Perception: Attention normal.         Mood and Affect: Mood and affect normal.         Speech: Speech normal.         Behavior: Behavior normal. Behavior is cooperative.         Thought Content: Thought content normal.         Cognition and Memory: Cognition and memory normal. Cognition is not impaired. Memory is not impaired.         Judgment: Judgment normal.             Significant Labs: All pertinent labs within the past 24 hours have been reviewed.    Significant Imaging: I have reviewed all pertinent imaging results/findings within the past 24 hours.      Assessment/Plan:      * VF (ventricular fibrillation)  While in the ED, the patient went into V-fib arrest, underwent 1 round of CPR and was cardioverted @200 joules x1, with achievement of ROSC.   She subsequently went into Vtach and was cardioverted @200 joules x1. No Epi was given and the patient was not intubated.     Cardiology following.  LHC showed non obstructive cardiomyopathy.  S/P AICD placement 11/9, Lidocaine infusion DC'd.    AICD (automatic cardioverter/defibrillator) present  Surgical site care      CHF (congestive heart failure)  Patient is identified as having Systolic (HFrEF) heart failure that is Acute. CHF is currently controlled. Latest ECHO performed and  demonstrates- Results for orders placed during the hospital encounter of 11/06/23    Echo    Interpretation Summary    Left Ventricle: The left ventricle is mildly dilated. Normal wall thickness. There is severely reduced systolic function with a visually estimated ejection fraction of 20 - 25%.    Right Ventricle: Normal right ventricular cavity size. Systolic function is normal.    Left Atrium: Left atrium is severely dilated.    Right Atrium: Right atrium is moderately dilated.    Mitral Valve: There is mild regurgitation.    Tricuspid Valve: There is mild to moderate regurgitation.    Pulmonary Artery: The estimated pulmonary artery systolic pressure is 49 mmHg.    IVC/SVC: Intermediate venous pressure at 8 mmHg.    Pericardium: There is a trivial effusion.   Monitor on telemetry. Patient is off CHF pathway.  Monitor strict Is&Os and daily weights.  Place on fluid restriction of 1.5 L. Cardiology has been consulted. Continue to stress to patient importance of self efficacy and  on diet for CHF. Last BNP reviewed- and noted below   Recent Labs   Lab 11/06/23  1306   BNP 1,839*     Non ischemic cardiomyopathy.  Continue GDMT  Appreciate cardiology assistance     Pulmonary embolism  CTA chest showed pulmonary emboli in segmental and subsegmental pulmonary artery in the right middle lobe, with mild-to-moderate bibasilar pleural effusions with associated bibasilar atelectasis.    Continue heparin ggt   Transition to Eliquis upon dc    Multifocal atrial tachycardia  Improved with diltiazem.   Now on Coreg as well  Cards following      Advanced care planning/counseling discussion  Remains full code at this time.       Hyperlipidemia associated with type 2 diabetes mellitus  Continue statin    History of stroke with current residual effects  Stable  Fall precautions      Type 2 diabetes mellitus with microalbuminuria, without long-term current use of insulin  Patient's FSGs are controlled on current  medication regimen.  Last A1c reviewed-   Lab Results   Component Value Date    HGBA1C 6.3 (H) 11/07/2023     Most recent fingerstick glucose reviewed-   Recent Labs   Lab 11/11/23  1129 11/11/23  1616 11/11/23 2011 11/12/23  0727   POCTGLUCOSE 199* 158* 161* 127*     Current correctional scale  Low  Maintain anti-hyperglycemic dose as follows-   Antihyperglycemics (From admission, onward)    Start     Stop Route Frequency Ordered    11/09/23 2100  insulin detemir U-100 (Levemir) pen 8 Units         -- SubQ Nightly 11/09/23 1321    11/06/23 2141  insulin aspart U-100 pen 0-5 Units         -- SubQ Before meals & nightly PRN 11/06/23 2041        Hold Oral hypoglycemics while patient is in the hospital.        Hypertension, essential, benign  Chronic, controlled. Latest blood pressure and vitals reviewed-     Temp:  [97.3 °F (36.3 °C)-98.6 °F (37 °C)]   Pulse:  []   Resp:  [18]   BP: (112-143)/(68-91)   SpO2:  [93 %-97 %] .     Continue current regimen      VTE Risk Mitigation (From admission, onward)         Ordered     heparin 25,000 units in dextrose 5% 250 mL (100 units/mL) infusion HIGH INTENSITY nomogram - [facility]  Continuous        Question:  Begin at (units/kg/hr)  Answer:  18    11/09/23 1238     heparin 25,000 units in dextrose 5% (100 units/ml) IV bolus from bag - ADDITIONAL PRN BOLUS - 60 units/kg  As needed (PRN)        Question:  Heparin Infusion Adjustment (DO NOT MODIFY ANSWER)  Answer:  \[email or web address]\epic\Images\Pharmacy\HeparinInfusions\heparin HIGH INTENSITY nomogram for [facility] [id number]    11/09/23 1238     heparin 25,000 units in dextrose 5% (100 units/ml) IV bolus from bag - ADDITIONAL PRN BOLUS - 30 units/kg  As needed (PRN)        Question:  Heparin Infusion Adjustment (DO NOT MODIFY ANSWER)  Answer:  [email or web address]\epic\Images\Pharmacy\HeparinInfusions\heparin HIGH INTENSITY nomogram for [facility] [id number]    11/09/23 1238     IP VTE HIGH RISK PATIENT  Once         11/06/23 2047     Place  sequential compression device  Until discontinued         11/06/23 2047     heparin 25,000 units in dextrose 5% (100 units/ml) IV bolus from bag - ADDITIONAL PRN BOLUS - 60 units/kg  As needed (PRN)        Question:  Heparin Infusion Adjustment (DO NOT MODIFY ANSWER)  Answer:  [email or web address]\epic\Images\Pharmacy\HeparinInfusions\heparin HIGH INTENSITY nomogram for [facility] [id number]    11/06/23 1833     heparin 25,000 units in dextrose 5% (100 units/ml) IV bolus from bag - ADDITIONAL PRN BOLUS - 30 units/kg  As needed (PRN)        Question:  Heparin Infusion Adjustment (DO NOT MODIFY ANSWER)  Answer:  [email or web address]\epic\Images\Pharmacy\HeparinInfusions\heparin HIGH INTENSITY nomogram for [facility] [id number]    11/06/23 1833     heparin 25,000 units in dextrose 5% 250 mL (100 units/mL) infusion HIGH INTENSITY nomogram - [facility]  Continuous        Question:  Begin at (units/kg/hr)  Answer:  18    11/06/23 1833                      I have completed this tele-visit without the assistance of a telepresenter.    The attending portion of this evaluation, treatment, and documentation was performed per Tere Ford MD via Telemedicine AudioVisual using the secure [employer] software platform with 2 way audio/video. The provider was located off-site and the patient is located in the hospital. The aforementioned video software was utilized to document the relevant history and physical exam    Tere Ford MD  Department of Hospital Medicine   [facility]

## 2023-11-12 NOTE — CONSULTS
[facility] Medicine  Telemedicine Consult Note    Patient Name: Harriett Oglesby  MRN: [id number]  Admission Date: 11/6/2023  Hospital Length of Stay: 5 days  Attending Physician: Jemima Wallis MD   Primary Care Provider: Zeinab Noble MD       Thank you for your consult to [facility]. We have reviewed the patient chart. This patient does meet criteria for [facility] service at this time.  Will assume care on 11/12/23 at 6AM.          Mariann Mccloud MD  Department of Hospital Medicine   [facility]

## 2023-11-13 VITALS
BODY MASS INDEX: 30.34 KG/M2 | HEIGHT: 65 IN | OXYGEN SATURATION: 97 % | DIASTOLIC BLOOD PRESSURE: 68 MMHG | HEART RATE: 98 BPM | RESPIRATION RATE: 18 BRPM | SYSTOLIC BLOOD PRESSURE: 108 MMHG | WEIGHT: 182.13 LBS | TEMPERATURE: 98 F

## 2023-11-13 LAB
APTT PPP: 62.6 SEC (ref 21–32)
BASOPHILS # BLD AUTO: 0.03 K/UL (ref 0–0.2)
BASOPHILS NFR BLD: 0.5 % (ref 0–1.9)
DIFFERENTIAL METHOD BLD: ABNORMAL
EOSINOPHIL # BLD AUTO: 0.1 K/UL (ref 0–0.5)
EOSINOPHIL NFR BLD: 1.6 % (ref 0–8)
ERYTHROCYTE [DISTWIDTH] IN BLOOD BY AUTOMATED COUNT: 14.8 % (ref 11.5–14.5)
HCT VFR BLD AUTO: 32.5 % (ref 37–48.5)
HGB BLD-MCNC: 10.3 G/DL (ref 12–16)
IMM GRANULOCYTES # BLD AUTO: 0.04 K/UL (ref 0–0.04)
IMM GRANULOCYTES NFR BLD AUTO: 0.7 % (ref 0–0.5)
LYMPHOCYTES # BLD AUTO: 1.6 K/UL (ref 1–4.8)
LYMPHOCYTES NFR BLD: 28.3 % (ref 18–48)
MCH RBC QN AUTO: 28.6 PG (ref 27–31)
MCHC RBC AUTO-ENTMCNC: 31.7 G/DL (ref 32–36)
MCV RBC AUTO: 90 FL (ref 82–98)
MONOCYTES # BLD AUTO: 0.8 K/UL (ref 0.3–1)
MONOCYTES NFR BLD: 13.8 % (ref 4–15)
NEUTROPHILS # BLD AUTO: 3.2 K/UL (ref 1.8–7.7)
NEUTROPHILS NFR BLD: 55.1 % (ref 38–73)
NRBC BLD-RTO: 0 /100 WBC
PLATELET # BLD AUTO: 296 K/UL (ref 150–450)
PMV BLD AUTO: 11 FL (ref 9.2–12.9)
POCT GLUCOSE: 128 MG/DL (ref 70–110)
POCT GLUCOSE: 149 MG/DL (ref 70–110)
RBC # BLD AUTO: 3.6 M/UL (ref 4–5.4)
WBC # BLD AUTO: 5.72 K/UL (ref 3.9–12.7)

## 2023-11-13 PROCEDURE — 25000003 PHARM REV CODE 250: Mod: HCNC | Performed by: HOSPITALIST

## 2023-11-13 PROCEDURE — 25000003 PHARM REV CODE 250: Mod: HCNC | Performed by: STUDENT IN AN ORGANIZED HEALTH CARE EDUCATION/TRAINING PROGRAM

## 2023-11-13 PROCEDURE — 85730 THROMBOPLASTIN TIME PARTIAL: CPT | Mod: HCNC | Performed by: HOSPITALIST

## 2023-11-13 PROCEDURE — 94761 N-INVAS EAR/PLS OXIMETRY MLT: CPT | Mod: HCNC

## 2023-11-13 PROCEDURE — 97535 SELF CARE MNGMENT TRAINING: CPT | Mod: HCNC

## 2023-11-13 PROCEDURE — A4216 STERILE WATER/SALINE, 10 ML: HCPCS | Mod: HCNC | Performed by: HOSPITALIST

## 2023-11-13 PROCEDURE — 85025 COMPLETE CBC W/AUTO DIFF WBC: CPT | Mod: HCNC | Performed by: HOSPITALIST

## 2023-11-13 RX ORDER — CARVEDILOL 12.5 MG/1
12.5 TABLET ORAL 2 TIMES DAILY WITH MEALS
Qty: 60 TABLET | Refills: 11 | Status: SHIPPED | OUTPATIENT
Start: 2023-11-13 | End: 2023-12-18 | Stop reason: SDUPTHER

## 2023-11-13 RX ORDER — ATORVASTATIN CALCIUM 40 MG/1
40 TABLET, FILM COATED ORAL NIGHTLY
Qty: 90 TABLET | Refills: 3 | Status: SHIPPED | OUTPATIENT
Start: 2023-11-13 | End: 2023-12-18 | Stop reason: SDUPTHER

## 2023-11-13 RX ORDER — VALSARTAN 160 MG/1
160 TABLET ORAL DAILY
Qty: 90 TABLET | Refills: 3 | Status: SHIPPED | OUTPATIENT
Start: 2023-11-13 | End: 2023-12-18 | Stop reason: SDUPTHER

## 2023-11-13 RX ADMIN — CEPHALEXIN 500 MG: 500 CAPSULE ORAL at 06:11

## 2023-11-13 RX ADMIN — CARVEDILOL 12.5 MG: 12.5 TABLET, FILM COATED ORAL at 08:11

## 2023-11-13 RX ADMIN — FAMOTIDINE 20 MG: 10 INJECTION INTRAVENOUS at 08:11

## 2023-11-13 RX ADMIN — MUPIROCIN: 20 OINTMENT TOPICAL at 08:11

## 2023-11-13 RX ADMIN — VALSARTAN 160 MG: 80 TABLET, FILM COATED ORAL at 08:11

## 2023-11-13 RX ADMIN — APIXABAN 5 MG: 5 TABLET, FILM COATED ORAL at 08:11

## 2023-11-13 RX ADMIN — Medication 10 ML: at 06:11

## 2023-11-13 RX ADMIN — Medication 10 ML: at 12:11

## 2023-11-13 RX ADMIN — CEPHALEXIN 500 MG: 500 CAPSULE ORAL at 12:11

## 2023-11-13 NOTE — NURSING
Received report from ALIDA Dupont. Patient lying in bed resting, NAD noted. Safety precautions maintained, Will Monitor.

## 2023-11-13 NOTE — ASSESSMENT & PLAN NOTE
Chronic, controlled. Latest blood pressure and vitals reviewed-     Temp:  [97.5 °F (36.4 °C)-98.6 °F (37 °C)]   Pulse:  []   Resp:  [18]   BP: (115-143)/(60-85)   SpO2:  [94 %-98 %] .     Continue current regimen

## 2023-11-13 NOTE — PLAN OF CARE
Problem: Adult Inpatient Plan of Care  Goal: Absence of Hospital-Acquired Illness or Injury  Intervention: Identify and Manage Fall Risk  Flowsheets (Taken 11/13/2023 0436)  Safety Promotion/Fall Prevention:   assistive device/personal item within reach   room near unit station  Intervention: Prevent Skin Injury  Flowsheets (Taken 11/13/2023 0436)  Body Position: turned  Intervention: Prevent and Manage VTE (Venous Thromboembolism) Risk  Flowsheets (Taken 11/13/2023 0436)  Activity Management: Ambulated to bathroom - L4  Goal: Optimal Comfort and Wellbeing  Intervention: Monitor Pain and Promote Comfort  Flowsheets (Taken 11/13/2023 0436)  Pain Management Interventions: care clustered  Intervention: Provide Person-Centered Care  Flowsheets (Taken 11/13/2023 0436)  Trust Relationship/Rapport: care explained     Problem: Diabetes Comorbidity  Goal: Blood Glucose Level Within Targeted Range  Intervention: Monitor and Manage Glycemia  Flowsheets (Taken 11/13/2023 0436)  Glycemic Management: blood glucose monitored     Problem: Adjustment to Illness (Heart Failure)  Goal: Optimal Coping  Intervention: Support Psychosocial Response  Flowsheets (Taken 11/13/2023 0436)  Supportive Measures: active listening utilized     Problem: Cardiac Output Decreased (Heart Failure)  Goal: Optimal Cardiac Output  Intervention: Optimize Cardiac Output  Flowsheets (Taken 11/13/2023 0436)  Environmental Support: calm environment promoted     Problem: Functional Ability Impaired (Heart Failure)  Goal: Optimal Functional Ability  Intervention: Optimize Functional Ability  Flowsheets (Taken 11/13/2023 0436)  Activity Management: Ambulated to bathroom - L4

## 2023-11-13 NOTE — PLAN OF CARE
Problem: Adult Inpatient Plan of Care  Goal: Plan of Care Review  Outcome: Adequate for Care Transition  Goal: Patient-Specific Goal (Individualized)  Outcome: Adequate for Care Transition  Goal: Absence of Hospital-Acquired Illness or Injury  Outcome: Adequate for Care Transition  Goal: Optimal Comfort and Wellbeing  Outcome: Adequate for Care Transition  Goal: Readiness for Transition of Care  Outcome: Adequate for Care Transition     Problem: Diabetes Comorbidity  Goal: Blood Glucose Level Within Targeted Range  Outcome: Adequate for Care Transition     Problem: Skin Injury Risk Increased  Goal: Skin Health and Integrity  Outcome: Adequate for Care Transition     Problem: Infection  Goal: Absence of Infection Signs and Symptoms  Outcome: Adequate for Care Transition     Problem: Fall Injury Risk  Goal: Absence of Fall and Fall-Related Injury  Outcome: Adequate for Care Transition     Problem: Adjustment to Illness (Heart Failure)  Goal: Optimal Coping  Outcome: Adequate for Care Transition     Problem: Cardiac Output Decreased (Heart Failure)  Goal: Optimal Cardiac Output  Outcome: Adequate for Care Transition     Problem: Dysrhythmia (Heart Failure)  Goal: Stable Heart Rate and Rhythm  Outcome: Adequate for Care Transition     Problem: Fluid Imbalance (Heart Failure)  Goal: Fluid Balance  Outcome: Adequate for Care Transition     Problem: Functional Ability Impaired (Heart Failure)  Goal: Optimal Functional Ability  Outcome: Adequate for Care Transition     Problem: Oral Intake Inadequate (Heart Failure)  Goal: Optimal Nutrition Intake  Outcome: Adequate for Care Transition     Problem: Respiratory Compromise (Heart Failure)  Goal: Effective Oxygenation and Ventilation  Outcome: Adequate for Care Transition     Problem: Sleep Disordered Breathing (Heart Failure)  Goal: Effective Breathing Pattern During Sleep  Outcome: Adequate for Care Transition

## 2023-11-13 NOTE — NURSING
Nurses Note -- 4 Eyes      11/13/2023   3:32 AM      Skin assessed during: Q Shift Change      [x] No Altered Skin Integrity Present    [x]Prevention Measures Documented      [] Yes- Altered Skin Integrity Present or Discovered   [] LDA Added if Not in Epic (Describe Wound)   [] New Altered Skin Integrity was Present on Admit and Documented in LDA   [] Wound Image Taken    Wound Care Consulted? No    Attending Nurse:  Kiah Valdez RN/Staff Member: Deirdre

## 2023-11-13 NOTE — ASSESSMENT & PLAN NOTE
Patient's FSGs are controlled on current medication regimen.  Last A1c reviewed-   Lab Results   Component Value Date    HGBA1C 6.3 (H) 11/07/2023     Most recent fingerstick glucose reviewed-   Recent Labs   Lab 11/12/23  1146 11/12/23  1633 11/12/23  1938 11/13/23  0743   POCTGLUCOSE 142* 219* 182* 128*       Current correctional scale  Low  Maintain anti-hyperglycemic dose as follows-   Antihyperglycemics (From admission, onward)    Start     Stop Route Frequency Ordered    11/09/23 2100  insulin detemir U-100 (Levemir) pen 8 Units         -- SubQ Nightly 11/09/23 1321    11/06/23 2141  insulin aspart U-100 pen 0-5 Units         -- SubQ Before meals & nightly PRN 11/06/23 2041        Hold Oral hypoglycemics while patient is in the hospital.

## 2023-11-13 NOTE — DISCHARGE SUMMARY
[facility] Medicine  Discharge Summary      Patient Name: Harriett Oglesby  MRN: [id number]  Patient Class: IP- Inpatient  Admission Date: 11/6/2023  Hospital Length of Stay: 7 days  Discharge Date and Time:  11/13/2023 8:37 AM  Attending Physician: Tere Chavarria MD   Discharging Provider: Tere Ford MD  Primary Care Provider: Zeinab Noble MD      HPI:   This is a 68-year-old female with a past medical history of hypertension, type 2 diabetes, hyperlipidemia, CVA with left-sided deficits who presents with tachycardia.      The patient initially presented to urgent care with shortness of breaths and lower extremity edema that started 7 days prior to presentation.  She ran out of her blood pressure medications about 2 weeks prior.  Additional symptoms include cough, nasal congestion and fatigue.  She was noted to be tachycardic and was advised present to the ED.    In the ED, the patient was tachycardic (up to 160s), tachypneic but normotensive.  Labs were remarkable for an elevated BNP (1839), elevated troponin (0.090 > 0.079), elevated D-dimer (2.87).  EKGs showed MAT and prolonged QTc. CTA chest showed pulmonary emboli in segmental and subsegmental pulmonary artery in the right middle lobe, with mild-to-moderate bibasilar pleural effusions with associated bibasilar atelectasis. An echocardiogram showed an EF of 20-25%, PA pressure of 49 mmHg.  Patient was given aspirin 325 mg, adenosine 6 mg IV x2, atenolol 25 mg p.o., diltiazem 20 mg IV x2, 30 mg IV x1, potassium bicarbonate 40 mEq use and was started on heparin drip.    While in the ED, the patient went into V-fib arrest, underwent 1 round of CPR and was cardioverted @200 joules x1, with achievement of ROSC. Mag sulfate 2 g IV & an amp of D50% were administered. Cardiology recommended Plavix, lidocaine infusion and an angiogram in the AM. She subsequently went into Vtach and was cardioverted @200 joules x1. No Epi was given and the  patient was not intubated.   Plavix, additional potassium and lidocaine bolus, followed by infusion were ordered. Patient became hypoxic and was placed on a NRB. She was admitted for further management.      Procedure(s) (LRB):  INSERTION, ICD GENERATOR, SINGLE CHAMBER (Left)      Hospital Course:   69 y/o female sent to ER from Urgent Care for tachycardia.  In the ER found to be in multifocal atrial tachycardia with prolonged QTC.  Patient was given Cardizem with some improvement in heart rate.  Subsequently CTA was done which revealed segmental and subsegmental pulmonary embolism on the right side. Echo was also done which demonstrated severe cardiomyopathy with EF of 20-25%.  Subsequently patient developed VFib and cardioverted followed by V-tach which was also cardioverted x1.  Started on lidocaine drip in the ER and admitted to ICU.  Also started on heparin drip.  Underwent LHC showing non ischemic cardiomyopathy.  Cards recommending AICD.  She has remained in NSR. AICD placed on 11/9.     Goals of Care Treatment Preferences:  Code Status: Full Code      Consults:   Consults (From admission, onward)          Status Ordering Provider     Inpatient virtual consult to Hospital Medicine  Once        Provider:  (Not yet assigned)    Completed PRAKASH GARCIA     Inpatient consult to Social Work  Once        Provider:  (Not yet assigned)    Completed ASHER TIRADO     [facility]  Once        Provider:  (Not yet assigned)    Acknowledged ISABELA FORD     Inpatient consult to Cardiology  Once        Provider:  Asher Tirado MD    Completed AUDRA JOY            Neuro  History of stroke with current residual effects  Stable  Fall precautions      Cardiac/Vascular  * VF (ventricular fibrillation)  While in the ED, the patient went into V-fib arrest, underwent 1 round of CPR and was cardioverted @200 joules x1, with achievement of ROSC.   She subsequently went into Vtach and  was cardioverted @200 joules x1. No Epi was given and the patient was not intubated.     Cardiology following.  LHC showed non obstructive cardiomyopathy.  S/P AICD placement 11/9, Lidocaine infusion DC'd.    AICD (automatic cardioverter/defibrillator) present  Surgical site care      CHF (congestive heart failure)  Patient is identified as having Systolic (HFrEF) heart failure that is Acute. CHF is currently controlled. Latest ECHO performed and demonstrates- Results for orders placed during the hospital encounter of 11/06/23    Echo    Interpretation Summary    Left Ventricle: The left ventricle is mildly dilated. Normal wall thickness. There is severely reduced systolic function with a visually estimated ejection fraction of 20 - 25%.    Right Ventricle: Normal right ventricular cavity size. Systolic function is normal.    Left Atrium: Left atrium is severely dilated.    Right Atrium: Right atrium is moderately dilated.    Mitral Valve: There is mild regurgitation.    Tricuspid Valve: There is mild to moderate regurgitation.    Pulmonary Artery: The estimated pulmonary artery systolic pressure is 49 mmHg.    IVC/SVC: Intermediate venous pressure at 8 mmHg.    Pericardium: There is a trivial effusion.   Monitor on telemetry. Patient is off CHF pathway.  Monitor strict Is&Os and daily weights.  Place on fluid restriction of 1.5 L. Cardiology has been consulted. Continue to stress to patient importance of self efficacy and  on diet for CHF. Last BNP reviewed- and noted below   Recent Labs   Lab 11/06/23  1306   BNP 1,839*       Non ischemic cardiomyopathy.  Continue GDMT  Appreciate cardiology assistance     Multifocal atrial tachycardia  Improved with diltiazem.   Now on Coreg as well  Cards following      Hyperlipidemia associated with type 2 diabetes mellitus  Continue statin    Hypertension, essential, benign  Chronic, controlled. Latest blood pressure and vitals reviewed-     Temp:  [97.5 °F (36.4  °C)-98.6 °F (37 °C)]   Pulse:  []   Resp:  [18]   BP: (115-143)/(60-85)   SpO2:  [94 %-98 %] .     Continue current regimen    Hematology  Pulmonary embolism  CTA chest showed pulmonary emboli in segmental and subsegmental pulmonary artery in the right middle lobe, with mild-to-moderate bibasilar pleural effusions with associated bibasilar atelectasis.    Continue heparin ggt   Transition to Eliquis upon dc    Endocrine  Type 2 diabetes mellitus with microalbuminuria, without long-term current use of insulin  Patient's FSGs are controlled on current medication regimen.  Last A1c reviewed-   Lab Results   Component Value Date    HGBA1C 6.3 (H) 11/07/2023     Most recent fingerstick glucose reviewed-   Recent Labs   Lab 11/12/23  1146 11/12/23  1633 11/12/23  1938 11/13/23  0743   POCTGLUCOSE 142* 219* 182* 128*       Current correctional scale  Low  Maintain anti-hyperglycemic dose as follows-   Antihyperglycemics (From admission, onward)      Start     Stop Route Frequency Ordered    11/09/23 2100  insulin detemir U-100 (Levemir) pen 8 Units         -- SubQ Nightly 11/09/23 1321    11/06/23 2141  insulin aspart U-100 pen 0-5 Units         -- SubQ Before meals & nightly PRN 11/06/23 2041          Hold Oral hypoglycemics while patient is in the hospital.        Palliative Care  Advanced care planning/counseling discussion  Remains full code at this time.         Final Active Diagnoses:    Diagnosis Date Noted POA    PRINCIPAL PROBLEM:  VF (ventricular fibrillation) [I49.01] 11/06/2023 Yes    AICD (automatic cardioverter/defibrillator) present [Z95.810] 11/09/2023 No    Multifocal atrial tachycardia [I47.19] 11/06/2023 Yes    Pulmonary embolism [I26.99] 11/06/2023 Yes    CHF (congestive heart failure) [I50.9] 11/06/2023 Yes    Advanced care planning/counseling discussion [Z71.89] 03/01/2021 Not Applicable    Hyperlipidemia associated with type 2 diabetes mellitus [E11.69, E78.5] 06/03/2019 Yes    Type 2 diabetes  "mellitus with microalbuminuria, without long-term current use of insulin [E11.29, R80.9] 12/03/2018 Yes    Hypertension, essential, benign [I10] 12/03/2018 Yes    History of stroke with current residual effects [I69.30] 12/03/2018 Not Applicable      Problems Resolved During this Admission:    Diagnosis Date Noted Date Resolved POA    Acute delirium [R41.0] 11/08/2023 11/11/2023 No    Acute respiratory failure with hypoxia [J96.01] 11/06/2023 11/07/2023 Yes       Discharged Condition: good    Disposition: Home or Self Care    Follow Up:    Patient Instructions:      Diet diabetic     Notify your health care provider if you experience any of the following:  temperature >100.4     Activity as tolerated       Significant Diagnostic Studies: Labs: CMP No results for input(s): "NA", "K", "CL", "CO2", "GLU", "BUN", "CREATININE", "CALCIUM", "PROT", "ALBUMIN", "BILITOT", "ALKPHOS", "AST", "ALT", "ANIONGAP", "ESTGFRAFRICA", "EGFRNONAA" in the last 48 hours. and CBC   Recent Labs   Lab 11/13/23  0314   WBC 5.72   HGB 10.3*   HCT 32.5*          Pending Diagnostic Studies:       None           Medications:  Reconciled Home Medications:      Medication List        START taking these medications      apixaban 5 mg Tab  Commonly known as: ELIQUIS  Take 1 tablet (5 mg total) by mouth 2 (two) times daily.     atorvastatin 40 MG tablet  Commonly known as: LIPITOR  Take 1 tablet (40 mg total) by mouth every evening.     carvediloL 12.5 MG tablet  Commonly known as: COREG  Take 1 tablet (12.5 mg total) by mouth 2 (two) times daily with meals.     cephALEXin 500 MG capsule  Commonly known as: KEFLEX  Take 1 capsule (500 mg total) by mouth every 6 (six) hours.     HYDROcodone-acetaminophen  mg per tablet  Commonly known as: NORCO  Take 1 tablet by mouth every 12 (twelve) hours as needed for Pain.            CHANGE how you take these medications      valsartan 160 MG tablet  Commonly known as: DIOVAN  Take 1 tablet (160 mg " total) by mouth once daily.  What changed:   medication strength  how much to take            CONTINUE taking these medications      amLODIPine 10 MG tablet  Commonly known as: NORVASC  Take 1 tablet (10 mg total) by mouth once daily.     aspirin 81 MG EC tablet  Commonly known as: ECOTRIN  Take 81 mg by mouth once daily.     cetirizine 10 MG tablet  Commonly known as: ZYRTEC  Take 10 mg by mouth once daily.     hydroCHLOROthiazide 25 MG tablet  Commonly known as: HYDRODIURIL  Take 1 tablet (25 mg total) by mouth once daily.     metFORMIN 1000 MG tablet  Commonly known as: GLUCOPHAGE  Take 1 tablet (1,000 mg total) by mouth 2 (two) times daily. for diabetes.            STOP taking these medications      atenoloL 25 MG tablet  Commonly known as: TENORMIN     cyclobenzaprine 5 MG tablet  Commonly known as: FLEXERIL     FARXIGA 10 mg tablet  Generic drug: dapagliflozin propanediol     methylPREDNISolone acetate 40 mg/mL injection  Commonly known as: DEPO-MEDROL     simvastatin 20 MG tablet  Commonly known as: ZOCOR              Indwelling Lines/Drains at time of discharge:   Lines/Drains/Airways       Peripherally Inserted Central Catheter Line  Duration             PICC Triple Lumen 11/08/23 0613 right basilic 5 days                    Time spent on the discharge of patient: 45 minutes         The attending portion of this evaluation, treatment, and documentation was performed per Tere Ford MD via Telemedicine AudioVisual using the secure [employer] software platform with 2 way audio/video. The provider was located off-site and the patient is located in the hospital. The aforementioned video software was utilized to document the relevant history and physical exam    Tere Ford MD  Department of [facility] Medicine  [facility]

## 2023-11-13 NOTE — PLAN OF CARE
Problem: Occupational Therapy  Goal: Occupational Therapy Goal  Description: Goals to be met by: 11/27/23     Patient will increase functional independence with ADLs by performing:    UE Dressing with Minimal Assistance.  LE Dressing with Minimal Assistance.  Grooming while standing at sink with Stand-by Assistance.  Toileting from toilet with Supervision for hygiene and clothing management.   Supine to sit with Supervision.  Step transfer with Supervision  Toilet transfer to toilet with Supervision.    Outcome: Ongoing, Progressing     CGA for in-room functional mobility with RAFIQE HHA and MIRIAM in sling. Edu and practice of doffing/donning LUE sling.

## 2023-11-13 NOTE — NURSING
[facility] [place]  Nurses Note -- 4 Eyes      11/13/2023       Skin assessed on: Q Shift      [x] No Pressure Injuries Present    []Prevention Measures Documented    [] Yes LDA  for Pressure Injury Previously documented     [] Yes New Pressure Injury Discovered   [] LDA for New Pressure Injury Added      Attending RN:  Susannah Combs RN     Second RN:  Kiah Khalil RN

## 2023-11-13 NOTE — PLAN OF CARE
11/13/23 1028   Final Note   Assessment Type Final Discharge Note   Anticipated Discharge Disposition Home-Health   What phone number can be called within the next 1-3 days to see how you are doing after discharge? [phone]   Hospital Resources/Appts/Education Provided Provided patient/caregiver with written discharge plan information;Appointments scheduled and added to AVS   Post-Acute Status   Post-Acute Authorization Home Health   Home Health Status Referrals Sent   Discharge Delays None known at this time     SW spoke with patient at bedside in regards to anticipated discharge on today. Patient to go home with her daughter. Referral for HH has been sent through [facility] but no accepting at this time. Nurse Susannah Combs informed that patient is clear for discharge from case management standpoint.

## 2023-11-13 NOTE — PT/OT/SLP PROGRESS
Occupational Therapy   Treatment    Name: Harriett Oglesby  MRN: [id number]  Admitting Diagnosis:  VF (ventricular fibrillation)  4 Days Post-Op    Recommendations:     Discharge Recommendations: Low Intensity Therapy (with caregiver support/assistance)  Discharge Equipment Recommendations:  cane, straight  Barriers to discharge:  None    Assessment:     Harriett Oglesby is a 68 y.o. female with a medical diagnosis of VF (ventricular fibrillation). Performance deficits affecting function are weakness, impaired endurance, decreased ROM, decreased coordination, impaired self care skills, decreased upper extremity function, impaired fine motor, impaired joint extensibility, impaired muscle length, decreased lower extremity function, impaired functional mobility, gait instability, decreased safety awareness, impaired balance.     CGA for in-room functional mobility with RUE HHA and LUE in sling. Edu and practice of doffing/donning LUE sling.     LUE at baseline: digits flexed, wrist flexion, forearm pronated, shoulder adduction d/t h/o old CVA in 2001 per pt. At baseline, pt adapts things to complete unilaterally with dominant RUE.     Rehab Prognosis:  Good; patient would benefit from acute skilled OT services to address these deficits and reach maximum level of function.       Plan:     Patient to be seen  (3-5x/week) to address the above listed problems via self-care/home management, therapeutic activities, therapeutic exercises  Plan of Care Expires: 11/24/23  Plan of Care Reviewed with: patient    Subjective     Chief Complaint: none reported   Patient/Family Comments/goals: agreeable to session; son flying in right now to stay with her at d/c; dtr and pt's sister are local and will be helping also     Pain/Comfort:  Pain Rating 1: 0/10    Objective:     Communicated with: nurseSusannah, prior to session. Patient found HOB elevated with LUE sling and swathe in place with peripheral IV, PureWick, telemetry (Sling  and Swathe) upon OT entry to room.    General Precautions: Standard, fall, diabetic, pacemaker    Orthopedic Precautions:N/A  Braces: Sling and swathe  Respiratory Status: Room air     Occupational Performance:     Bed Mobility:    Patient completed Scooting anteriorly with contact guard assistance  Patient completed Supine to Sit with contact guard assistance, with side rail, and HOB elevated; extra time with verbal/tactile cueing for body mechanics      Functional Mobility/Transfers:  Patient completed Sit <> Stand Transfer with contact guard assistance  with  RUE HHA   Patient completed Bed <> Chair Transfer using Step Transfer technique with contact guard assistance with hand-held assist (RUE)  Functional Mobility: Gait belt donned prior to transfer for safety during mobility/transfers. Pt completed in-room functional mobility with RUE HHA and CGA with no LOB/dizziness/pain reported.     Activities of Daily Living:  Feeding:  OT provided total A to cut meat on lunch tray and to open straw/place in cup at end of session    Grooming: moderate assistance with hand  to clean L hand   Upper Body Dressing: maximal assistance to doff/don sling and swathe with education of process during task       [facility] 6 Click ADL: 15    Treatment & Education:  Pt re-educated on OT role/POC.   Edu on importance of checking L palm skin daily and upkeep of assist to trim fingernails to protect skin integrity; assisted with cleaning hand   Encouraged pt to have family assist with set-up of meals/meat cut etc   Importance of OOB activity with staff assistance. Encouraged OOB>chair/bathroom with staff throughout the day   Safety during functional t/f and mobility   Offered assist to change into personal clothes and review techniques, but pt politely declined d/t wanting daughter to assist. Reviewed hemiparetic technique for dressing   Handout provided and reviewed on pacemaker precautions; re-edu on no weight bear through LUE; edu  to have pillows to support LUE when doing ADLs if sling and swathe is off   Edu on air cushion and benefits   Multiple self-care tasks/functional mobility completed- assistance level noted above   All questions/concerns answered within OT scope of practice       Patient left  seated on air cushion reclined in the chair with tray table in front of pt with lunch set-up  with all lines intact, call button in reach, nurse, Susannah, notified, and all needs met/within reach; door left open, lights on, blinds opened, tv on.     GOALS:   Multidisciplinary Problems       Occupational Therapy Goals          Problem: Occupational Therapy    Goal Priority Disciplines Outcome Interventions   Occupational Therapy Goal     OT, PT/OT Ongoing, Progressing    Description: Goals to be met by: 11/27/23     Patient will increase functional independence with ADLs by performing:    UE Dressing with Minimal Assistance.  LE Dressing with Minimal Assistance.  Grooming while standing at sink with Stand-by Assistance.  Toileting from toilet with Supervision for hygiene and clothing management.   Supine to sit with Supervision.  Step transfer with Supervision  Toilet transfer to toilet with Supervision.                         Time Tracking:     OT Date of Treatment: 11/13/23  OT Start Time: 1204  OT Stop Time: 1228  OT Total Time (min): 24 min    Billable Minutes:Self Care/Home Management 24 min     OT/KELLY: OT     Number of KELLY visits since last OT visit: 0    11/13/2023

## 2023-11-13 NOTE — PLAN OF CARE
[facility] Telemetry    HOME HEALTH ORDERS  FACE TO FACE ENCOUNTER    Patient Name: Harriett Oglesby  YOB: 1955    PCP: Zeinab Noble MD   PCP Address: [address] [place] [address] [address] [address]  PCP Phone Number: [phone]  PCP Fax: [phone]       Encounter Date: 11/13/2023    Admit to Home Health    Diagnoses:  Active Hospital Problems    Diagnosis  POA    *VF (ventricular fibrillation) [I49.01]  Yes    AICD (automatic cardioverter/defibrillator) present [Z95.810]  No     [place] single AICD 11/9/23      Multifocal atrial tachycardia [I47.19]  Yes    Pulmonary embolism [I26.99]  Yes    CHF (congestive heart failure) [I50.9]  Yes    Advanced care planning/counseling discussion [Z71.89]  Not Applicable    Hyperlipidemia associated with type 2 diabetes mellitus [E11.69, E78.5]  Yes    Type 2 diabetes mellitus with microalbuminuria, without long-term current use of insulin [E11.29, R80.9]  Yes    Hypertension, essential, benign [I10]  Yes    History of stroke with current residual effects [I69.30]  Not Applicable      Resolved Hospital Problems    Diagnosis Date Resolved POA    Acute delirium [R41.0] 11/11/2023 No    Acute respiratory failure with hypoxia [J96.01] 11/07/2023 Yes       Future Appointments   Date Time Provider Department Center   11/22/2023  9:30 AM Chilo Da Silva MD [facility] CARDIO [place]   12/27/2023  3:40 PM Zeinab Noble MD [facility] Hipolito Herrera           I have seen and examined this patient face to face today. My clinical findings that support the need for the home health skilled services and home bound status are the following:  Weakness/numbness causing balance and gait disturbance due to Weakness/Debility making it taxing to leave home.    Allergies:Review of patient's allergies indicates:  No Known Allergies    Diet: cardiac diet and diabetic diet: 2000 calorie    Activities: activity as tolerated    Nursing:   SN to complete comprehensive  assessment including routine vital signs. Instruct on disease process and s/s of complications to report to MD. Review/verify medication list sent home with the patient at time of discharge  and instruct patient/caregiver as needed. Frequency may be adjusted depending on start of care date.    Notify MD if SBP > 160 or < 90; DBP > 90 or < 50; HR > 120 or < 50; Temp > 101; Other:         CONSULTS:    Physical Therapy to evaluate and treat. Evaluate for home safety and equipment needs; Establish/upgrade home exercise program. Perform / instruct on therapeutic exercises, gait training, transfer training, and Range of Motion.  Occupational Therapy to evaluate and treat. Evaluate home environment for safety and equipment needs. Perform/Instruct on transfers, ADL training, ROM, and therapeutic exercises.   to evaluate for community resources/long-range planning.  Aide to provide assistance with personal care, ADLs, and vital signs.    MISCELLANEOUS CARE:  Routine Skin for Bedridden Patients: Instruct patient/caregiver to apply moisture barrier cream to all skin folds and wet areas in perineal area daily and after baths and all bowel movements.  Diabetic Care:   SN to perform and educate Diabetic management with blood glucose monitoring:, Fingerstick blood sugar AC and HS, and Report CBG < 60 or > 350 to physician.    WOUND CARE ORDERS  no      Medications: Review discharge medications with patient and family and provide education.      Current Discharge Medication List        START taking these medications    Details   apixaban (ELIQUIS) 5 mg Tab Take 1 tablet (5 mg total) by mouth 2 (two) times daily.  Qty: 60 tablet, Refills: 11      atorvastatin (LIPITOR) 40 MG tablet Take 1 tablet (40 mg total) by mouth every evening.  Qty: 90 tablet, Refills: 3      carvediloL (COREG) 12.5 MG tablet Take 1 tablet (12.5 mg total) by mouth 2 (two) times daily with meals.  Qty: 60 tablet, Refills: 11    Comments: .       cephALEXin (KEFLEX) 500 MG capsule Take 1 capsule (500 mg total) by mouth every 6 (six) hours.  Qty: 12 capsule, Refills: 0      HYDROcodone-acetaminophen (NORCO)  mg per tablet Take 1 tablet by mouth every 12 (twelve) hours as needed for Pain.  Qty: 8 tablet, Refills: 0           CONTINUE these medications which have CHANGED    Details   valsartan (DIOVAN) 160 MG tablet Take 1 tablet (160 mg total) by mouth once daily.  Qty: 90 tablet, Refills: 3    Comments: .           CONTINUE these medications which have NOT CHANGED    Details   aspirin (ECOTRIN) 81 MG EC tablet Take 81 mg by mouth once daily.       metFORMIN (GLUCOPHAGE) 1000 MG tablet Take 1 tablet (1,000 mg total) by mouth 2 (two) times daily. for diabetes.  Qty: 180 tablet, Refills: 1    Comments: This prescription was filled on 4/20/2022. Any refills authorized will be placed on file.  Associated Diagnoses: Type 2 diabetes mellitus with microalbuminuria, without long-term current use of insulin      amLODIPine (NORVASC) 10 MG tablet Take 1 tablet (10 mg total) by mouth once daily.  Qty: 90 tablet, Refills: 3    Comments: .  Associated Diagnoses: Hypertension, essential, benign      cetirizine (ZYRTEC) 10 MG tablet Take 10 mg by mouth once daily.      hydroCHLOROthiazide (HYDRODIURIL) 25 MG tablet Take 1 tablet (25 mg total) by mouth once daily.  Qty: 30 tablet, Refills: 0    Comments: This prescription was filled on 3/31/2022. Any refills authorized will be placed on file.  Associated Diagnoses: Encounter for medication refill           STOP taking these medications       atenoloL (TENORMIN) 25 MG tablet Comments:   Reason for Stopping:         cyclobenzaprine (FLEXERIL) 5 MG tablet Comments:   Reason for Stopping:         dapagliflozin (FARXIGA) 10 mg tablet Comments:   Reason for Stopping:         methylPREDNISolone acetate (DEPO-MEDROL) 40 mg/mL injection Comments:   Reason for Stopping:         simvastatin (ZOCOR) 20 MG tablet Comments:    Reason for Stopping:               I certify that this patient is confined to her home and needs intermittent skilled nursing care, physical therapy, and occupational therapy.

## 2023-11-13 NOTE — PLAN OF CARE
Patient lives in [place] ([address] [place] [address], [place] [address]) and there are no accepting agencies that service the area. SW informed Dr. Chavarria and patient.   Nelida with [facility] states they can go out for PT only and can start on Wednesday

## 2023-11-14 ENCOUNTER — PATIENT OUTREACH (OUTPATIENT)
Dept: ADMINISTRATIVE | Facility: CLINIC | Age: 68
End: 2023-11-14
Payer: MEDICARE

## 2023-11-14 NOTE — PROGRESS NOTES
C3 nurse attempted to contact Harriett Oglesby for a TCC post hospital discharge follow up call. No answer. Left voicemail with callback information. The patient has a scheduled HOSFU appointment with Zeinab Noble MD on 11/22/23 @ 4881.

## 2023-11-14 NOTE — PROGRESS NOTES
C3 nurse spoke with Harriett Oglesby and forest Timur for a TCC post hospital discharge follow up call. The patient has a scheduled HOSFU appointment with Zeinab Noble MD on 11/22/23 @ 1040.

## 2023-11-17 ENCOUNTER — TELEPHONE (OUTPATIENT)
Dept: CARDIOLOGY | Facility: CLINIC | Age: 68
End: 2023-11-17
Payer: MEDICARE

## 2023-11-17 NOTE — TELEPHONE ENCOUNTER
Patient's son, Mr. Fraga was called back.  Patient was given previous message again.  Patient will come by the clinic to  2 coupons for mother. The coupons will be ready in the front.

## 2023-11-17 NOTE — TELEPHONE ENCOUNTER
----- Message from Andrey Irwin sent at 11/17/2023 11:12 AM CST -----  Regarding: Son [phone]  Type:  Patient Returning Call    Who Called: Son      Who Left Message for Patient:  Cady Hernandez MA    Does the patient know what this is regarding?: yes     Would the patient rather a call back or a response via [facility]?  Call back     Best Call Back Number: [phone]     Additional Information:     Thank you.

## 2023-11-17 NOTE — TELEPHONE ENCOUNTER
"----- Message from Peter Kline sent at 11/17/2023 10:29 AM CST -----  Regarding: Timur "son"  Type: Patient Callback     Who called: Timur "son"     What is the request in detail: Son stated that his mother dropped her Eloquis  pill down the sink and the pharmacy will not refill it and the insurance will not approve it. He would like to know if there is something else she can take. Patient does not have anymore medication. Please reach out to the son.     Can the clinic reply by [facility]? Yes     Would the patient rather a call back or a response via [facility]? Callback     Best call back number: [phone]      Additional Information:    "

## 2023-11-17 NOTE — TELEPHONE ENCOUNTER
Patient was contacted.  Patient was advised that provider cannot give another prescription for the same medication because the insurance will not cover it.  Patient was advised that she will have to pay out of pocket.  Patient states that she does not have $600 dollars to pay for it.  Patient was offered 2 coupons, [facility] and an Eliquis trial that she can try to see if it will lower her cost.  Patient's son was called and a voice message was left with the details as well.

## 2023-11-22 ENCOUNTER — OFFICE VISIT (OUTPATIENT)
Dept: FAMILY MEDICINE | Facility: CLINIC | Age: 68
End: 2023-11-22
Payer: MEDICARE

## 2023-11-22 ENCOUNTER — OFFICE VISIT (OUTPATIENT)
Dept: CARDIOLOGY | Facility: CLINIC | Age: 68
End: 2023-11-22
Payer: MEDICARE

## 2023-11-22 VITALS
HEIGHT: 65 IN | DIASTOLIC BLOOD PRESSURE: 52 MMHG | BODY MASS INDEX: 27.22 KG/M2 | HEART RATE: 82 BPM | SYSTOLIC BLOOD PRESSURE: 102 MMHG | OXYGEN SATURATION: 97 % | WEIGHT: 163.38 LBS | TEMPERATURE: 98 F

## 2023-11-22 VITALS
HEART RATE: 117 BPM | WEIGHT: 182.13 LBS | HEIGHT: 65 IN | DIASTOLIC BLOOD PRESSURE: 58 MMHG | BODY MASS INDEX: 30.34 KG/M2 | OXYGEN SATURATION: 97 % | SYSTOLIC BLOOD PRESSURE: 108 MMHG

## 2023-11-22 DIAGNOSIS — Z95.810 AICD (AUTOMATIC CARDIOVERTER/DEFIBRILLATOR) PRESENT: ICD-10-CM

## 2023-11-22 DIAGNOSIS — I47.19 MULTIFOCAL ATRIAL TACHYCARDIA: ICD-10-CM

## 2023-11-22 DIAGNOSIS — R07.89 DISCOMFORT OF CHEST WALL: Primary | ICD-10-CM

## 2023-11-22 DIAGNOSIS — E11.69 HYPERLIPIDEMIA ASSOCIATED WITH TYPE 2 DIABETES MELLITUS: ICD-10-CM

## 2023-11-22 DIAGNOSIS — I49.01 VF (VENTRICULAR FIBRILLATION): ICD-10-CM

## 2023-11-22 DIAGNOSIS — Z95.810 AICD (AUTOMATIC CARDIOVERTER/DEFIBRILLATOR) PRESENT: Primary | ICD-10-CM

## 2023-11-22 DIAGNOSIS — I26.01 ACUTE SEPTIC PULMONARY EMBOLISM WITH ACUTE COR PULMONALE: ICD-10-CM

## 2023-11-22 DIAGNOSIS — K80.20 CALCULUS OF GALLBLADDER WITHOUT CHOLECYSTITIS WITHOUT OBSTRUCTION: ICD-10-CM

## 2023-11-22 DIAGNOSIS — Z12.31 ENCOUNTER FOR SCREENING MAMMOGRAM FOR BREAST CANCER: ICD-10-CM

## 2023-11-22 DIAGNOSIS — I50.9 CONGESTIVE HEART FAILURE, UNSPECIFIED HF CHRONICITY, UNSPECIFIED HEART FAILURE TYPE: ICD-10-CM

## 2023-11-22 DIAGNOSIS — I10 HYPERTENSION, ESSENTIAL, BENIGN: ICD-10-CM

## 2023-11-22 DIAGNOSIS — E78.5 HYPERLIPIDEMIA ASSOCIATED WITH TYPE 2 DIABETES MELLITUS: ICD-10-CM

## 2023-11-22 DIAGNOSIS — I50.22 CHRONIC SYSTOLIC CONGESTIVE HEART FAILURE: ICD-10-CM

## 2023-11-22 DIAGNOSIS — I26.99 PULMONARY EMBOLISM, UNSPECIFIED CHRONICITY, UNSPECIFIED PULMONARY EMBOLISM TYPE, UNSPECIFIED WHETHER ACUTE COR PULMONALE PRESENT: ICD-10-CM

## 2023-11-22 PROCEDURE — 3288F PR FALLS RISK ASSESSMENT DOCUMENTED: ICD-10-PCS | Mod: HCNC,CPTII,S$GLB, | Performed by: INTERNAL MEDICINE

## 2023-11-22 PROCEDURE — 99215 PR OFFICE/OUTPT VISIT, EST, LEVL V, 40-54 MIN: ICD-10-PCS | Mod: HCNC,S$GLB,, | Performed by: INTERNAL MEDICINE

## 2023-11-22 PROCEDURE — 1111F PR DISCHARGE MEDS RECONCILED W/ CURRENT OUTPATIENT MED LIST: ICD-10-PCS | Mod: HCNC,CPTII,S$GLB, | Performed by: INTERNAL MEDICINE

## 2023-11-22 PROCEDURE — 3288F FALL RISK ASSESSMENT DOCD: CPT | Mod: HCNC,CPTII,S$GLB, | Performed by: INTERNAL MEDICINE

## 2023-11-22 PROCEDURE — 99024 PR POST-OP FOLLOW-UP VISIT: ICD-10-PCS | Mod: HCNC,S$GLB,, | Performed by: INTERNAL MEDICINE

## 2023-11-22 PROCEDURE — 3044F PR MOST RECENT HEMOGLOBIN A1C LEVEL <7.0%: ICD-10-PCS | Mod: HCNC,CPTII,S$GLB, | Performed by: INTERNAL MEDICINE

## 2023-11-22 PROCEDURE — 3008F PR BODY MASS INDEX (BMI) DOCUMENTED: ICD-10-PCS | Mod: HCNC,CPTII,S$GLB, | Performed by: INTERNAL MEDICINE

## 2023-11-22 PROCEDURE — 3074F PR MOST RECENT SYSTOLIC BLOOD PRESSURE < 130 MM HG: ICD-10-PCS | Mod: HCNC,CPTII,S$GLB, | Performed by: INTERNAL MEDICINE

## 2023-11-22 PROCEDURE — 1160F PR REVIEW ALL MEDS BY PRESCRIBER/CLIN PHARMACIST DOCUMENTED: ICD-10-PCS | Mod: HCNC,CPTII,S$GLB, | Performed by: INTERNAL MEDICINE

## 2023-11-22 PROCEDURE — 99999 PR PBB SHADOW E&M-EST. PATIENT-LVL III: ICD-10-PCS | Mod: PBBFAC,HCNC,, | Performed by: INTERNAL MEDICINE

## 2023-11-22 PROCEDURE — 3061F PR NEG MICROALBUMINURIA RESULT DOCUMENTED/REVIEW: ICD-10-PCS | Mod: HCNC,CPTII,S$GLB, | Performed by: INTERNAL MEDICINE

## 2023-11-22 PROCEDURE — 1159F MED LIST DOCD IN RCRD: CPT | Mod: HCNC,CPTII,S$GLB, | Performed by: INTERNAL MEDICINE

## 2023-11-22 PROCEDURE — 4010F ACE/ARB THERAPY RXD/TAKEN: CPT | Mod: HCNC,CPTII,S$GLB, | Performed by: INTERNAL MEDICINE

## 2023-11-22 PROCEDURE — 3066F NEPHROPATHY DOC TX: CPT | Mod: HCNC,CPTII,S$GLB, | Performed by: INTERNAL MEDICINE

## 2023-11-22 PROCEDURE — 1101F PR PT FALLS ASSESS DOC 0-1 FALLS W/OUT INJ PAST YR: ICD-10-PCS | Mod: HCNC,CPTII,S$GLB, | Performed by: INTERNAL MEDICINE

## 2023-11-22 PROCEDURE — 3008F BODY MASS INDEX DOCD: CPT | Mod: HCNC,CPTII,S$GLB, | Performed by: INTERNAL MEDICINE

## 2023-11-22 PROCEDURE — 99024 POSTOP FOLLOW-UP VISIT: CPT | Mod: HCNC,S$GLB,, | Performed by: INTERNAL MEDICINE

## 2023-11-22 PROCEDURE — 3061F NEG MICROALBUMINURIA REV: CPT | Mod: HCNC,CPTII,S$GLB, | Performed by: INTERNAL MEDICINE

## 2023-11-22 PROCEDURE — 99215 OFFICE O/P EST HI 40 MIN: CPT | Mod: HCNC,S$GLB,, | Performed by: INTERNAL MEDICINE

## 2023-11-22 PROCEDURE — 3074F SYST BP LT 130 MM HG: CPT | Mod: HCNC,CPTII,S$GLB, | Performed by: INTERNAL MEDICINE

## 2023-11-22 PROCEDURE — 3078F DIAST BP <80 MM HG: CPT | Mod: HCNC,CPTII,S$GLB, | Performed by: INTERNAL MEDICINE

## 2023-11-22 PROCEDURE — 99999 PR PBB SHADOW E&M-EST. PATIENT-LVL IV: ICD-10-PCS | Mod: PBBFAC,HCNC,, | Performed by: INTERNAL MEDICINE

## 2023-11-22 PROCEDURE — 99999 PR PBB SHADOW E&M-EST. PATIENT-LVL III: CPT | Mod: PBBFAC,HCNC,, | Performed by: INTERNAL MEDICINE

## 2023-11-22 PROCEDURE — 3044F HG A1C LEVEL LT 7.0%: CPT | Mod: HCNC,CPTII,S$GLB, | Performed by: INTERNAL MEDICINE

## 2023-11-22 PROCEDURE — 3078F PR MOST RECENT DIASTOLIC BLOOD PRESSURE < 80 MM HG: ICD-10-PCS | Mod: HCNC,CPTII,S$GLB, | Performed by: INTERNAL MEDICINE

## 2023-11-22 PROCEDURE — 3066F PR DOCUMENTATION OF TREATMENT FOR NEPHROPATHY: ICD-10-PCS | Mod: HCNC,CPTII,S$GLB, | Performed by: INTERNAL MEDICINE

## 2023-11-22 PROCEDURE — 1111F DSCHRG MED/CURRENT MED MERGE: CPT | Mod: HCNC,CPTII,S$GLB, | Performed by: INTERNAL MEDICINE

## 2023-11-22 PROCEDURE — 1159F PR MEDICATION LIST DOCUMENTED IN MEDICAL RECORD: ICD-10-PCS | Mod: HCNC,CPTII,S$GLB, | Performed by: INTERNAL MEDICINE

## 2023-11-22 PROCEDURE — 4010F PR ACE/ARB THEARPY RXD/TAKEN: ICD-10-PCS | Mod: HCNC,CPTII,S$GLB, | Performed by: INTERNAL MEDICINE

## 2023-11-22 PROCEDURE — 1126F AMNT PAIN NOTED NONE PRSNT: CPT | Mod: HCNC,CPTII,S$GLB, | Performed by: INTERNAL MEDICINE

## 2023-11-22 PROCEDURE — 1126F PR PAIN SEVERITY QUANTIFIED, NO PAIN PRESENT: ICD-10-PCS | Mod: HCNC,CPTII,S$GLB, | Performed by: INTERNAL MEDICINE

## 2023-11-22 PROCEDURE — 1101F PT FALLS ASSESS-DOCD LE1/YR: CPT | Mod: HCNC,CPTII,S$GLB, | Performed by: INTERNAL MEDICINE

## 2023-11-22 PROCEDURE — 99999 PR PBB SHADOW E&M-EST. PATIENT-LVL IV: CPT | Mod: PBBFAC,HCNC,, | Performed by: INTERNAL MEDICINE

## 2023-11-22 PROCEDURE — 1160F RVW MEDS BY RX/DR IN RCRD: CPT | Mod: HCNC,CPTII,S$GLB, | Performed by: INTERNAL MEDICINE

## 2023-11-22 NOTE — PROGRESS NOTES
SUBJECTIVE     Chief Complaint   Patient presents with    Hospital Follow Up       HPI  Harriett Oglesby is a 68 y.o. female with multiple medical diagnoses as listed in the medical history and problem list that presents for follow-up after hospital discharge with cardiac arrest. Pt was out of her meds for several days and became very congested. She went to urgent care, but was sent to the ED for tachycardia.  In the ER found to be in multifocal atrial tachycardia with prolonged QTC and segmental and subsegmental pulmonary embolism on the right side with echo demonstrating severe cardiomyopathy with EF of 20-25%.  While in the ED, pt developed VFib and cardioverted followed by V-tach which was also cardioverted.  She was admitted to ICU and underwent LHC showing non ischemic cardiomyopathy. She had an AICD placed on 11/9 with subsequent discharge. Pt needs a cane to assist with walking and needs high rise toilet to assist with getting up. She still has some chest soreness at a 5-6/10. Norco made her ill with N/V. Pt is eating and drinking okay. She just reports a dry mouth. Pt is working well with [facility], which is [facility]. Of note, her home fasting blood glucose levels are ranging from .    PAST MEDICAL HISTORY:  Past Medical History:   Diagnosis Date    Acute respiratory failure with hypoxia 11/6/2023    Adult BMI 31.0-31.9 kg/sq m 12/3/2018    Anemia     CHF (congestive heart failure) 11/6/2023    CVA (cerebral vascular accident)     Residual weakness in the left arm and hand    Essential thrombocythemia     Hyperlipidemia associated with type 2 diabetes mellitus     Hypertension     Osteoporosis     Pulmonary embolism 11/6/2023    Type 2 diabetes mellitus        PAST SURGICAL HISTORY:  Past Surgical History:   Procedure Laterality Date    CATHETERIZATION OF BOTH LEFT AND RIGHT HEART N/A 11/7/2023    Procedure: CATHETERIZATION, HEART, BOTH LEFT AND RIGHT;  Surgeon: Asher Gonzalez MD;  Location: [facility] CATH LAB;   Service: Cardiology;  Laterality: N/A;    COLONOSCOPY N/A 1/4/2019    Procedure: COLONOSCOPY;  Surgeon: James Ozuna MD;  Location: [facility] ENDO;  Service: Endoscopy;  Laterality: N/A;    COLONOSCOPY N/A 3/1/2021    Procedure: COLONOSCOPY;  Surgeon: Nelida Cook MD;  Location: [facility] ENDO;  Service: Endoscopy;  Laterality: N/A;    ESOPHAGOGASTRODUODENOSCOPY N/A 3/1/2021    Procedure: EGD (ESOPHAGOGASTRODUODENOSCOPY);  Surgeon: Nelida Cook MD;  Location: [facility] ENDO;  Service: Endoscopy;  Laterality: N/A;  rapid covid > 50 miles away, instructions mailed -ml    HEMORRHOID SURGERY      INSERTION, PULSE GENERATOR, ICD, SINGLE CHAMBER Left 11/9/2023    Procedure: INSERTION, ICD GENERATOR, SINGLE CHAMBER;  Surgeon: Chilo Da Silva MD;  Location: [facility] CATH LAB;  Service: Cardiology;  Laterality: Left;       SOCIAL HISTORY:  Social History     Socioeconomic History    Marital status: Single   Tobacco Use    Smoking status: Former     Types: Cigarettes    Smokeless tobacco: Never   Substance and Sexual Activity    Alcohol use: No    Drug use: No    Sexual activity: Not Currently     Social Determinants of Health     Financial Resource Strain: Low Risk  (8/11/2023)    Overall Financial Resource Strain (CARDIA)     Difficulty of Paying Living Expenses: Not very hard   Food Insecurity: No Food Insecurity (8/11/2023)    Hunger Vital Sign     Worried About Running Out of Food in the Last Year: Never true     Ran Out of Food in the Last Year: Never true   Transportation Needs: Unmet Transportation Needs (8/11/2023)    PRAPARE - Transportation     Lack of Transportation (Medical): Yes     Lack of Transportation (Non-Medical): Yes   Physical Activity: Insufficiently Active (8/11/2023)    Exercise Vital Sign     Days of Exercise per Week: 3 days     Minutes of Exercise per Session: 30 min   Stress: No Stress Concern Present (8/11/2023)    [employer] [employer] - Occupational Stress Questionnaire      Feeling of Stress : Not at all   Social Connections: Moderately Isolated (8/11/2023)    Social Connection and Isolation Panel [NHANES]     Frequency of Communication with Friends and Family: More than three times a week     Frequency of Social Gatherings with Friends and Family: More than three times a week     Attends [employer] Services: More than 4 times per year     Active Member of Clubs or Organizations: No     Attends Club or Organization Meetings: Never     Marital Status: Never    Housing Stability: Low Risk  (8/11/2023)    Housing Stability Vital Sign     Unable to Pay for Housing in the Last Year: No     Number of Places Lived in the Last Year: 1     Unstable Housing in the Last Year: No       FAMILY HISTORY:  Family History   Problem Relation Age of Onset    Pancreatic cancer Mother     Emphysema Father     Cancer Father         Lung    Breast cancer Cousin     Breast cancer Cousin     Colon cancer Neg Hx     Esophageal cancer Neg Hx        ALLERGIES AND MEDICATIONS: updated and reviewed.  Review of patient's allergies indicates:  No Known Allergies  Current Outpatient Medications   Medication Sig Dispense Refill    amLODIPine (NORVASC) 10 MG tablet Take 1 tablet (10 mg total) by mouth once daily. 90 tablet 3    apixaban (ELIQUIS) 5 mg Tab Take 1 tablet (5 mg total) by mouth 2 (two) times daily. 60 tablet 11    aspirin (ECOTRIN) 81 MG EC tablet Take 81 mg by mouth once daily.       atorvastatin (LIPITOR) 40 MG tablet Take 1 tablet (40 mg total) by mouth every evening. 90 tablet 3    carvediloL (COREG) 12.5 MG tablet Take 1 tablet (12.5 mg total) by mouth 2 (two) times daily with meals. 60 tablet 11    hydroCHLOROthiazide (HYDRODIURIL) 25 MG tablet Take 1 tablet (25 mg total) by mouth once daily. 30 tablet 0    HYDROcodone-acetaminophen (NORCO)  mg per tablet Take 1 tablet by mouth every 12 (twelve) hours as needed for Pain. 8 tablet 0    valsartan (DIOVAN) 160 MG tablet Take 1 tablet (160 mg  "total) by mouth once daily. 90 tablet 3    cetirizine (ZYRTEC) 10 MG tablet Take 10 mg by mouth once daily.      metFORMIN (GLUCOPHAGE) 1000 MG tablet Take 1 tablet (1,000 mg total) by mouth 2 (two) times daily. for diabetes. (Patient not taking: Reported on 11/22/2023) 180 tablet 1     No current facility-administered medications for this visit.       ROS  Review of Systems   Constitutional:  Negative for chills and fever.   HENT:  Negative for hearing loss and sore throat.    Eyes:  Negative for visual disturbance.   Respiratory:  Positive for shortness of breath (DELGADO). Negative for cough.    Cardiovascular:  Positive for chest pain. Negative for palpitations and leg swelling.   Gastrointestinal:  Negative for abdominal pain, constipation, diarrhea, nausea and vomiting.   Genitourinary:  Negative for dysuria, frequency and urgency.   Musculoskeletal:  Positive for joint swelling (L ankle) and neck pain. Negative for arthralgias and myalgias.   Skin:  Negative for rash and wound.   Neurological:  Negative for headaches.   Psychiatric/Behavioral:  Negative for agitation and confusion. The patient is not nervous/anxious.          OBJECTIVE     Physical Exam  Vitals:    11/22/23 0926   BP: (!) 102/52   Pulse: 82   Temp: 97.5 °F (36.4 °C)    Body mass index is 27.18 kg/m².  Weight: 74.1 kg (163 lb 5.8 oz)   Height: 5' 5" (165.1 cm)     Physical Exam  Constitutional:       General: She is not in acute distress.     Appearance: She is well-developed.   HENT:      Head: Normocephalic and atraumatic.      Right Ear: External ear normal.      Left Ear: External ear normal.      Nose: Nose normal.   Eyes:      General: No scleral icterus.        Right eye: No discharge.         Left eye: No discharge.      Conjunctiva/sclera: Conjunctivae normal.   Neck:      Vascular: No JVD.      Trachea: No tracheal deviation.   Cardiovascular:      Rate and Rhythm: Normal rate and regular rhythm.      Heart sounds: No murmur heard.     " No friction rub. No gallop.   Pulmonary:      Effort: Pulmonary effort is normal. No respiratory distress.      Breath sounds: Normal breath sounds. No wheezing.   Abdominal:      General: Bowel sounds are normal. There is no distension.      Palpations: Abdomen is soft. There is no mass.      Tenderness: There is no abdominal tenderness. There is no guarding or rebound.   Musculoskeletal:         General: No tenderness or deformity. Normal range of motion.      Cervical back: Normal range of motion and neck supple.   Skin:     General: Skin is warm and dry.      Findings: No erythema or rash.   Neurological:      Mental Status: She is alert and oriented to person, place, and time.      Motor: No abnormal muscle tone.      Coordination: Coordination normal.   Psychiatric:         Behavior: Behavior normal.         Thought Content: Thought content normal.         Judgment: Judgment normal.           Health Maintenance         Date Due Completion Date    TETANUS VACCINE Never done ---    RSV Vaccine (Age 60+ and Pregnant patients) (1 - 1-dose 60+ series) Never done ---    Influenza Vaccine (1) 09/01/2023 11/2/2022    Mammogram 11/29/2023 11/29/2022    Eye Exam 12/09/2023 12/9/2022    Hemoglobin A1c 05/07/2024 11/7/2023    Diabetes Urine Screening 05/31/2024 5/31/2023    Foot Exam 11/06/2024 11/6/2023 (Done)    Override on 11/6/2023: Done    Override on 11/19/2020: Done    Override on 6/3/2019: Done    Lipid Panel 11/07/2024 11/7/2023    High Dose Statin 11/22/2024 11/22/2023    DEXA Scan 11/29/2025 11/29/2022    Colorectal Cancer Screening 03/01/2028 3/1/2021              ASSESSMENT     68 y.o. female with     1. Discomfort of chest wall    2. Chronic systolic congestive heart failure    3. AICD (automatic cardioverter/defibrillator) present    4. Acute septic pulmonary embolism with acute cor pulmonale    5. VF (ventricular fibrillation)    6. Multifocal atrial tachycardia    7. Encounter for screening mammogram for  breast cancer    8. Calculus of gallbladder without cholecystitis without obstruction        PLAN:     1. Discomfort of chest wall  - Likely 2/2 chest compressions  - Pt okay to take Tylenol or apply warm compresses with care not to burn herself; she voiced understanding    2. Chronic systolic congestive heart failure  - Stable; no acute issues  - independently reviewed hospital labs/imaging  - continue management per Cardiology  - CANE FOR HOME USE  - HME - OTHER    3. AICD (automatic cardioverter/defibrillator) present  - Pt with non-ischemic cardiomyopathy with reduced EF; AICD in place    4. Acute septic pulmonary embolism with acute cor pulmonale  - Stable; no acute issues  - Continue Eliquis    5. VF (ventricular fibrillation)  - Resolved  - Pt required CPR    6. Multifocal atrial tachycardia  - Resolved  - Continue B-blocker    7. Encounter for screening mammogram for breast cancer  - Mammo Digital Screening Bilat w/ [facility]; Future    8. Calculus of gallbladder without cholecystitis without obstruction  - Stable; no acute issues            RTC in 3 months       Transitional Care Note    Family and/or Caretaker present at visit?  Yes.  Diagnostic tests reviewed/disposition: No diagnosic tests pending after this hospitalization.  Disease/illness education: Provided during visit  Home health/community services discussion/referrals: Patient has home health established at [facility] .   Establishment or re-establishment of referral orders for community resources: No other necessary community resources.   Discussion with other health care providers: No discussion with other health care providers necessary.           Zeinab Noble MD  11/22/2023 9:35 AM        No follow-ups on file.

## 2023-11-22 NOTE — PROGRESS NOTES
Subjective:   Patient ID:  Harriett Oglesby is a 68 y.o. female who presents for follow-up of Post-op Evaluation and Suture / Staple Removal      HPI    NICM - EF 20-25%, VT/VF - [facility] single AICD 11/9/23, CHF, HTN, HLD, DM, CVA, PE on eliquis    Admitted 11/6/23  This is a 68-year-old female with a past medical history of hypertension, type 2 diabetes, hyperlipidemia, CVA with left-sided deficits who presents with tachycardia.       The patient initially presented to urgent care with shortness of breaths and lower extremity edema that started 7 days prior to presentation.  She ran out of her blood pressure medications about 2 weeks prior.  Additional symptoms include cough, nasal congestion and fatigue.  She was noted to be tachycardic and was advised present to the ED.     In the ED, the patient was tachycardic (up to 160s), tachypneic but normotensive.  Labs were remarkable for an elevated BNP (1839), elevated troponin (0.090 > 0.079), elevated D-dimer (2.87).  EKGs showed MAT and prolonged QTc. CTA chest showed pulmonary emboli in segmental and subsegmental pulmonary artery in the right middle lobe, with mild-to-moderate bibasilar pleural effusions with associated bibasilar atelectasis. An echocardiogram showed an EF of 20-25%, PA pressure of 49 mmHg.  Patient was given aspirin 325 mg, adenosine 6 mg IV x2, atenolol 25 mg p.o., diltiazem 20 mg IV x2, 30 mg IV x1, potassium bicarbonate 40 mEq use and was started on heparin drip.     While in the ED, the patient went into V-fib arrest, underwent 1 round of CPR and was cardioverted @200 joules x1, with achievement of ROSC. Mag sulfate 2 g IV & an amp of D50% were administered. Cardiology recommended Plavix, lidocaine infusion and an angiogram in the AM. She subsequently went into Vtach and was cardioverted @200 joules x1. No Epi was given and the patient was not intubated.   Plavix, additional potassium and lidocaine bolus, followed by infusion were ordered.  Patient became hypoxic and was placed on a NRB. She was admitted for further management.        67 y/o female sent to ER from Urgent Care for tachycardia.  In the ER found to be in multifocal atrial tachycardia with prolonged QTC.  Patient was given Cardizem with some improvement in heart rate.  Subsequently CTA was done which revealed segmental and subsegmental pulmonary embolism on the right side. Echo was also done which demonstrated severe cardiomyopathy with EF of 20-25%.  Subsequently patient developed VFib and cardioverted followed by V-tach which was also cardioverted x1.  Started on lidocaine drip in the ER and admitted to ICU.  Also started on heparin drip.  Underwent LHC showing non ischemic cardiomyopathy.  Cards recommending AICD.  She has remained in NSR. AICD placed on 11/9.        11/9/23 [facility] single AICD placed left SC vein     LHC 11/7/23 non-obstructive CAD    Echo 11/6/23    Left Ventricle: The left ventricle is mildly dilated. Normal wall thickness. There is severely reduced systolic function with a visually estimated ejection fraction of 20 - 25%.    Right Ventricle: Normal right ventricular cavity size. Systolic function is normal.    Left Atrium: Left atrium is severely dilated.    Right Atrium: Right atrium is moderately dilated.    Mitral Valve: There is mild regurgitation.    Tricuspid Valve: There is mild to moderate regurgitation.    Pulmonary Artery: The estimated pulmonary artery systolic pressure is 49 mmHg.    IVC/SVC: Intermediate venous pressure at 8 mmHg.    Pericardium: There is a trivial effusion.    11/22/23 Did well after AICD. Reports continued DELGADO, denies CP    Review of Systems   Constitutional: Negative for decreased appetite.   HENT:  Negative for ear discharge.    Eyes:  Negative for blurred vision.   Respiratory:  Negative for hemoptysis.    Endocrine: Negative for polyphagia.   Hematologic/Lymphatic: Negative for adenopathy.   Skin:  Negative for color change.    Musculoskeletal:  Negative for joint swelling.   Genitourinary:  Negative for bladder incontinence.   Neurological:  Negative for brief paralysis.   Psychiatric/Behavioral:  Negative for hallucinations.    Allergic/Immunologic: Negative for hives.       Objective:   Physical Exam  Constitutional:       Appearance: She is well-developed.   HENT:      Head: Normocephalic and atraumatic.   Eyes:      Conjunctiva/sclera: Conjunctivae normal.      Pupils: Pupils are equal, round, and reactive to light.   Cardiovascular:      Rate and Rhythm: Normal rate.      Pulses: Intact distal pulses.      Heart sounds: Normal heart sounds.   Pulmonary:      Effort: Pulmonary effort is normal.      Breath sounds: Normal breath sounds.   Abdominal:      General: Bowel sounds are normal.      Palpations: Abdomen is soft.   Musculoskeletal:         General: Normal range of motion.      Cervical back: Normal range of motion and neck supple.   Skin:     General: Skin is warm and dry.   Neurological:      Mental Status: She is alert and oriented to person, place, and time.     AICD site C/D/I    Assessment:      1. AICD (automatic cardioverter/defibrillator) present    2. Congestive heart failure, unspecified HF chronicity, unspecified heart failure type    3. Hyperlipidemia associated with type 2 diabetes mellitus    4. Hypertension, essential, benign    5. Pulmonary embolism, unspecified chronicity, unspecified pulmonary embolism type, unspecified whether acute cor pulmonale present        Plan:     Staples removed  Continue Rx for NICM, CHF, PE, VT/VF, HTN, DM, HLD  OV 2 weeks with Dr Gonzalez with [facility] AIC check, BNP, BMP

## 2023-12-13 ENCOUNTER — PATIENT OUTREACH (OUTPATIENT)
Dept: ADMINISTRATIVE | Facility: HOSPITAL | Age: 68
End: 2023-12-13
Payer: MEDICARE

## 2023-12-18 ENCOUNTER — OFFICE VISIT (OUTPATIENT)
Dept: CARDIOLOGY | Facility: CLINIC | Age: 68
End: 2023-12-18
Payer: MEDICARE

## 2023-12-18 ENCOUNTER — PATIENT OUTREACH (OUTPATIENT)
Dept: ADMINISTRATIVE | Facility: HOSPITAL | Age: 68
End: 2023-12-18
Payer: MEDICARE

## 2023-12-18 VITALS
WEIGHT: 161.06 LBS | RESPIRATION RATE: 15 BRPM | BODY MASS INDEX: 26.84 KG/M2 | HEART RATE: 102 BPM | DIASTOLIC BLOOD PRESSURE: 74 MMHG | HEIGHT: 65 IN | SYSTOLIC BLOOD PRESSURE: 102 MMHG | OXYGEN SATURATION: 99 %

## 2023-12-18 DIAGNOSIS — I46.9 CARDIAC ARREST: ICD-10-CM

## 2023-12-18 DIAGNOSIS — I49.01 VF (VENTRICULAR FIBRILLATION): ICD-10-CM

## 2023-12-18 DIAGNOSIS — I50.9 CONGESTIVE HEART FAILURE, UNSPECIFIED HF CHRONICITY, UNSPECIFIED HEART FAILURE TYPE: Primary | ICD-10-CM

## 2023-12-18 DIAGNOSIS — R00.0 TACHYCARDIA: ICD-10-CM

## 2023-12-18 DIAGNOSIS — E11.69 HYPERLIPIDEMIA ASSOCIATED WITH TYPE 2 DIABETES MELLITUS: ICD-10-CM

## 2023-12-18 DIAGNOSIS — Z95.810 AICD (AUTOMATIC CARDIOVERTER/DEFIBRILLATOR) PRESENT: ICD-10-CM

## 2023-12-18 DIAGNOSIS — I47.20 V-TACH: ICD-10-CM

## 2023-12-18 DIAGNOSIS — I10 HYPERTENSION, ESSENTIAL, BENIGN: ICD-10-CM

## 2023-12-18 DIAGNOSIS — I26.99 PULMONARY EMBOLISM, UNSPECIFIED CHRONICITY, UNSPECIFIED PULMONARY EMBOLISM TYPE, UNSPECIFIED WHETHER ACUTE COR PULMONALE PRESENT: ICD-10-CM

## 2023-12-18 DIAGNOSIS — E78.5 HYPERLIPIDEMIA ASSOCIATED WITH TYPE 2 DIABETES MELLITUS: ICD-10-CM

## 2023-12-18 DIAGNOSIS — I42.0 CONGESTIVE CARDIOMYOPATHY: ICD-10-CM

## 2023-12-18 PROCEDURE — 99214 PR OFFICE/OUTPT VISIT, EST, LEVL IV, 30-39 MIN: ICD-10-PCS | Mod: HCNC,25,S$GLB, | Performed by: INTERNAL MEDICINE

## 2023-12-18 PROCEDURE — 4010F PR ACE/ARB THEARPY RXD/TAKEN: ICD-10-PCS | Mod: HCNC,CPTII,S$GLB, | Performed by: INTERNAL MEDICINE

## 2023-12-18 PROCEDURE — 4010F ACE/ARB THERAPY RXD/TAKEN: CPT | Mod: HCNC,CPTII,S$GLB, | Performed by: INTERNAL MEDICINE

## 2023-12-18 PROCEDURE — 93282 PRGRMG EVAL IMPLANTABLE DFB: CPT | Mod: 26,HCNC,, | Performed by: INTERNAL MEDICINE

## 2023-12-18 PROCEDURE — 3288F FALL RISK ASSESSMENT DOCD: CPT | Mod: HCNC,CPTII,S$GLB, | Performed by: INTERNAL MEDICINE

## 2023-12-18 PROCEDURE — 3074F PR MOST RECENT SYSTOLIC BLOOD PRESSURE < 130 MM HG: ICD-10-PCS | Mod: HCNC,CPTII,S$GLB, | Performed by: INTERNAL MEDICINE

## 2023-12-18 PROCEDURE — 3061F PR NEG MICROALBUMINURIA RESULT DOCUMENTED/REVIEW: ICD-10-PCS | Mod: HCNC,CPTII,S$GLB, | Performed by: INTERNAL MEDICINE

## 2023-12-18 PROCEDURE — 3008F PR BODY MASS INDEX (BMI) DOCUMENTED: ICD-10-PCS | Mod: HCNC,CPTII,S$GLB, | Performed by: INTERNAL MEDICINE

## 2023-12-18 PROCEDURE — 99999 PR PBB SHADOW E&M-EST. PATIENT-LVL III: ICD-10-PCS | Mod: PBBFAC,HCNC,, | Performed by: INTERNAL MEDICINE

## 2023-12-18 PROCEDURE — 3066F NEPHROPATHY DOC TX: CPT | Mod: HCNC,CPTII,S$GLB, | Performed by: INTERNAL MEDICINE

## 2023-12-18 PROCEDURE — 3044F PR MOST RECENT HEMOGLOBIN A1C LEVEL <7.0%: ICD-10-PCS | Mod: HCNC,CPTII,S$GLB, | Performed by: INTERNAL MEDICINE

## 2023-12-18 PROCEDURE — 1126F AMNT PAIN NOTED NONE PRSNT: CPT | Mod: HCNC,CPTII,S$GLB, | Performed by: INTERNAL MEDICINE

## 2023-12-18 PROCEDURE — 93282 PR PROGRAM EVAL (IN PERSON) IMPLANT DEVICE,CARDVERT/DEFIB,1 LEAD: ICD-10-PCS | Mod: 26,HCNC,, | Performed by: INTERNAL MEDICINE

## 2023-12-18 PROCEDURE — 3066F PR DOCUMENTATION OF TREATMENT FOR NEPHROPATHY: ICD-10-PCS | Mod: HCNC,CPTII,S$GLB, | Performed by: INTERNAL MEDICINE

## 2023-12-18 PROCEDURE — 3044F HG A1C LEVEL LT 7.0%: CPT | Mod: HCNC,CPTII,S$GLB, | Performed by: INTERNAL MEDICINE

## 2023-12-18 PROCEDURE — 3078F PR MOST RECENT DIASTOLIC BLOOD PRESSURE < 80 MM HG: ICD-10-PCS | Mod: HCNC,CPTII,S$GLB, | Performed by: INTERNAL MEDICINE

## 2023-12-18 PROCEDURE — 1101F PT FALLS ASSESS-DOCD LE1/YR: CPT | Mod: HCNC,CPTII,S$GLB, | Performed by: INTERNAL MEDICINE

## 2023-12-18 PROCEDURE — 3061F NEG MICROALBUMINURIA REV: CPT | Mod: HCNC,CPTII,S$GLB, | Performed by: INTERNAL MEDICINE

## 2023-12-18 PROCEDURE — 1159F MED LIST DOCD IN RCRD: CPT | Mod: HCNC,CPTII,S$GLB, | Performed by: INTERNAL MEDICINE

## 2023-12-18 PROCEDURE — 1159F PR MEDICATION LIST DOCUMENTED IN MEDICAL RECORD: ICD-10-PCS | Mod: HCNC,CPTII,S$GLB, | Performed by: INTERNAL MEDICINE

## 2023-12-18 PROCEDURE — 99999 PR PBB SHADOW E&M-EST. PATIENT-LVL III: CPT | Mod: PBBFAC,HCNC,, | Performed by: INTERNAL MEDICINE

## 2023-12-18 PROCEDURE — 1101F PR PT FALLS ASSESS DOC 0-1 FALLS W/OUT INJ PAST YR: ICD-10-PCS | Mod: HCNC,CPTII,S$GLB, | Performed by: INTERNAL MEDICINE

## 2023-12-18 PROCEDURE — 3288F PR FALLS RISK ASSESSMENT DOCUMENTED: ICD-10-PCS | Mod: HCNC,CPTII,S$GLB, | Performed by: INTERNAL MEDICINE

## 2023-12-18 PROCEDURE — 3008F BODY MASS INDEX DOCD: CPT | Mod: HCNC,CPTII,S$GLB, | Performed by: INTERNAL MEDICINE

## 2023-12-18 PROCEDURE — 1126F PR PAIN SEVERITY QUANTIFIED, NO PAIN PRESENT: ICD-10-PCS | Mod: HCNC,CPTII,S$GLB, | Performed by: INTERNAL MEDICINE

## 2023-12-18 PROCEDURE — 99214 OFFICE O/P EST MOD 30 MIN: CPT | Mod: HCNC,25,S$GLB, | Performed by: INTERNAL MEDICINE

## 2023-12-18 PROCEDURE — 3074F SYST BP LT 130 MM HG: CPT | Mod: HCNC,CPTII,S$GLB, | Performed by: INTERNAL MEDICINE

## 2023-12-18 PROCEDURE — 3078F DIAST BP <80 MM HG: CPT | Mod: HCNC,CPTII,S$GLB, | Performed by: INTERNAL MEDICINE

## 2023-12-18 RX ORDER — VALSARTAN 160 MG/1
160 TABLET ORAL DAILY
Qty: 90 TABLET | Refills: 3 | Status: SHIPPED | OUTPATIENT
Start: 2023-12-18 | End: 2024-12-17

## 2023-12-18 RX ORDER — SPIRONOLACTONE 25 MG/1
25 TABLET ORAL DAILY
Qty: 90 TABLET | Refills: 3 | Status: SHIPPED | OUTPATIENT
Start: 2023-12-18

## 2023-12-18 RX ORDER — CARVEDILOL 12.5 MG/1
12.5 TABLET ORAL 2 TIMES DAILY WITH MEALS
Qty: 60 TABLET | Refills: 11 | Status: SHIPPED | OUTPATIENT
Start: 2023-12-18 | End: 2024-12-17

## 2023-12-18 RX ORDER — ATORVASTATIN CALCIUM 40 MG/1
40 TABLET, FILM COATED ORAL NIGHTLY
Qty: 90 TABLET | Refills: 3 | Status: SHIPPED | OUTPATIENT
Start: 2023-12-18 | End: 2024-12-17

## 2023-12-18 RX ORDER — FUROSEMIDE 20 MG/1
20 TABLET ORAL
Qty: 90 TABLET | Refills: 3 | Status: SHIPPED | OUTPATIENT
Start: 2023-12-18

## 2023-12-18 NOTE — PROGRESS NOTES
CARDIOVASCULAR CONSULTATION    REASON FOR CONSULT:   Harriett Oglesby is a 68 y.o. female who presents for   Chief Complaint   Patient presents with    Follow-up          HISTORY OF PRESENT ILLNESS:     Patient is a pleasant 68-year-old lady.  Came into hospital.  Found to have nonischemic cardiomyopathy.  Underwent AICD implantation for secondary prevention.  Doing fine.  Main complaint is dyspnea on exertion and tiredness.  [employer] AICD, single-chamber.        Cardiology consult from recent hospitalization:    VF (ventricular fibrillation)  VFib arrest.  Prolonged QTC.  On lidocaine drip.  Monitor closely in ICU.  Plan for cardiac catheterization in a.m.     11/7: Risks, benefits and alternatives of the catheterization procedure were discussed with the patient.The risks of coronary angiography include but are not limited to: bleeding, infection, death, heart attack, arrhythmia, kidney injury or failure, potential need for dialysis, allergic reactions, stroke, need for emergency surgery, hematoma, pseudoaneurysm etc.  Should stenting be indicated, the patient has agreed to dual anti-platelet therapy for 1-consecutive year with a drug-eluting stent and a minimum of 1-month with the use of a bare metal stent. Additionally, pt is aware that non-compliance is likely to result in stent clotting with heart attack, heart failure, and/or death  The risks of moderate sedation include hypotension, respiratory depression, arrhythmias, bronchospasm, and death. Informed consent was obtained and the  patient is agreeable to proceed with the procedure. Consent was placed on the chart.  Continue lidocaine drip  11/8:  Nonischemic cardiomyopathy.  No significant stenosis on angiogram yesterday.  Plan for AICD in a.m..  Continue lidocaine drip        11/9: s/p aicd today.      CHF (congestive heart failure)  Patient is identified as having Combined Systolic and Diastolic heart failure that is Acute.Latest ECHO performed and  demonstrates- Results for orders placed during the hospital encounter of 11/06/23     Echo     Interpretation Summary    Left Ventricle: The left ventricle is mildly dilated. Normal wall thickness. There is severely reduced systolic function with a visually estimated ejection fraction of 20 - 25%.    Right Ventricle: Normal right ventricular cavity size. Systolic function is normal.    Left Atrium: Left atrium is severely dilated.    Right Atrium: Right atrium is moderately dilated.    Mitral Valve: There is mild regurgitation.    Tricuspid Valve: There is mild to moderate regurgitation.    Pulmonary Artery: The estimated pulmonary artery systolic pressure is 49 mmHg.    IVC/SVC: Intermediate venous pressure at 8 mmHg.    Pericardium: There is a trivial effusion.  . Continue Furosemide and monitor clinical status closely. Monitor on telemetry. Patient is on CHF pathway.  Monitor strict Is&Os and daily weights.  Place on fluid restriction of 1.5 L. Cardiology has been consulted. Continue to stress to patient importance of self efficacy and  on diet for CHF. Last BNP reviewed- and noted below       Recent Labs   Lab 11/06/23  1306   BNP 1,839*   .     euvolumic on exam. Continue gdmt     Pulmonary embolism  CTA personally reviewed.  Segmental and subsegmental pulmonary embolism.  RV to LV ratio less than 0.9.  Will continue to monitor.  Currently no indication for thrombectomy.  No DVT on peripheral ultrasound.  Continue heparin drip, transition to Eliquis at time of discharge           Multifocal atrial tachycardia  On initial presentation.  Improving.     11/7: now in sinus     Hyperlipidemia associated with type 2 diabetes mellitus         History of stroke with current residual effects         Type 2 diabetes mellitus with microalbuminuria, without long-term current use of insulin         Hypertension, essential, benign        Discharge summary from recent hospitalization:    HPI:   This is a 68-year-old  female with a past medical history of hypertension, type 2 diabetes, hyperlipidemia, CVA with left-sided deficits who presents with tachycardia.       The patient initially presented to urgent care with shortness of breaths and lower extremity edema that started 7 days prior to presentation.  She ran out of her blood pressure medications about 2 weeks prior.  Additional symptoms include cough, nasal congestion and fatigue.  She was noted to be tachycardic and was advised present to the ED.     In the ED, the patient was tachycardic (up to 160s), tachypneic but normotensive.  Labs were remarkable for an elevated BNP (1839), elevated troponin (0.090 > 0.079), elevated D-dimer (2.87).  EKGs showed MAT and prolonged QTc. CTA chest showed pulmonary emboli in segmental and subsegmental pulmonary artery in the right middle lobe, with mild-to-moderate bibasilar pleural effusions with associated bibasilar atelectasis. An echocardiogram showed an EF of 20-25%, PA pressure of 49 mmHg.  Patient was given aspirin 325 mg, adenosine 6 mg IV x2, atenolol 25 mg p.o., diltiazem 20 mg IV x2, 30 mg IV x1, potassium bicarbonate 40 mEq use and was started on heparin drip.     While in the ED, the patient went into V-fib arrest, underwent 1 round of CPR and was cardioverted @200 joules x1, with achievement of ROSC. Mag sulfate 2 g IV & an amp of D50% were administered. Cardiology recommended Plavix, lidocaine infusion and an angiogram in the AM. She subsequently went into Vtach and was cardioverted @200 joules x1. No Epi was given and the patient was not intubated.   Plavix, additional potassium and lidocaine bolus, followed by infusion were ordered. Patient became hypoxic and was placed on a NRB. She was admitted for further management.       Procedure(s) (LRB):  INSERTION, ICD GENERATOR, SINGLE CHAMBER (Left)       Hospital Course:   69 y/o female sent to ER from Urgent Care for tachycardia.  In the ER found to be in multifocal atrial  tachycardia with prolonged QTC.  Patient was given Cardizem with some improvement in heart rate.  Subsequently CTA was done which revealed segmental and subsegmental pulmonary embolism on the right side. Echo was also done which demonstrated severe cardiomyopathy with EF of 20-25%.  Subsequently patient developed VFib and cardioverted followed by V-tach which was also cardioverted x1.  Started on lidocaine drip in the ER and admitted to ICU.  Also started on heparin drip.  Underwent LHC showing non ischemic cardiomyopathy.  Cards recommending AICD.  She has remained in NSR. AICD placed on 11/9.     PAST MEDICAL HISTORY:     Past Medical History:   Diagnosis Date    Acute respiratory failure with hypoxia 11/6/2023    Adult BMI 31.0-31.9 kg/sq m 12/3/2018    Anemia     CHF (congestive heart failure) 11/6/2023    CVA (cerebral vascular accident)     Residual weakness in the left arm and hand    Essential thrombocythemia     Hyperlipidemia associated with type 2 diabetes mellitus     Hypertension     Osteoporosis     Pulmonary embolism 11/6/2023    Type 2 diabetes mellitus        PAST SURGICAL HISTORY:     Past Surgical History:   Procedure Laterality Date    CATHETERIZATION OF BOTH LEFT AND RIGHT HEART N/A 11/7/2023    Procedure: CATHETERIZATION, HEART, BOTH LEFT AND RIGHT;  Surgeon: Asher Gonzalez MD;  Location: [facility] CATH LAB;  Service: Cardiology;  Laterality: N/A;    COLONOSCOPY N/A 1/4/2019    Procedure: COLONOSCOPY;  Surgeon: James Ozuna MD;  Location: [facility] ENDO;  Service: Endoscopy;  Laterality: N/A;    COLONOSCOPY N/A 3/1/2021    Procedure: COLONOSCOPY;  Surgeon: Nelida Cook MD;  Location: [facility];  Service: Endoscopy;  Laterality: N/A;    ESOPHAGOGASTRODUODENOSCOPY N/A 3/1/2021    Procedure: EGD (ESOPHAGOGASTRODUODENOSCOPY);  Surgeon: Nelida Cook MD;  Location: [facility] ENDO;  Service: Endoscopy;  Laterality: N/A;  rapid covid > 50 miles away, instructions mailed -ml    HEMORRHOID SURGERY       INSERTION, PULSE GENERATOR, ICD, SINGLE CHAMBER Left 11/9/2023    Procedure: INSERTION, ICD GENERATOR, SINGLE CHAMBER;  Surgeon: Chilo Da Silva MD;  Location: [facility] CATH LAB;  Service: Cardiology;  Laterality: Left;       ALLERGIES AND MEDICATION:   Review of patient's allergies indicates:  No Known Allergies     Medication List            Accurate as of December 18, 2023  4:20 PM. If you have any questions, ask your nurse or doctor.                START taking these medications      empagliflozin 10 mg tablet  Commonly known as: JARDIANCE  Take 1 tablet (10 mg total) by mouth once daily.  Started by: Asher Gonzalez MD     furosemide 20 MG tablet  Commonly known as: LASIX  Take 1 tablet (20 mg total) by mouth as needed (take 1-2 pills as needed for fluid overload).  Started by: Asher Gonzalez MD     spironolactone 25 MG tablet  Commonly known as: ALDACTONE  Take 1 tablet (25 mg total) by mouth once daily.  Started by: Asher Gonzalez MD            CONTINUE taking these medications      apixaban 5 mg Tab  Commonly known as: ELIQUIS  Take 1 tablet (5 mg total) by mouth 2 (two) times daily.     aspirin 81 MG EC tablet  Commonly known as: ECOTRIN     atorvastatin 40 MG tablet  Commonly known as: LIPITOR  Take 1 tablet (40 mg total) by mouth every evening.     carvediloL 12.5 MG tablet  Commonly known as: COREG  Take 1 tablet (12.5 mg total) by mouth 2 (two) times daily with meals.     cetirizine 10 MG tablet  Commonly known as: ZYRTEC     HYDROcodone-acetaminophen  mg per tablet  Commonly known as: NORCO  Take 1 tablet by mouth every 12 (twelve) hours as needed for Pain.     metFORMIN 1000 MG tablet  Commonly known as: GLUCOPHAGE  Take 1 tablet (1,000 mg total) by mouth 2 (two) times daily. for diabetes.     valsartan 160 MG tablet  Commonly known as: DIOVAN  Take 1 tablet (160 mg total) by mouth once daily.            STOP taking these medications      amLODIPine 10 MG tablet  Commonly known as: NORVASC  Stopped  by: Asher Gonzalez MD     hydroCHLOROthiazide 25 MG tablet  Commonly known as: HYDRODIURIL  Stopped by: Asher Gonzalez MD               Where to Get Your Medications        These medications were sent to [facility]/pharmacy #[id number] - [place] - [address]  [address] [place] [place] [address]      Phone: [phone]   atorvastatin 40 MG tablet  carvediloL 12.5 MG tablet  empagliflozin 10 mg tablet  furosemide 20 MG tablet  spironolactone 25 MG tablet  valsartan 160 MG tablet         SOCIAL HISTORY:     Social History     Socioeconomic History    Marital status: Single   Tobacco Use    Smoking status: Former     Types: Cigarettes    Smokeless tobacco: Never   Substance and Sexual Activity    Alcohol use: No    Drug use: No    Sexual activity: Not Currently     Social Determinants of Health     Financial Resource Strain: Low Risk  (8/11/2023)    Overall Financial Resource Strain (CARDIA)     Difficulty of Paying Living Expenses: Not very hard   Food Insecurity: No Food Insecurity (8/11/2023)    Hunger Vital Sign     Worried About Running Out of Food in the Last Year: Never true     Ran Out of Food in the Last Year: Never true   Transportation Needs: Unmet Transportation Needs (8/11/2023)    PRAPARE - Transportation     Lack of Transportation (Medical): Yes     Lack of Transportation (Non-Medical): Yes   Physical Activity: Insufficiently Active (8/11/2023)    Exercise Vital Sign     Days of Exercise per Week: 3 days     Minutes of Exercise per Session: 30 min   Stress: No Stress Concern Present (8/11/2023)    [employer] [place] [employer] - Occupational Stress Questionnaire     Feeling of Stress : Not at all   Social Connections: Moderately Isolated (8/11/2023)    Social Connection and Isolation Panel [NHANES]     Frequency of Communication with Friends and Family: More than three times a week     Frequency of Social Gatherings with Friends and Family: More than three times a week     Attends  "[employer] Services: More than 4 times per year     Active Member of Clubs or Organizations: No     Attends Club or Organization Meetings: Never     Marital Status: Never    Housing Stability: Low Risk  (8/11/2023)    Housing Stability Vital Sign     Unable to Pay for Housing in the Last Year: No     Number of Places Lived in the Last Year: 1     Unstable Housing in the Last Year: No       FAMILY HISTORY:     Family History   Problem Relation Age of Onset    Pancreatic cancer Mother     Emphysema Father     Cancer Father         Lung    Breast cancer Cousin     Breast cancer Cousin     Colon cancer Neg Hx     Esophageal cancer Neg Hx        REVIEW OF SYSTEMS:   Review of Systems   Constitutional: Positive for malaise/fatigue.   HENT: Negative.     Eyes: Negative.    Cardiovascular:  Positive for dyspnea on exertion.   Respiratory: Negative.     Endocrine: Negative.    Hematologic/Lymphatic: Negative.    Skin: Negative.    Musculoskeletal: Negative.    Gastrointestinal: Negative.    Genitourinary: Negative.    Neurological: Negative.    Psychiatric/Behavioral: Negative.     Allergic/Immunologic: Negative.        A 10 point review of systems was performed and all the pertinent positives have been mentioned. Rest of review of systems was negative.        PHYSICAL EXAM:     Vitals:    12/18/23 1328   BP: 102/74   Pulse: 102   Resp: 15    Body mass index is 26.8 kg/m².  Weight: 73.1 kg (161 lb 0.7 oz)   Height: 5' 5" (165.1 cm)     Physical Exam  Constitutional:       Appearance: Normal appearance. She is well-developed.   HENT:      Head: Normocephalic.   Eyes:      Pupils: Pupils are equal, round, and reactive to light.   Cardiovascular:      Rate and Rhythm: Normal rate and regular rhythm.   Pulmonary:      Effort: Pulmonary effort is normal.      Breath sounds: Normal breath sounds.   Abdominal:      General: Bowel sounds are normal.      Palpations: Abdomen is soft.      Tenderness: There is no abdominal " tenderness.   Musculoskeletal:         General: Normal range of motion.      Cervical back: Normal range of motion and neck supple.   Skin:     General: Skin is warm.   Neurological:      Mental Status: She is alert and oriented to person, place, and time.           DATA:     Laboratory:  CBC:  Recent Labs   Lab 11/10/23  0602 11/11/23  0400 11/13/23  0314   WBC 6.34 6.41 5.72   Hemoglobin 10.8 L 11.5 L 10.3 L   Hematocrit 33.8 L 36.8 L 32.5 L   Platelets 270 318 296       CHEMISTRIES:  Recent Labs   Lab 06/23/21  0800 08/02/21  0901 01/26/22  1040 11/02/22  0913 11/07/23  0100 11/07/23  0841 11/08/23  2355 11/10/23  1221   Glucose 118 H 152 H 122 H   < > 253 H 230 H 189 H 214 H   Sodium 144 141 139   < > 138 140 138 137   Potassium 3.7 3.6 3.7   < > 4.7 4.8 3.5 3.8   BUN 13 17 14   < > 13 15 18 15   Creatinine 0.8 0.9 0.9   < > 1.1 1.1 0.9 0.8   eGFR if African American >60 >60 >60  --   --   --   --   --    eGFR if non African American >60 >60 >60  --   --   --   --   --    Calcium 9.5 10.1 10.0   < > 8.9 9.2 8.3 L 8.6 L   Magnesium  --   --   --    < > 2.3 2.3  --  1.6    < > = values in this interval not displayed.       CARDIAC BIOMARKERS:  Recent Labs   Lab 11/06/23  1617 11/06/23 2026 11/07/23  0100   Troponin I 0.079 H 0.085 H 0.080 H       COAGS:  Recent Labs   Lab 11/07/23  0841 11/09/23  1510   INR 1.1  1.1 1.2       LIPIDS/LFTS:  Recent Labs   Lab 01/26/22  1040 11/02/22  0913 05/31/23  0928 11/06/23  1306 11/06/23  2026 11/07/23  0841 11/08/23  2355   Cholesterol 134  --  130  --   --  141  --    Triglycerides 134  --  151 H  --   --  101  --    HDL 49  --  44  --   --  39 L  --    LDL Cholesterol 58.2 L  --  55.8 L  --   --  81.8  --    Non-HDL Cholesterol 85  --  86  --   --  102  --    AST 14   < > 10   < > 78 H 55 H 59 H   ALT 12   < > 8 L   < > 56 H 52 H 53 H    < > = values in this interval not displayed.       Hemoglobin A1C   Date Value Ref Range Status   11/07/2023 6.3 (H) 4.0 - 5.6 % Final      Comment:     ADA Screening Guidelines:  5.7-6.4%  Consistent with prediabetes  >or=6.5%  Consistent with diabetes    High levels of fetal hemoglobin interfere with the HbA1C  assay. Heterozygous hemoglobin variants (HbS, HgC, etc)do  not significantly interfere with this assay.   However, presence of multiple variants may affect accuracy.     05/31/2023 6.1 (H) 4.0 - 5.6 % Final     Comment:     ADA Screening Guidelines:  5.7-6.4%  Consistent with prediabetes  >or=6.5%  Consistent with diabetes    High levels of fetal hemoglobin interfere with the HbA1C  assay. Heterozygous hemoglobin variants (HbS, HgC, etc)do  not significantly interfere with this assay.   However, presence of multiple variants may affect accuracy.     11/02/2022 6.4 (H) 4.0 - 5.6 % Final     Comment:     ADA Screening Guidelines:  5.7-6.4%  Consistent with prediabetes  >or=6.5%  Consistent with diabetes    High levels of fetal hemoglobin interfere with the HbA1C  assay. Heterozygous hemoglobin variants (HbS, HgC, etc)do  not significantly interfere with this assay.   However, presence of multiple variants may affect accuracy.         TSH  Recent Labs   Lab 11/02/22  0913 05/31/23  0928   TSH 1.833 1.555       The 10-year ASCVD risk score (Charo DK, et al., 2019) is: 12.8%    Values used to calculate the score:      Age: 68 years      Sex: Female      Is Non- : Yes      Diabetic: Yes      Tobacco smoker: No      Systolic Blood Pressure: 102 mmHg      Is BP treated: Yes      HDL Cholesterol: 39 mg/dL      Total Cholesterol: 141 mg/dL       BNP    Lab Results   Component Value Date/Time    BNP 1,839 (H) 11/06/2023 01:06 PM            ECHO    Results for orders placed during the hospital encounter of 11/06/23    Echo    Interpretation Summary    Left Ventricle: The left ventricle is mildly dilated. Normal wall thickness. There is severely reduced systolic function with a visually estimated ejection fraction of 20 - 25%.     Right Ventricle: Normal right ventricular cavity size. Systolic function is normal.    Left Atrium: Left atrium is severely dilated.    Right Atrium: Right atrium is moderately dilated.    Mitral Valve: There is mild regurgitation.    Tricuspid Valve: There is mild to moderate regurgitation.    Pulmonary Artery: The estimated pulmonary artery systolic pressure is 49 mmHg.    IVC/SVC: Intermediate venous pressure at 8 mmHg.    Pericardium: There is a trivial effusion.      STRESS TEST    No results found for this or any previous visit.        CATH    Results for orders placed during the hospital encounter of 11/06/23    Cardiac catheterization    Conclusion    No significant flow-limiting coronary artery stenosis.    The pre-procedure left ventricular end diastolic pressure was 25.    The estimated blood loss was <50 mL.    Angiogram:    Left main:  No significant stenosis    Lad:  Luminal irregularities    Circumflex:  Luminal irregularities    RCA:  Luminal irregularities    Right heart catheterization was performed    Right heart catheterization:    Pulmonary capillary wedge pressure:  24 mmHg    PA 52/25 with a mean of 37 mmHg    RV 50/10 with a RVEDP of 21 mmHg    RA 19 mmHg                    The procedure log was documented by Documenter: Matt Chambers RN; Dread Whiteside RN and verified by Asher Gonzalez MD.    Date: 11/29/2023  Time: 8:36 AM        ASSESSMENT AND PLAN     Patient Active Problem List   Diagnosis    Hypertension, essential, benign    Type 2 diabetes mellitus with microalbuminuria, without long-term current use of insulin    History of stroke with current residual effects    Macroalbuminuric diabetic nephropathy    Essential thrombocythemia    Osteopenia    Hyperlipidemia associated with type 2 diabetes mellitus    Iron deficiency anemia due to chronic blood loss    Advanced care planning/counseling discussion    Multifocal atrial tachycardia    Pulmonary embolism    VF (ventricular  fibrillation)    CHF (congestive heart failure)    AICD (automatic cardioverter/defibrillator) present    Calculus of gallbladder without cholecystitis without obstruction         No orders of the defined types were placed in this encounter.      Nonischemic cardiomyopathy:  Discontinue amlodipine.  Initiate spironolactone and Jardiance.  Continue Coreg and valsartan     Lasix as needed     Discontinue hydrochlorothiazide     AICD, [employer], single chamber.  Device interrogation done today.  Report in chart.    Follow-up in 1 month for further titration of heart failure medications.      Thank you very much for involving me in the care of your patient.  Please do not hesitate to contact me if there are any questions.      Asher Gonzalez MD, FACC, [facility]  Interventional Cardiologist, [facility].           This note was dictated with the help of speech recognition software.  There might be un-intended errors and/or substitutions.

## 2023-12-18 NOTE — PROGRESS NOTES
Updates were requested from care everywhere.  Health Maintenance has been updated.  LINKS immunization registry triggered.  Immunizations were reconciled.  Per BARBER in basket message, pt declined flu vaccine 12/18/2023.

## 2023-12-26 ENCOUNTER — EXTERNAL HOME HEALTH (OUTPATIENT)
Dept: HOME HEALTH SERVICES | Facility: HOSPITAL | Age: 68
End: 2023-12-26
Payer: MEDICARE

## 2023-12-27 ENCOUNTER — OFFICE VISIT (OUTPATIENT)
Dept: FAMILY MEDICINE | Facility: CLINIC | Age: 68
End: 2023-12-27
Payer: MEDICARE

## 2023-12-27 VITALS
HEIGHT: 65 IN | TEMPERATURE: 98 F | HEART RATE: 101 BPM | WEIGHT: 162.5 LBS | DIASTOLIC BLOOD PRESSURE: 70 MMHG | BODY MASS INDEX: 27.07 KG/M2 | SYSTOLIC BLOOD PRESSURE: 124 MMHG | OXYGEN SATURATION: 98 %

## 2023-12-27 DIAGNOSIS — R80.9 TYPE 2 DIABETES MELLITUS WITH MICROALBUMINURIA, WITHOUT LONG-TERM CURRENT USE OF INSULIN: Primary | ICD-10-CM

## 2023-12-27 DIAGNOSIS — Z12.31 ENCOUNTER FOR SCREENING MAMMOGRAM FOR BREAST CANCER: ICD-10-CM

## 2023-12-27 DIAGNOSIS — E11.29 TYPE 2 DIABETES MELLITUS WITH MICROALBUMINURIA, WITHOUT LONG-TERM CURRENT USE OF INSULIN: Primary | ICD-10-CM

## 2023-12-27 PROCEDURE — 3074F SYST BP LT 130 MM HG: CPT | Mod: HCNC,CPTII,S$GLB, | Performed by: INTERNAL MEDICINE

## 2023-12-27 PROCEDURE — 3008F PR BODY MASS INDEX (BMI) DOCUMENTED: ICD-10-PCS | Mod: HCNC,CPTII,S$GLB, | Performed by: INTERNAL MEDICINE

## 2023-12-27 PROCEDURE — 99214 OFFICE O/P EST MOD 30 MIN: CPT | Mod: HCNC,S$GLB,, | Performed by: INTERNAL MEDICINE

## 2023-12-27 PROCEDURE — 3061F PR NEG MICROALBUMINURIA RESULT DOCUMENTED/REVIEW: ICD-10-PCS | Mod: HCNC,CPTII,S$GLB, | Performed by: INTERNAL MEDICINE

## 2023-12-27 PROCEDURE — 3074F PR MOST RECENT SYSTOLIC BLOOD PRESSURE < 130 MM HG: ICD-10-PCS | Mod: HCNC,CPTII,S$GLB, | Performed by: INTERNAL MEDICINE

## 2023-12-27 PROCEDURE — 3066F PR DOCUMENTATION OF TREATMENT FOR NEPHROPATHY: ICD-10-PCS | Mod: HCNC,CPTII,S$GLB, | Performed by: INTERNAL MEDICINE

## 2023-12-27 PROCEDURE — 3066F NEPHROPATHY DOC TX: CPT | Mod: HCNC,CPTII,S$GLB, | Performed by: INTERNAL MEDICINE

## 2023-12-27 PROCEDURE — 99214 PR OFFICE/OUTPT VISIT, EST, LEVL IV, 30-39 MIN: ICD-10-PCS | Mod: HCNC,S$GLB,, | Performed by: INTERNAL MEDICINE

## 2023-12-27 PROCEDURE — 1126F PR PAIN SEVERITY QUANTIFIED, NO PAIN PRESENT: ICD-10-PCS | Mod: HCNC,CPTII,S$GLB, | Performed by: INTERNAL MEDICINE

## 2023-12-27 PROCEDURE — 3288F PR FALLS RISK ASSESSMENT DOCUMENTED: ICD-10-PCS | Mod: HCNC,CPTII,S$GLB, | Performed by: INTERNAL MEDICINE

## 2023-12-27 PROCEDURE — 3044F HG A1C LEVEL LT 7.0%: CPT | Mod: HCNC,CPTII,S$GLB, | Performed by: INTERNAL MEDICINE

## 2023-12-27 PROCEDURE — 4010F PR ACE/ARB THEARPY RXD/TAKEN: ICD-10-PCS | Mod: HCNC,CPTII,S$GLB, | Performed by: INTERNAL MEDICINE

## 2023-12-27 PROCEDURE — 1101F PT FALLS ASSESS-DOCD LE1/YR: CPT | Mod: HCNC,CPTII,S$GLB, | Performed by: INTERNAL MEDICINE

## 2023-12-27 PROCEDURE — 3061F NEG MICROALBUMINURIA REV: CPT | Mod: HCNC,CPTII,S$GLB, | Performed by: INTERNAL MEDICINE

## 2023-12-27 PROCEDURE — 1159F MED LIST DOCD IN RCRD: CPT | Mod: HCNC,CPTII,S$GLB, | Performed by: INTERNAL MEDICINE

## 2023-12-27 PROCEDURE — 99999 PR PBB SHADOW E&M-EST. PATIENT-LVL IV: ICD-10-PCS | Mod: PBBFAC,HCNC,, | Performed by: INTERNAL MEDICINE

## 2023-12-27 PROCEDURE — 3008F BODY MASS INDEX DOCD: CPT | Mod: HCNC,CPTII,S$GLB, | Performed by: INTERNAL MEDICINE

## 2023-12-27 PROCEDURE — 4010F ACE/ARB THERAPY RXD/TAKEN: CPT | Mod: HCNC,CPTII,S$GLB, | Performed by: INTERNAL MEDICINE

## 2023-12-27 PROCEDURE — 1160F RVW MEDS BY RX/DR IN RCRD: CPT | Mod: HCNC,CPTII,S$GLB, | Performed by: INTERNAL MEDICINE

## 2023-12-27 PROCEDURE — 3078F PR MOST RECENT DIASTOLIC BLOOD PRESSURE < 80 MM HG: ICD-10-PCS | Mod: HCNC,CPTII,S$GLB, | Performed by: INTERNAL MEDICINE

## 2023-12-27 PROCEDURE — 1101F PR PT FALLS ASSESS DOC 0-1 FALLS W/OUT INJ PAST YR: ICD-10-PCS | Mod: HCNC,CPTII,S$GLB, | Performed by: INTERNAL MEDICINE

## 2023-12-27 PROCEDURE — 1126F AMNT PAIN NOTED NONE PRSNT: CPT | Mod: HCNC,CPTII,S$GLB, | Performed by: INTERNAL MEDICINE

## 2023-12-27 PROCEDURE — 3078F DIAST BP <80 MM HG: CPT | Mod: HCNC,CPTII,S$GLB, | Performed by: INTERNAL MEDICINE

## 2023-12-27 PROCEDURE — 99999 PR PBB SHADOW E&M-EST. PATIENT-LVL IV: CPT | Mod: PBBFAC,HCNC,, | Performed by: INTERNAL MEDICINE

## 2023-12-27 PROCEDURE — 1159F PR MEDICATION LIST DOCUMENTED IN MEDICAL RECORD: ICD-10-PCS | Mod: HCNC,CPTII,S$GLB, | Performed by: INTERNAL MEDICINE

## 2023-12-27 PROCEDURE — 3044F PR MOST RECENT HEMOGLOBIN A1C LEVEL <7.0%: ICD-10-PCS | Mod: HCNC,CPTII,S$GLB, | Performed by: INTERNAL MEDICINE

## 2023-12-27 PROCEDURE — 1160F PR REVIEW ALL MEDS BY PRESCRIBER/CLIN PHARMACIST DOCUMENTED: ICD-10-PCS | Mod: HCNC,CPTII,S$GLB, | Performed by: INTERNAL MEDICINE

## 2023-12-27 PROCEDURE — 3288F FALL RISK ASSESSMENT DOCD: CPT | Mod: HCNC,CPTII,S$GLB, | Performed by: INTERNAL MEDICINE

## 2023-12-27 RX ORDER — METFORMIN HYDROCHLORIDE 1000 MG/1
1000 TABLET ORAL 2 TIMES DAILY
Qty: 180 TABLET | Refills: 1 | Status: SHIPPED | OUTPATIENT
Start: 2023-12-27

## 2023-12-27 NOTE — PROGRESS NOTES
SUBJECTIVE     Chief Complaint   Patient presents with    Medication Refill       HPI  Harriett Oglesby is a 68 y.o. female with multiple medical diagnoses as listed in the medical history and problem list that presents for evaluation for DM2. Pt has been doing  well  since last visit. she is fully compliant with meds and denies any adverse side effects. Pt is  mostly compliant  with an ADA diet and does not exercise, but walks in her house. Pt does check her blood sugar levels at home with fasting readings ranging from   . Pt denies any hypoglycemia since last visit. Pt presents for med refills today and is without any other complaints.     PAST MEDICAL HISTORY:  Past Medical History:   Diagnosis Date    Acute respiratory failure with hypoxia 11/6/2023    Adult BMI 31.0-31.9 kg/sq m 12/3/2018    Anemia     CHF (congestive heart failure) 11/6/2023    CVA (cerebral vascular accident)     Residual weakness in the left arm and hand    Essential thrombocythemia     Hyperlipidemia associated with type 2 diabetes mellitus     Hypertension     Osteoporosis     Pulmonary embolism 11/6/2023    Type 2 diabetes mellitus        PAST SURGICAL HISTORY:  Past Surgical History:   Procedure Laterality Date    CATHETERIZATION OF BOTH LEFT AND RIGHT HEART N/A 11/7/2023    Procedure: CATHETERIZATION, HEART, BOTH LEFT AND RIGHT;  Surgeon: Asher Gonzalez MD;  Location: [facility] CATH LAB;  Service: Cardiology;  Laterality: N/A;    COLONOSCOPY N/A 1/4/2019    Procedure: COLONOSCOPY;  Surgeon: James Ozuna MD;  Location: [facility] ENDO;  Service: Endoscopy;  Laterality: N/A;    COLONOSCOPY N/A 3/1/2021    Procedure: COLONOSCOPY;  Surgeon: Nelida Cook MD;  Location: [facility] ENDO;  Service: Endoscopy;  Laterality: N/A;    ESOPHAGOGASTRODUODENOSCOPY N/A 3/1/2021    Procedure: EGD (ESOPHAGOGASTRODUODENOSCOPY);  Surgeon: Nelida Cook MD;  Location: [facility] ENDO;  Service: Endoscopy;  Laterality: N/A;  rapid covid > 50 miles away,  instructions mailed -ml    HEMORRHOID SURGERY      INSERTION, PULSE GENERATOR, ICD, SINGLE CHAMBER Left 11/9/2023    Procedure: INSERTION, ICD GENERATOR, SINGLE CHAMBER;  Surgeon: Chilo Da Silva MD;  Location: [facility] CATH LAB;  Service: Cardiology;  Laterality: Left;       SOCIAL HISTORY:  Social History     Socioeconomic History    Marital status: Single   Tobacco Use    Smoking status: Former     Types: Cigarettes    Smokeless tobacco: Never   Substance and Sexual Activity    Alcohol use: No    Drug use: No    Sexual activity: Not Currently     Social Determinants of Health     Financial Resource Strain: Low Risk  (8/11/2023)    Overall Financial Resource Strain (CARDIA)     Difficulty of Paying Living Expenses: Not very hard   Food Insecurity: No Food Insecurity (8/11/2023)    Hunger Vital Sign     Worried About Running Out of Food in the Last Year: Never true     Ran Out of Food in the Last Year: Never true   Transportation Needs: Unmet Transportation Needs (8/11/2023)    PRAPARE - Transportation     Lack of Transportation (Medical): Yes     Lack of Transportation (Non-Medical): Yes   Physical Activity: Insufficiently Active (8/11/2023)    Exercise Vital Sign     Days of Exercise per Week: 3 days     Minutes of Exercise per Session: 30 min   Stress: No Stress Concern Present (8/11/2023)    [employer] - Occupational Stress Questionnaire     Feeling of Stress : Not at all   Social Connections: Moderately Isolated (8/11/2023)    Social Connection and Isolation Panel [NHANES]     Frequency of Communication with Friends and Family: More than three times a week     Frequency of Social Gatherings with Friends and Family: More than three times a week     Attends Jew Services: More than 4 times per year     Active Member of Clubs or Organizations: No     Attends Club or Organization Meetings: Never     Marital Status: Never    Housing Stability: Low Risk  (8/11/2023)    Housing  Stability Vital Sign     Unable to Pay for Housing in the Last Year: No     Number of Places Lived in the Last Year: 1     Unstable Housing in the Last Year: No       FAMILY HISTORY:  Family History   Problem Relation Age of Onset    Pancreatic cancer Mother     Emphysema Father     Cancer Father         Lung    Breast cancer Cousin     Breast cancer Cousin     Colon cancer Neg Hx     Esophageal cancer Neg Hx        ALLERGIES AND MEDICATIONS: updated and reviewed.  Review of patient's allergies indicates:  No Known Allergies  Current Outpatient Medications   Medication Sig Dispense Refill    apixaban (ELIQUIS) 5 mg Tab Take 1 tablet (5 mg total) by mouth 2 (two) times daily. 60 tablet 11    aspirin (ECOTRIN) 81 MG EC tablet Take 81 mg by mouth once daily.       atorvastatin (LIPITOR) 40 MG tablet Take 1 tablet (40 mg total) by mouth every evening. 90 tablet 3    carvediloL (COREG) 12.5 MG tablet Take 1 tablet (12.5 mg total) by mouth 2 (two) times daily with meals. 60 tablet 11    cetirizine (ZYRTEC) 10 MG tablet Take 10 mg by mouth once daily.      empagliflozin (JARDIANCE) 10 mg tablet Take 1 tablet (10 mg total) by mouth once daily. 90 tablet 3    furosemide (LASIX) 20 MG tablet Take 1 tablet (20 mg total) by mouth as needed (take 1-2 pills as needed for fluid overload). 90 tablet 3    HYDROcodone-acetaminophen (NORCO)  mg per tablet Take 1 tablet by mouth every 12 (twelve) hours as needed for Pain. 8 tablet 0    spironolactone (ALDACTONE) 25 MG tablet Take 1 tablet (25 mg total) by mouth once daily. 90 tablet 3    valsartan (DIOVAN) 160 MG tablet Take 1 tablet (160 mg total) by mouth once daily. 90 tablet 3    metFORMIN (GLUCOPHAGE) 1000 MG tablet Take 1 tablet (1,000 mg total) by mouth 2 (two) times daily. for diabetes. 180 tablet 1     No current facility-administered medications for this visit.       ROS  Review of Systems   Constitutional:  Negative for chills and fever.   HENT:  Negative for hearing  "loss and sore throat.    Eyes:  Negative for visual disturbance.   Respiratory:  Negative for cough and shortness of breath.    Cardiovascular:  Negative for chest pain, palpitations and leg swelling.   Gastrointestinal:  Negative for abdominal pain, constipation, diarrhea, nausea and vomiting.   Genitourinary:  Negative for dysuria, frequency and urgency.   Musculoskeletal:  Positive for joint swelling (LLE). Negative for arthralgias and myalgias.   Skin:  Negative for rash and wound.   Neurological:  Negative for headaches.   Psychiatric/Behavioral:  Negative for agitation and confusion. The patient is not nervous/anxious.          OBJECTIVE     Physical Exam  Vitals:    12/27/23 1306   BP: 124/70   Pulse: 101   Temp: 97.5 °F (36.4 °C)    Body mass index is 27.04 kg/m².  Weight: 73.7 kg (162 lb 7.7 oz)   Height: 5' 5" (165.1 cm)     Physical Exam  Constitutional:       General: She is not in acute distress.     Appearance: She is well-developed.   HENT:      Head: Normocephalic and atraumatic.      Right Ear: External ear normal.      Left Ear: External ear normal.      Nose: Nose normal.   Eyes:      General: No scleral icterus.        Right eye: No discharge.         Left eye: No discharge.      Conjunctiva/sclera: Conjunctivae normal.   Neck:      Vascular: No JVD.      Trachea: No tracheal deviation.   Cardiovascular:      Rate and Rhythm: Normal rate and regular rhythm.      Heart sounds: No murmur heard.     No friction rub. No gallop.   Pulmonary:      Effort: Pulmonary effort is normal. No respiratory distress.      Breath sounds: Normal breath sounds. No wheezing.   Abdominal:      General: Bowel sounds are normal. There is no distension.      Palpations: Abdomen is soft. There is no mass.      Tenderness: There is no abdominal tenderness. There is no guarding or rebound.   Musculoskeletal:         General: No tenderness or deformity. Normal range of motion.      Cervical back: Normal range of motion and " neck supple.      Right lower leg: No edema.      Left lower leg: Edema present.   Skin:     General: Skin is warm and dry.      Findings: No erythema or rash.   Neurological:      Mental Status: She is alert and oriented to person, place, and time.      Motor: No abnormal muscle tone.      Coordination: Coordination normal.   Psychiatric:         Behavior: Behavior normal.         Thought Content: Thought content normal.         Judgment: Judgment normal.           Health Maintenance         Date Due Completion Date    TETANUS VACCINE Never done ---    RSV Vaccine (Age 60+ and Pregnant patients) (1 - 1-dose 60+ series) Never done ---    Mammogram 11/29/2023 11/29/2022    Eye Exam 12/09/2023 12/9/2022    Hemoglobin A1c 05/07/2024 11/7/2023    Diabetes Urine Screening 05/31/2024 5/31/2023    Foot Exam 11/06/2024 11/6/2023 (Done)    Override on 11/6/2023: Done    Lipid Panel 11/07/2024 11/7/2023    High Dose Statin 12/27/2024 12/27/2023    DEXA Scan 11/29/2025 11/29/2022    Colorectal Cancer Screening 03/01/2028 3/1/2021              ASSESSMENT     68 y.o. female with     1. Type 2 diabetes mellitus with microalbuminuria, without long-term current use of insulin    2. Encounter for screening mammogram for breast cancer        PLAN:     1. Type 2 diabetes mellitus with microalbuminuria, without long-term current use of insulin  - Well controlled  - The current medical regimen is effective;  continue present plan and medications.  - metFORMIN (GLUCOPHAGE) 1000 MG tablet; Take 1 tablet (1,000 mg total) by mouth 2 (two) times daily. for diabetes.  Dispense: 180 tablet; Refill: 1    2. Encounter for screening mammogram for breast cancer  - Mammo Digital Screening Bilat w/ [facility]; Future            RTC in 6 months     Zeinab Noble MD  12/27/2023 1:22 PM        No follow-ups on file.

## 2024-01-20 NOTE — TELEPHONE ENCOUNTER
No care due was identified.  [facility] Embedded Care Due Messages. Reference number: [id number].   1/20/2024 6:47:14 AM CST

## 2024-01-21 RX ORDER — DAPAGLIFLOZIN 10 MG/1
10 TABLET, FILM COATED ORAL
Qty: 90 TABLET | Refills: 1 | OUTPATIENT
Start: 2024-01-21

## 2024-01-21 NOTE — TELEPHONE ENCOUNTER
Refill Decision Note   Harriett Rodriguezrance  is requesting a refill authorization.  Brief Assessment and Rationale for Refill:  Quick Discontinue     Medication Therapy Plan:         Comments:     Note composed:9:10 AM 01/21/2024

## 2024-01-29 ENCOUNTER — HOSPITAL ENCOUNTER (OUTPATIENT)
Dept: RADIOLOGY | Facility: HOSPITAL | Age: 69
Discharge: HOME | End: 2024-01-29
Attending: INTERNAL MEDICINE
Payer: MEDICARE

## 2024-01-29 ENCOUNTER — OFFICE VISIT (OUTPATIENT)
Dept: CARDIOLOGY | Facility: CLINIC | Age: 69
End: 2024-01-29
Payer: MEDICARE

## 2024-01-29 VITALS — BODY MASS INDEX: 27.07 KG/M2 | WEIGHT: 162.5 LBS | HEIGHT: 65 IN

## 2024-01-29 VITALS
OXYGEN SATURATION: 95 % | BODY MASS INDEX: 27.07 KG/M2 | SYSTOLIC BLOOD PRESSURE: 122 MMHG | HEART RATE: 110 BPM | DIASTOLIC BLOOD PRESSURE: 82 MMHG | RESPIRATION RATE: 15 BRPM | HEIGHT: 65 IN | WEIGHT: 162.5 LBS

## 2024-01-29 DIAGNOSIS — I42.0 CONGESTIVE CARDIOMYOPATHY: ICD-10-CM

## 2024-01-29 DIAGNOSIS — I47.20 V-TACH: Primary | ICD-10-CM

## 2024-01-29 DIAGNOSIS — Z12.31 ENCOUNTER FOR SCREENING MAMMOGRAM FOR BREAST CANCER: ICD-10-CM

## 2024-01-29 DIAGNOSIS — I50.9 CONGESTIVE HEART FAILURE, UNSPECIFIED HF CHRONICITY, UNSPECIFIED HEART FAILURE TYPE: ICD-10-CM

## 2024-01-29 DIAGNOSIS — I26.99 PULMONARY EMBOLISM, UNSPECIFIED CHRONICITY, UNSPECIFIED PULMONARY EMBOLISM TYPE, UNSPECIFIED WHETHER ACUTE COR PULMONALE PRESENT: ICD-10-CM

## 2024-01-29 PROCEDURE — 77067 SCR MAMMO BI INCL CAD: CPT | Mod: TC,HCNC

## 2024-01-29 PROCEDURE — 3074F SYST BP LT 130 MM HG: CPT | Mod: HCNC,CPTII,S$GLB, | Performed by: INTERNAL MEDICINE

## 2024-01-29 PROCEDURE — 4010F ACE/ARB THERAPY RXD/TAKEN: CPT | Mod: HCNC,CPTII,S$GLB, | Performed by: INTERNAL MEDICINE

## 2024-01-29 PROCEDURE — 99214 OFFICE O/P EST MOD 30 MIN: CPT | Mod: HCNC,25,S$GLB, | Performed by: INTERNAL MEDICINE

## 2024-01-29 PROCEDURE — 99999 PR PBB SHADOW E&M-EST. PATIENT-LVL IV: CPT | Mod: PBBFAC,HCNC,, | Performed by: INTERNAL MEDICINE

## 2024-01-29 PROCEDURE — 3288F FALL RISK ASSESSMENT DOCD: CPT | Mod: HCNC,CPTII,S$GLB, | Performed by: INTERNAL MEDICINE

## 2024-01-29 PROCEDURE — 77063 BREAST TOMOSYNTHESIS BI: CPT | Mod: 26,HCNC,, | Performed by: RADIOLOGY

## 2024-01-29 PROCEDURE — 1126F AMNT PAIN NOTED NONE PRSNT: CPT | Mod: HCNC,CPTII,S$GLB, | Performed by: INTERNAL MEDICINE

## 2024-01-29 PROCEDURE — 3079F DIAST BP 80-89 MM HG: CPT | Mod: HCNC,CPTII,S$GLB, | Performed by: INTERNAL MEDICINE

## 2024-01-29 PROCEDURE — 3008F BODY MASS INDEX DOCD: CPT | Mod: HCNC,CPTII,S$GLB, | Performed by: INTERNAL MEDICINE

## 2024-01-29 PROCEDURE — 1101F PT FALLS ASSESS-DOCD LE1/YR: CPT | Mod: HCNC,CPTII,S$GLB, | Performed by: INTERNAL MEDICINE

## 2024-01-29 PROCEDURE — 77067 SCR MAMMO BI INCL CAD: CPT | Mod: 26,HCNC,, | Performed by: RADIOLOGY

## 2024-01-29 NOTE — PROGRESS NOTES
CARDIOVASCULAR CONSULTATION    REASON FOR CONSULT:   Harriett Oglesby is a 69 y.o. female who presents for   Chief Complaint   Patient presents with    Follow-up          HISTORY OF PRESENT ILLNESS:     Patient is a pleasant 68-year-old lady.  Came into hospital.  Found to have nonischemic cardiomyopathy.  Underwent AICD implantation for secondary prevention.  Doing fine.  Main complaint is dyspnea on exertion and tiredness.  [employer] AICD, single-chamber.        Cardiology consult from recent hospitalization:    VF (ventricular fibrillation)  VFib arrest.  Prolonged QTC.  On lidocaine drip.  Monitor closely in ICU.  Plan for cardiac catheterization in a.m.     11/7: Risks, benefits and alternatives of the catheterization procedure were discussed with the patient.The risks of coronary angiography include but are not limited to: bleeding, infection, death, heart attack, arrhythmia, kidney injury or failure, potential need for dialysis, allergic reactions, stroke, need for emergency surgery, hematoma, pseudoaneurysm etc.  Should stenting be indicated, the patient has agreed to dual anti-platelet therapy for 1-consecutive year with a drug-eluting stent and a minimum of 1-month with the use of a bare metal stent. Additionally, pt is aware that non-compliance is likely to result in stent clotting with heart attack, heart failure, and/or death  The risks of moderate sedation include hypotension, respiratory depression, arrhythmias, bronchospasm, and death. Informed consent was obtained and the  patient is agreeable to proceed with the procedure. Consent was placed on the chart.  Continue lidocaine drip  11/8:  Nonischemic cardiomyopathy.  No significant stenosis on angiogram yesterday.  Plan for AICD in a.m..  Continue lidocaine drip        11/9: s/p aicd today.      CHF (congestive heart failure)  Patient is identified as having Combined Systolic and Diastolic heart failure that is Acute.Latest ECHO performed and  demonstrates- Results for orders placed during the hospital encounter of 11/06/23     Echo     Interpretation Summary    Left Ventricle: The left ventricle is mildly dilated. Normal wall thickness. There is severely reduced systolic function with a visually estimated ejection fraction of 20 - 25%.    Right Ventricle: Normal right ventricular cavity size. Systolic function is normal.    Left Atrium: Left atrium is severely dilated.    Right Atrium: Right atrium is moderately dilated.    Mitral Valve: There is mild regurgitation.    Tricuspid Valve: There is mild to moderate regurgitation.    Pulmonary Artery: The estimated pulmonary artery systolic pressure is 49 mmHg.    IVC/SVC: Intermediate venous pressure at 8 mmHg.    Pericardium: There is a trivial effusion.  . Continue Furosemide and monitor clinical status closely. Monitor on telemetry. Patient is on CHF pathway.  Monitor strict Is&Os and daily weights.  Place on fluid restriction of 1.5 L. Cardiology has been consulted. Continue to stress to patient importance of self efficacy and  on diet for CHF. Last BNP reviewed- and noted below       Recent Labs   Lab 11/06/23  1306   BNP 1,839*   .     euvolumic on exam. Continue gdmt     Pulmonary embolism  CTA personally reviewed.  Segmental and subsegmental pulmonary embolism.  RV to LV ratio less than 0.9.  Will continue to monitor.  Currently no indication for thrombectomy.  No DVT on peripheral ultrasound.  Continue heparin drip, transition to Eliquis at time of discharge           Multifocal atrial tachycardia  On initial presentation.  Improving.     11/7: now in sinus     Hyperlipidemia associated with type 2 diabetes mellitus         History of stroke with current residual effects         Type 2 diabetes mellitus with microalbuminuria, without long-term current use of insulin         Hypertension, essential, benign        Discharge summary from recent hospitalization:    HPI:   This is a 68-year-old  female with a past medical history of hypertension, type 2 diabetes, hyperlipidemia, CVA with left-sided deficits who presents with tachycardia.       The patient initially presented to urgent care with shortness of breaths and lower extremity edema that started 7 days prior to presentation.  She ran out of her blood pressure medications about 2 weeks prior.  Additional symptoms include cough, nasal congestion and fatigue.  She was noted to be tachycardic and was advised present to the ED.     In the ED, the patient was tachycardic (up to 160s), tachypneic but normotensive.  Labs were remarkable for an elevated BNP (1839), elevated troponin (0.090 > 0.079), elevated D-dimer (2.87).  EKGs showed MAT and prolonged QTc. CTA chest showed pulmonary emboli in segmental and subsegmental pulmonary artery in the right middle lobe, with mild-to-moderate bibasilar pleural effusions with associated bibasilar atelectasis. An echocardiogram showed an EF of 20-25%, PA pressure of 49 mmHg.  Patient was given aspirin 325 mg, adenosine 6 mg IV x2, atenolol 25 mg p.o., diltiazem 20 mg IV x2, 30 mg IV x1, potassium bicarbonate 40 mEq use and was started on heparin drip.     While in the ED, the patient went into V-fib arrest, underwent 1 round of CPR and was cardioverted @200 joules x1, with achievement of ROSC. Mag sulfate 2 g IV & an amp of D50% were administered. Cardiology recommended Plavix, lidocaine infusion and an angiogram in the AM. She subsequently went into Vtach and was cardioverted @200 joules x1. No Epi was given and the patient was not intubated.   Plavix, additional potassium and lidocaine bolus, followed by infusion were ordered. Patient became hypoxic and was placed on a NRB. She was admitted for further management.       Procedure(s) (LRB):  INSERTION, ICD GENERATOR, SINGLE CHAMBER (Left)       Hospital Course:   67 y/o female sent to ER from Urgent Care for tachycardia.  In the ER found to be in multifocal atrial  tachycardia with prolonged QTC.  Patient was given Cardizem with some improvement in heart rate.  Subsequently CTA was done which revealed segmental and subsegmental pulmonary embolism on the right side. Echo was also done which demonstrated severe cardiomyopathy with EF of 20-25%.  Subsequently patient developed VFib and cardioverted followed by V-tach which was also cardioverted x1.  Started on lidocaine drip in the ER and admitted to ICU.  Also started on heparin drip.  Underwent LHC showing non ischemic cardiomyopathy.  Cards recommending AICD.  She has remained in NSR. AICD placed on 11/9.     PAST MEDICAL HISTORY:     Past Medical History:   Diagnosis Date    Acute respiratory failure with hypoxia 11/6/2023    Adult BMI 31.0-31.9 kg/sq m 12/3/2018    Anemia     CHF (congestive heart failure) 11/6/2023    CVA (cerebral vascular accident)     Residual weakness in the left arm and hand    Essential thrombocythemia     Hyperlipidemia associated with type 2 diabetes mellitus     Hypertension     Osteoporosis     Pulmonary embolism 11/6/2023    Type 2 diabetes mellitus        PAST SURGICAL HISTORY:     Past Surgical History:   Procedure Laterality Date    CATHETERIZATION OF BOTH LEFT AND RIGHT HEART N/A 11/7/2023    Procedure: CATHETERIZATION, HEART, BOTH LEFT AND RIGHT;  Surgeon: Asher Gonzalez MD;  Location: [facility] CATH LAB;  Service: Cardiology;  Laterality: N/A;    COLONOSCOPY N/A 1/4/2019    Procedure: COLONOSCOPY;  Surgeon: James Ozuna MD;  Location: [facility] ENDO;  Service: Endoscopy;  Laterality: N/A;    COLONOSCOPY N/A 3/1/2021    Procedure: COLONOSCOPY;  Surgeon: Nelida Cook MD;  Location: [facility];  Service: Endoscopy;  Laterality: N/A;    ESOPHAGOGASTRODUODENOSCOPY N/A 3/1/2021    Procedure: EGD (ESOPHAGOGASTRODUODENOSCOPY);  Surgeon: Nelida Cook MD;  Location: [facility] ENDO;  Service: Endoscopy;  Laterality: N/A;  rapid covid > 50 miles away, instructions mailed -ml    HEMORRHOID SURGERY       INSERTION, PULSE GENERATOR, ICD, SINGLE CHAMBER Left 11/9/2023    Procedure: INSERTION, ICD GENERATOR, SINGLE CHAMBER;  Surgeon: Chilo Da Silva MD;  Location: [facility] CATH LAB;  Service: Cardiology;  Laterality: Left;       ALLERGIES AND MEDICATION:   Review of patient's allergies indicates:  No Known Allergies     Medication List            Accurate as of January 29, 2024 11:59 PM. If you have any questions, ask your nurse or doctor.                CONTINUE taking these medications      apixaban 5 mg Tab  Commonly known as: ELIQUIS  Take 1 tablet (5 mg total) by mouth 2 (two) times daily.     aspirin 81 MG EC tablet  Commonly known as: ECOTRIN     atorvastatin 40 MG tablet  Commonly known as: LIPITOR  Take 1 tablet (40 mg total) by mouth every evening.     carvediloL 12.5 MG tablet  Commonly known as: COREG  Take 1 tablet (12.5 mg total) by mouth 2 (two) times daily with meals.     cetirizine 10 MG tablet  Commonly known as: ZYRTEC     empagliflozin 10 mg tablet  Commonly known as: JARDIANCE  Take 1 tablet (10 mg total) by mouth once daily.     furosemide 20 MG tablet  Commonly known as: LASIX  Take 1 tablet (20 mg total) by mouth as needed (take 1-2 pills as needed for fluid overload).     metFORMIN 1000 MG tablet  Commonly known as: GLUCOPHAGE  Take 1 tablet (1,000 mg total) by mouth 2 (two) times daily. for diabetes.     spironolactone 25 MG tablet  Commonly known as: ALDACTONE  Take 1 tablet (25 mg total) by mouth once daily.     valsartan 160 MG tablet  Commonly known as: DIOVAN  Take 1 tablet (160 mg total) by mouth once daily.              SOCIAL HISTORY:     Social History     Socioeconomic History    Marital status: Single   Tobacco Use    Smoking status: Former     Types: Cigarettes    Smokeless tobacco: Never   Substance and Sexual Activity    Alcohol use: No    Drug use: No    Sexual activity: Not Currently     Social Determinants of Health     Financial Resource Strain: Low Risk  (2/22/2024)    Overall  Financial Resource Strain (CARDIA)     Difficulty of Paying Living Expenses: Not hard at all   Food Insecurity: No Food Insecurity (2/22/2024)    Hunger Vital Sign     Worried About Running Out of Food in the Last Year: Never true     Ran Out of Food in the Last Year: Never true   Transportation Needs: Unmet Transportation Needs (2/22/2024)    PRAPARE - Transportation     Lack of Transportation (Medical): Yes     Lack of Transportation (Non-Medical): Yes   Physical Activity: Inactive (2/22/2024)    Exercise Vital Sign     Days of Exercise per Week: 0 days     Minutes of Exercise per Session: 0 min   Stress: No Stress Concern Present (2/22/2024)    [employer] [place] [employer] - Occupational Stress Questionnaire     Feeling of Stress : Not at all   Social Connections: Moderately Isolated (2/22/2024)    Social Connection and Isolation Panel [NHANES]     Frequency of Communication with Friends and Family: More than three times a week     Frequency of Social Gatherings with Friends and Family: More than three times a week     Attends Hindu Services: More than 4 times per year     Active Member of Clubs or Organizations: No     Attends Club or Organization Meetings: Never     Marital Status: Never    Housing Stability: Low Risk  (2/22/2024)    Housing Stability Vital Sign     Unable to Pay for Housing in the Last Year: No     Number of Places Lived in the Last Year: 1     Unstable Housing in the Last Year: No       FAMILY HISTORY:     Family History   Problem Relation Age of Onset    Pancreatic cancer Mother     Emphysema Father     Cancer Father         Lung    Breast cancer Cousin     Breast cancer Cousin     Colon cancer Neg Hx     Esophageal cancer Neg Hx        REVIEW OF SYSTEMS:   Review of Systems   Constitutional: Positive for malaise/fatigue.   HENT: Negative.     Eyes: Negative.    Cardiovascular:  Positive for dyspnea on exertion.   Respiratory: Negative.     Endocrine: Negative.   "  Hematologic/Lymphatic: Negative.    Skin: Negative.    Musculoskeletal: Negative.    Gastrointestinal: Negative.    Genitourinary: Negative.    Neurological: Negative.    Psychiatric/Behavioral: Negative.     Allergic/Immunologic: Negative.        A 10 point review of systems was performed and all the pertinent positives have been mentioned. Rest of review of systems was negative.        PHYSICAL EXAM:     Vitals:    01/29/24 1303   BP: 122/82   Pulse: 110   Resp: 15    Body mass index is 27.04 kg/m².  Weight: 73.7 kg (162 lb 7.7 oz)   Height: 5' 5" (165.1 cm)     Physical Exam  Constitutional:       Appearance: Normal appearance. She is well-developed.   HENT:      Head: Normocephalic.   Eyes:      Pupils: Pupils are equal, round, and reactive to light.   Cardiovascular:      Rate and Rhythm: Normal rate and regular rhythm.   Pulmonary:      Effort: Pulmonary effort is normal.      Breath sounds: Normal breath sounds.   Abdominal:      General: Bowel sounds are normal.      Palpations: Abdomen is soft.      Tenderness: There is no abdominal tenderness.   Musculoskeletal:         General: Normal range of motion.      Cervical back: Normal range of motion and neck supple.   Skin:     General: Skin is warm.   Neurological:      Mental Status: She is alert and oriented to person, place, and time.           DATA:     Laboratory:  CBC:  Recent Labs   Lab 11/10/23  0602 11/11/23  0400 11/13/23  0314   WBC 6.34 6.41 5.72   Hemoglobin 10.8 L 11.5 L 10.3 L   Hematocrit 33.8 L 36.8 L 32.5 L   Platelets 270 318 296       CHEMISTRIES:  Recent Labs   Lab 06/23/21  0800 08/02/21  0901 01/26/22  1040 11/02/22  0913 11/07/23  0100 11/07/23  0841 11/08/23  2355 11/10/23  1221 01/29/24  1434   Glucose 118 H 152 H 122 H   < > 253 H 230 H 189 H 214 H 114 H   Sodium 144 141 139   < > 138 140 138 137 143   Potassium 3.7 3.6 3.7   < > 4.7 4.8 3.5 3.8 3.4 L   BUN 13 17 14   < > 13 15 18 15 12   Creatinine 0.8 0.9 0.9   < > 1.1 1.1 0.9 " 0.8 0.8   eGFR if African American >60 >60 >60  --   --   --   --   --   --    eGFR if non African American >60 >60 >60  --   --   --   --   --   --    Calcium 9.5 10.1 10.0   < > 8.9 9.2 8.3 L 8.6 L 9.0   Magnesium  --   --   --    < > 2.3 2.3  --  1.6  --     < > = values in this interval not displayed.       CARDIAC BIOMARKERS:  Recent Labs   Lab 11/06/23  1617 11/06/23 2026 11/07/23  0100   Troponin I 0.079 H 0.085 H 0.080 H       COAGS:  Recent Labs   Lab 11/07/23  0841 11/09/23  1510   INR 1.1  1.1 1.2       LIPIDS/LFTS:  Recent Labs   Lab 01/26/22  1040 11/02/22  0913 05/31/23  0928 11/06/23  1306 11/06/23 2026 11/07/23  0841 11/08/23  2355   Cholesterol 134  --  130  --   --  141  --    Triglycerides 134  --  151 H  --   --  101  --    HDL 49  --  44  --   --  39 L  --    LDL Cholesterol 58.2 L  --  55.8 L  --   --  81.8  --    Non-HDL Cholesterol 85  --  86  --   --  102  --    AST 14   < > 10   < > 78 H 55 H 59 H   ALT 12   < > 8 L   < > 56 H 52 H 53 H    < > = values in this interval not displayed.       Hemoglobin A1C   Date Value Ref Range Status   11/07/2023 6.3 (H) 4.0 - 5.6 % Final     Comment:     ADA Screening Guidelines:  5.7-6.4%  Consistent with prediabetes  >or=6.5%  Consistent with diabetes    High levels of fetal hemoglobin interfere with the HbA1C  assay. Heterozygous hemoglobin variants (HbS, HgC, etc)do  not significantly interfere with this assay.   However, presence of multiple variants may affect accuracy.     05/31/2023 6.1 (H) 4.0 - 5.6 % Final     Comment:     ADA Screening Guidelines:  5.7-6.4%  Consistent with prediabetes  >or=6.5%  Consistent with diabetes    High levels of fetal hemoglobin interfere with the HbA1C  assay. Heterozygous hemoglobin variants (HbS, HgC, etc)do  not significantly interfere with this assay.   However, presence of multiple variants may affect accuracy.     11/02/2022 6.4 (H) 4.0 - 5.6 % Final     Comment:     ADA Screening Guidelines:  5.7-6.4%   Consistent with prediabetes  >or=6.5%  Consistent with diabetes    High levels of fetal hemoglobin interfere with the HbA1C  assay. Heterozygous hemoglobin variants (HbS, HgC, etc)do  not significantly interfere with this assay.   However, presence of multiple variants may affect accuracy.         TSH  Recent Labs   Lab 11/02/22  0913 05/31/23  0928   TSH 1.833 1.555       The 10-year ASCVD risk score (Charo LORENZO, et al., 2019) is: 20.3%    Values used to calculate the score:      Age: 69 years      Sex: Female      Is Non- : Yes      Diabetic: Yes      Tobacco smoker: No      Systolic Blood Pressure: 130 mmHg      Is BP treated: Yes      HDL Cholesterol: 39 mg/dL      Total Cholesterol: 141 mg/dL       BNP    Lab Results   Component Value Date/Time    BNP 2,007 (H) 01/29/2024 02:34 PM    BNP 1,839 (H) 11/06/2023 01:06 PM            ECHO    Results for orders placed during the hospital encounter of 11/06/23    Echo    Interpretation Summary    Left Ventricle: The left ventricle is mildly dilated. Normal wall thickness. There is severely reduced systolic function with a visually estimated ejection fraction of 20 - 25%.    Right Ventricle: Normal right ventricular cavity size. Systolic function is normal.    Left Atrium: Left atrium is severely dilated.    Right Atrium: Right atrium is moderately dilated.    Mitral Valve: There is mild regurgitation.    Tricuspid Valve: There is mild to moderate regurgitation.    Pulmonary Artery: The estimated pulmonary artery systolic pressure is 49 mmHg.    IVC/SVC: Intermediate venous pressure at 8 mmHg.    Pericardium: There is a trivial effusion.      STRESS TEST    No results found for this or any previous visit.        CATH    Results for orders placed during the hospital encounter of 11/06/23    Cardiac catheterization    Conclusion    No significant flow-limiting coronary artery stenosis.    The pre-procedure left ventricular end diastolic pressure was  25.    The estimated blood loss was <50 mL.    Angiogram:    Left main:  No significant stenosis    Lad:  Luminal irregularities    Circumflex:  Luminal irregularities    RCA:  Luminal irregularities    Right heart catheterization was performed    Right heart catheterization:    Pulmonary capillary wedge pressure:  24 mmHg    PA 52/25 with a mean of 37 mmHg    RV 50/10 with a RVEDP of 21 mmHg    RA 19 mmHg                    The procedure log was documented by Documenter: Matt Chambers RN; Dread Whiteside RN and verified by Asher Gonzalez MD.    Date: 11/29/2023  Time: 8:36 AM        ASSESSMENT AND PLAN     Patient Active Problem List   Diagnosis    Hypertension, essential, benign    Type 2 diabetes mellitus with microalbuminuria, without long-term current use of insulin    History of stroke with current residual effects    Macroalbuminuric diabetic nephropathy    Hyperlipidemia associated with type 2 diabetes mellitus    Iron deficiency anemia due to chronic blood loss    Multifocal atrial tachycardia    Chronic septic pulmonary embolism without acute cor pulmonale    VF (ventricular fibrillation)    CHF (congestive heart failure)    AICD (automatic cardioverter/defibrillator) present    Calculus of gallbladder without cholecystitis without obstruction    V-tach    Hemiplegia and hemiparesis following cerebral infarction affecting right non-dominant side    Personal history of nicotine dependence    Age-related osteoporosis without current pathological fracture    Nonischemic cardiomyopathy    Calcification of aorta    COPD         No orders of the defined types were placed in this encounter.      Nonischemic cardiomyopathy:  Continue current therapy    Lasix as needed     AICD, [employer], single chamber.        Follow-up in 3 m      Thank you very much for involving me in the care of your patient.  Please do not hesitate to contact me if there are any questions.      Asher Gonzalez MD, FACC,  [facility]  Interventional Cardiologist, [facility].           This note was dictated with the help of speech recognition software.  There might be un-intended errors and/or substitutions.

## 2024-01-30 ENCOUNTER — PATIENT OUTREACH (OUTPATIENT)
Dept: ADMINISTRATIVE | Facility: OTHER | Age: 69
End: 2024-01-30
Payer: MEDICARE

## 2024-02-06 NOTE — PROGRESS NOTES
CHW - Outreach Attempt    Carrie Ascencio, Community Health Worker left a voicemail message for 3rd attempt to contact patient regarding: Community Health     CHW - Unable to Contact    Community Health Worker to close episode at this time due to three missed attempts for patient contact.

## 2024-02-12 PROBLEM — I26.99 PULMONARY EMBOLISM: Status: RESOLVED | Noted: 2023-11-06 | Resolved: 2024-02-12

## 2024-02-20 ENCOUNTER — TELEPHONE (OUTPATIENT)
Dept: ADMINISTRATIVE | Facility: CLINIC | Age: 69
End: 2024-02-20
Payer: MEDICARE

## 2024-02-20 NOTE — TELEPHONE ENCOUNTER
Called pt, informed pt I was calling to remind pt of her in office EAWV on 2/22/24; clinic location provided to patient; pt confirmed appointment

## 2024-02-22 ENCOUNTER — OFFICE VISIT (OUTPATIENT)
Dept: FAMILY MEDICINE | Facility: CLINIC | Age: 69
End: 2024-02-22
Payer: MEDICARE

## 2024-02-22 VITALS
BODY MASS INDEX: 26.3 KG/M2 | TEMPERATURE: 99 F | HEIGHT: 65 IN | HEART RATE: 98 BPM | SYSTOLIC BLOOD PRESSURE: 130 MMHG | OXYGEN SATURATION: 99 % | DIASTOLIC BLOOD PRESSURE: 80 MMHG | RESPIRATION RATE: 16 BRPM | WEIGHT: 157.88 LBS

## 2024-02-22 DIAGNOSIS — I27.82 CHRONIC SEPTIC PULMONARY EMBOLISM WITHOUT ACUTE COR PULMONALE: ICD-10-CM

## 2024-02-22 DIAGNOSIS — I50.22 CHRONIC SYSTOLIC CONGESTIVE HEART FAILURE: ICD-10-CM

## 2024-02-22 DIAGNOSIS — I47.19 MULTIFOCAL ATRIAL TACHYCARDIA: ICD-10-CM

## 2024-02-22 DIAGNOSIS — I70.0 CALCIFICATION OF AORTA: ICD-10-CM

## 2024-02-22 DIAGNOSIS — E11.29 TYPE 2 DIABETES MELLITUS WITH MICROALBUMINURIA, WITHOUT LONG-TERM CURRENT USE OF INSULIN: ICD-10-CM

## 2024-02-22 DIAGNOSIS — Z00.00 ENCOUNTER FOR PREVENTIVE HEALTH EXAMINATION: Primary | ICD-10-CM

## 2024-02-22 DIAGNOSIS — E78.5 HYPERLIPIDEMIA ASSOCIATED WITH TYPE 2 DIABETES MELLITUS: ICD-10-CM

## 2024-02-22 DIAGNOSIS — M81.0 AGE-RELATED OSTEOPOROSIS WITHOUT CURRENT PATHOLOGICAL FRACTURE: ICD-10-CM

## 2024-02-22 DIAGNOSIS — I26.90 CHRONIC SEPTIC PULMONARY EMBOLISM WITHOUT ACUTE COR PULMONALE: ICD-10-CM

## 2024-02-22 DIAGNOSIS — I47.20 V-TACH: ICD-10-CM

## 2024-02-22 DIAGNOSIS — E11.69 HYPERLIPIDEMIA ASSOCIATED WITH TYPE 2 DIABETES MELLITUS: ICD-10-CM

## 2024-02-22 DIAGNOSIS — J44.9 COPD, MILD: ICD-10-CM

## 2024-02-22 DIAGNOSIS — I49.01 VF (VENTRICULAR FIBRILLATION): ICD-10-CM

## 2024-02-22 DIAGNOSIS — I69.353 HEMIPLEGIA AND HEMIPARESIS FOLLOWING CEREBRAL INFARCTION AFFECTING RIGHT NON-DOMINANT SIDE: ICD-10-CM

## 2024-02-22 DIAGNOSIS — I42.8 NONISCHEMIC CARDIOMYOPATHY: ICD-10-CM

## 2024-02-22 DIAGNOSIS — E11.21 MACROALBUMINURIC DIABETIC NEPHROPATHY: ICD-10-CM

## 2024-02-22 DIAGNOSIS — R80.9 TYPE 2 DIABETES MELLITUS WITH MICROALBUMINURIA, WITHOUT LONG-TERM CURRENT USE OF INSULIN: ICD-10-CM

## 2024-02-22 PROBLEM — Z71.89 ADVANCED CARE PLANNING/COUNSELING DISCUSSION: Status: RESOLVED | Noted: 2021-03-01 | Resolved: 2024-02-22

## 2024-02-22 PROCEDURE — G0439 PPPS, SUBSEQ VISIT: HCPCS | Mod: HCNC,S$GLB,, | Performed by: NURSE PRACTITIONER

## 2024-02-22 PROCEDURE — G9919 SCRN ND POS ND PROV OF REC: HCPCS | Mod: HCNC,CPTII,S$GLB, | Performed by: NURSE PRACTITIONER

## 2024-02-22 PROCEDURE — 4010F ACE/ARB THERAPY RXD/TAKEN: CPT | Mod: HCNC,CPTII,S$GLB, | Performed by: NURSE PRACTITIONER

## 2024-02-22 PROCEDURE — 1160F RVW MEDS BY RX/DR IN RCRD: CPT | Mod: HCNC,CPTII,S$GLB, | Performed by: NURSE PRACTITIONER

## 2024-02-22 PROCEDURE — 3075F SYST BP GE 130 - 139MM HG: CPT | Mod: HCNC,CPTII,S$GLB, | Performed by: NURSE PRACTITIONER

## 2024-02-22 PROCEDURE — 1101F PT FALLS ASSESS-DOCD LE1/YR: CPT | Mod: HCNC,CPTII,S$GLB, | Performed by: NURSE PRACTITIONER

## 2024-02-22 PROCEDURE — 1126F AMNT PAIN NOTED NONE PRSNT: CPT | Mod: HCNC,CPTII,S$GLB, | Performed by: NURSE PRACTITIONER

## 2024-02-22 PROCEDURE — 3079F DIAST BP 80-89 MM HG: CPT | Mod: HCNC,CPTII,S$GLB, | Performed by: NURSE PRACTITIONER

## 2024-02-22 PROCEDURE — 3288F FALL RISK ASSESSMENT DOCD: CPT | Mod: HCNC,CPTII,S$GLB, | Performed by: NURSE PRACTITIONER

## 2024-02-22 PROCEDURE — 1159F MED LIST DOCD IN RCRD: CPT | Mod: HCNC,CPTII,S$GLB, | Performed by: NURSE PRACTITIONER

## 2024-02-22 PROCEDURE — 99999 PR PBB SHADOW E&M-EST. PATIENT-LVL V: CPT | Mod: PBBFAC,HCNC,, | Performed by: NURSE PRACTITIONER

## 2024-02-22 NOTE — PATIENT INSTRUCTIONS
Counseling and Referral of Other Preventative  (Italic type indicates deductible and co-insurance are waived)    Patient Name: Harriett Oglesby  Today's Date: 2/22/2024    Health Maintenance       Date Due Completion Date    TETANUS VACCINE Never done ---    RSV Vaccine (Age 60+ and Pregnant patients) (1 - 1-dose 60+ series) Never done ---    Eye Exam 12/09/2023 12/9/2022    Hemoglobin A1c 05/07/2024 11/7/2023    Diabetes Urine Screening 05/31/2024 5/31/2023    Foot Exam 11/06/2024 11/6/2023 (Done)    Override on 11/6/2023: Done    Lipid Panel 11/07/2024 11/7/2023    High Dose Statin 01/29/2025 1/29/2024    Mammogram 01/29/2025 1/29/2024    DEXA Scan 11/29/2025 11/29/2022    Colorectal Cancer Screening 03/01/2028 3/1/2021        No orders of the defined types were placed in this encounter.    The following information is provided to all patients.  This information is to help you find resources for any of the problems found today that may be affecting your health:                  Living healthy guide: www.[facility].gov      Understanding Diabetes: www.diabetes.org      Eating healthy: www.cdc.gov/healthyweight      CDC home safety checklist: www.cdc.gov/steadi/patient.html      Agency on Aging: www.goea.[place].[facility]      Alcoholics anonymous (AA): www.aa.org      Physical Activity: www.gracie.nih.gov/pd5xxoa      Tobacco use: www.quitwithusla.org

## 2024-02-22 NOTE — PROGRESS NOTES
"  Harriett Oglesby presented for a  Medicare AWV and comprehensive Health Risk Assessment today. The following components were reviewed and updated:    Medical history  Family History  Social history  Allergies and Current Medications  Health Risk Assessment  Health Maintenance  Care Team     Patient screened moderate and/or high risk for one or more social determinants of health (SDOH). Patient connected to community resources through the ED Navigator.      ** See Completed Assessments for Annual Wellness Visit within the encounter summary.**         The following assessments were completed:  Living Situation  CAGE  Depression Screening  Timed Get Up and Go  Whisper Test  Cognitive Function Screening  Nutrition Screening  ADL Screening  PAQ Screening        Vitals:    02/22/24 1013   BP: 130/80   Pulse: 98   Resp: 16   Temp: 98.7 °F (37.1 °C)   TempSrc: Oral   SpO2: 99%   Weight: 71.6 kg (157 lb 13.6 oz)   Height: 5' 5" (1.651 m)     Body mass index is 26.27 kg/m².  Physical Exam  Vitals and nursing note reviewed.   Constitutional:       General: She is not in acute distress.     Appearance: Normal appearance. She is well-developed and well-groomed. She is not ill-appearing.   HENT:      Head: Normocephalic and atraumatic.      Right Ear: External ear normal.      Left Ear: External ear normal.      Nose: Nose normal.      Mouth/Throat:      Lips: Pink.      Mouth: Mucous membranes are moist.   Eyes:      General: Lids are normal. Vision grossly intact. Gaze aligned appropriately.      Conjunctiva/sclera: Conjunctivae normal.   Neck:      Trachea: Phonation normal.   Pulmonary:      Effort: Pulmonary effort is normal. No accessory muscle usage or respiratory distress.   Abdominal:      General: Abdomen is flat. There is no distension.   Musculoskeletal:      Cervical back: Neck supple.   Skin:     General: Skin is warm and dry.      Findings: No rash.   Neurological:      General: No focal deficit present.      " Mental Status: She is alert and oriented to person, place, and time. Mental status is at baseline.      Sensory: Sensory deficit present.      Motor: Weakness, tremor, atrophy and abnormal muscle tone present.      Coordination: Coordination normal.      Gait: Gait abnormal.   Psychiatric:         Attention and Perception: Attention and perception normal.         Mood and Affect: Mood and affect normal.         Speech: Speech normal.         Behavior: Behavior normal. Behavior is cooperative.         Thought Content: Thought content normal.         Cognition and Memory: Cognition and memory normal.         Judgment: Judgment normal.               Diagnoses and health risks identified today and associated recommendations/orders:    1. Encounter for preventive health examination  Health maintenance reviewed and up to date, will f/u with PCP for routine preventative care  Review for Opioid Screening: Pt does not have Rx for Opioids   Review for Substance Use Disorders: Patient does not use substance     2. Hemiplegia and hemiparesis following cerebral infarction affecting left non-dominant side  Pt with no functioning on left side, ambulatory with walking cane, not safe to drive or operate any machinery, discussed home independence and falls precautions    3. Nonischemic cardiomyopathy  Stable, recently exacerbated by PE a few months ago, closely f/u by cards, continue current regimen     4. Chronic systolic congestive heart failure  Stable, recently exacerbated by PE a few months ago, closely f/u by cards, continue current regimen    5. Multifocal atrial tachycardia  Stable, recently exacerbated by PE a few months ago, now s/p AICD placement, closely f/u by cards, continue current regimen    6. V-tach  Stable, recently exacerbated by PE a few months ago, now s/p AICD placement, closely f/u by cards, continue current regimen    7. VF (ventricular fibrillation)  Stable, recently exacerbated by PE a few months ago, now  s/p AICD placement, closely f/u by cards, continue current regimen    8. Calcification of aorta  Uncomplicated and asymptomatic, BP controlled, on statin and ASA with eliquis anticoagulation     9. Hyperlipidemia associated with type 2 diabetes mellitus  Continue dietary measures.  Continue regular exercise.  Lipid-lowering medications:  continue statin daily .    10. Type 2 diabetes mellitus with microalbuminuria, without long-term current use of insulin  DM stable, last HgA1c 6.3, BP controlled with ARB   Education: Reviewed ABCs of diabetes management (respective goals in parentheses):  A1C (<7), blood pressure (<130/80), and cholesterol (LDL <100).    11. Macroalbuminuric diabetic nephropathy  DM stable, last HgA1c 6.3, BP controlled with ARB   Education: Reviewed ABCs of diabetes management (respective goals in parentheses):  A1C (<7), blood pressure (<130/80), and cholesterol (LDL <100).    12. Chronic septic pulmonary embolism without acute cor pulmonale  Breathing stable without thrombectomy, anticoagulated with eliquis and ASA, BP controlled and uncomplicated     13. COPD  Breathing stable without complication    14. Age-related osteoporosis without current pathological fracture  Compliant with daily calcium and vit.D supplementation       Provided Harriett with a 5-10 year written screening schedule and personal prevention plan. Recommendations were developed using the USPSTF age appropriate recommendations. Education, counseling, and referrals were provided as needed. After Visit Summary printed and given to patient which includes a list of additional screenings\tests needed.    Follow up in about 1 year (around 2/22/2025).    Khalida Montalvo NP  I offered to discuss advanced care planning, including how to pick a person who would make decisions for you if you were unable to make them for yourself, called a health care power of , and what kind of decisions you might make such as use of life  sustaining treatments such as ventilators and tube feeding when faced with a life limiting illness recorded on a living will that they will need to know. (How you want to be cared for as you near the end of your natural life)     X  Patient has advanced directives written and agrees to provide copies to the institution.

## 2024-02-27 PROBLEM — I42.8 NONISCHEMIC CARDIOMYOPATHY: Status: ACTIVE | Noted: 2024-02-27

## 2024-02-27 PROBLEM — I26.90 CHRONIC SEPTIC PULMONARY EMBOLISM WITHOUT ACUTE COR PULMONALE: Status: ACTIVE | Noted: 2023-11-06

## 2024-02-27 PROBLEM — I70.0 CALCIFICATION OF AORTA: Status: ACTIVE | Noted: 2024-02-27

## 2024-02-27 PROBLEM — M81.0 AGE-RELATED OSTEOPOROSIS WITHOUT CURRENT PATHOLOGICAL FRACTURE: Status: ACTIVE | Noted: 2023-11-18

## 2024-02-27 PROBLEM — I26.93 SINGLE SUBSEGMENTAL PULMONARY EMBOLISM WITHOUT ACUTE COR PULMONALE: Status: ACTIVE | Noted: 2023-11-06

## 2024-02-27 PROBLEM — J44.9 COPD, MILD: Status: ACTIVE | Noted: 2024-02-27

## 2024-02-27 PROBLEM — Z87.891 PERSONAL HISTORY OF NICOTINE DEPENDENCE: Status: ACTIVE | Noted: 2023-11-18

## 2024-02-27 PROBLEM — M85.80 OSTEOPENIA: Status: RESOLVED | Noted: 2019-01-28 | Resolved: 2024-02-27

## 2024-02-27 PROBLEM — D47.3 ESSENTIAL THROMBOCYTHEMIA: Status: RESOLVED | Noted: 2018-12-05 | Resolved: 2024-02-27

## 2024-02-27 PROBLEM — I27.82 CHRONIC SEPTIC PULMONARY EMBOLISM WITHOUT ACUTE COR PULMONALE: Status: ACTIVE | Noted: 2023-11-06

## 2024-03-11 LAB
LEFT EYE DM RETINOPATHY: NEGATIVE
RIGHT EYE DM RETINOPATHY: NEGATIVE

## 2024-04-09 ENCOUNTER — PATIENT OUTREACH (OUTPATIENT)
Dept: ADMINISTRATIVE | Facility: HOSPITAL | Age: 69
End: 2024-04-09
Payer: MEDICARE

## 2024-04-09 NOTE — PROGRESS NOTES
Health Maintenance Due   Topic Date Due    TETANUS VACCINE  Never done    RSV Vaccine (Age 60+ and Pregnant patients) (1 - 1-dose 60+ series) Never done    Hemoglobin A1c  05/07/2024     Chart review done.  updated. Immunizations reviewed & updated. Care Everywhere updated.

## 2024-04-10 NOTE — PROGRESS NOTES
SUBJECTIVE     Chief Complaint   Patient presents with    diabetes follow up     diabetes is going up and down       HPI  Harriett Oglesby is a 64 y.o. female with multiple medical diagnoses as listed in the medical history and problem list that presents for follow-up for DM2. Pt has been doing okay since her last visit. She is fully compliant with meds and denies any adverse side effects. Her fasting blood sugar levels range from 107-154. Pt is mostly compliant with an ADA diet. She reports feeling more tired with less energy than before.    PAST MEDICAL HISTORY:  Past Medical History:   Diagnosis Date    Hyperlipidemia associated with type 2 diabetes mellitus     Hypertension     Type 2 diabetes mellitus        PAST SURGICAL HISTORY:  Past Surgical History:   Procedure Laterality Date    COLONOSCOPY N/A 1/4/2019    Performed by [facility] at [facility]    HEMORRHOID SURGERY         SOCIAL HISTORY:  Social History     Socioeconomic History    Marital status: Single     Spouse name: Not on file    Number of children: Not on file    Years of education: Not on file    Highest education level: Not on file   Occupational History    Not on file   Social Needs    Financial resource strain: Not on file    Food insecurity:     Worry: Not on file     Inability: Not on file    Transportation needs:     Medical: Not on file     Non-medical: Not on file   Tobacco Use    Smoking status: Former Smoker     Types: Cigarettes    Smokeless tobacco: Never Used   Substance and Sexual Activity    Alcohol use: No     Frequency: Never    Drug use: No    Sexual activity: Not on file   Lifestyle    Physical activity:     Days per week: Not on file     Minutes per session: Not on file    Stress: Not on file   Relationships    Social connections:     Talks on phone: Not on file     Gets together: Not on file     Attends Jain service: Not on file     Active member of club or organization: Not on file      Well Visit Information for Teens at 15 to 18 Years   AMBULATORY CARE:   A well visit  is when you see a healthcare provider to prevent health problems. It is a different type of visit than when you see a healthcare provider because you are sick. Well visits are used to track your growth and development. It is also a time for you to ask questions and to get information on how to stay safe. Write down your questions so you remember to ask them. You should have regular well visits from birth to the end of your life.  Development milestones you may reach at 15 to 18 years:  Every person develops at his or her own pace. You might have already reached the following milestones, or you may reach them later:  Menstruation by 16 years for girls    Start driving    Develop a desire to have sex, start dating, and identify sexual orientation    Start working or planning for college or  service    Get the right nutrition:  You will have a growth spurt during this age. This growth spurt and other changes during adolescence may cause you to change your eating habits. Your appetite will increase, so you will eat more than usual. You should follow a healthy meal plan that provides enough calories and nutrients for growth and good health.     Eat regular meals and snacks, even if you are busy.  You should eat 3 meals and 2 snacks each day to help meet your calorie needs. You should also eat a variety of healthy foods to get the nutrients you need, and to maintain a healthy weight. Choose healthy foods when you eat out. Choose a chicken sandwich instead of a large burger, or choose a side salad instead of French fries.    Eat a variety of fruits and vegetables.  Half of your plate should contain fruits and vegetables. You should eat about 5 servings of fruits and vegetables each day. Eat fresh, canned, or dried fruit instead of fruit juice. Eat more dark green, red, and orange vegetables. Dark green vegetables include broccoli,  Attends meetings of clubs or organizations: Not on file     Relationship status: Not on file   Other Topics Concern    Not on file   Social History Narrative    Not on file       FAMILY HISTORY:  Family History   Problem Relation Age of Onset    Pancreatic cancer Mother     Emphysema Father     Cancer Father     Breast cancer Cousin     Breast cancer Cousin        ALLERGIES AND MEDICATIONS: updated and reviewed.  Review of patient's allergies indicates:  No Known Allergies  Current Outpatient Medications   Medication Sig Dispense Refill    alendronate (FOSAMAX) 70 MG tablet Take 1 tablet (70 mg total) by mouth every 7 days. 4 tablet 11    amLODIPine (NORVASC) 10 MG tablet Take 1 tablet (10 mg total) by mouth once daily. 90 tablet 1    aspirin (ECOTRIN) 81 MG EC tablet Take 81 mg by mouth once daily.      atenolol (TENORMIN) 25 MG tablet Take 1 tablet (25 mg total) by mouth once daily. 90 tablet 1    blood sugar diagnostic Strp 1 strip by Misc.(Non-Drug; Combo Route) route once daily. 200 each 3    cyanocobalamin (VITAMIN B-12) 500 MCG tablet Take 500 mcg by mouth once daily.      FLUZONE HIGH-DOSE 2018-19, PF, 180 mcg/0.5 mL vaccine ADM 0.5ML IM UTD  0    metFORMIN (GLUCOPHAGE) 1000 MG tablet Take 1 tablet (1,000 mg total) by mouth 2 (two) times daily. for diabetes. 180 tablet 1    polyethylene glycol (COLYTE) 240-22.72-6.72 -5.84 gram SolR One gallon; split prep. 1 Bottle 0    PREVNAR 13, PF, 0.5 mL Syrg ADM 0.5ML IM UTD  0    simvastatin (ZOCOR) 20 MG tablet Take 1 tablet (20 mg total) by mouth once daily. 90 tablet 1    valsartan-hydrochlorothiazide (DIOVAN-HCT) 320-25 mg per tablet Take 1 tablet by mouth once daily. 90 tablet 1    dapagliflozin (FARXIGA) 10 mg Tab Take 1 tablet by mouth once daily. 90 tablet 1     No current facility-administered medications for this visit.        ROS  Review of Systems   Constitutional: Negative for chills and fever.   HENT: Negative for hearing loss and  spinach, adriana lettuce, and anahy greens. Examples of orange and red vegetables are carrots, sweet potatoes, winter squash, and red peppers.    Eat whole-grain foods.  Half of the grains you eat each day should be whole grains. Whole grains include brown rice, whole-wheat pasta, and whole-grain cereals and breads.    Make sure you get enough calcium each day.  Calcium is needed to build strong bones. You need 1,300 milligrams (mg) of calcium each day. Low-fat dairy foods are a good source of calcium. Examples include milk, cheese, cottage cheese, and yogurt. Other foods that contain calcium include tofu, kale, spinach, broccoli, almonds, and calcium-fortified orange juice.         Eat lean meats, poultry, fish, and other healthy protein foods.  Other healthy protein foods include legumes (such as beans), soy foods (such as tofu), and peanut butter. Bake, broil, or grill meat instead of frying it to reduce the amount of fat.    Drink plenty of water each day.  Water is better for you than juice or soda. Ask your healthcare provider how much water you should drink each day.    Limit foods high in fat and sugar.  Foods high in fat and sugar do not have the nutrients you need to be healthy. Foods high in fat and sugar include snack foods (potato chips, candy, and other sweets), juice, fruit drinks, and soda. If you eat these foods too often, you may eat fewer healthy foods during mealtimes. You may also gain too much weight. You may not get enough iron and develop anemia (low levels of iron in the blood). Anemia can affect your growth and ability to learn. Iron is found in red meat, egg yolks, and fortified cereals, and breads.    Limit your intake of caffeine to 100 mg or less each day.  Caffeine is found in soft drinks, energy drinks, tea, coffee, and some over-the-counter medicines. Caffeine can cause you to feel jittery, anxious, or dizzy. It can also cause headaches and trouble sleeping.    Talk to your  "sore throat.    Eyes: Negative for visual disturbance.   Respiratory: Negative for cough and shortness of breath.    Cardiovascular: Negative for chest pain, palpitations and leg swelling.   Gastrointestinal: Negative for abdominal pain, constipation, diarrhea, nausea and vomiting.   Genitourinary: Negative for dysuria, frequency and urgency.   Musculoskeletal: Negative for arthralgias, joint swelling and myalgias.   Skin: Negative for rash and wound.   Neurological: Negative for headaches.   Psychiatric/Behavioral: Negative for agitation and confusion. The patient is not nervous/anxious.          OBJECTIVE     Physical Exam  Vitals:    06/26/19 1121   BP: 128/75   Pulse: 76   Temp: 97.5 °F (36.4 °C)    Body mass index is 33.32 kg/m².  Weight: 96.5 kg (212 lb 11.9 oz)   Height: 5' 7" (170.2 cm)     Physical Exam   Constitutional: She is oriented to person, place, and time. She appears well-developed and well-nourished. No distress.   HENT:   Head: Normocephalic and atraumatic.   Right Ear: External ear normal.   Left Ear: External ear normal.   Nose: Nose normal.   Mouth/Throat: Oropharynx is clear and moist.   Eyes: Conjunctivae and EOM are normal. Right eye exhibits no discharge. Left eye exhibits no discharge. No scleral icterus.   Neck: Normal range of motion. Neck supple. No JVD present. No tracheal deviation present.   Pulmonary/Chest: Effort normal. No respiratory distress.   Musculoskeletal: Normal range of motion. She exhibits no deformity.   Neurological: She is alert and oriented to person, place, and time. She exhibits normal muscle tone. Coordination normal.   Skin: Skin is warm and dry. No rash noted. No erythema.   Psychiatric: She has a normal mood and affect. Her behavior is normal. Judgment and thought content normal.         Health Maintenance       Date Due Completion Date    TETANUS VACCINE 02/02/1971 ---    Shingles Vaccine (1 of 2) 02/02/2003 ---    Influenza Vaccine 08/01/2019 9/26/2018    " healthcare provider about safe weight loss, if needed.  Your healthcare provider can help you decide how much you should weigh. Do not follow a fad diet that your friends or famous people are following. Fad diets usually do not have all the nutrients you need to grow and stay healthy.    Limit your portion sizes.  You will be very hungry on some days and want to eat more. For example, you may want to eat more on days when you are more active. You may also eat more if you are going through a growth spurt. There may be days when you eat less than usual.       Stay active:  You should get 1 hour or more of physical activity each day. Examples of physical activities include sports, running, walking, swimming, and riding bikes. The hour of physical activity does not need to be done all at once. It can be done in shorter blocks of time. Limit the time you spend watching television or on the computer to 2 hours each day. This will give you more time for physical activity.       Care for your teeth:   Clean your teeth 2 times each day.  Mouth care prevents infection, plaque, bleeding gums, mouth sores, and cavities. It also freshens breath and improves appetite. Brush, floss, and use mouthwash. Ask your dentist which mouthwash is best for you to use.    Visit the dentist at least 2 times each year.  A dentist can check for problems with your teeth or gums, and provide treatments to protect your teeth.    Wear a mouth guard during sports.  This will protect your teeth from injury. Make sure the mouth guard fits correctly. Ask your healthcare provider for more information on mouth guards.    Protect your hearing:  Do not listen to music too loudly. Loud music may cause permanent hearing loss. Make sure you can still hear what is going on around you while you use headphones or earbuds. Use earplugs at music concerts if you are close to the speaker.  What you need to know about alcohol, tobacco, nicotine, and drugs:  It is best  "never to start using alcohol, tobacco, nicotine, or drugs. This will prevent health problems from these substances that can continue when you become an adult. You may also have a hard time quitting later. Talk to your parents, healthcare provider, or adult you trust if you have questions about the following:  Do not use tobacco or nicotine products.  Nicotine and other chemicals in cigarettes, cigars, and e-cigarettes can cause lung damage. Nicotine can also affect brain development. This can lead to trouble thinking, learning, or paying attention. Vaping is not safer than smoking regular cigarettes or cigars. Ask your healthcare provider for information if you currently smoke or vape and need help to quit.    Do not drink alcohol or use drugs.  Alcohol and drugs can keep you from making smart and healthy decisions. Ask your healthcare provider for information if you currently drink alcohol or use drugs and need help to quit.    Support friends who do not drink alcohol, smoke, vape, or use drugs.  Do not pressure your friends. Respect their decision not to use these substances.    What you need to know about safe sex:   Get the correct information about sex.  It is okay to have questions about your sexuality, physical development, and sexual feelings. Talk to your parents, healthcare provider, or other adults you trust. They can answer your questions and give you correct information. Your friends may not give you correct information.    Abstinence is the best way to prevent pregnancy and sexually transmitted infections (STIs).  Abstinence means you do not have sex. It is okay to say \"no\" to someone. You should always respect your date when he or she says \"no.\" Do not let others pressure you into having sex. This includes oral sex.    Protect yourself against pregnancy and STIs.  Use condoms or barriers every time you have sex. This includes oral sex. Ask your healthcare provider for more information about condoms " Pneumococcal Vaccine (65+ Low/Medium Risk) (2 of 2 - PPSV23) 10/15/2019 10/15/2018    Eye Exam 11/15/2019 11/15/2018    Lipid Panel 12/03/2019 12/3/2018    Hemoglobin A1c 12/03/2019 6/3/2019    High Dose Statin 06/03/2020 6/3/2019    Foot Exam 06/03/2020 6/3/2019    Override on 6/3/2019: Done    Mammogram 01/28/2021 1/28/2019    DEXA SCAN 01/28/2022 1/28/2019    Colonoscopy 01/04/2029 1/4/2019            ASSESSMENT     64 y.o. female with     1. Type 2 diabetes mellitus with hyperglycemia, without long-term current use of insulin        PLAN:     1. Type 2 diabetes mellitus with hyperglycemia, without long-term current use of insulin  - Blood glucose levels have increased since Glipizide discontinued; continue Metformin and start Farxiga  - dapagliflozin (FARXIGA) 10 mg Tab; Take 1 tablet by mouth once daily.  Dispense: 90 tablet; Refill: 1        RTC in 5 months     Zeinab Noble MD  06/26/2019 11:34 AM        No follow-ups on file.               and barriers.    Get screened regularly for STIs.  STIs are often treatable. Without treatment, STIs can lead to long-term health problems, including infertility and chronic pelvic pain. STIs may not cause any symptoms. Routine screening is important, even if you do not notice any problems.    Stay safe in the car:   Always wear your seatbelt.  Make sure everyone in your car wears a seatbelt. A seatbelt can save your life if you are in an accident.    Limit the number of friends in your car.  Too many people in your car may distract you from driving. This could cause an accident.    Limit how much you drive at night.  It is much easier to see things in the road during the day. If you need to drive at night, do not drive long distances.    Do not play music too loudly.  Loud music may prevent you from hearing an emergency vehicle that needs to pass you.    Do not use your cell phone when you are driving.  This could distract you and cause an accident. Pull over if you need to make a call or read or send a text message.    Never drink or use drugs and drive.  You could be injured or injure others.    Do not get in a car with someone who has used alcohol or drugs.  This is not safe. The person could get into an accident and injure you, himself or herself, or others. Call your parents or another trusted adult for a ride instead.    Other ways to stay safe:   Find safe activities at school and in your community.  Join an after school activity or sports team, or volunteer in your community.    Wear helmets, lifejackets, and protective gear.  Always wear a helmet when you ride a bike, skateboard, or roller blade. Wear protective equipment when you play sports. Wear a lifejacket when you are on a boat or doing water sports.         Learn to deal with conflict without violence.  Physical fights can cause serious injury to you or others. It can also get you into trouble with police or school. Never  carry a weapon out of your  home. Never  touch a weapon without your parent's approval and supervision.    Make healthy choices:   Ask for help when you need it.  Talk to your family, teachers, or counselors if you have concerns or feel unsafe. Also tell them if you are being bullied.    Find healthy ways to deal with stress.  Talk to your parents, teachers, or a school counselor if you feel stressed or overwhelmed. Find activities that help you deal with stress, such as reading or exercising.    Create positive relationships.  Respect your friends, peers, and anyone you date. Do not bully anyone.    Contact a suicide prevention organization:        For the [employer] Suicide and Crisis Lifeline:     Call or text [employer]     Send a chat on https://[employer]org/chat     Call 1-998-367-8573 (1-800-273-TALK)    For the Suicide Hotline, call 1-689-171-0809 (7-608-WVVFLPN)  For a list of international numbers: https://save.org/find-help/international-resources/   Set goals for yourself.  Set goals for your future, school, and other activities. Begin to think about your plans after high school. Talk with your parents, friends, and school counselor about these goals. Be proud of yourself when you reach your goals.  Vaccines and screenings you may get during this well visit:   Vaccines  include influenza (flu) each year. You may also need HPV (human papillomavirus), MMR (measles, mumps, rubella), varicella (chickenpox), or meningococcal vaccines. This depends on the vaccines you got during the last few well visits.         Screening  may be needed to check for sexually transmitted infections (STIs). You may also be screened for anxiety or depression.       Your next well visit:  Your healthcare provider will talk to you about where you should go for medical care after 17 years. You may continue to see the same healthcare providers until you are 21 years old. You may need vaccines and screenings at your next visit. Your provider will tell you which vaccines  and screenings you need and when you should get them.  © Copyright [employer] 2023 Information is for End User's use only and may not be sold, redistributed or otherwise used for commercial purposes.  The above information is an  only. It is not intended as medical advice for individual conditions or treatments. Talk to your doctor, nurse or pharmacist before following any medical regimen to see if it is safe and effective for you.

## 2024-04-16 RX ORDER — FUROSEMIDE 20 MG/1
TABLET ORAL
Qty: 180 TABLET | Refills: 1 | Status: SHIPPED | OUTPATIENT
Start: 2024-04-16

## 2024-04-22 ENCOUNTER — OFFICE VISIT (OUTPATIENT)
Dept: CARDIOLOGY | Facility: CLINIC | Age: 69
End: 2024-04-22
Payer: MEDICARE

## 2024-04-22 VITALS
OXYGEN SATURATION: 99 % | HEIGHT: 65 IN | SYSTOLIC BLOOD PRESSURE: 126 MMHG | HEART RATE: 82 BPM | BODY MASS INDEX: 25.98 KG/M2 | RESPIRATION RATE: 15 BRPM | WEIGHT: 155.94 LBS | DIASTOLIC BLOOD PRESSURE: 62 MMHG

## 2024-04-22 DIAGNOSIS — R00.2 HEART PALPITATIONS: ICD-10-CM

## 2024-04-22 DIAGNOSIS — I50.9 CONGESTIVE HEART FAILURE, UNSPECIFIED HF CHRONICITY, UNSPECIFIED HEART FAILURE TYPE: Primary | ICD-10-CM

## 2024-04-22 PROCEDURE — 3078F DIAST BP <80 MM HG: CPT | Mod: CPTII,S$GLB,, | Performed by: INTERNAL MEDICINE

## 2024-04-22 PROCEDURE — 1101F PT FALLS ASSESS-DOCD LE1/YR: CPT | Mod: CPTII,S$GLB,, | Performed by: INTERNAL MEDICINE

## 2024-04-22 PROCEDURE — 3074F SYST BP LT 130 MM HG: CPT | Mod: CPTII,S$GLB,, | Performed by: INTERNAL MEDICINE

## 2024-04-22 PROCEDURE — 1159F MED LIST DOCD IN RCRD: CPT | Mod: CPTII,S$GLB,, | Performed by: INTERNAL MEDICINE

## 2024-04-22 PROCEDURE — 1160F RVW MEDS BY RX/DR IN RCRD: CPT | Mod: CPTII,S$GLB,, | Performed by: INTERNAL MEDICINE

## 2024-04-22 PROCEDURE — 3008F BODY MASS INDEX DOCD: CPT | Mod: CPTII,S$GLB,, | Performed by: INTERNAL MEDICINE

## 2024-04-22 PROCEDURE — 99214 OFFICE O/P EST MOD 30 MIN: CPT | Mod: 25,S$GLB,, | Performed by: INTERNAL MEDICINE

## 2024-04-22 PROCEDURE — 3288F FALL RISK ASSESSMENT DOCD: CPT | Mod: CPTII,S$GLB,, | Performed by: INTERNAL MEDICINE

## 2024-04-22 PROCEDURE — 93282 PRGRMG EVAL IMPLANTABLE DFB: CPT | Mod: 26,,, | Performed by: INTERNAL MEDICINE

## 2024-04-22 PROCEDURE — 99999 PR PBB SHADOW E&M-EST. PATIENT-LVL IV: CPT | Mod: PBBFAC,,, | Performed by: INTERNAL MEDICINE

## 2024-04-22 PROCEDURE — 1126F AMNT PAIN NOTED NONE PRSNT: CPT | Mod: CPTII,S$GLB,, | Performed by: INTERNAL MEDICINE

## 2024-04-22 PROCEDURE — 4010F ACE/ARB THERAPY RXD/TAKEN: CPT | Mod: CPTII,S$GLB,, | Performed by: INTERNAL MEDICINE

## 2024-04-22 NOTE — PROGRESS NOTES
CARDIOVASCULAR CONSULTATION    REASON FOR CONSULT:   Harriett Oglesby is a 69 y.o. female who presents for   Chief Complaint   Patient presents with    Follow-up          HISTORY OF PRESENT ILLNESS:     Patient is a pleasant 68-year-old lady.  Came into hospital.  Found to have nonischemic cardiomyopathy.  Underwent AICD implantation for secondary prevention.  Doing fine.  Main complaint is dyspnea on exertion and tiredness.  [employer] AICD, single-chamber.        Cardiology consult from recent hospitalization:    VF (ventricular fibrillation)  VFib arrest.  Prolonged QTC.  On lidocaine drip.  Monitor closely in ICU.  Plan for cardiac catheterization in a.m.     11/7: Risks, benefits and alternatives of the catheterization procedure were discussed with the patient.The risks of coronary angiography include but are not limited to: bleeding, infection, death, heart attack, arrhythmia, kidney injury or failure, potential need for dialysis, allergic reactions, stroke, need for emergency surgery, hematoma, pseudoaneurysm etc.  Should stenting be indicated, the patient has agreed to dual anti-platelet therapy for 1-consecutive year with a drug-eluting stent and a minimum of 1-month with the use of a bare metal stent. Additionally, pt is aware that non-compliance is likely to result in stent clotting with heart attack, heart failure, and/or death  The risks of moderate sedation include hypotension, respiratory depression, arrhythmias, bronchospasm, and death. Informed consent was obtained and the  patient is agreeable to proceed with the procedure. Consent was placed on the chart.  Continue lidocaine drip  11/8:  Nonischemic cardiomyopathy.  No significant stenosis on angiogram yesterday.  Plan for AICD in a.m..  Continue lidocaine drip        11/9: s/p aicd today.      CHF (congestive heart failure)  Patient is identified as having Combined Systolic and Diastolic heart failure that is Acute.Latest ECHO performed and  demonstrates- Results for orders placed during the hospital encounter of 11/06/23     Echo     Interpretation Summary    Left Ventricle: The left ventricle is mildly dilated. Normal wall thickness. There is severely reduced systolic function with a visually estimated ejection fraction of 20 - 25%.    Right Ventricle: Normal right ventricular cavity size. Systolic function is normal.    Left Atrium: Left atrium is severely dilated.    Right Atrium: Right atrium is moderately dilated.    Mitral Valve: There is mild regurgitation.    Tricuspid Valve: There is mild to moderate regurgitation.    Pulmonary Artery: The estimated pulmonary artery systolic pressure is 49 mmHg.    IVC/SVC: Intermediate venous pressure at 8 mmHg.    Pericardium: There is a trivial effusion.  . Continue Furosemide and monitor clinical status closely. Monitor on telemetry. Patient is on CHF pathway.  Monitor strict Is&Os and daily weights.  Place on fluid restriction of 1.5 L. Cardiology has been consulted. Continue to stress to patient importance of self efficacy and  on diet for CHF. Last BNP reviewed- and noted below       Recent Labs   Lab 11/06/23  1306   BNP 1,839*   .     euvolumic on exam. Continue gdmt     Pulmonary embolism  CTA personally reviewed.  Segmental and subsegmental pulmonary embolism.  RV to LV ratio less than 0.9.  Will continue to monitor.  Currently no indication for thrombectomy.  No DVT on peripheral ultrasound.  Continue heparin drip, transition to Eliquis at time of discharge           Multifocal atrial tachycardia  On initial presentation.  Improving.     11/7: now in sinus     Hyperlipidemia associated with type 2 diabetes mellitus         History of stroke with current residual effects         Type 2 diabetes mellitus with microalbuminuria, without long-term current use of insulin         Hypertension, essential, benign        Discharge summary from recent hospitalization:    HPI:   This is a 68-year-old  female with a past medical history of hypertension, type 2 diabetes, hyperlipidemia, CVA with left-sided deficits who presents with tachycardia.       The patient initially presented to urgent care with shortness of breaths and lower extremity edema that started 7 days prior to presentation.  She ran out of her blood pressure medications about 2 weeks prior.  Additional symptoms include cough, nasal congestion and fatigue.  She was noted to be tachycardic and was advised present to the ED.     In the ED, the patient was tachycardic (up to 160s), tachypneic but normotensive.  Labs were remarkable for an elevated BNP (1839), elevated troponin (0.090 > 0.079), elevated D-dimer (2.87).  EKGs showed MAT and prolonged QTc. CTA chest showed pulmonary emboli in segmental and subsegmental pulmonary artery in the right middle lobe, with mild-to-moderate bibasilar pleural effusions with associated bibasilar atelectasis. An echocardiogram showed an EF of 20-25%, PA pressure of 49 mmHg.  Patient was given aspirin 325 mg, adenosine 6 mg IV x2, atenolol 25 mg p.o., diltiazem 20 mg IV x2, 30 mg IV x1, potassium bicarbonate 40 mEq use and was started on heparin drip.     While in the ED, the patient went into V-fib arrest, underwent 1 round of CPR and was cardioverted @200 joules x1, with achievement of ROSC. Mag sulfate 2 g IV & an amp of D50% were administered. Cardiology recommended Plavix, lidocaine infusion and an angiogram in the AM. She subsequently went into Vtach and was cardioverted @200 joules x1. No Epi was given and the patient was not intubated.   Plavix, additional potassium and lidocaine bolus, followed by infusion were ordered. Patient became hypoxic and was placed on a NRB. She was admitted for further management.       Procedure(s) (LRB):  INSERTION, ICD GENERATOR, SINGLE CHAMBER (Left)       Hospital Course:   69 y/o female sent to ER from Urgent Care for tachycardia.  In the ER found to be in multifocal atrial  tachycardia with prolonged QTC.  Patient was given Cardizem with some improvement in heart rate.  Subsequently CTA was done which revealed segmental and subsegmental pulmonary embolism on the right side. Echo was also done which demonstrated severe cardiomyopathy with EF of 20-25%.  Subsequently patient developed VFib and cardioverted followed by V-tach which was also cardioverted x1.  Started on lidocaine drip in the ER and admitted to ICU.  Also started on heparin drip.  Underwent LHC showing non ischemic cardiomyopathy.  Cards recommending AICD.  She has remained in NSR. AICD placed on 11/9.       Notes from April 2024: Patient here for follow-up.  Feeling fine.  Device checked today.    PAST MEDICAL HISTORY:     Past Medical History:   Diagnosis Date    Acute respiratory failure with hypoxia 11/6/2023    Adult BMI 31.0-31.9 kg/sq m 12/3/2018    Anemia     CHF (congestive heart failure) 11/6/2023    CVA (cerebral vascular accident)     Residual weakness in the left arm and hand    Essential thrombocythemia     Hyperlipidemia associated with type 2 diabetes mellitus     Hypertension     Osteoporosis     Pulmonary embolism 11/6/2023    Type 2 diabetes mellitus        PAST SURGICAL HISTORY:     Past Surgical History:   Procedure Laterality Date    CATHETERIZATION OF BOTH LEFT AND RIGHT HEART N/A 11/7/2023    Procedure: CATHETERIZATION, HEART, BOTH LEFT AND RIGHT;  Surgeon: Asher Gonzalez MD;  Location: [facility] CATH LAB;  Service: Cardiology;  Laterality: N/A;    COLONOSCOPY N/A 1/4/2019    Procedure: COLONOSCOPY;  Surgeon: James Ozuna MD;  Location: [facility];  Service: Endoscopy;  Laterality: N/A;    COLONOSCOPY N/A 3/1/2021    Procedure: COLONOSCOPY;  Surgeon: Nelida Cook MD;  Location: [facility];  Service: Endoscopy;  Laterality: N/A;    ESOPHAGOGASTRODUODENOSCOPY N/A 3/1/2021    Procedure: EGD (ESOPHAGOGASTRODUODENOSCOPY);  Surgeon: Nelida Cook MD;  Location: [facility];  Service: Endoscopy;   Laterality: N/A;  rapid covid > 50 miles away, instructions mailed -ml    HEMORRHOID SURGERY      INSERTION, PULSE GENERATOR, ICD, SINGLE CHAMBER Left 11/9/2023    Procedure: INSERTION, ICD GENERATOR, SINGLE CHAMBER;  Surgeon: Chilo Da Silva MD;  Location: [facility] CATH LAB;  Service: Cardiology;  Laterality: Left;       ALLERGIES AND MEDICATION:   Review of patient's allergies indicates:  No Known Allergies     Medication List            Accurate as of April 22, 2024  2:14 PM. If you have any questions, ask your nurse or doctor.                CONTINUE taking these medications      apixaban 5 mg Tab  Commonly known as: ELIQUIS  Take 1 tablet (5 mg total) by mouth 2 (two) times daily.     aspirin 81 MG EC tablet  Commonly known as: ECOTRIN     atorvastatin 40 MG tablet  Commonly known as: LIPITOR  Take 1 tablet (40 mg total) by mouth every evening.     carvediloL 12.5 MG tablet  Commonly known as: COREG  Take 1 tablet (12.5 mg total) by mouth 2 (two) times daily with meals.     cetirizine 10 MG tablet  Commonly known as: ZYRTEC     empagliflozin 10 mg tablet  Commonly known as: JARDIANCE  Take 1 tablet (10 mg total) by mouth once daily.     furosemide 20 MG tablet  Commonly known as: LASIX  TAKE 1-2 PILLS DAILY AS NEEDED FOR FLUID OVERLOAD).     metFORMIN 1000 MG tablet  Commonly known as: GLUCOPHAGE  Take 1 tablet (1,000 mg total) by mouth 2 (two) times daily. for diabetes.     spironolactone 25 MG tablet  Commonly known as: ALDACTONE  Take 1 tablet (25 mg total) by mouth once daily.     valsartan 160 MG tablet  Commonly known as: DIOVAN  Take 1 tablet (160 mg total) by mouth once daily.              SOCIAL HISTORY:     Social History     Socioeconomic History    Marital status: Single   Tobacco Use    Smoking status: Former     Types: Cigarettes    Smokeless tobacco: Never   Substance and Sexual Activity    Alcohol use: No    Drug use: No    Sexual activity: Not Currently     Social Determinants of Health      Financial Resource Strain: Low Risk  (2/22/2024)    Overall Financial Resource Strain (CARDIA)     Difficulty of Paying Living Expenses: Not hard at all   Food Insecurity: No Food Insecurity (2/22/2024)    Hunger Vital Sign     Worried About Running Out of Food in the Last Year: Never true     Ran Out of Food in the Last Year: Never true   Transportation Needs: Unmet Transportation Needs (2/22/2024)    PRAPARE - Transportation     Lack of Transportation (Medical): Yes     Lack of Transportation (Non-Medical): Yes   Physical Activity: Inactive (2/22/2024)    Exercise Vital Sign     Days of Exercise per Week: 0 days     Minutes of Exercise per Session: 0 min   Stress: No Stress Concern Present (2/22/2024)    [employer] [place] [employer] - Occupational Stress Questionnaire     Feeling of Stress : Not at all   Social Connections: Moderately Isolated (2/22/2024)    Social Connection and Isolation Panel [NHANES]     Frequency of Communication with Friends and Family: More than three times a week     Frequency of Social Gatherings with Friends and Family: More than three times a week     Attends Hoahaoism Services: More than 4 times per year     Active Member of Clubs or Organizations: No     Attends Club or Organization Meetings: Never     Marital Status: Never    Housing Stability: Low Risk  (2/22/2024)    Housing Stability Vital Sign     Unable to Pay for Housing in the Last Year: No     Number of Places Lived in the Last Year: 1     Unstable Housing in the Last Year: No       FAMILY HISTORY:     Family History   Problem Relation Name Age of Onset    Pancreatic cancer Mother      Emphysema Father      Cancer Father          Lung    Breast cancer Cousin      Breast cancer Cousin      Colon cancer Neg Hx      Esophageal cancer Neg Hx         REVIEW OF SYSTEMS:   Review of Systems   HENT: Negative.     Eyes: Negative.    Respiratory: Negative.     Endocrine: Negative.    Hematologic/Lymphatic:  "Negative.    Skin: Negative.    Musculoskeletal: Negative.    Gastrointestinal: Negative.    Genitourinary: Negative.    Neurological: Negative.    Psychiatric/Behavioral: Negative.     Allergic/Immunologic: Negative.        A 10 point review of systems was performed and all the pertinent positives have been mentioned. Rest of review of systems was negative.        PHYSICAL EXAM:     Vitals:    04/22/24 1342   BP: 126/62   Pulse: 82   Resp: 15    Body mass index is 25.94 kg/m².  Weight: 70.7 kg (155 lb 14.6 oz)   Height: 5' 5" (165.1 cm)     Physical Exam  Constitutional:       Appearance: Normal appearance. She is well-developed.   HENT:      Head: Normocephalic.   Eyes:      Pupils: Pupils are equal, round, and reactive to light.   Cardiovascular:      Rate and Rhythm: Normal rate and regular rhythm.   Pulmonary:      Effort: Pulmonary effort is normal.      Breath sounds: Normal breath sounds.   Abdominal:      General: Bowel sounds are normal.      Palpations: Abdomen is soft.      Tenderness: There is no abdominal tenderness.   Musculoskeletal:         General: Normal range of motion.      Cervical back: Normal range of motion and neck supple.   Skin:     General: Skin is warm.   Neurological:      Mental Status: She is alert and oriented to person, place, and time.         DATA:     Laboratory:  CBC:  Recent Labs   Lab 11/10/23  0602 11/11/23  0400 11/13/23  0314   WBC 6.34 6.41 5.72   Hemoglobin 10.8 L 11.5 L 10.3 L   Hematocrit 33.8 L 36.8 L 32.5 L   Platelets 270 318 296       CHEMISTRIES:  Recent Labs   Lab 06/23/21  0800 08/02/21  0901 01/26/22  1040 11/02/22  0913 11/07/23  0100 11/07/23  0841 11/08/23  2355 11/10/23  1221 01/29/24  1434   Glucose 118 H 152 H 122 H   < > 253 H 230 H 189 H 214 H 114 H   Sodium 144 141 139   < > 138 140 138 137 143   Potassium 3.7 3.6 3.7   < > 4.7 4.8 3.5 3.8 3.4 L   BUN 13 17 14   < > 13 15 18 15 12   Creatinine 0.8 0.9 0.9   < > 1.1 1.1 0.9 0.8 0.8   eGFR if  " American >60 >60 >60  --   --   --   --   --   --    eGFR if non African American >60 >60 >60  --   --   --   --   --   --    Calcium 9.5 10.1 10.0   < > 8.9 9.2 8.3 L 8.6 L 9.0   Magnesium  --   --   --    < > 2.3 2.3  --  1.6  --     < > = values in this interval not displayed.       CARDIAC BIOMARKERS:  Recent Labs   Lab 11/06/23  1617 11/06/23 2026 11/07/23  0100   Troponin I 0.079 H 0.085 H 0.080 H       COAGS:  Recent Labs   Lab 11/07/23  0841 11/09/23  1510   INR 1.1  1.1 1.2       LIPIDS/LFTS:  Recent Labs   Lab 01/26/22  1040 11/02/22  0913 05/31/23  0928 11/06/23  1306 11/06/23 2026 11/07/23  0841 11/08/23  2355   Cholesterol 134  --  130  --   --  141  --    Triglycerides 134  --  151 H  --   --  101  --    HDL 49  --  44  --   --  39 L  --    LDL Cholesterol 58.2 L  --  55.8 L  --   --  81.8  --    Non-HDL Cholesterol 85  --  86  --   --  102  --    AST 14   < > 10   < > 78 H 55 H 59 H   ALT 12   < > 8 L   < > 56 H 52 H 53 H    < > = values in this interval not displayed.       Hemoglobin A1C   Date Value Ref Range Status   11/07/2023 6.3 (H) 4.0 - 5.6 % Final     Comment:     ADA Screening Guidelines:  5.7-6.4%  Consistent with prediabetes  >or=6.5%  Consistent with diabetes    High levels of fetal hemoglobin interfere with the HbA1C  assay. Heterozygous hemoglobin variants (HbS, HgC, etc)do  not significantly interfere with this assay.   However, presence of multiple variants may affect accuracy.     05/31/2023 6.1 (H) 4.0 - 5.6 % Final     Comment:     ADA Screening Guidelines:  5.7-6.4%  Consistent with prediabetes  >or=6.5%  Consistent with diabetes    High levels of fetal hemoglobin interfere with the HbA1C  assay. Heterozygous hemoglobin variants (HbS, HgC, etc)do  not significantly interfere with this assay.   However, presence of multiple variants may affect accuracy.     11/02/2022 6.4 (H) 4.0 - 5.6 % Final     Comment:     ADA Screening Guidelines:  5.7-6.4%  Consistent with  prediabetes  >or=6.5%  Consistent with diabetes    High levels of fetal hemoglobin interfere with the HbA1C  assay. Heterozygous hemoglobin variants (HbS, HgC, etc)do  not significantly interfere with this assay.   However, presence of multiple variants may affect accuracy.         TSH  Recent Labs   Lab 11/02/22  0913 05/31/23  0928   TSH 1.833 1.555       The ASCVD Risk score (Charo LORENZO, et al., 2019) failed to calculate for the following reasons:    The patient has a prior MI or stroke diagnosis       BNP    Lab Results   Component Value Date/Time    BNP 2,007 (H) 01/29/2024 02:34 PM    BNP 1,839 (H) 11/06/2023 01:06 PM            ECHO    Results for orders placed during the hospital encounter of 11/06/23    Echo    Interpretation Summary    Left Ventricle: The left ventricle is mildly dilated. Normal wall thickness. There is severely reduced systolic function with a visually estimated ejection fraction of 20 - 25%.    Right Ventricle: Normal right ventricular cavity size. Systolic function is normal.    Left Atrium: Left atrium is severely dilated.    Right Atrium: Right atrium is moderately dilated.    Mitral Valve: There is mild regurgitation.    Tricuspid Valve: There is mild to moderate regurgitation.    Pulmonary Artery: The estimated pulmonary artery systolic pressure is 49 mmHg.    IVC/SVC: Intermediate venous pressure at 8 mmHg.    Pericardium: There is a trivial effusion.      STRESS TEST    No results found for this or any previous visit.        CATH    Results for orders placed during the hospital encounter of 11/06/23    Cardiac catheterization    Conclusion    No significant flow-limiting coronary artery stenosis.    The pre-procedure left ventricular end diastolic pressure was 25.    The estimated blood loss was <50 mL.    Angiogram:    Left main:  No significant stenosis    Lad:  Luminal irregularities    Circumflex:  Luminal irregularities    RCA:  Luminal irregularities    Right heart  catheterization was performed    Right heart catheterization:    Pulmonary capillary wedge pressure:  24 mmHg    PA 52/25 with a mean of 37 mmHg    RV 50/10 with a RVEDP of 21 mmHg    RA 19 mmHg                    The procedure log was documented by Documenter: Matt Chambers, ALIDA; Dread Whiteside, ALIDA and verified by Asher Gonzalez MD.    Date: 11/29/2023  Time: 8:36 AM        ASSESSMENT AND PLAN     Patient Active Problem List   Diagnosis    Hypertension, essential, benign    Type 2 diabetes mellitus with microalbuminuria, without long-term current use of insulin    History of stroke with current residual effects    Macroalbuminuric diabetic nephropathy    Hyperlipidemia associated with type 2 diabetes mellitus    Iron deficiency anemia due to chronic blood loss    Multifocal atrial tachycardia    Chronic septic pulmonary embolism without acute cor pulmonale    VF (ventricular fibrillation)    CHF (congestive heart failure)    AICD (automatic cardioverter/defibrillator) present    Calculus of gallbladder without cholecystitis without obstruction    V-tach    Hemiplegia and hemiparesis following cerebral infarction affecting right non-dominant side    Personal history of nicotine dependence    Age-related osteoporosis without current pathological fracture    Nonischemic cardiomyopathy    Calcification of aorta    COPD         No orders of the defined types were placed in this encounter.      Nonischemic cardiomyopathy:  Continue current therapy    Lasix as needed     AICD, [employer], single chamber.  SVT seen on device interrogation today. Event monitor ordered.       Follow-up in 3 m      Thank you very much for involving me in the care of your patient.  Please do not hesitate to contact me if there are any questions.      Asher Gonzalez MD, FACC, [facility]  Interventional Cardiologist, [facility].           This note was dictated with the help of speech recognition software.  There might be un-intended  errors and/or substitutions.

## 2024-05-07 DIAGNOSIS — E11.29 TYPE 2 DIABETES MELLITUS WITH MICROALBUMINURIA, WITHOUT LONG-TERM CURRENT USE OF INSULIN: ICD-10-CM

## 2024-05-07 DIAGNOSIS — R80.9 TYPE 2 DIABETES MELLITUS WITH MICROALBUMINURIA, WITHOUT LONG-TERM CURRENT USE OF INSULIN: ICD-10-CM

## 2024-05-07 RX ORDER — METFORMIN HYDROCHLORIDE 1000 MG/1
1000 TABLET ORAL 2 TIMES DAILY
Qty: 180 TABLET | Refills: 0 | Status: SHIPPED | OUTPATIENT
Start: 2024-05-07

## 2024-05-07 RX ORDER — CYCLOBENZAPRINE HCL 5 MG
TABLET ORAL
Qty: 60 TABLET | Refills: 0 | Status: SHIPPED | OUTPATIENT
Start: 2024-05-07 | End: 2024-06-03

## 2024-05-07 RX ORDER — DAPAGLIFLOZIN 10 MG/1
10 TABLET, FILM COATED ORAL
Qty: 90 TABLET | Refills: 0 | Status: SHIPPED | OUTPATIENT
Start: 2024-05-07

## 2024-05-07 NOTE — TELEPHONE ENCOUNTER
Refill Routing Note   Medication(s) are not appropriate for processing by [facility] for the following reason(s):        Required labs outdated  Outside of protocol    [facility] action(s):  Defer  Route               Appointments  past 12m or future 3m with PCP    Date Provider   Last Visit   12/27/2023 Zeinab Noble MD   Next Visit   6/24/2024 Zeinab Noble MD   ED visits in past 90 days: 0        Note composed:12:23 PM 05/07/2024

## 2024-05-07 NOTE — TELEPHONE ENCOUNTER
No care due was identified.  [facility] Embedded Care Due Messages. Reference number: [id number].   5/07/2024 9:00:46 AM CDT

## 2024-06-03 RX ORDER — CYCLOBENZAPRINE HCL 5 MG
TABLET ORAL
Qty: 60 TABLET | Refills: 0 | Status: SHIPPED | OUTPATIENT
Start: 2024-06-03

## 2024-06-03 NOTE — TELEPHONE ENCOUNTER
No care due was identified.  [facility] Embedded Care Due Messages. Reference number: [id number].   6/03/2024 2:25:03 PM CDT

## 2024-06-24 ENCOUNTER — LAB VISIT (OUTPATIENT)
Dept: LAB | Facility: HOSPITAL | Age: 69
End: 2024-06-24
Attending: INTERNAL MEDICINE
Payer: MEDICARE

## 2024-06-24 ENCOUNTER — OFFICE VISIT (OUTPATIENT)
Dept: FAMILY MEDICINE | Facility: CLINIC | Age: 69
End: 2024-06-24
Payer: MEDICARE

## 2024-06-24 VITALS
TEMPERATURE: 98 F | WEIGHT: 157.63 LBS | SYSTOLIC BLOOD PRESSURE: 126 MMHG | OXYGEN SATURATION: 99 % | BODY MASS INDEX: 26.26 KG/M2 | HEIGHT: 65 IN | HEART RATE: 92 BPM | DIASTOLIC BLOOD PRESSURE: 70 MMHG

## 2024-06-24 DIAGNOSIS — E11.29 TYPE 2 DIABETES MELLITUS WITH MICROALBUMINURIA, WITHOUT LONG-TERM CURRENT USE OF INSULIN: ICD-10-CM

## 2024-06-24 DIAGNOSIS — R80.9 TYPE 2 DIABETES MELLITUS WITH MICROALBUMINURIA, WITHOUT LONG-TERM CURRENT USE OF INSULIN: ICD-10-CM

## 2024-06-24 DIAGNOSIS — R80.9 TYPE 2 DIABETES MELLITUS WITH MICROALBUMINURIA, WITHOUT LONG-TERM CURRENT USE OF INSULIN: Primary | ICD-10-CM

## 2024-06-24 DIAGNOSIS — E11.29 TYPE 2 DIABETES MELLITUS WITH MICROALBUMINURIA, WITHOUT LONG-TERM CURRENT USE OF INSULIN: Primary | ICD-10-CM

## 2024-06-24 LAB
ALBUMIN SERPL BCP-MCNC: 4.3 G/DL (ref 3.5–5.2)
ALP SERPL-CCNC: 40 U/L (ref 55–135)
ALT SERPL W/O P-5'-P-CCNC: 10 U/L (ref 10–44)
ANION GAP SERPL CALC-SCNC: 11 MMOL/L (ref 8–16)
AST SERPL-CCNC: 13 U/L (ref 10–40)
BASOPHILS # BLD AUTO: 0.07 K/UL (ref 0–0.2)
BASOPHILS NFR BLD: 1.3 % (ref 0–1.9)
BILIRUB SERPL-MCNC: 0.4 MG/DL (ref 0.1–1)
BUN SERPL-MCNC: 19 MG/DL (ref 8–23)
CALCIUM SERPL-MCNC: 9.9 MG/DL (ref 8.7–10.5)
CHLORIDE SERPL-SCNC: 106 MMOL/L (ref 95–110)
CO2 SERPL-SCNC: 25 MMOL/L (ref 23–29)
CREAT SERPL-MCNC: 1.1 MG/DL (ref 0.5–1.4)
DIFFERENTIAL METHOD BLD: ABNORMAL
EOSINOPHIL # BLD AUTO: 0.2 K/UL (ref 0–0.5)
EOSINOPHIL NFR BLD: 4.1 % (ref 0–8)
ERYTHROCYTE [DISTWIDTH] IN BLOOD BY AUTOMATED COUNT: 14.6 % (ref 11.5–14.5)
EST. GFR  (NO RACE VARIABLE): 54 ML/MIN/1.73 M^2
ESTIMATED AVG GLUCOSE: 137 MG/DL (ref 68–131)
GLUCOSE SERPL-MCNC: 105 MG/DL (ref 70–110)
HBA1C MFR BLD: 6.4 % (ref 4–5.6)
HCT VFR BLD AUTO: 38.7 % (ref 37–48.5)
HGB BLD-MCNC: 11.4 G/DL (ref 12–16)
IMM GRANULOCYTES # BLD AUTO: 0.01 K/UL (ref 0–0.04)
IMM GRANULOCYTES NFR BLD AUTO: 0.2 % (ref 0–0.5)
LYMPHOCYTES # BLD AUTO: 2.2 K/UL (ref 1–4.8)
LYMPHOCYTES NFR BLD: 39.8 % (ref 18–48)
MCH RBC QN AUTO: 27.6 PG (ref 27–31)
MCHC RBC AUTO-ENTMCNC: 29.5 G/DL (ref 32–36)
MCV RBC AUTO: 94 FL (ref 82–98)
MONOCYTES # BLD AUTO: 0.5 K/UL (ref 0.3–1)
MONOCYTES NFR BLD: 10 % (ref 4–15)
NEUTROPHILS # BLD AUTO: 2.4 K/UL (ref 1.8–7.7)
NEUTROPHILS NFR BLD: 44.6 % (ref 38–73)
NRBC BLD-RTO: 0 /100 WBC
PLATELET # BLD AUTO: 343 K/UL (ref 150–450)
PMV BLD AUTO: 10.6 FL (ref 9.2–12.9)
POTASSIUM SERPL-SCNC: 4.8 MMOL/L (ref 3.5–5.1)
PROT SERPL-MCNC: 7.6 G/DL (ref 6–8.4)
RBC # BLD AUTO: 4.13 M/UL (ref 4–5.4)
SODIUM SERPL-SCNC: 142 MMOL/L (ref 136–145)
WBC # BLD AUTO: 5.4 K/UL (ref 3.9–12.7)

## 2024-06-24 PROCEDURE — 1101F PT FALLS ASSESS-DOCD LE1/YR: CPT | Mod: HCNC,CPTII,S$GLB, | Performed by: INTERNAL MEDICINE

## 2024-06-24 PROCEDURE — 99214 OFFICE O/P EST MOD 30 MIN: CPT | Mod: HCNC,S$GLB,, | Performed by: INTERNAL MEDICINE

## 2024-06-24 PROCEDURE — 85025 COMPLETE CBC W/AUTO DIFF WBC: CPT | Mod: HCNC | Performed by: INTERNAL MEDICINE

## 2024-06-24 PROCEDURE — 1160F RVW MEDS BY RX/DR IN RCRD: CPT | Mod: HCNC,CPTII,S$GLB, | Performed by: INTERNAL MEDICINE

## 2024-06-24 PROCEDURE — 3074F SYST BP LT 130 MM HG: CPT | Mod: HCNC,CPTII,S$GLB, | Performed by: INTERNAL MEDICINE

## 2024-06-24 PROCEDURE — 3008F BODY MASS INDEX DOCD: CPT | Mod: HCNC,CPTII,S$GLB, | Performed by: INTERNAL MEDICINE

## 2024-06-24 PROCEDURE — 3288F FALL RISK ASSESSMENT DOCD: CPT | Mod: HCNC,CPTII,S$GLB, | Performed by: INTERNAL MEDICINE

## 2024-06-24 PROCEDURE — 1126F AMNT PAIN NOTED NONE PRSNT: CPT | Mod: HCNC,CPTII,S$GLB, | Performed by: INTERNAL MEDICINE

## 2024-06-24 PROCEDURE — 99999 PR PBB SHADOW E&M-EST. PATIENT-LVL III: CPT | Mod: PBBFAC,HCNC,, | Performed by: INTERNAL MEDICINE

## 2024-06-24 PROCEDURE — 36415 COLL VENOUS BLD VENIPUNCTURE: CPT | Mod: HCNC,PO | Performed by: INTERNAL MEDICINE

## 2024-06-24 PROCEDURE — 3078F DIAST BP <80 MM HG: CPT | Mod: HCNC,CPTII,S$GLB, | Performed by: INTERNAL MEDICINE

## 2024-06-24 PROCEDURE — 80053 COMPREHEN METABOLIC PANEL: CPT | Mod: HCNC | Performed by: INTERNAL MEDICINE

## 2024-06-24 PROCEDURE — 4010F ACE/ARB THERAPY RXD/TAKEN: CPT | Mod: HCNC,CPTII,S$GLB, | Performed by: INTERNAL MEDICINE

## 2024-06-24 PROCEDURE — 83036 HEMOGLOBIN GLYCOSYLATED A1C: CPT | Mod: HCNC | Performed by: INTERNAL MEDICINE

## 2024-06-24 PROCEDURE — 1159F MED LIST DOCD IN RCRD: CPT | Mod: HCNC,CPTII,S$GLB, | Performed by: INTERNAL MEDICINE

## 2024-06-24 PROCEDURE — 2023F DILAT RTA XM W/O RTNOPTHY: CPT | Mod: HCNC,CPTII,S$GLB, | Performed by: INTERNAL MEDICINE

## 2024-06-24 PROCEDURE — G2211 COMPLEX E/M VISIT ADD ON: HCPCS | Mod: HCNC,S$GLB,, | Performed by: INTERNAL MEDICINE

## 2024-06-24 RX ORDER — METFORMIN HYDROCHLORIDE 1000 MG/1
1000 TABLET ORAL 2 TIMES DAILY
Qty: 180 TABLET | Refills: 0 | Status: SHIPPED | OUTPATIENT
Start: 2024-06-24

## 2024-06-24 RX ORDER — BLOOD-GLUCOSE CONTROL, NORMAL
1 EACH MISCELLANEOUS DAILY
Qty: 200 EACH | Refills: 3 | Status: SHIPPED | OUTPATIENT
Start: 2024-06-24 | End: 2025-06-24

## 2024-06-24 RX ORDER — INSULIN PUMP SYRINGE, 3 ML
EACH MISCELLANEOUS
Qty: 1 EACH | Refills: 0 | Status: SHIPPED | OUTPATIENT
Start: 2024-06-24 | End: 2025-06-24

## 2024-06-24 RX ORDER — AMLODIPINE BESYLATE 10 MG/1
10 TABLET ORAL
COMMUNITY
Start: 2024-02-09 | End: 2024-06-24

## 2024-06-24 NOTE — PROGRESS NOTES
SUBJECTIVE     Chief Complaint   Patient presents with    Follow-up    Medication Refill       HPI  Harriett Oglesby is a 69 y.o. female with multiple medical diagnoses as listed in the medical history and problem list that presents for evaluation for DM2. Pt has been doing  well  since last visit. she is fully compliant with meds and denies any adverse side effects. Pt is  mostly compliant  with an ADA diet and does exercise by walking twice weekly. Pt does check her blood sugar levels at home with fasting readings ranging from   . Pt denies any hypoglycemia since last visit. Pt presents for med refills today and is without any other complaints.     PAST MEDICAL HISTORY:  Past Medical History:   Diagnosis Date    Acute respiratory failure with hypoxia 11/6/2023    Adult BMI 31.0-31.9 kg/sq m 12/3/2018    Anemia     CHF (congestive heart failure) 11/6/2023    CVA (cerebral vascular accident)     Residual weakness in the left arm and hand    Essential thrombocythemia     Hyperlipidemia associated with type 2 diabetes mellitus     Hypertension     Osteoporosis     Pulmonary embolism 11/6/2023    Type 2 diabetes mellitus        PAST SURGICAL HISTORY:  Past Surgical History:   Procedure Laterality Date    CATHETERIZATION OF BOTH LEFT AND RIGHT HEART N/A 11/7/2023    Procedure: CATHETERIZATION, HEART, BOTH LEFT AND RIGHT;  Surgeon: Asher Gonzalez MD;  Location: [facility] CATH LAB;  Service: Cardiology;  Laterality: N/A;    COLONOSCOPY N/A 1/4/2019    Procedure: COLONOSCOPY;  Surgeon: James Ozuna MD;  Location: [facility] ENDO;  Service: Endoscopy;  Laterality: N/A;    COLONOSCOPY N/A 3/1/2021    Procedure: COLONOSCOPY;  Surgeon: Nelida Cook MD;  Location: [facility] ENDO;  Service: Endoscopy;  Laterality: N/A;    ESOPHAGOGASTRODUODENOSCOPY N/A 3/1/2021    Procedure: EGD (ESOPHAGOGASTRODUODENOSCOPY);  Surgeon: Nelida Cook MD;  Location: [facility] ENDO;  Service: Endoscopy;  Laterality: N/A;  rapid covid > 50 miles  away, instructions mailed -ml    HEMORRHOID SURGERY      INSERTION, PULSE GENERATOR, ICD, SINGLE CHAMBER Left 11/9/2023    Procedure: INSERTION, ICD GENERATOR, SINGLE CHAMBER;  Surgeon: Chilo Da Silva MD;  Location: [facility] CATH LAB;  Service: Cardiology;  Laterality: Left;       SOCIAL HISTORY:  Social History     Socioeconomic History    Marital status: Single   Tobacco Use    Smoking status: Former     Types: Cigarettes    Smokeless tobacco: Never   Substance and Sexual Activity    Alcohol use: No    Drug use: No    Sexual activity: Not Currently     Social Determinants of Health     Financial Resource Strain: Low Risk  (2/22/2024)    Overall Financial Resource Strain (CARDIA)     Difficulty of Paying Living Expenses: Not hard at all   Food Insecurity: No Food Insecurity (2/22/2024)    Hunger Vital Sign     Worried About Running Out of Food in the Last Year: Never true     Ran Out of Food in the Last Year: Never true   Transportation Needs: Unmet Transportation Needs (2/22/2024)    PRAPARE - Transportation     Lack of Transportation (Medical): Yes     Lack of Transportation (Non-Medical): Yes   Physical Activity: Inactive (2/22/2024)    Exercise Vital Sign     Days of Exercise per Week: 0 days     Minutes of Exercise per Session: 0 min   Stress: No Stress Concern Present (2/22/2024)    [employer] - Occupational Stress Questionnaire     Feeling of Stress : Not at all   Housing Stability: Low Risk  (2/22/2024)    Housing Stability Vital Sign     Unable to Pay for Housing in the Last Year: No     Number of Places Lived in the Last Year: 1     Unstable Housing in the Last Year: No       FAMILY HISTORY:  Family History   Problem Relation Name Age of Onset    Pancreatic cancer Mother      Emphysema Father      Cancer Father          Lung    Breast cancer Cousin      Breast cancer Cousin      Colon cancer Neg Hx      Esophageal cancer Neg Hx         ALLERGIES AND MEDICATIONS: updated and  reviewed.  Review of patient's allergies indicates:  No Known Allergies  Current Outpatient Medications   Medication Sig Dispense Refill    apixaban (ELIQUIS) 5 mg Tab Take 1 tablet (5 mg total) by mouth 2 (two) times daily. 60 tablet 11    aspirin (ECOTRIN) 81 MG EC tablet Take 81 mg by mouth once daily.       atorvastatin (LIPITOR) 40 MG tablet Take 1 tablet (40 mg total) by mouth every evening. 90 tablet 3    carvediloL (COREG) 12.5 MG tablet Take 1 tablet (12.5 mg total) by mouth 2 (two) times daily with meals. 60 tablet 11    cetirizine (ZYRTEC) 10 MG tablet Take 10 mg by mouth once daily.      empagliflozin (JARDIANCE) 10 mg tablet Take 1 tablet (10 mg total) by mouth once daily. 90 tablet 3    furosemide (LASIX) 20 MG tablet TAKE 1-2 PILLS DAILY AS NEEDED FOR FLUID OVERLOAD). 180 tablet 1    spironolactone (ALDACTONE) 25 MG tablet Take 1 tablet (25 mg total) by mouth once daily. 90 tablet 3    valsartan (DIOVAN) 160 MG tablet Take 1 tablet (160 mg total) by mouth once daily. 90 tablet 3    blood sugar diagnostic Strp 1 strip by Misc.(Non-Drug; Combo Route) route Daily. 200 each 3    blood-glucose meter kit Use as instructed 1 each 0    lancets 30 gauge Misc 1 lancet  by Misc.(Non-Drug; Combo Route) route Daily. 200 each 3    metFORMIN (GLUCOPHAGE) 1000 MG tablet Take 1 tablet (1,000 mg total) by mouth 2 (two) times daily. for diabetes. 180 tablet 0     No current facility-administered medications for this visit.       ROS  Review of Systems   Constitutional:  Negative for chills and fever.   HENT:  Negative for hearing loss and sore throat.    Eyes:  Negative for visual disturbance.   Respiratory:  Negative for cough and shortness of breath.    Cardiovascular:  Negative for chest pain, palpitations and leg swelling.   Gastrointestinal:  Negative for abdominal pain, constipation, diarrhea, nausea and vomiting.   Genitourinary:  Negative for dysuria, frequency and urgency.   Musculoskeletal:  Negative for  "arthralgias, joint swelling and myalgias.   Skin:  Negative for rash and wound.   Neurological:  Negative for headaches.   Psychiatric/Behavioral:  Negative for agitation and confusion. The patient is not nervous/anxious.          OBJECTIVE     Physical Exam  Vitals:    06/24/24 0819   BP: 126/70   Pulse: 92   Temp: 97.5 °F (36.4 °C)    Body mass index is 26.23 kg/m².  Weight: 71.5 kg (157 lb 10.1 oz)   Height: 5' 5" (165.1 cm)     Physical Exam  Constitutional:       General: She is not in acute distress.     Appearance: She is well-developed.   HENT:      Head: Normocephalic and atraumatic.      Right Ear: External ear normal.      Left Ear: External ear normal.      Nose: Nose normal.   Eyes:      General: No scleral icterus.        Right eye: No discharge.         Left eye: No discharge.      Conjunctiva/sclera: Conjunctivae normal.   Neck:      Vascular: No JVD.      Trachea: No tracheal deviation.   Cardiovascular:      Rate and Rhythm: Normal rate and regular rhythm.      Heart sounds: No murmur heard.     No friction rub. No gallop.   Pulmonary:      Effort: Pulmonary effort is normal. No respiratory distress.      Breath sounds: Normal breath sounds. No wheezing.   Abdominal:      General: Bowel sounds are normal. There is no distension.      Palpations: Abdomen is soft. There is no mass.      Tenderness: There is no abdominal tenderness. There is no guarding or rebound.   Musculoskeletal:         General: No tenderness or deformity. Normal range of motion.      Cervical back: Normal range of motion and neck supple.   Skin:     General: Skin is warm and dry.      Findings: No erythema or rash.   Neurological:      Mental Status: She is alert and oriented to person, place, and time.      Motor: No abnormal muscle tone.      Coordination: Coordination normal.   Psychiatric:         Behavior: Behavior normal.         Thought Content: Thought content normal.         Judgment: Judgment normal.           Health " Maintenance         Date Due Completion Date    TETANUS VACCINE Never done ---    RSV Vaccine (Age 60+ and Pregnant patients) (1 - 1-dose 60+ series) Never done ---    Hemoglobin A1c 05/07/2024 11/7/2023    Diabetes Urine Screening 05/31/2024 5/31/2023    Foot Exam 11/06/2024 11/6/2023 (Done)    Override on 11/6/2023: Done    Lipid Panel 11/07/2024 11/7/2023    Mammogram 01/29/2025 1/29/2024    Eye Exam 03/11/2025 3/11/2024    High Dose Statin 04/22/2025 4/22/2024    DEXA Scan 11/29/2025 11/29/2022    Colorectal Cancer Screening 03/01/2028 3/1/2021              ASSESSMENT     69 y.o. female with     1. Type 2 diabetes mellitus with microalbuminuria, without long-term current use of insulin        PLAN:     1. Type 2 diabetes mellitus with microalbuminuria, without long-term current use of insulin  - Check labs   - CBC Auto Differential; Future  - Comprehensive Metabolic Panel; Future  - Hemoglobin A1C; Future  - Microalbumin/Creatinine Ratio, Urine; Future  - metFORMIN (GLUCOPHAGE) 1000 MG tablet; Take 1 tablet (1,000 mg total) by mouth 2 (two) times daily. for diabetes.  Dispense: 180 tablet; Refill: 0  - blood-glucose meter kit; Use as instructed  Dispense: 1 each; Refill: 0  - blood sugar diagnostic Strp; 1 strip by Misc.(Non-Drug; Combo Route) route Daily.  Dispense: 200 each; Refill: 3  - lancets 30 gauge Misc; 1 lancet  by Misc.(Non-Drug; Combo Route) route Daily.  Dispense: 200 each; Refill: 3            RTC in 6 months     Zeinab Noble MD  06/24/2024 1:22 PM        No follow-ups on file.

## 2024-07-11 ENCOUNTER — LAB VISIT (OUTPATIENT)
Dept: LAB | Facility: HOSPITAL | Age: 69
End: 2024-07-11
Attending: INTERNAL MEDICINE
Payer: MEDICARE

## 2024-07-11 DIAGNOSIS — I50.9 CONGESTIVE HEART FAILURE, UNSPECIFIED HF CHRONICITY, UNSPECIFIED HEART FAILURE TYPE: ICD-10-CM

## 2024-07-11 LAB
ALBUMIN SERPL BCP-MCNC: 4.6 G/DL (ref 3.5–5.2)
ALP SERPL-CCNC: 46 U/L (ref 55–135)
ALT SERPL W/O P-5'-P-CCNC: 9 U/L (ref 10–44)
ANION GAP SERPL CALC-SCNC: 12 MMOL/L (ref 8–16)
AST SERPL-CCNC: 15 U/L (ref 10–40)
BILIRUB SERPL-MCNC: 0.3 MG/DL (ref 0.1–1)
BUN SERPL-MCNC: 26 MG/DL (ref 8–23)
CALCIUM SERPL-MCNC: 10.1 MG/DL (ref 8.7–10.5)
CHLORIDE SERPL-SCNC: 102 MMOL/L (ref 95–110)
CO2 SERPL-SCNC: 27 MMOL/L (ref 23–29)
CREAT SERPL-MCNC: 1.1 MG/DL (ref 0.5–1.4)
EST. GFR  (NO RACE VARIABLE): 54 ML/MIN/1.73 M^2
GLUCOSE SERPL-MCNC: 118 MG/DL (ref 70–110)
POTASSIUM SERPL-SCNC: 4.5 MMOL/L (ref 3.5–5.1)
PROT SERPL-MCNC: 8.1 G/DL (ref 6–8.4)
SODIUM SERPL-SCNC: 141 MMOL/L (ref 136–145)

## 2024-07-11 PROCEDURE — 80053 COMPREHEN METABOLIC PANEL: CPT | Mod: HCNC | Performed by: INTERNAL MEDICINE

## 2024-07-11 PROCEDURE — 36415 COLL VENOUS BLD VENIPUNCTURE: CPT | Mod: HCNC | Performed by: INTERNAL MEDICINE

## 2024-07-29 ENCOUNTER — OFFICE VISIT (OUTPATIENT)
Dept: CARDIOLOGY | Facility: CLINIC | Age: 69
End: 2024-07-29
Payer: MEDICARE

## 2024-07-29 VITALS
HEIGHT: 64 IN | WEIGHT: 156.5 LBS | HEART RATE: 77 BPM | DIASTOLIC BLOOD PRESSURE: 60 MMHG | OXYGEN SATURATION: 99 % | BODY MASS INDEX: 26.72 KG/M2 | SYSTOLIC BLOOD PRESSURE: 112 MMHG

## 2024-07-29 DIAGNOSIS — Z95.810 AICD (AUTOMATIC CARDIOVERTER/DEFIBRILLATOR) PRESENT: ICD-10-CM

## 2024-07-29 DIAGNOSIS — I50.9 CONGESTIVE HEART FAILURE, UNSPECIFIED HF CHRONICITY, UNSPECIFIED HEART FAILURE TYPE: Primary | ICD-10-CM

## 2024-07-29 DIAGNOSIS — I47.20 V-TACH: ICD-10-CM

## 2024-07-29 DIAGNOSIS — E78.5 HYPERLIPIDEMIA ASSOCIATED WITH TYPE 2 DIABETES MELLITUS: ICD-10-CM

## 2024-07-29 DIAGNOSIS — I42.0 CONGESTIVE CARDIOMYOPATHY: ICD-10-CM

## 2024-07-29 DIAGNOSIS — E11.69 HYPERLIPIDEMIA ASSOCIATED WITH TYPE 2 DIABETES MELLITUS: ICD-10-CM

## 2024-07-29 DIAGNOSIS — R00.2 HEART PALPITATIONS: ICD-10-CM

## 2024-07-29 PROCEDURE — 3288F FALL RISK ASSESSMENT DOCD: CPT | Mod: HCNC,CPTII,S$GLB, | Performed by: INTERNAL MEDICINE

## 2024-07-29 PROCEDURE — 93282 PRGRMG EVAL IMPLANTABLE DFB: CPT | Mod: 26,HCNC,, | Performed by: INTERNAL MEDICINE

## 2024-07-29 PROCEDURE — 1101F PT FALLS ASSESS-DOCD LE1/YR: CPT | Mod: HCNC,CPTII,S$GLB, | Performed by: INTERNAL MEDICINE

## 2024-07-29 PROCEDURE — 99214 OFFICE O/P EST MOD 30 MIN: CPT | Mod: HCNC,25,S$GLB, | Performed by: INTERNAL MEDICINE

## 2024-07-29 PROCEDURE — 99999 PR PBB SHADOW E&M-EST. PATIENT-LVL III: CPT | Mod: PBBFAC,HCNC,, | Performed by: INTERNAL MEDICINE

## 2024-07-29 PROCEDURE — 1126F AMNT PAIN NOTED NONE PRSNT: CPT | Mod: HCNC,CPTII,S$GLB, | Performed by: INTERNAL MEDICINE

## 2024-07-29 PROCEDURE — 4010F ACE/ARB THERAPY RXD/TAKEN: CPT | Mod: HCNC,CPTII,S$GLB, | Performed by: INTERNAL MEDICINE

## 2024-07-29 PROCEDURE — 3078F DIAST BP <80 MM HG: CPT | Mod: HCNC,CPTII,S$GLB, | Performed by: INTERNAL MEDICINE

## 2024-07-29 PROCEDURE — 3008F BODY MASS INDEX DOCD: CPT | Mod: HCNC,CPTII,S$GLB, | Performed by: INTERNAL MEDICINE

## 2024-07-29 PROCEDURE — 3044F HG A1C LEVEL LT 7.0%: CPT | Mod: HCNC,CPTII,S$GLB, | Performed by: INTERNAL MEDICINE

## 2024-07-29 PROCEDURE — 3074F SYST BP LT 130 MM HG: CPT | Mod: HCNC,CPTII,S$GLB, | Performed by: INTERNAL MEDICINE

## 2024-07-29 NOTE — PROGRESS NOTES
CARDIOVASCULAR CONSULTATION    REASON FOR CONSULT:   Harriett Oglesby is a 69 y.o. female who presents for   Chief Complaint   Patient presents with    Follow-up          HISTORY OF PRESENT ILLNESS:     Patient is a pleasant 68-year-old lady.  Came into hospital.  Found to have nonischemic cardiomyopathy.  Underwent AICD implantation for secondary prevention.  Doing fine.  Main complaint is dyspnea on exertion and tiredness.  [employer] AICD, single-chamber.        Cardiology consult from recent hospitalization:    VF (ventricular fibrillation)  VFib arrest.  Prolonged QTC.  On lidocaine drip.  Monitor closely in ICU.  Plan for cardiac catheterization in a.m.     11/7: Risks, benefits and alternatives of the catheterization procedure were discussed with the patient.The risks of coronary angiography include but are not limited to: bleeding, infection, death, heart attack, arrhythmia, kidney injury or failure, potential need for dialysis, allergic reactions, stroke, need for emergency surgery, hematoma, pseudoaneurysm etc.  Should stenting be indicated, the patient has agreed to dual anti-platelet therapy for 1-consecutive year with a drug-eluting stent and a minimum of 1-month with the use of a bare metal stent. Additionally, pt is aware that non-compliance is likely to result in stent clotting with heart attack, heart failure, and/or death  The risks of moderate sedation include hypotension, respiratory depression, arrhythmias, bronchospasm, and death. Informed consent was obtained and the  patient is agreeable to proceed with the procedure. Consent was placed on the chart.  Continue lidocaine drip  11/8:  Nonischemic cardiomyopathy.  No significant stenosis on angiogram yesterday.  Plan for AICD in a.m..  Continue lidocaine drip        11/9: s/p aicd today.      CHF (congestive heart failure)  Patient is identified as having Combined Systolic and Diastolic heart failure that is Acute.Latest ECHO performed and  demonstrates- Results for orders placed during the hospital encounter of 11/06/23     Echo     Interpretation Summary    Left Ventricle: The left ventricle is mildly dilated. Normal wall thickness. There is severely reduced systolic function with a visually estimated ejection fraction of 20 - 25%.    Right Ventricle: Normal right ventricular cavity size. Systolic function is normal.    Left Atrium: Left atrium is severely dilated.    Right Atrium: Right atrium is moderately dilated.    Mitral Valve: There is mild regurgitation.    Tricuspid Valve: There is mild to moderate regurgitation.    Pulmonary Artery: The estimated pulmonary artery systolic pressure is 49 mmHg.    IVC/SVC: Intermediate venous pressure at 8 mmHg.    Pericardium: There is a trivial effusion.  . Continue Furosemide and monitor clinical status closely. Monitor on telemetry. Patient is on CHF pathway.  Monitor strict Is&Os and daily weights.  Place on fluid restriction of 1.5 L. Cardiology has been consulted. Continue to stress to patient importance of self efficacy and  on diet for CHF. Last BNP reviewed- and noted below       Recent Labs   Lab 11/06/23  1306   BNP 1,839*   .     euvolumic on exam. Continue gdmt     Pulmonary embolism  CTA personally reviewed.  Segmental and subsegmental pulmonary embolism.  RV to LV ratio less than 0.9.  Will continue to monitor.  Currently no indication for thrombectomy.  No DVT on peripheral ultrasound.  Continue heparin drip, transition to Eliquis at time of discharge           Multifocal atrial tachycardia  On initial presentation.  Improving.     11/7: now in sinus     Hyperlipidemia associated with type 2 diabetes mellitus         History of stroke with current residual effects         Type 2 diabetes mellitus with microalbuminuria, without long-term current use of insulin         Hypertension, essential, benign        Discharge summary from recent hospitalization:    HPI:   This is a 68-year-old  female with a past medical history of hypertension, type 2 diabetes, hyperlipidemia, CVA with left-sided deficits who presents with tachycardia.       The patient initially presented to urgent care with shortness of breaths and lower extremity edema that started 7 days prior to presentation.  She ran out of her blood pressure medications about 2 weeks prior.  Additional symptoms include cough, nasal congestion and fatigue.  She was noted to be tachycardic and was advised present to the ED.     In the ED, the patient was tachycardic (up to 160s), tachypneic but normotensive.  Labs were remarkable for an elevated BNP (1839), elevated troponin (0.090 > 0.079), elevated D-dimer (2.87).  EKGs showed MAT and prolonged QTc. CTA chest showed pulmonary emboli in segmental and subsegmental pulmonary artery in the right middle lobe, with mild-to-moderate bibasilar pleural effusions with associated bibasilar atelectasis. An echocardiogram showed an EF of 20-25%, PA pressure of 49 mmHg.  Patient was given aspirin 325 mg, adenosine 6 mg IV x2, atenolol 25 mg p.o., diltiazem 20 mg IV x2, 30 mg IV x1, potassium bicarbonate 40 mEq use and was started on heparin drip.     While in the ED, the patient went into V-fib arrest, underwent 1 round of CPR and was cardioverted @200 joules x1, with achievement of ROSC. Mag sulfate 2 g IV & an amp of D50% were administered. Cardiology recommended Plavix, lidocaine infusion and an angiogram in the AM. She subsequently went into Vtach and was cardioverted @200 joules x1. No Epi was given and the patient was not intubated.   Plavix, additional potassium and lidocaine bolus, followed by infusion were ordered. Patient became hypoxic and was placed on a NRB. She was admitted for further management.       Procedure(s) (LRB):  INSERTION, ICD GENERATOR, SINGLE CHAMBER (Left)       Hospital Course:   67 y/o female sent to ER from Urgent Care for tachycardia.  In the ER found to be in multifocal atrial  tachycardia with prolonged QTC.  Patient was given Cardizem with some improvement in heart rate.  Subsequently CTA was done which revealed segmental and subsegmental pulmonary embolism on the right side. Echo was also done which demonstrated severe cardiomyopathy with EF of 20-25%.  Subsequently patient developed VFib and cardioverted followed by V-tach which was also cardioverted x1.  Started on lidocaine drip in the ER and admitted to ICU.  Also started on heparin drip.  Underwent LHC showing non ischemic cardiomyopathy.  Cards recommending AICD.  She has remained in NSR. AICD placed on 11/9.       Notes from April 2024: Patient here for follow-up.  Feeling fine.  Device checked today.    July 24: Patient here for follow-up.  Device check today.  Doing fine.  Some NSVT seen.  Patient doing fine also.  Denies any chest pains at rest on exertion, orthopnea, PND    PAST MEDICAL HISTORY:     Past Medical History:   Diagnosis Date    Acute respiratory failure with hypoxia 11/6/2023    Adult BMI 31.0-31.9 kg/sq m 12/3/2018    Anemia     CHF (congestive heart failure) 11/6/2023    CVA (cerebral vascular accident)     Residual weakness in the left arm and hand    Essential thrombocythemia     Hyperlipidemia associated with type 2 diabetes mellitus     Hypertension     Osteoporosis     Pulmonary embolism 11/6/2023    Type 2 diabetes mellitus        PAST SURGICAL HISTORY:     Past Surgical History:   Procedure Laterality Date    CATHETERIZATION OF BOTH LEFT AND RIGHT HEART N/A 11/7/2023    Procedure: CATHETERIZATION, HEART, BOTH LEFT AND RIGHT;  Surgeon: Asher Gonzalez MD;  Location: [facility] CATH LAB;  Service: Cardiology;  Laterality: N/A;    COLONOSCOPY N/A 1/4/2019    Procedure: COLONOSCOPY;  Surgeon: James Ozuna MD;  Location: [facility] ENDO;  Service: Endoscopy;  Laterality: N/A;    COLONOSCOPY N/A 3/1/2021    Procedure: COLONOSCOPY;  Surgeon: Nelida Cook MD;  Location: [facility] ENDO;  Service: Endoscopy;  Laterality:  N/A;    ESOPHAGOGASTRODUODENOSCOPY N/A 3/1/2021    Procedure: EGD (ESOPHAGOGASTRODUODENOSCOPY);  Surgeon: Nelida Cook MD;  Location: [facility] ENDO;  Service: Endoscopy;  Laterality: N/A;  rapid covid > 50 miles away, instructions mailed -ml    HEMORRHOID SURGERY      INSERTION, PULSE GENERATOR, ICD, SINGLE CHAMBER Left 11/9/2023    Procedure: INSERTION, ICD GENERATOR, SINGLE CHAMBER;  Surgeon: Chilo Da Silva MD;  Location: [facility] CATH LAB;  Service: Cardiology;  Laterality: Left;       ALLERGIES AND MEDICATION:   Review of patient's allergies indicates:  No Known Allergies     Medication List            Accurate as of July 29, 2024  1:51 PM. If you have any questions, ask your nurse or doctor.                CONTINUE taking these medications      apixaban 5 mg Tab  Commonly known as: ELIQUIS  Take 1 tablet (5 mg total) by mouth 2 (two) times daily.     aspirin 81 MG EC tablet  Commonly known as: ECOTRIN     atorvastatin 40 MG tablet  Commonly known as: LIPITOR  Take 1 tablet (40 mg total) by mouth every evening.     blood sugar diagnostic Strp  1 strip by Misc.(Non-Drug; Combo Route) route Daily.     blood-glucose meter kit  Use as instructed     carvediloL 12.5 MG tablet  Commonly known as: COREG  Take 1 tablet (12.5 mg total) by mouth 2 (two) times daily with meals.     cetirizine 10 MG tablet  Commonly known as: ZYRTEC     empagliflozin 10 mg tablet  Commonly known as: JARDIANCE  Take 1 tablet (10 mg total) by mouth once daily.     furosemide 20 MG tablet  Commonly known as: LASIX  TAKE 1-2 PILLS DAILY AS NEEDED FOR FLUID OVERLOAD).     lancets 30 gauge Misc  1 lancet  by Misc.(Non-Drug; Combo Route) route Daily.     metFORMIN 1000 MG tablet  Commonly known as: GLUCOPHAGE  Take 1 tablet (1,000 mg total) by mouth 2 (two) times daily. for diabetes.     spironolactone 25 MG tablet  Commonly known as: ALDACTONE  Take 1 tablet (25 mg total) by mouth once daily.     valsartan 160 MG tablet  Commonly known as:  DIOVAN  Take 1 tablet (160 mg total) by mouth once daily.              SOCIAL HISTORY:     Social History     Socioeconomic History    Marital status: Single   Tobacco Use    Smoking status: Former     Types: Cigarettes    Smokeless tobacco: Never   Substance and Sexual Activity    Alcohol use: No    Drug use: No    Sexual activity: Not Currently     Social Determinants of Health     Financial Resource Strain: Low Risk  (2/22/2024)    Overall Financial Resource Strain (CARDIA)     Difficulty of Paying Living Expenses: Not hard at all   Food Insecurity: No Food Insecurity (2/22/2024)    Hunger Vital Sign     Worried About Running Out of Food in the Last Year: Never true     Ran Out of Food in the Last Year: Never true   Transportation Needs: Unmet Transportation Needs (2/22/2024)    PRAPARE - Transportation     Lack of Transportation (Medical): Yes     Lack of Transportation (Non-Medical): Yes   Physical Activity: Inactive (2/22/2024)    Exercise Vital Sign     Days of Exercise per Week: 0 days     Minutes of Exercise per Session: 0 min   Stress: No Stress Concern Present (2/22/2024)    [employer] - Occupational Stress Questionnaire     Feeling of Stress : Not at all   Housing Stability: Low Risk  (2/22/2024)    Housing Stability Vital Sign     Unable to Pay for Housing in the Last Year: No     Number of Places Lived in the Last Year: 1     Unstable Housing in the Last Year: No       FAMILY HISTORY:     Family History   Problem Relation Name Age of Onset    Pancreatic cancer Mother      Emphysema Father      Cancer Father          Lung    Breast cancer Cousin      Breast cancer Cousin      Colon cancer Neg Hx      Esophageal cancer Neg Hx         REVIEW OF SYSTEMS:   Review of Systems   HENT: Negative.     Eyes: Negative.    Respiratory: Negative.     Endocrine: Negative.    Hematologic/Lymphatic: Negative.    Skin: Negative.    Musculoskeletal: Negative.    Gastrointestinal: Negative.   "  Genitourinary: Negative.    Neurological: Negative.    Psychiatric/Behavioral: Negative.     Allergic/Immunologic: Negative.        A 10 point review of systems was performed and all the pertinent positives have been mentioned. Rest of review of systems was negative.        PHYSICAL EXAM:     Vitals:    07/29/24 1256   BP: 112/60   Pulse: 77    Body mass index is 26.87 kg/m².  Weight: 71 kg (156 lb 8.4 oz)   Height: 5' 4" (162.6 cm)     Physical Exam  Constitutional:       Appearance: Normal appearance. She is well-developed.   HENT:      Head: Normocephalic.   Eyes:      Pupils: Pupils are equal, round, and reactive to light.   Cardiovascular:      Rate and Rhythm: Normal rate and regular rhythm.   Pulmonary:      Effort: Pulmonary effort is normal.      Breath sounds: Normal breath sounds.   Abdominal:      General: Bowel sounds are normal.      Palpations: Abdomen is soft.      Tenderness: There is no abdominal tenderness.   Musculoskeletal:         General: Normal range of motion.      Cervical back: Normal range of motion and neck supple.   Skin:     General: Skin is warm.   Neurological:      Mental Status: She is alert and oriented to person, place, and time.           DATA:     Laboratory:  CBC:  Recent Labs   Lab 11/11/23  0400 11/13/23  0314 06/24/24  0900   WBC 6.41 5.72 5.40   Hemoglobin 11.5 L 10.3 L 11.4 L   Hematocrit 36.8 L 32.5 L 38.7   Platelets 318 296 343       CHEMISTRIES:  Recent Labs   Lab 08/02/21  0901 01/26/22  1040 11/02/22  0913 11/07/23  0100 11/07/23  0841 11/08/23  2355 11/10/23  1221 01/29/24  1434 06/24/24  0900 07/11/24  0907   Glucose 152 H 122 H   < > 253 H 230 H   < > 214 H 114 H 105 118 H   Sodium 141 139   < > 138 140   < > 137 143 142 141   Potassium 3.6 3.7   < > 4.7 4.8   < > 3.8 3.4 L 4.8 4.5   BUN 17 14   < > 13 15   < > 15 12 19 26 H   Creatinine 0.9 0.9   < > 1.1 1.1   < > 0.8 0.8 1.1 1.1   eGFR if  >60 >60  --   --   --   --   --   --   --   --    eGFR " if non  >60 >60  --   --   --   --   --   --   --   --    Calcium 10.1 10.0   < > 8.9 9.2   < > 8.6 L 9.0 9.9 10.1   Magnesium  --   --    < > 2.3 2.3  --  1.6  --   --   --     < > = values in this interval not displayed.       CARDIAC BIOMARKERS:  Recent Labs   Lab 11/06/23  1617 11/06/23 2026 11/07/23  0100   Troponin I 0.079 H 0.085 H 0.080 H       COAGS:  Recent Labs   Lab 11/07/23  0841 11/09/23  1510   INR 1.1  1.1 1.2       LIPIDS/LFTS:  Recent Labs   Lab 01/26/22  1040 11/02/22  0913 05/31/23  0928 11/06/23  1306 11/07/23  0841 11/08/23  2355 06/24/24  0900 07/11/24  0907   Cholesterol 134  --  130  --  141  --   --   --    Triglycerides 134  --  151 H  --  101  --   --   --    HDL 49  --  44  --  39 L  --   --   --    LDL Cholesterol 58.2 L  --  55.8 L  --  81.8  --   --   --    Non-HDL Cholesterol 85  --  86  --  102  --   --   --    AST 14   < > 10   < > 55 H 59 H 13 15   ALT 12   < > 8 L   < > 52 H 53 H 10 9 L    < > = values in this interval not displayed.       Hemoglobin A1C   Date Value Ref Range Status   06/24/2024 6.4 (H) 4.0 - 5.6 % Final     Comment:     ADA Screening Guidelines:  5.7-6.4%  Consistent with prediabetes  >or=6.5%  Consistent with diabetes    High levels of fetal hemoglobin interfere with the HbA1C  assay. Heterozygous hemoglobin variants (HbS, HgC, etc)do  not significantly interfere with this assay.   However, presence of multiple variants may affect accuracy.     11/07/2023 6.3 (H) 4.0 - 5.6 % Final     Comment:     ADA Screening Guidelines:  5.7-6.4%  Consistent with prediabetes  >or=6.5%  Consistent with diabetes    High levels of fetal hemoglobin interfere with the HbA1C  assay. Heterozygous hemoglobin variants (HbS, HgC, etc)do  not significantly interfere with this assay.   However, presence of multiple variants may affect accuracy.     05/31/2023 6.1 (H) 4.0 - 5.6 % Final     Comment:     ADA Screening Guidelines:  5.7-6.4%  Consistent with  prediabetes  >or=6.5%  Consistent with diabetes    High levels of fetal hemoglobin interfere with the HbA1C  assay. Heterozygous hemoglobin variants (HbS, HgC, etc)do  not significantly interfere with this assay.   However, presence of multiple variants may affect accuracy.         TSH  Recent Labs   Lab 11/02/22  0913 05/31/23  0928   TSH 1.833 1.555       The ASCVD Risk score (Charo LORENZO, et al., 2019) failed to calculate for the following reasons:    The patient has a prior MI or stroke diagnosis       BNP    Lab Results   Component Value Date/Time    BNP 2,007 (H) 01/29/2024 02:34 PM    BNP 1,839 (H) 11/06/2023 01:06 PM            ECHO    Results for orders placed during the hospital encounter of 11/06/23    Echo    Interpretation Summary    Left Ventricle: The left ventricle is mildly dilated. Normal wall thickness. There is severely reduced systolic function with a visually estimated ejection fraction of 20 - 25%.    Right Ventricle: Normal right ventricular cavity size. Systolic function is normal.    Left Atrium: Left atrium is severely dilated.    Right Atrium: Right atrium is moderately dilated.    Mitral Valve: There is mild regurgitation.    Tricuspid Valve: There is mild to moderate regurgitation.    Pulmonary Artery: The estimated pulmonary artery systolic pressure is 49 mmHg.    IVC/SVC: Intermediate venous pressure at 8 mmHg.    Pericardium: There is a trivial effusion.      STRESS TEST    No results found for this or any previous visit.        CATH    Results for orders placed during the hospital encounter of 11/06/23    Cardiac catheterization    Conclusion    No significant flow-limiting coronary artery stenosis.    The pre-procedure left ventricular end diastolic pressure was 25.    The estimated blood loss was <50 mL.    Angiogram:    Left main:  No significant stenosis    Lad:  Luminal irregularities    Circumflex:  Luminal irregularities    RCA:  Luminal irregularities    Right heart  catheterization was performed    Right heart catheterization:    Pulmonary capillary wedge pressure:  24 mmHg    PA 52/25 with a mean of 37 mmHg    RV 50/10 with a RVEDP of 21 mmHg    RA 19 mmHg                    The procedure log was documented by Documenter: Matt Chambers, ALIDA; Dread Whiteside, ALIDA and verified by Asher Gonzalez MD.    Date: 11/29/2023  Time: 8:36 AM        ASSESSMENT AND PLAN     Patient Active Problem List   Diagnosis    Hypertension, essential, benign    Type 2 diabetes mellitus with microalbuminuria, without long-term current use of insulin    History of stroke with current residual effects    Macroalbuminuric diabetic nephropathy    Hyperlipidemia associated with type 2 diabetes mellitus    Iron deficiency anemia due to chronic blood loss    Multifocal atrial tachycardia    Chronic septic pulmonary embolism without acute cor pulmonale    VF (ventricular fibrillation)    CHF (congestive heart failure)    AICD (automatic cardioverter/defibrillator) present    Calculus of gallbladder without cholecystitis without obstruction    V-tach    Hemiplegia and hemiparesis following cerebral infarction affecting right non-dominant side    Personal history of nicotine dependence    Age-related osteoporosis without current pathological fracture    Nonischemic cardiomyopathy    Calcification of aorta    COPD         No orders of the defined types were placed in this encounter.      Nonischemic cardiomyopathy:  Continue current therapy    Lasix as needed     AICD, [employer], single chamber.  SVT seen on device interrogation last time. Event monitor ordered.       Follow-up in 3 m      Thank you very much for involving me in the care of your patient.  Please do not hesitate to contact me if there are any questions.      Asher Gonzalez MD, FACC, [facility]  Interventional Cardiologist, [facility].           This note was dictated with the help of speech recognition software.  There might be un-intended  errors and/or substitutions.

## 2024-08-01 ENCOUNTER — CLINICAL SUPPORT (OUTPATIENT)
Dept: CARDIOLOGY | Facility: HOSPITAL | Age: 69
End: 2024-08-01
Attending: INTERNAL MEDICINE
Payer: MEDICARE

## 2024-08-01 DIAGNOSIS — I50.9 CONGESTIVE HEART FAILURE, UNSPECIFIED HF CHRONICITY, UNSPECIFIED HEART FAILURE TYPE: ICD-10-CM

## 2024-08-01 DIAGNOSIS — R00.2 HEART PALPITATIONS: ICD-10-CM

## 2024-08-01 PROCEDURE — 93271 ECG/MONITORING AND ANALYSIS: CPT | Mod: HCNC

## 2024-08-05 ENCOUNTER — TELEPHONE (OUTPATIENT)
Dept: CARDIOLOGY | Facility: CLINIC | Age: 69
End: 2024-08-05
Payer: MEDICARE

## 2024-09-09 RX ORDER — SPIRONOLACTONE 25 MG/1
25 TABLET ORAL
Qty: 90 TABLET | Refills: 3 | Status: SHIPPED | OUTPATIENT
Start: 2024-09-09

## 2024-09-09 RX ORDER — EMPAGLIFLOZIN 10 MG/1
10 TABLET, FILM COATED ORAL
Qty: 90 TABLET | Refills: 3 | Status: SHIPPED | OUTPATIENT
Start: 2024-09-09

## 2024-10-07 NOTE — TELEPHONE ENCOUNTER
----- Message from Bob sent at 10/7/2024  1:18 PM CDT -----  Regarding: Self   Type: RX Refill Request    Who Called: Self    Have you contacted your pharmacy: yes     Refill    RX Name and Strength:apixaban (ELIQUIS) 5 mg Tab     Preferred Pharmacy with phone number: .  [facility]/pharmacy #[id number] - [place] - [address]  [address] [address]  Phone: [phone] Fax: [phone]        Local or Mail Order: local     Would the patient rather a call back or a response via [facility]? Call back     Best Call Back Number:.[phone]      Additional Information:     Thank you.

## 2024-10-11 RX ORDER — FUROSEMIDE 20 MG/1
TABLET ORAL
Qty: 180 TABLET | Refills: 1 | Status: SHIPPED | OUTPATIENT
Start: 2024-10-11

## 2024-10-11 NOTE — TELEPHONE ENCOUNTER
Refill Routing Note   Medication(s) are not appropriate for processing by [facility] for the following reason(s):        Outside of protocol    [facility] action(s):  Route     Requires labs : Yes      Medication Therapy Plan: PRN use of diuretics      Appointments  past 12m or future 3m with PCP    Date Provider   Last Visit   6/24/2024 Zeinab Noble MD   Next Visit   Visit date not found Zeinab Noble MD   ED visits in past 90 days: 0        Note composed:6:05 AM 10/11/2024

## 2024-10-11 NOTE — TELEPHONE ENCOUNTER
Care Due:                  Date            Visit Type   Department     Provider  --------------------------------------------------------------------------------                                [facility] FAM[facility]                              PRIMARY      MEDICINE/INTERN  Last Visit: 06-      CARE ([facility])   AL MED         Zeinab Noble  Next Visit: None Scheduled  None         None Found                                                            Last  Test          Frequency    Reason                     Performed    Due Date  --------------------------------------------------------------------------------    HBA1C.......  6 months...  metFORMIN................  06- 12-    [facility] Embedded Care Due Messages. Reference number: [id number].   10/11/2024 5:56:00 AM CDT

## 2024-10-29 ENCOUNTER — OFFICE VISIT (OUTPATIENT)
Dept: CARDIOLOGY | Facility: CLINIC | Age: 69
End: 2024-10-29
Payer: MEDICARE

## 2024-10-29 VITALS
BODY MASS INDEX: 27.4 KG/M2 | OXYGEN SATURATION: 99 % | HEIGHT: 64 IN | HEART RATE: 83 BPM | DIASTOLIC BLOOD PRESSURE: 58 MMHG | SYSTOLIC BLOOD PRESSURE: 132 MMHG | WEIGHT: 160.5 LBS | RESPIRATION RATE: 18 BRPM

## 2024-10-29 DIAGNOSIS — I10 HYPERTENSION, ESSENTIAL, BENIGN: Primary | ICD-10-CM

## 2024-10-29 PROCEDURE — 93000 ELECTROCARDIOGRAM COMPLETE: CPT | Mod: HCNC,S$GLB,, | Performed by: INTERNAL MEDICINE

## 2024-10-29 PROCEDURE — 99999 PR PBB SHADOW E&M-EST. PATIENT-LVL IV: CPT | Mod: PBBFAC,HCNC,, | Performed by: INTERNAL MEDICINE

## 2024-10-29 RX ORDER — ATORVASTATIN CALCIUM 80 MG/1
80 TABLET, FILM COATED ORAL NIGHTLY
Qty: 90 TABLET | Refills: 3 | Status: SHIPPED | OUTPATIENT
Start: 2024-10-29

## 2024-10-31 LAB
OHS QRS DURATION: 76 MS
OHS QTC CALCULATION: 448 MS

## 2024-11-18 RX ORDER — VALSARTAN 160 MG/1
160 TABLET ORAL
Qty: 90 TABLET | Refills: 3 | Status: SHIPPED | OUTPATIENT
Start: 2024-11-18

## 2024-11-24 ENCOUNTER — HOSPITAL ENCOUNTER (OUTPATIENT)
Dept: CARDIOLOGY | Facility: HOSPITAL | Age: 69
Discharge: HOME | End: 2024-11-24
Attending: INTERNAL MEDICINE
Payer: MEDICARE

## 2024-11-24 ENCOUNTER — CLINICAL SUPPORT (OUTPATIENT)
Dept: CARDIOLOGY | Facility: HOSPITAL | Age: 69
End: 2024-11-24
Payer: MEDICARE

## 2024-11-24 DIAGNOSIS — Z95.810 PRESENCE OF AUTOMATIC (IMPLANTABLE) CARDIAC DEFIBRILLATOR: ICD-10-CM

## 2024-11-24 PROCEDURE — 93295 DEV INTERROG REMOTE 1/2/MLT: CPT | Mod: HCNC,,, | Performed by: INTERNAL MEDICINE

## 2024-11-24 PROCEDURE — 93296 REM INTERROG EVL PM/IDS: CPT | Mod: HCNC | Performed by: INTERNAL MEDICINE

## 2024-12-02 ENCOUNTER — OFFICE VISIT (OUTPATIENT)
Dept: FAMILY MEDICINE | Facility: CLINIC | Age: 69
End: 2024-12-02
Payer: MEDICARE

## 2024-12-02 VITALS
BODY MASS INDEX: 26.88 KG/M2 | SYSTOLIC BLOOD PRESSURE: 130 MMHG | TEMPERATURE: 98 F | HEIGHT: 64 IN | WEIGHT: 157.44 LBS | DIASTOLIC BLOOD PRESSURE: 62 MMHG

## 2024-12-02 DIAGNOSIS — Z23 NEEDS FLU SHOT: ICD-10-CM

## 2024-12-02 DIAGNOSIS — R80.9 TYPE 2 DIABETES MELLITUS WITH MICROALBUMINURIA, WITHOUT LONG-TERM CURRENT USE OF INSULIN: Primary | ICD-10-CM

## 2024-12-02 DIAGNOSIS — E11.29 TYPE 2 DIABETES MELLITUS WITH MICROALBUMINURIA, WITHOUT LONG-TERM CURRENT USE OF INSULIN: Primary | ICD-10-CM

## 2024-12-02 DIAGNOSIS — Z12.31 ENCOUNTER FOR SCREENING MAMMOGRAM FOR BREAST CANCER: ICD-10-CM

## 2024-12-02 PROCEDURE — G0008 ADMIN INFLUENZA VIRUS VAC: HCPCS | Mod: HCNC,S$GLB,, | Performed by: INTERNAL MEDICINE

## 2024-12-02 PROCEDURE — 90653 IIV ADJUVANT VACCINE IM: CPT | Mod: HCNC,S$GLB,, | Performed by: INTERNAL MEDICINE

## 2024-12-02 PROCEDURE — 99999 PR PBB SHADOW E&M-EST. PATIENT-LVL III: CPT | Mod: PBBFAC,HCNC,, | Performed by: INTERNAL MEDICINE

## 2024-12-02 PROCEDURE — 99214 OFFICE O/P EST MOD 30 MIN: CPT | Mod: HCNC,S$GLB,, | Performed by: INTERNAL MEDICINE

## 2024-12-02 PROCEDURE — 3075F SYST BP GE 130 - 139MM HG: CPT | Mod: HCNC,CPTII,S$GLB, | Performed by: INTERNAL MEDICINE

## 2024-12-02 PROCEDURE — 2023F DILAT RTA XM W/O RTNOPTHY: CPT | Mod: HCNC,CPTII,S$GLB, | Performed by: INTERNAL MEDICINE

## 2024-12-02 PROCEDURE — 3008F BODY MASS INDEX DOCD: CPT | Mod: HCNC,CPTII,S$GLB, | Performed by: INTERNAL MEDICINE

## 2024-12-02 PROCEDURE — 3288F FALL RISK ASSESSMENT DOCD: CPT | Mod: HCNC,CPTII,S$GLB, | Performed by: INTERNAL MEDICINE

## 2024-12-02 PROCEDURE — 1159F MED LIST DOCD IN RCRD: CPT | Mod: HCNC,CPTII,S$GLB, | Performed by: INTERNAL MEDICINE

## 2024-12-02 PROCEDURE — 3078F DIAST BP <80 MM HG: CPT | Mod: HCNC,CPTII,S$GLB, | Performed by: INTERNAL MEDICINE

## 2024-12-02 PROCEDURE — G2211 COMPLEX E/M VISIT ADD ON: HCPCS | Mod: HCNC,S$GLB,, | Performed by: INTERNAL MEDICINE

## 2024-12-02 PROCEDURE — 4010F ACE/ARB THERAPY RXD/TAKEN: CPT | Mod: HCNC,CPTII,S$GLB, | Performed by: INTERNAL MEDICINE

## 2024-12-02 PROCEDURE — 1160F RVW MEDS BY RX/DR IN RCRD: CPT | Mod: HCNC,CPTII,S$GLB, | Performed by: INTERNAL MEDICINE

## 2024-12-02 PROCEDURE — 1126F AMNT PAIN NOTED NONE PRSNT: CPT | Mod: HCNC,CPTII,S$GLB, | Performed by: INTERNAL MEDICINE

## 2024-12-02 PROCEDURE — 3044F HG A1C LEVEL LT 7.0%: CPT | Mod: HCNC,CPTII,S$GLB, | Performed by: INTERNAL MEDICINE

## 2024-12-02 PROCEDURE — 1101F PT FALLS ASSESS-DOCD LE1/YR: CPT | Mod: HCNC,CPTII,S$GLB, | Performed by: INTERNAL MEDICINE

## 2024-12-02 RX ORDER — ATORVASTATIN CALCIUM 40 MG/1
40 TABLET, FILM COATED ORAL
COMMUNITY
Start: 2024-11-16

## 2024-12-02 RX ORDER — METFORMIN HYDROCHLORIDE 1000 MG/1
1000 TABLET ORAL 2 TIMES DAILY
Qty: 180 TABLET | Refills: 1 | Status: SHIPPED | OUTPATIENT
Start: 2024-12-02

## 2024-12-02 NOTE — PROGRESS NOTES
Order verified from Dr Zeinab Noble for Fluad vaccine IM. Patient identified using full name and . Allergies reviewed with patient.   Fluad vaccine given IM to RT deltoid and well tolerated. Patient observed for 15 minutes for any drug reactions or side effects.  No adverse reaction noted.  Patient released from clinic in stable condition.

## 2024-12-02 NOTE — PROGRESS NOTES
-SUBJECTIVE     Chief Complaint   Patient presents with    Diabetes       HPI  Harriett Oglesby is a 69 y.o. female with multiple medical diagnoses as listed in the medical history and problem list that presents for evaluation for DM2. Pt has been doing  well  since last visit. she is fully compliant with meds and denies any adverse side effects. Pt is  mostly compliant  with an ADA diet and does exercise by walking her driveway at least 3 times per week. Pt does check her blood sugar levels at home with fasting readings ranging from   . Pt denies any hypoglycemia since last visit. Pt presents for med refills today and is without any other complaints.     PAST MEDICAL HISTORY:  Past Medical History:   Diagnosis Date    Acute respiratory failure with hypoxia 11/6/2023    Adult BMI 31.0-31.9 kg/sq m 12/3/2018    Anemia     CHF (congestive heart failure) 11/6/2023    CVA (cerebral vascular accident)     Residual weakness in the left arm and hand    Essential thrombocythemia     Hyperlipidemia associated with type 2 diabetes mellitus     Hypertension     Osteoporosis     Pulmonary embolism 11/6/2023    Type 2 diabetes mellitus        PAST SURGICAL HISTORY:  Past Surgical History:   Procedure Laterality Date    CATHETERIZATION OF BOTH LEFT AND RIGHT HEART N/A 11/7/2023    Procedure: CATHETERIZATION, HEART, BOTH LEFT AND RIGHT;  Surgeon: Asher Gonzalez MD;  Location: [facility] CATH LAB;  Service: Cardiology;  Laterality: N/A;    COLONOSCOPY N/A 1/4/2019    Procedure: COLONOSCOPY;  Surgeon: James Ozuna MD;  Location: [facility];  Service: Endoscopy;  Laterality: N/A;    COLONOSCOPY N/A 3/1/2021    Procedure: COLONOSCOPY;  Surgeon: Nelida Cook MD;  Location: [facility];  Service: Endoscopy;  Laterality: N/A;    ESOPHAGOGASTRODUODENOSCOPY N/A 3/1/2021    Procedure: EGD (ESOPHAGOGASTRODUODENOSCOPY);  Surgeon: Nelida Cook MD;  Location: [facility];  Service: Endoscopy;  Laterality: N/A;  rapid covid > 50  miles away, instructions mailed -ml    HEMORRHOID SURGERY      INSERTION, PULSE GENERATOR, ICD, SINGLE CHAMBER Left 11/9/2023    Procedure: INSERTION, ICD GENERATOR, SINGLE CHAMBER;  Surgeon: Chilo Da Silva MD;  Location: [facility] CATH LAB;  Service: Cardiology;  Laterality: Left;       SOCIAL HISTORY:  Social History     Socioeconomic History    Marital status: Single   Tobacco Use    Smoking status: Former     Types: Cigarettes    Smokeless tobacco: Never   Substance and Sexual Activity    Alcohol use: No    Drug use: No    Sexual activity: Not Currently     Social Drivers of Health     Financial Resource Strain: Low Risk  (2/22/2024)    Overall Financial Resource Strain (CARDIA)     Difficulty of Paying Living Expenses: Not hard at all   Food Insecurity: No Food Insecurity (2/22/2024)    Hunger Vital Sign     Worried About Running Out of Food in the Last Year: Never true     Ran Out of Food in the Last Year: Never true   Transportation Needs: Unmet Transportation Needs (2/22/2024)    PRAPARE - Transportation     Lack of Transportation (Medical): Yes     Lack of Transportation (Non-Medical): Yes   Physical Activity: Inactive (2/22/2024)    Exercise Vital Sign     Days of Exercise per Week: 0 days     Minutes of Exercise per Session: 0 min   Stress: No Stress Concern Present (2/22/2024)    [employer] - Occupational Stress Questionnaire     Feeling of Stress : Not at all   Housing Stability: Low Risk  (2/22/2024)    Housing Stability Vital Sign     Unable to Pay for Housing in the Last Year: No     Number of Places Lived in the Last Year: 1     Unstable Housing in the Last Year: No       FAMILY HISTORY:  Family History   Problem Relation Name Age of Onset    Pancreatic cancer Mother      Emphysema Father      Cancer Father          Lung    Breast cancer Cousin      Breast cancer Cousin      Colon cancer Neg Hx      Esophageal cancer Neg Hx         ALLERGIES AND MEDICATIONS: updated and  reviewed.  Review of patient's allergies indicates:  No Known Allergies  Current Outpatient Medications   Medication Sig Dispense Refill    apixaban (ELIQUIS) 5 mg Tab Take 1 tablet (5 mg total) by mouth 2 (two) times daily. 180 tablet 1    aspirin (ECOTRIN) 81 MG EC tablet Take 81 mg by mouth once daily.       atorvastatin (LIPITOR) 40 MG tablet Take 40 mg by mouth.      blood sugar diagnostic Strp 1 strip by Misc.(Non-Drug; Combo Route) route Daily. 200 each 3    blood-glucose meter kit Use as instructed 1 each 0    carvediloL (COREG) 12.5 MG tablet Take 1 tablet (12.5 mg total) by mouth 2 (two) times daily with meals. 60 tablet 11    cetirizine (ZYRTEC) 10 MG tablet Take 10 mg by mouth once daily.      furosemide (LASIX) 20 MG tablet TAKE 1-2 PILLS DAILY AS NEEDED FOR FLUID OVERLOAD). 180 tablet 1    JARDIANCE 10 mg tablet TAKE 1 TABLET BY MOUTH EVERY DAY 90 tablet 3    lancets 30 gauge Misc 1 lancet  by Misc.(Non-Drug; Combo Route) route Daily. 200 each 3    spironolactone (ALDACTONE) 25 MG tablet TAKE 1 TABLET BY MOUTH EVERY DAY 90 tablet 3    valsartan (DIOVAN) 160 MG tablet TAKE 1 TABLET BY MOUTH ONCE DAILY. 90 tablet 3    metFORMIN (GLUCOPHAGE) 1000 MG tablet Take 1 tablet (1,000 mg total) by mouth 2 (two) times daily. for diabetes. 180 tablet 1     No current facility-administered medications for this visit.       ROS  Review of Systems   Constitutional:  Negative for chills and fever.   HENT:  Negative for hearing loss and sore throat.    Eyes:  Negative for visual disturbance.   Respiratory:  Negative for cough and shortness of breath.    Cardiovascular:  Negative for chest pain, palpitations and leg swelling.   Gastrointestinal:  Negative for abdominal pain, constipation, diarrhea, nausea and vomiting.   Genitourinary:  Negative for dysuria, frequency and urgency.   Musculoskeletal:  Negative for arthralgias, joint swelling and myalgias.   Skin:  Negative for rash and wound.   Neurological:  Negative for  "headaches.   Psychiatric/Behavioral:  Negative for agitation and confusion. The patient is not nervous/anxious.          OBJECTIVE     Physical Exam  Vitals:    12/02/24 0843   BP: 130/62   Temp: 97.7 °F (36.5 °C)    Body mass index is 27.02 kg/m².  Weight: 71.4 kg (157 lb 6.5 oz)   Height: 5' 4" (162.6 cm)     Physical Exam  Constitutional:       General: She is not in acute distress.     Appearance: She is well-developed.   HENT:      Head: Normocephalic and atraumatic.      Right Ear: External ear normal.      Left Ear: External ear normal.      Nose: Nose normal.   Eyes:      General: No scleral icterus.        Right eye: No discharge.         Left eye: No discharge.      Conjunctiva/sclera: Conjunctivae normal.   Neck:      Vascular: No JVD.      Trachea: No tracheal deviation.   Cardiovascular:      Rate and Rhythm: Normal rate and regular rhythm.      Heart sounds: No murmur heard.     No friction rub. No gallop.   Pulmonary:      Effort: Pulmonary effort is normal. No respiratory distress.      Breath sounds: Normal breath sounds. No wheezing.   Abdominal:      General: Bowel sounds are normal. There is no distension.      Palpations: Abdomen is soft. There is no mass.      Tenderness: There is no abdominal tenderness. There is no guarding or rebound.   Musculoskeletal:         General: No tenderness or deformity. Normal range of motion.      Cervical back: Normal range of motion and neck supple.   Skin:     General: Skin is warm and dry.      Findings: No erythema or rash.   Neurological:      Mental Status: She is alert and oriented to person, place, and time.      Motor: No abnormal muscle tone.      Coordination: Coordination normal.   Psychiatric:         Behavior: Behavior normal.         Thought Content: Thought content normal.         Judgment: Judgment normal.           Health Maintenance         Date Due Completion Date    TETANUS VACCINE Never done ---    RSV Vaccine (Age 60+ and Pregnant " patients) (1 - Risk 60-74 years 1-dose series) Never done ---    Diabetes Urine Screening 05/31/2024 5/31/2023    Foot Exam 11/06/2024 11/6/2023 (Done)    Override on 11/6/2023: Done    Lipid Panel 11/07/2024 11/7/2023    Hemoglobin A1c 12/24/2024 6/24/2024    Mammogram 01/29/2025 1/29/2024    Eye Exam 03/11/2025 3/11/2024    DEXA Scan 11/29/2025 11/29/2022    High Dose Statin 12/02/2025 12/2/2024    Colorectal Cancer Screening 03/01/2028 3/1/2021              ASSESSMENT     69 y.o. female with     1. Type 2 diabetes mellitus with microalbuminuria, without long-term current use of insulin    2. Needs flu shot    3. Encounter for screening mammogram for breast cancer        PLAN:     1. Type 2 diabetes mellitus with microalbuminuria, without long-term current use of insulin  - Check labs  - CBC Auto Differential; Future  - Comprehensive Metabolic Panel; Future  - Hemoglobin A1C; Future  - TSH; Future  - Lipid Panel; Future  - Microalbumin/Creatinine Ratio, Urine; Future  - metFORMIN (GLUCOPHAGE) 1000 MG tablet; Take 1 tablet (1,000 mg total) by mouth 2 (two) times daily. for diabetes.  Dispense: 180 tablet; Refill: 1    2. Needs flu shot  - influenza (adjuvanted) (Fluad) 45 mcg/0.5 mL IM vaccine (> or = 66 yo) 0.5 mL    3. Encounter for screening mammogram for breast cancer  - Mammo Digital Screening Bilat w/ Suman; Future            RTC in 6 months     Zeinab Noble MD  12/02/2024 1:22 PM        No follow-ups on file.

## 2024-12-10 LAB
OHS CV DC REMOTE DEVICE TYPE: NORMAL
OHS CV RV PACING PERCENT: 1 %

## 2024-12-18 NOTE — PROGRESS NOTES
Orthopaedic Clinic Note - Postop    NAME:  Angel Meyers   : 1952  MRN: [id number]      2024     CHIEF COMPLAINT: status post right reverse total shoulder arthroplasty, 2024    HISTORY OF PRESENT ILLNESS:   The patient is a 72 y.o. male who returns today for follow up of the right shoulder.  Pain has been controlled with medication.  There were no postoperative complications.    He has been nonweightbearing in a shoulder sling and abductor pillow.    Past Medical History:        Diagnosis Date    Arthritis     CHF (congestive heart failure) (HCC)     Diabetes mellitus (HCC)     Hyperlipidemia     Hypertension        Past Surgical History:        Procedure Laterality Date    APPENDECTOMY      CARDIAC SURGERY      Pacemaker/defibrillator    CHOLECYSTECTOMY, LAPAROSCOPIC      OR NEUROPLASTY &/TRANSPOS MEDIAN NRV CARPAL TUNNE Right 2017    CARPAL TUNNEL RELEASE performed by John Michelle MD at [facility] OR    SHOULDER ARTHROSCOPY Right 2024    RIGHT REVERSE TOTAL SHOULDER ARTHROPLASTY       Current Medications:   Prior to Admission medications    Medication Sig Start Date End Date Taking? Authorizing Provider   spironolactone (ALDACTONE) 25 MG tablet Take 1 tablet by mouth daily   Yes Marcela Alcantara MD   isosorbide mononitrate (IMDUR) 60 MG extended release tablet Take 1 tablet by mouth daily   Yes ProviderMarcela MD   carvedilol (COREG) 12.5 MG tablet Take 1 tablet by mouth 2 times daily (with meals)   Yes Marcela Alcantara MD   diazepam (VALIUM) 5 MG tablet Take 1 tablet by mouth every 6 hours as needed for Anxiety.   Yes Marcela Alcantara MD   lisinopril (PRINIVIL;ZESTRIL) 40 MG tablet Take 1 tablet by mouth daily   Yes Marcela Alcantara MD   omeprazole (PRILOSEC) 20 MG delayed release capsule Take 1 capsule by mouth daily   Yes Marcela Alcantara MD   glipiZIDE (GLUCOTROL) 10 MG tablet Take 1 tablet by mouth 2 times daily (before meals)   Yes Maricruz  "  Harriett Oglesby presented for a  Medicare AWV and comprehensive Health Risk Assessment today. The following components were reviewed and updated:    · Medical history  · Family History  · Social history  · Allergies and Current Medications  · Health Risk Assessment  · Health Maintenance  · Care Team     Patient screened moderate and/or high risk for one or more social determinants of health (SDOH). Patient connected to community resources through the ED Navigator.      ** See Completed Assessments for Annual Wellness Visit within the encounter summary.**         The following assessments were completed:  · Living Situation  · CAGE  · Depression Screening  · Timed Get Up and Go  · Whisper Test  · Cognitive Function Screening  · Nutrition Screening  · ADL Screening  · PAQ Screening        Vitals:    03/22/22 1014   BP: 130/76   Pulse: 87   Resp: 16   Temp: 97.9 °F (36.6 °C)   TempSrc: Oral   SpO2: 98%   Weight: 85.9 kg (189 lb 6 oz)   Height: 5' 5" (1.651 m)     Body mass index is 31.51 kg/m².  Physical Exam  Vitals and nursing note reviewed.   Constitutional:       General: She is not in acute distress.     Appearance: Normal appearance. She is well-developed, well-groomed and overweight. She is not ill-appearing.   HENT:      Head: Normocephalic and atraumatic.      Right Ear: External ear normal.      Left Ear: External ear normal.      Nose: Nose normal.      Mouth/Throat:      Lips: Pink.      Mouth: Mucous membranes are moist.   Eyes:      General: Lids are normal. Vision grossly intact. Gaze aligned appropriately. No scleral icterus.        Right eye: No discharge.         Left eye: No discharge.      Extraocular Movements: Extraocular movements intact.      Conjunctiva/sclera: Conjunctivae normal.   Neck:      Trachea: Phonation normal.   Pulmonary:      Effort: Pulmonary effort is normal. No accessory muscle usage or respiratory distress.   Abdominal:      General: Abdomen is flat. There is no distension. "   Musculoskeletal:      Cervical back: Neck supple.   Skin:     General: Skin is warm and dry.      Findings: No rash.   Neurological:      General: No focal deficit present.      Mental Status: She is alert and oriented to person, place, and time. Mental status is at baseline.      Motor: No abnormal muscle tone.      Gait: Gait normal.   Psychiatric:         Attention and Perception: Attention and perception normal.         Mood and Affect: Mood and affect normal.         Speech: Speech normal.         Behavior: Behavior normal. Behavior is cooperative.         Thought Content: Thought content normal.         Cognition and Memory: Cognition and memory normal.         Judgment: Judgment normal.         Diagnoses and health risks identified today and associated recommendations/orders:    1. Encounter for preventive health examination  Health maintenance reviewed, will f/u with PCP for routine preventative care    2. Hypertension, essential, benign  BP controlled on ARB/BB/CCB/HCTZ    3. Hyperlipidemia associated with type 2 diabetes mellitus  Controlled on statin, DM controlled     4. Type 2 diabetes mellitus with microalbuminuria, without long-term current use of insulin  DM controlled on current regimen, HgA1c 6.3, normal kidney functioning     5. Macroalbuminuric diabetic nephropathy  Normal kidney functioning     6. History of stroke with current residual effects  Left hand and arm weakness, uncomplicated     7. Essential thrombocythemia  No acute thrombosis     8. Iron deficiency anemia, unspecified iron deficiency anemia type  No acute hemorrhage    9. Osteopenia, unspecified location  No calcium or vit.D supplementation       Provided Harriett with a 5-10 year written screening schedule and personal prevention plan. Recommendations were developed using the USPSTF age appropriate recommendations. Education, counseling, and referrals were provided as needed. After Visit Summary printed and given to patient which  includes a list of additional screenings\tests needed.    Follow up in about 1 year (around 3/22/2023).    Khalida Montalvo NP  I offered to discuss advanced care planning, including how to pick a person who would make decisions for you if you were unable to make them for yourself, called a health care power of , and what kind of decisions you might make such as use of life sustaining treatments such as ventilators and tube feeding when faced with a life limiting illness recorded on a living will that they will need to know. (How you want to be cared for as you near the end of your natural life)     X Patient is interested in learning more about how to make advanced directives.  I provided them paperwork and offered to discuss this with them.

## 2025-01-13 DIAGNOSIS — Z00.00 ENCOUNTER FOR MEDICARE ANNUAL WELLNESS EXAM: ICD-10-CM

## 2025-01-30 ENCOUNTER — HOSPITAL ENCOUNTER (OUTPATIENT)
Dept: RADIOLOGY | Facility: HOSPITAL | Age: 70
Discharge: HOME | End: 2025-01-30
Attending: INTERNAL MEDICINE
Payer: MEDICARE

## 2025-01-30 DIAGNOSIS — Z12.31 ENCOUNTER FOR SCREENING MAMMOGRAM FOR BREAST CANCER: ICD-10-CM

## 2025-01-30 PROCEDURE — 77067 SCR MAMMO BI INCL CAD: CPT | Mod: TC,HCNC

## 2025-01-30 PROCEDURE — 77063 BREAST TOMOSYNTHESIS BI: CPT | Mod: 26,HCNC,, | Performed by: RADIOLOGY

## 2025-01-30 PROCEDURE — 77067 SCR MAMMO BI INCL CAD: CPT | Mod: 26,HCNC,, | Performed by: RADIOLOGY

## 2025-02-03 RX ORDER — CARVEDILOL 12.5 MG/1
12.5 TABLET ORAL 2 TIMES DAILY WITH MEALS
Qty: 180 TABLET | Refills: 3 | Status: SHIPPED | OUTPATIENT
Start: 2025-02-03

## 2025-02-23 ENCOUNTER — HOSPITAL ENCOUNTER (OUTPATIENT)
Dept: CARDIOLOGY | Facility: HOSPITAL | Age: 70
Discharge: HOME | End: 2025-02-23
Attending: INTERNAL MEDICINE
Payer: MEDICARE

## 2025-02-23 ENCOUNTER — CLINICAL SUPPORT (OUTPATIENT)
Dept: CARDIOLOGY | Facility: HOSPITAL | Age: 70
End: 2025-02-23
Payer: MEDICARE

## 2025-02-23 DIAGNOSIS — Z95.810 PRESENCE OF AUTOMATIC (IMPLANTABLE) CARDIAC DEFIBRILLATOR: ICD-10-CM

## 2025-02-23 PROCEDURE — 93296 REM INTERROG EVL PM/IDS: CPT | Mod: HCNC | Performed by: INTERNAL MEDICINE

## 2025-02-23 PROCEDURE — 93295 DEV INTERROG REMOTE 1/2/MLT: CPT | Mod: HCNC,,, | Performed by: INTERNAL MEDICINE

## 2025-03-11 LAB
OHS CV DC REMOTE DEVICE TYPE: NORMAL
OHS CV RV PACING PERCENT: 1 %

## 2025-03-12 DIAGNOSIS — E11.9 TYPE 2 DIABETES MELLITUS WITHOUT COMPLICATION, UNSPECIFIED WHETHER LONG TERM INSULIN USE: ICD-10-CM

## 2025-03-29 NOTE — TELEPHONE ENCOUNTER
Care Due:                  Date            Visit Type   Department     Provider  --------------------------------------------------------------------------------                                [facility]                              PRIMARY      MEDICINE/INTERN  Last Visit: 12-      CARE ([facility])   JEANETH Noble                              [facility]                              PRIMARY      MEDICINE/INTERN  Next Visit: 06-      CARE ([facility])   JEANETH Noble                                                            Last  Test          Frequency    Reason                     Performed    Due Date  --------------------------------------------------------------------------------    HBA1C.......  6 months...  metFORMIN................  12- 06-    [facility] Embedded Care Due Messages. Reference number: [id number].   3/29/2025 4:32:29 PM CDT

## 2025-03-31 RX ORDER — ATORVASTATIN CALCIUM 40 MG/1
40 TABLET, FILM COATED ORAL NIGHTLY
Qty: 90 TABLET | Refills: 3 | OUTPATIENT
Start: 2025-03-31

## 2025-03-31 RX ORDER — FUROSEMIDE 20 MG/1
TABLET ORAL
Qty: 180 TABLET | Refills: 1 | Status: SHIPPED | OUTPATIENT
Start: 2025-03-31

## 2025-03-31 RX ORDER — APIXABAN 5 MG/1
5 TABLET, FILM COATED ORAL 2 TIMES DAILY
Qty: 180 TABLET | Refills: 1 | OUTPATIENT
Start: 2025-03-31

## 2025-03-31 NOTE — TELEPHONE ENCOUNTER
Refill Routing Note   Medication(s) are not appropriate for processing by [facility] for the following reason(s):        Outside of protocol    ORC action(s):  Route     Requires labs : Yes      Medication Therapy Plan: PRN usage on Furosemide outside of protocol for [facility]        Appointments  past 12m or future 3m with PCP    Date Provider   Last Visit   12/2/2024 Zeinab Noble MD   Next Visit   6/2/2025 Zeinab Noble MD   ED visits in past 90 days: 0        Note composed:7:17 PM 03/30/2025

## 2025-04-01 NOTE — TELEPHONE ENCOUNTER
----- Message from Dragan sent at 2025  9:57 AM CDT -----  [phone]  Type: RX Refill Request Who Called: Self  Have you contacted your pharmacy: no Refill or New Rx: refil RX Name and Strength: apixaban (ELIQUIS) 5 mg Tab pt stated they received a notice from the pharmacy stating they needed to be seen by physician in order to receive the refill. Pt is reaching out in regards to this notice.  How is the patient currently taking it? (ex. 1XDay): Is this a 30 day or 90 day RX: Preferred Pharmacy with phone number: [facility] [address] [facility] [address] [address] [address]: [phone] Fax: [phone] or Mail Order: local Ordering Provider: Would the patient rather a call back or a response via [facility]? Call back Best Call Back Number: [phone]  Additional Information: pt will be out of medication on 2025.

## 2025-04-30 ENCOUNTER — OFFICE VISIT (OUTPATIENT)
Dept: FAMILY MEDICINE | Facility: CLINIC | Age: 70
End: 2025-04-30
Payer: MEDICARE

## 2025-04-30 VITALS
HEART RATE: 64 BPM | DIASTOLIC BLOOD PRESSURE: 60 MMHG | BODY MASS INDEX: 27.06 KG/M2 | TEMPERATURE: 98 F | RESPIRATION RATE: 16 BRPM | WEIGHT: 158.5 LBS | OXYGEN SATURATION: 97 % | SYSTOLIC BLOOD PRESSURE: 124 MMHG | HEIGHT: 64 IN

## 2025-04-30 DIAGNOSIS — E78.2 COMBINED HYPERLIPIDEMIA: ICD-10-CM

## 2025-04-30 DIAGNOSIS — R80.9 TYPE 2 DIABETES MELLITUS WITH MICROALBUMINURIA, WITHOUT LONG-TERM CURRENT USE OF INSULIN: ICD-10-CM

## 2025-04-30 DIAGNOSIS — I42.8 NONISCHEMIC CARDIOMYOPATHY: ICD-10-CM

## 2025-04-30 DIAGNOSIS — I26.90 CHRONIC SEPTIC PULMONARY EMBOLISM WITHOUT ACUTE COR PULMONALE: ICD-10-CM

## 2025-04-30 DIAGNOSIS — I47.19 MULTIFOCAL ATRIAL TACHYCARDIA: ICD-10-CM

## 2025-04-30 DIAGNOSIS — E11.29 TYPE 2 DIABETES MELLITUS WITH MICROALBUMINURIA, WITHOUT LONG-TERM CURRENT USE OF INSULIN: ICD-10-CM

## 2025-04-30 DIAGNOSIS — I49.01 VENTRICULAR FIBRILLATION: ICD-10-CM

## 2025-04-30 DIAGNOSIS — I69.353 HEMIPLEGIA AND HEMIPARESIS FOLLOWING CEREBRAL INFARCTION AFFECTING RIGHT NON-DOMINANT SIDE: ICD-10-CM

## 2025-04-30 DIAGNOSIS — I50.814 RIGHT-SIDED CONGESTIVE HEART FAILURE SECONDARY TO LEFT-SIDED CONGESTIVE HEART FAILURE: ICD-10-CM

## 2025-04-30 DIAGNOSIS — J44.9 COPD, MILD: ICD-10-CM

## 2025-04-30 DIAGNOSIS — Z00.00 ENCOUNTER FOR MEDICARE ANNUAL WELLNESS EXAM: Primary | ICD-10-CM

## 2025-04-30 DIAGNOSIS — M81.0 AGE-RELATED OSTEOPOROSIS WITHOUT CURRENT PATHOLOGICAL FRACTURE: ICD-10-CM

## 2025-04-30 DIAGNOSIS — I27.82 CHRONIC SEPTIC PULMONARY EMBOLISM WITHOUT ACUTE COR PULMONALE: ICD-10-CM

## 2025-04-30 PROCEDURE — 99999 PR PBB SHADOW E&M-EST. PATIENT-LVL V: CPT | Mod: PBBFAC,HCNC,, | Performed by: NURSE PRACTITIONER

## 2025-04-30 RX ORDER — ATORVASTATIN CALCIUM 80 MG/1
80 TABLET, FILM COATED ORAL
COMMUNITY
Start: 2025-04-27

## 2025-04-30 NOTE — PATIENT INSTRUCTIONS
Counseling and Referral of Other Preventative  (Italic type indicates deductible and co-insurance are waived)    Patient Name: Harriett Oglesby  Today's Date: 4/30/2025    Health Maintenance       Date Due Completion Date    RSV Vaccine (Age 60+ and Pregnant patients) (1 - Risk 60-74 years 1-dose series) Never done ---    Foot Exam 11/06/2024 11/6/2023 (Done)    Override on 11/6/2023: Done    Diabetic Eye Exam 03/11/2025 3/11/2024    Hemoglobin A1c 06/02/2025 12/2/2024    DEXA Scan 11/29/2025 11/29/2022    Diabetes Urine Screening 12/02/2025 12/2/2024    Lipid Panel 12/02/2025 12/2/2024    Mammogram 01/30/2026 1/30/2025    High Dose Statin 04/30/2026 4/30/2025    Colorectal Cancer Screening 03/01/2028 3/1/2021    TETANUS VACCINE 04/05/2035 4/5/2025        No orders of the defined types were placed in this encounter.    The following information is provided to all patients.  This information is to help you find resources for any of the problems found today that may be affecting your health:                  Living healthy guide: www.[facility].[place].gov      Understanding Diabetes: www.diabetes.org      Eating healthy: www.cdc.gov/healthyweight      CDC home safety checklist: www.cdc.gov/steadi/patient.html      Agency on Aging: www.goea.[place].[facility]      Alcoholics anonymous (AA): www.aa.org      Physical Activity: www.gracie.nih.gov/jy1irir      Tobacco use: www.quitwithusla.org

## 2025-04-30 NOTE — LETTER
AUTHORIZATION FOR RELEASE OF   CONFIDENTIAL INFORMATION    Dear Release of information,    We are seeing Harriett Oglesby, date of birth 1955, in the clinic at [facility] MED. Zeinab Noble MD is the patient's PCP. Harriett Oglesby has an outstanding lab/procedure at the time we reviewed her chart. In order to help keep her health information updated, she has authorized us to request the following medical record(s):        (  )  MAMMOGRAM                                      (  )  COLONOSCOPY      (  )  PAP SMEAR                                          (  )  OUTSIDE LAB RESULTS     (  )  DEXA SCAN                                          (X  )  EYE EXAM            (  )  FOOT EXAM                                          (  )  ENTIRE RECORD     (  )  OUTSIDE IMMUNIZATIONS                 (  )  _______________         Please fax records to [facility], Jones, Nadja N., MD, [phone]      If you have any questions, please contact Adrienne at [phone].           Patient Name: Harriett Oglesby  : 1955  Patient Phone #: [phone]

## 2025-04-30 NOTE — PROGRESS NOTES
"  Harriett Oglesby presented for a  Medicare AWV and comprehensive Health Risk Assessment today. The following components were reviewed and updated:    Medical history  Family History  Social history  Allergies and Current Medications  Health Risk Assessment  Health Maintenance  Care Team     Patient screened moderate and/or high risk for one or more social determinants of health (SDOH). Patient connected to community resources through the ED Navigator.      ** See Completed Assessments for Annual Wellness Visit within the encounter summary.**         The following assessments were completed:  Living Situation  CAGE  Depression Screening  Timed Get Up and Go  Whisper Test  Cognitive Function Screening  Nutrition Screening  ADL Screening  PAQ Screening      Opioid documentation:  Patient does not have a current opioid prescription.        Vitals:    04/30/25 0946   BP: 124/60   Pulse: 64   Resp: 16   Temp: 97.6 °F (36.4 °C)   TempSrc: Oral   SpO2: 97%   Weight: 71.9 kg (158 lb 8.2 oz)   Height: 5' 4" (1.626 m)     Body mass index is 27.21 kg/m².  Physical Exam  Vitals and nursing note reviewed.   Constitutional:       General: She is not in acute distress.     Appearance: Normal appearance. She is well-developed and well-groomed. She is not ill-appearing.   HENT:      Head: Normocephalic and atraumatic.      Right Ear: External ear normal.      Left Ear: External ear normal.      Nose: Nose normal.      Mouth/Throat:      Lips: Pink.      Mouth: Mucous membranes are moist.   Eyes:      General: Lids are normal. Vision grossly intact. Gaze aligned appropriately.      Conjunctiva/sclera: Conjunctivae normal.   Neck:      Trachea: Phonation normal.   Pulmonary:      Effort: Pulmonary effort is normal. No accessory muscle usage or respiratory distress.   Abdominal:      General: Abdomen is flat. There is no distension.   Musculoskeletal:      Cervical back: Neck supple.   Skin:     General: Skin is warm and dry.      " Findings: No rash.   Neurological:      General: No focal deficit present.      Mental Status: She is alert and oriented to person, place, and time. Mental status is at baseline.      Sensory: Sensory deficit present.      Motor: Weakness, tremor, atrophy and abnormal muscle tone present.      Coordination: Coordination normal.      Gait: Gait abnormal.   Psychiatric:         Attention and Perception: Attention and perception normal.         Mood and Affect: Mood and affect normal.         Speech: Speech normal.         Behavior: Behavior normal. Behavior is cooperative.         Thought Content: Thought content normal.         Cognition and Memory: Cognition and memory normal.         Judgment: Judgment normal.               Diagnoses and health risks identified today and associated recommendations/orders:    1. Encounter for Medicare annual wellness exam  Health maintenance reviewed and up to date, will f/u with PCP for routine preventative care  Review for Opioid Screening: Pt does not have Rx for Opioids   Review for Substance Use Disorders: Patient does not use substance   - Ambulatory Referral/Consult to Enhanced Annual Wellness Visit (eAWV)    2. Hemiplegia and hemiparesis following cerebral infarction affecting right non-dominant side  Pt with no functioning on left side, ambulatory with walking cane, not safe to drive or operate any machinery, discussed home independence and falls precautions    3. Right-sided congestive heart failure secondary to left-sided congestive heart failure  Chronically stable, closely followed by cards  Disease process and medications discussed. Questions answered fully.  Emphasized salt restriction.  Encouraged daily monitoring of the patient's weight.  Encouraged regular exercise.    4. Nonischemic cardiomyopathy  Recommendations: decrease sodium in the diet, elevate feet above the level of the heart whenever possible, increase physical activity, use of compression stockings, and  weight loss.  The patient was also instructed to call IMMEDIATELY (i.e., day or night) if any cardiopulmonary symptoms occur, especially chest pain, shortness of breath, dyspnea on exertion, paroxysmal nocturnal dyspnea, or orthopnea, and these were explained.    5. Multifocal atrial tachycardia  Rate controlled with AICD and continues with chronic anticoagulation without complication, continue cardiology plan     6. Ventricular fibrillation  Rate controlled with AICD and continues with chronic anticoagulation without complication, continue cardiology plan     7. COPD, mild  Breathing stable and continues to refrain from cigarette smoking  Discussed distinction between quick-relief and controlled medications.  Discussed medication dosage, use, side effects, and goals of treatment in detail.    Warning signs of respiratory distress were reviewed with the patient.   Discussed avoidance of precipitants.  Discussed monitoring symptoms and use of quick-relief medications and contacting us early in the course of exacerbations.    8. Chronic septic pulmonary embolism without acute cor pulmonale  Breathing stable and doing well with chronic anticoagulation     9. Type 2 diabetes mellitus with microalbuminuria, without long-term current use of insulin  Lab Results   Component Value Date    HGBA1C 6.1 (H) 12/02/2024     DM stable  Education: Reviewed ABCs of diabetes management (respective goals in parentheses):  A1C (<7), blood pressure (<130/80), and cholesterol (LDL <100).  Addressed ADA diet.  Suggested low cholesterol diet.  Encouraged aerobic exercise.  Discussed foot care.  Reminded to get yearly retinal exam.  Discussed ways to avoid symptomatic hypoglycemia.  Discussed sick day management.    10. Combined hyperlipidemia  Continue dietary measures.  Continue regular exercise.  Lipid-lowering medications: continue statin daily.    11. Age-related osteoporosis without current pathological fracture  Falls precautions  discussed:  - add more outdoor lighting  - always use handrails on the stairs  - always wear low-heeled or flat shoes or slippers with nonskid soles  - call the doctor if I am feeling too drowsy  - install bathroom grab bars  - join an exercise group in my community  - keep a flashlight by the bed  - keep my cell phone with me always  - learn how to get back up if I fall  - make an emergency alert plan in case I fall  -  clutter from the floors  - use a nonslip pad with throw rugs, or remove them completely  - use a cane or walker  - use a nightlight in the bathroom  - wear my glasses and/or hearing aid  - attend therapy      Provided Harriett with a 5-10 year written screening schedule and personal prevention plan. Recommendations were developed using the USPSTF age appropriate recommendations. Education, counseling, and referrals were provided as needed. After Visit Summary printed and given to patient which includes a list of additional screenings\tests needed.    No follow-ups on file.    Khalida Montalvo NP  I offered to discuss advanced care planning, including how to pick a person who would make decisions for you if you were unable to make them for yourself, called a health care power of , and what kind of decisions you might make such as use of life sustaining treatments such as ventilators and tube feeding when faced with a life limiting illness recorded on a living will that they will need to know. (How you want to be cared for as you near the end of your natural life)     X  Patient has advanced directives written and agrees to provide copies to the institution.

## 2025-05-14 NOTE — PLAN OF CARE
Problem: Adult Inpatient Plan of Care  Goal: Plan of Care Review  11/12/2023 1818 by Deirdre Fuentes RN  Outcome: Ongoing, Progressing  11/12/2023 1753 by Deirdre Fuentes RN  Outcome: Ongoing, Progressing  11/12/2023 1752 by Deirdre Fuentes RN  Outcome: Ongoing, Progressing  Goal: Patient-Specific Goal (Individualized)  11/12/2023 1818 by Deirdre Fuentes RN  Outcome: Ongoing, Progressing  11/12/2023 1753 by Deirdre Fuentes RN  Outcome: Ongoing, Progressing  11/12/2023 1752 by Deirdre Fuentes RN  Outcome: Ongoing, Progressing  Goal: Absence of Hospital-Acquired Illness or Injury  11/12/2023 1818 by Deirdre Fuentes RN  Outcome: Ongoing, Progressing  11/12/2023 1753 by Deirdre Fuentes RN  Outcome: Ongoing, Progressing  11/12/2023 1752 by Deirdre Fuentes RN  Outcome: Ongoing, Progressing  Goal: Optimal Comfort and Wellbeing  11/12/2023 1818 by Deirdre Fuentes RN  Outcome: Ongoing, Progressing  11/12/2023 1753 by Deirdre Fuentes RN  Outcome: Ongoing, Progressing  11/12/2023 1752 by Deirdre Fuentes RN  Outcome: Ongoing, Progressing  Intervention: Provide Person-Centered Care  Flowsheets (Taken 11/12/2023 1818)  Trust Relationship/Rapport:   care explained   questions encouraged   choices provided   reassurance provided   emotional support provided   thoughts/feelings acknowledged   empathic listening provided   questions answered  Goal: Readiness for Transition of Care  11/12/2023 1818 by Deirdre Fuentes RN  Outcome: Ongoing, Progressing  11/12/2023 1753 by Deirdre Fuentes RN  Outcome: Ongoing, Progressing  11/12/2023 1752 by Deirdre Fuentes RN  Outcome: Ongoing, Progressing     Problem: Diabetes Comorbidity  Goal: Blood Glucose Level Within Targeted Range  11/12/2023 1818 by Deirdre Fuentes RN  Outcome: Ongoing, Progressing  11/12/2023 1753 by Deirdre Fuentes RN  Outcome: Ongoing, Progressing  11/12/2023 1752 by Deirdre Fuentes  Called and left message informing patient of below.     Please call the patient, CT calcium score is 0 which carry a low risk.  Thank you RN  Outcome: Ongoing, Progressing     Problem: Skin Injury Risk Increased  Goal: Skin Health and Integrity  11/12/2023 1818 by Deirdre Fuentes RN  Outcome: Ongoing, Progressing  11/12/2023 1753 by Deirdre Fuentes RN  Outcome: Ongoing, Progressing  11/12/2023 1752 by Deirdre Fuentes RN  Outcome: Ongoing, Progressing     Problem: Infection  Goal: Absence of Infection Signs and Symptoms  11/12/2023 1818 by Deirdre Fuentes RN  Outcome: Ongoing, Progressing  11/12/2023 1753 by Deirdre Fuentes RN  Outcome: Ongoing, Progressing  11/12/2023 1752 by Deirdre Fuentes RN  Outcome: Ongoing, Progressing     Problem: Fall Injury Risk  Goal: Absence of Fall and Fall-Related Injury  11/12/2023 1818 by Deirdre Fuentes RN  Outcome: Ongoing, Progressing  11/12/2023 1753 by Deirdre Fuentes RN  Outcome: Ongoing, Progressing  11/12/2023 1752 by Deirdre Fuentes RN  Outcome: Ongoing, Progressing     Problem: Adjustment to Illness (Heart Failure)  Goal: Optimal Coping  11/12/2023 1818 by Deirdre Fuentes RN  Outcome: Ongoing, Progressing  11/12/2023 1753 by Deirdre Fuentes RN  Outcome: Ongoing, Progressing  11/12/2023 1752 by Deirdre Fuentes RN  Outcome: Ongoing, Progressing     Problem: Cardiac Output Decreased (Heart Failure)  Goal: Optimal Cardiac Output  11/12/2023 1818 by Deirdre Fuentes RN  Outcome: Ongoing, Progressing  11/12/2023 1753 by Deirdre Fuentes RN  Outcome: Ongoing, Progressing  11/12/2023 1752 by Deirdre Fuentes RN  Outcome: Ongoing, Progressing     Problem: Dysrhythmia (Heart Failure)  Goal: Stable Heart Rate and Rhythm  11/12/2023 1818 by Deirdre Fuentes RN  Outcome: Ongoing, Progressing  11/12/2023 1753 by Deirdre Fuentes RN  Outcome: Ongoing, Progressing  11/12/2023 1752 by Deirdre Fuentes RN  Outcome: Ongoing, Progressing     Problem: Fluid Imbalance (Heart Failure)  Goal: Fluid Balance  11/12/2023 1818 by Deirdre Fuentes  RN  Outcome: Ongoing, Progressing  11/12/2023 1753 by Deirdre Fuentes RN  Outcome: Ongoing, Progressing  11/12/2023 1752 by Deirdre Fuentes RN  Outcome: Ongoing, Progressing     Problem: Functional Ability Impaired (Heart Failure)  Goal: Optimal Functional Ability  11/12/2023 1818 by Deirdre Fuentes RN  Outcome: Ongoing, Progressing  11/12/2023 1753 by Deirdre Fuentes RN  Outcome: Ongoing, Progressing  11/12/2023 1752 by Deirdre Fuentes RN  Outcome: Ongoing, Progressing     Problem: Oral Intake Inadequate (Heart Failure)  Goal: Optimal Nutrition Intake  11/12/2023 1818 by Deirdre Fuentes RN  Outcome: Ongoing, Progressing  11/12/2023 1753 by Deirdre Fuentes RN  Outcome: Ongoing, Progressing  11/12/2023 1752 by Deirdre Fuentes RN  Outcome: Ongoing, Progressing     Problem: Respiratory Compromise (Heart Failure)  Goal: Effective Oxygenation and Ventilation  11/12/2023 1818 by Deirdre Fuentes RN  Outcome: Ongoing, Progressing  11/12/2023 1753 by Deirdre Fuentes RN  Outcome: Ongoing, Progressing  11/12/2023 1752 by Deirdre Fuentes RN  Outcome: Ongoing, Progressing     Problem: Sleep Disordered Breathing (Heart Failure)  Goal: Effective Breathing Pattern During Sleep  11/12/2023 1818 by Deirdre Fuentes RN  Outcome: Ongoing, Progressing  11/12/2023 1753 by Deirdre Fuentes RN  Outcome: Ongoing, Progressing  11/12/2023 1752 by Deirdre Fuentes RN  Outcome: Ongoing, Progressing

## 2025-05-15 ENCOUNTER — PATIENT OUTREACH (OUTPATIENT)
Dept: ADMINISTRATIVE | Facility: HOSPITAL | Age: 70
End: 2025-05-15
Payer: MEDICARE

## 2025-05-15 ENCOUNTER — OFFICE VISIT (OUTPATIENT)
Dept: CARDIOLOGY | Facility: CLINIC | Age: 70
End: 2025-05-15
Payer: MEDICARE

## 2025-05-15 VITALS
WEIGHT: 157.44 LBS | OXYGEN SATURATION: 99 % | HEIGHT: 64 IN | HEART RATE: 79 BPM | RESPIRATION RATE: 15 BRPM | SYSTOLIC BLOOD PRESSURE: 139 MMHG | BODY MASS INDEX: 26.88 KG/M2 | DIASTOLIC BLOOD PRESSURE: 73 MMHG

## 2025-05-15 DIAGNOSIS — I47.20 V-TACH: ICD-10-CM

## 2025-05-15 DIAGNOSIS — E78.5 HYPERLIPIDEMIA ASSOCIATED WITH TYPE 2 DIABETES MELLITUS: ICD-10-CM

## 2025-05-15 DIAGNOSIS — E11.29 TYPE 2 DIABETES MELLITUS WITH MICROALBUMINURIA, WITHOUT LONG-TERM CURRENT USE OF INSULIN: Primary | ICD-10-CM

## 2025-05-15 DIAGNOSIS — R80.9 TYPE 2 DIABETES MELLITUS WITH MICROALBUMINURIA, WITHOUT LONG-TERM CURRENT USE OF INSULIN: Primary | ICD-10-CM

## 2025-05-15 DIAGNOSIS — I26.90 CHRONIC SEPTIC PULMONARY EMBOLISM WITHOUT ACUTE COR PULMONALE: ICD-10-CM

## 2025-05-15 DIAGNOSIS — I50.22 CHRONIC SYSTOLIC CONGESTIVE HEART FAILURE: ICD-10-CM

## 2025-05-15 DIAGNOSIS — I27.82 CHRONIC SEPTIC PULMONARY EMBOLISM WITHOUT ACUTE COR PULMONALE: ICD-10-CM

## 2025-05-15 DIAGNOSIS — Z95.810 AICD (AUTOMATIC CARDIOVERTER/DEFIBRILLATOR) PRESENT: ICD-10-CM

## 2025-05-15 DIAGNOSIS — I10 HYPERTENSION, ESSENTIAL, BENIGN: Primary | ICD-10-CM

## 2025-05-15 DIAGNOSIS — I11.0 HYPERTENSIVE HEART DISEASE WITH HEART FAILURE: ICD-10-CM

## 2025-05-15 DIAGNOSIS — G47.30 SLEEP APNEA, UNSPECIFIED TYPE: ICD-10-CM

## 2025-05-15 DIAGNOSIS — I69.30 HISTORY OF STROKE WITH CURRENT RESIDUAL EFFECTS: ICD-10-CM

## 2025-05-15 DIAGNOSIS — E11.69 HYPERLIPIDEMIA ASSOCIATED WITH TYPE 2 DIABETES MELLITUS: ICD-10-CM

## 2025-05-15 LAB
OHS QRS DURATION: 68 MS
OHS QTC CALCULATION: 417 MS

## 2025-05-15 PROCEDURE — 99214 OFFICE O/P EST MOD 30 MIN: CPT | Mod: HCNC,S$GLB,, | Performed by: INTERNAL MEDICINE

## 2025-05-15 PROCEDURE — G2211 COMPLEX E/M VISIT ADD ON: HCPCS | Mod: HCNC,S$GLB,, | Performed by: INTERNAL MEDICINE

## 2025-05-15 PROCEDURE — 93000 ELECTROCARDIOGRAM COMPLETE: CPT | Mod: HCNC,S$GLB,, | Performed by: INTERNAL MEDICINE

## 2025-05-15 PROCEDURE — 1159F MED LIST DOCD IN RCRD: CPT | Mod: CPTII,HCNC,S$GLB, | Performed by: INTERNAL MEDICINE

## 2025-05-15 PROCEDURE — 1101F PT FALLS ASSESS-DOCD LE1/YR: CPT | Mod: CPTII,HCNC,S$GLB, | Performed by: INTERNAL MEDICINE

## 2025-05-15 PROCEDURE — 4010F ACE/ARB THERAPY RXD/TAKEN: CPT | Mod: CPTII,HCNC,S$GLB, | Performed by: INTERNAL MEDICINE

## 2025-05-15 PROCEDURE — 3008F BODY MASS INDEX DOCD: CPT | Mod: CPTII,HCNC,S$GLB, | Performed by: INTERNAL MEDICINE

## 2025-05-15 PROCEDURE — 3288F FALL RISK ASSESSMENT DOCD: CPT | Mod: CPTII,HCNC,S$GLB, | Performed by: INTERNAL MEDICINE

## 2025-05-15 PROCEDURE — 1126F AMNT PAIN NOTED NONE PRSNT: CPT | Mod: CPTII,HCNC,S$GLB, | Performed by: INTERNAL MEDICINE

## 2025-05-15 PROCEDURE — 99999 PR PBB SHADOW E&M-EST. PATIENT-LVL V: CPT | Mod: PBBFAC,HCNC,, | Performed by: INTERNAL MEDICINE

## 2025-05-15 PROCEDURE — 3078F DIAST BP <80 MM HG: CPT | Mod: CPTII,HCNC,S$GLB, | Performed by: INTERNAL MEDICINE

## 2025-05-15 PROCEDURE — 3075F SYST BP GE 130 - 139MM HG: CPT | Mod: CPTII,HCNC,S$GLB, | Performed by: INTERNAL MEDICINE

## 2025-05-15 NOTE — PROGRESS NOTES
CARDIOVASCULAR CONSULTATION    REASON FOR CONSULT:   Harriett Oglesby is a 70 y.o. female who presents for   Chief Complaint   Patient presents with    Follow-up          HISTORY OF PRESENT ILLNESS:     Patient is a pleasant 68-year-old lady.  Came into hospital.  Found to have nonischemic cardiomyopathy.  Underwent AICD implantation for secondary prevention.  Doing fine.  Main complaint is dyspnea on exertion and tiredness.  [employer] AICD, single-chamber.        Cardiology consult from recent hospitalization:    VF (ventricular fibrillation)  VFib arrest.  Prolonged QTC.  On lidocaine drip.  Monitor closely in ICU.  Plan for cardiac catheterization in a.m.     11/7: Risks, benefits and alternatives of the catheterization procedure were discussed with the patient.The risks of coronary angiography include but are not limited to: bleeding, infection, death, heart attack, arrhythmia, kidney injury or failure, potential need for dialysis, allergic reactions, stroke, need for emergency surgery, hematoma, pseudoaneurysm etc.  Should stenting be indicated, the patient has agreed to dual anti-platelet therapy for 1-consecutive year with a drug-eluting stent and a minimum of 1-month with the use of a bare metal stent. Additionally, pt is aware that non-compliance is likely to result in stent clotting with heart attack, heart failure, and/or death  The risks of moderate sedation include hypotension, respiratory depression, arrhythmias, bronchospasm, and death. Informed consent was obtained and the  patient is agreeable to proceed with the procedure. Consent was placed on the chart.  Continue lidocaine drip  11/8:  Nonischemic cardiomyopathy.  No significant stenosis on angiogram yesterday.  Plan for AICD in a.m..  Continue lidocaine drip        11/9: s/p aicd today.      CHF (congestive heart failure)  Patient is identified as having Combined Systolic and Diastolic heart failure that is Acute.Latest ECHO performed and  demonstrates- Results for orders placed during the hospital encounter of 11/06/23     Echo     Interpretation Summary    Left Ventricle: The left ventricle is mildly dilated. Normal wall thickness. There is severely reduced systolic function with a visually estimated ejection fraction of 20 - 25%.    Right Ventricle: Normal right ventricular cavity size. Systolic function is normal.    Left Atrium: Left atrium is severely dilated.    Right Atrium: Right atrium is moderately dilated.    Mitral Valve: There is mild regurgitation.    Tricuspid Valve: There is mild to moderate regurgitation.    Pulmonary Artery: The estimated pulmonary artery systolic pressure is 49 mmHg.    IVC/SVC: Intermediate venous pressure at 8 mmHg.    Pericardium: There is a trivial effusion.  . Continue Furosemide and monitor clinical status closely. Monitor on telemetry. Patient is on CHF pathway.  Monitor strict Is&Os and daily weights.  Place on fluid restriction of 1.5 L. Cardiology has been consulted. Continue to stress to patient importance of self efficacy and  on diet for CHF. Last BNP reviewed- and noted below       Recent Labs   Lab 11/06/23  1306   BNP 1,839*   .     euvolumic on exam. Continue gdmt     Pulmonary embolism  CTA personally reviewed.  Segmental and subsegmental pulmonary embolism.  RV to LV ratio less than 0.9.  Will continue to monitor.  Currently no indication for thrombectomy.  No DVT on peripheral ultrasound.  Continue heparin drip, transition to Eliquis at time of discharge           Multifocal atrial tachycardia  On initial presentation.  Improving.     11/7: now in sinus     Hyperlipidemia associated with type 2 diabetes mellitus         History of stroke with current residual effects         Type 2 diabetes mellitus with microalbuminuria, without long-term current use of insulin         Hypertension, essential, benign        Discharge summary from recent hospitalization:    HPI:   This is a 68-year-old  female with a past medical history of hypertension, type 2 diabetes, hyperlipidemia, CVA with left-sided deficits who presents with tachycardia.       The patient initially presented to urgent care with shortness of breaths and lower extremity edema that started 7 days prior to presentation.  She ran out of her blood pressure medications about 2 weeks prior.  Additional symptoms include cough, nasal congestion and fatigue.  She was noted to be tachycardic and was advised present to the ED.     In the ED, the patient was tachycardic (up to 160s), tachypneic but normotensive.  Labs were remarkable for an elevated BNP (1839), elevated troponin (0.090 > 0.079), elevated D-dimer (2.87).  EKGs showed MAT and prolonged QTc. CTA chest showed pulmonary emboli in segmental and subsegmental pulmonary artery in the right middle lobe, with mild-to-moderate bibasilar pleural effusions with associated bibasilar atelectasis. An echocardiogram showed an EF of 20-25%, PA pressure of 49 mmHg.  Patient was given aspirin 325 mg, adenosine 6 mg IV x2, atenolol 25 mg p.o., diltiazem 20 mg IV x2, 30 mg IV x1, potassium bicarbonate 40 mEq use and was started on heparin drip.     While in the ED, the patient went into V-fib arrest, underwent 1 round of CPR and was cardioverted @200 joules x1, with achievement of ROSC. Mag sulfate 2 g IV & an amp of D50% were administered. Cardiology recommended Plavix, lidocaine infusion and an angiogram in the AM. She subsequently went into Vtach and was cardioverted @200 joules x1. No Epi was given and the patient was not intubated.   Plavix, additional potassium and lidocaine bolus, followed by infusion were ordered. Patient became hypoxic and was placed on a NRB. She was admitted for further management.       Procedure(s) (LRB):  INSERTION, ICD GENERATOR, SINGLE CHAMBER (Left)       Hospital Course:   69 y/o female sent to ER from Urgent Care for tachycardia.  In the ER found to be in multifocal atrial  tachycardia with prolonged QTC.  Patient was given Cardizem with some improvement in heart rate.  Subsequently CTA was done which revealed segmental and subsegmental pulmonary embolism on the right side. Echo was also done which demonstrated severe cardiomyopathy with EF of 20-25%.  Subsequently patient developed VFib and cardioverted followed by V-tach which was also cardioverted x1.  Started on lidocaine drip in the ER and admitted to ICU.  Also started on heparin drip.  Underwent LHC showing non ischemic cardiomyopathy.  Cards recommending AICD.  She has remained in NSR. AICD placed on 11/9.       Notes from April 2024: Patient here for follow-up.  Feeling fine.  Device checked today.    July 24: Patient here for follow-up.  Device check today.  Doing fine.  Some NSVT seen.  Patient doing fine also.  Denies any chest pains at rest on exertion, orthopnea, PND      May 25:History of Present Illness    CHIEF COMPLAINT:  Harriett presents today with fatigue    HISTORY OF PRESENT ILLNESS:  She reports experiencing persistent fatigue requiring rest periods during activities. She stops and sits down until energy is regained before resuming activity. This pattern has been consistent and longstanding.    SLEEP:  She reports sleep maintenance insomnia, waking around 2 AM with difficulty returning to sleep. She also endorses snoring.    MEDICAL HISTORY:  She has a history of pulmonary embolism (2023), stroke, and cardiac disease.    MEDICATIONS:  She currently takes Eliquis, Atorvastatin, Carvedilol, Lasix (PRN), Jardiance, Spironolactone, and Valsartan. She discontinued B12 supplements after developing cardiac problems, preferring to take only prescribed medications.           PAST MEDICAL HISTORY:     Past Medical History:   Diagnosis Date    Acute respiratory failure with hypoxia 11/6/2023    Adult BMI 31.0-31.9 kg/sq m 12/3/2018    Anemia     CHF (congestive heart failure) 11/6/2023    CVA (cerebral vascular accident)      Residual weakness in the left arm and hand    Essential thrombocythemia     Hyperlipidemia associated with type 2 diabetes mellitus     Hypertension     Osteoporosis     Pulmonary embolism 11/6/2023    Type 2 diabetes mellitus        PAST SURGICAL HISTORY:     Past Surgical History:   Procedure Laterality Date    CATHETERIZATION OF BOTH LEFT AND RIGHT HEART N/A 11/7/2023    Procedure: CATHETERIZATION, HEART, BOTH LEFT AND RIGHT;  Surgeon: Asher Gonzalez MD;  Location: [facility] CATH LAB;  Service: Cardiology;  Laterality: N/A;    COLONOSCOPY N/A 1/4/2019    Procedure: COLONOSCOPY;  Surgeon: James Ozuna MD;  Location: [facility] ENDO;  Service: Endoscopy;  Laterality: N/A;    COLONOSCOPY N/A 3/1/2021    Procedure: COLONOSCOPY;  Surgeon: Nelida Cook MD;  Location: [facility] ENDO;  Service: Endoscopy;  Laterality: N/A;    ESOPHAGOGASTRODUODENOSCOPY N/A 3/1/2021    Procedure: EGD (ESOPHAGOGASTRODUODENOSCOPY);  Surgeon: Nelida Cook MD;  Location: [facility] ENDO;  Service: Endoscopy;  Laterality: N/A;  rapid covid > 50 miles away, instructions mailed -ml    HEMORRHOID SURGERY      INSERTION, PULSE GENERATOR, ICD, SINGLE CHAMBER Left 11/9/2023    Procedure: INSERTION, ICD GENERATOR, SINGLE CHAMBER;  Surgeon: Chilo Da Silva MD;  Location: [facility] CATH LAB;  Service: Cardiology;  Laterality: Left;       ALLERGIES AND MEDICATION:   Review of patient's allergies indicates:  No Known Allergies     Medication List            Accurate as of May 15, 2025  8:41 AM. If you have any questions, ask your nurse or doctor.                CONTINUE taking these medications      apixaban 5 mg Tab  Commonly known as: ELIQUIS  Take 1 tablet (5 mg total) by mouth 2 (two) times daily.     aspirin 81 MG EC tablet  Commonly known as: ECOTRIN     atorvastatin 80 MG tablet  Commonly known as: LIPITOR     blood sugar diagnostic Strp  1 strip by Misc.(Non-Drug; Combo Route) route Daily.     blood-glucose meter kit  Use as instructed     carvediloL  12.5 MG tablet  Commonly known as: COREG  TAKE 1 TABLET BY MOUTH TWICE A DAY WITH MEALS     cetirizine 10 MG tablet  Commonly known as: ZYRTEC     furosemide 20 MG tablet  Commonly known as: LASIX  TAKE 1-2 TABLETS BY MOUTH DAILY AS NEEDED FOR FLUID OVERLOAD).     JARDIANCE 10 mg tablet  Generic drug: empagliflozin  TAKE 1 TABLET BY MOUTH EVERY DAY     lancets 30 gauge Misc  1 lancet  by Misc.(Non-Drug; Combo Route) route Daily.     metFORMIN 1000 MG tablet  Commonly known as: GLUCOPHAGE  Take 1 tablet (1,000 mg total) by mouth 2 (two) times daily. for diabetes.     spironolactone 25 MG tablet  Commonly known as: ALDACTONE  TAKE 1 TABLET BY MOUTH EVERY DAY     valsartan 160 MG tablet  Commonly known as: DIOVAN  TAKE 1 TABLET BY MOUTH ONCE DAILY.              SOCIAL HISTORY:     Social History     Socioeconomic History    Marital status: Single   Tobacco Use    Smoking status: Former     Types: Cigarettes    Smokeless tobacco: Never   Substance and Sexual Activity    Alcohol use: No    Drug use: No    Sexual activity: Not Currently     Social Drivers of Health     Financial Resource Strain: Low Risk  (4/30/2025)    Overall Financial Resource Strain (CARDIA)     Difficulty of Paying Living Expenses: Not hard at all   Food Insecurity: No Food Insecurity (4/30/2025)    Hunger Vital Sign     Worried About Running Out of Food in the Last Year: Never true     Ran Out of Food in the Last Year: Never true   Transportation Needs: No Transportation Needs (4/30/2025)    PRAPARE - Transportation     Lack of Transportation (Medical): No     Lack of Transportation (Non-Medical): No   Physical Activity: Insufficiently Active (4/30/2025)    Exercise Vital Sign     Days of Exercise per Week: 2 days     Minutes of Exercise per Session: 20 min   Stress: No Stress Concern Present (4/30/2025)    Citizen of [place] of Occupational Health - Occupational Stress Questionnaire     Feeling of Stress : Not at all   Housing Stability: Low Risk   "(4/30/2025)    Housing Stability Vital Sign     Unable to Pay for Housing in the Last Year: No     Number of Times Moved in the Last Year: 0     Homeless in the Last Year: No       FAMILY HISTORY:     Family History   Problem Relation Name Age of Onset    Pancreatic cancer Mother      Emphysema Father      Cancer Father          Lung    Breast cancer Cousin      Breast cancer Cousin      Colon cancer Neg Hx      Esophageal cancer Neg Hx         REVIEW OF SYSTEMS:   Review of Systems   HENT: Negative.     Eyes: Negative.    Respiratory: Negative.     Endocrine: Negative.    Hematologic/Lymphatic: Negative.    Skin: Negative.    Musculoskeletal: Negative.    Gastrointestinal: Negative.    Genitourinary: Negative.    Neurological: Negative.    Psychiatric/Behavioral: Negative.     Allergic/Immunologic: Negative.        A 10 point review of systems was performed and all the pertinent positives have been mentioned. Rest of review of systems was negative.        PHYSICAL EXAM:     Vitals:    05/15/25 0825   BP: 139/73   Pulse: 79   Resp: 15    Body mass index is 27.02 kg/m².  Weight: 71.4 kg (157 lb 6.5 oz)   Height: 5' 4" (162.6 cm)     Physical Exam  Constitutional:       Appearance: Normal appearance. She is well-developed.   HENT:      Head: Normocephalic.   Eyes:      Pupils: Pupils are equal, round, and reactive to light.   Cardiovascular:      Rate and Rhythm: Normal rate and regular rhythm.   Pulmonary:      Effort: Pulmonary effort is normal.      Breath sounds: Normal breath sounds.   Abdominal:      General: Bowel sounds are normal.      Palpations: Abdomen is soft.      Tenderness: There is no abdominal tenderness.   Musculoskeletal:         General: Normal range of motion.      Cervical back: Normal range of motion and neck supple.   Skin:     General: Skin is warm.   Neurological:      Mental Status: She is alert and oriented to person, place, and time.         DATA:     Laboratory:  CBC:  Recent Labs   Lab " 11/13/23  0314 06/24/24  0900 12/02/24  0920   WBC 5.72 5.40 6.69   Hemoglobin 10.3 L 11.4 L 11.4 L   Hematocrit 32.5 L 38.7 37.7   Platelets 296 343 400       CHEMISTRIES:  Recent Labs   Lab 11/07/23  0100 11/07/23  0841 11/08/23  2355 11/10/23  1221 01/29/24  1434 06/24/24  0900 07/11/24  0907 12/02/24  0920   Glucose 253 H 230 H   < > 214 H   < > 105 118 H 88   Sodium 138 140   < > 137   < > 142 141 140   Potassium 4.7 4.8   < > 3.8   < > 4.8 4.5 3.9   BUN 13 15   < > 15   < > 19 26 H 18   Creatinine 1.1 1.1   < > 0.8   < > 1.1 1.1 1.0   Calcium 8.9 9.2   < > 8.6 L   < > 9.9 10.1 9.9   Magnesium 2.3 2.3  --  1.6  --   --   --   --     < > = values in this interval not displayed.       CARDIAC BIOMARKERS:  Recent Labs   Lab 11/06/23  1617 11/06/23  2026 11/07/23  0100   Troponin I 0.079 H 0.085 H 0.080 H       COAGS:  Recent Labs   Lab 11/07/23  0841 11/09/23  1510   INR 1.1  1.1 1.2       LIPIDS/LFTS:  Recent Labs   Lab 05/31/23  0928 11/06/23  1306 11/07/23  0841 11/08/23  2355 06/24/24  0900 07/11/24  0907 12/02/24  0920   Cholesterol 130  --  141  --   --   --  94 L   Triglycerides 151 H  --  101  --   --   --  70   HDL 44  --  39 L  --   --   --  39 L   LDL Cholesterol 55.8 L  --  81.8  --   --   --  41.0 L   Non-HDL Cholesterol 86  --  102  --   --   --  55   AST 10   < > 55 H   < > 13 15 14   ALT 8 L   < > 52 H   < > 10 9 L 9 L    < > = values in this interval not displayed.       Hemoglobin A1C   Date Value Ref Range Status   12/02/2024 6.1 (H) 4.0 - 5.6 % Final     Comment:     ADA Screening Guidelines:  5.7-6.4%  Consistent with prediabetes  >or=6.5%  Consistent with diabetes    High levels of fetal hemoglobin interfere with the HbA1C  assay. Heterozygous hemoglobin variants (HbS, HgC, etc)do  not significantly interfere with this assay.   However, presence of multiple variants may affect accuracy.     06/24/2024 6.4 (H) 4.0 - 5.6 % Final     Comment:     ADA Screening Guidelines:  5.7-6.4%  Consistent  with prediabetes  >or=6.5%  Consistent with diabetes    High levels of fetal hemoglobin interfere with the HbA1C  assay. Heterozygous hemoglobin variants (HbS, HgC, etc)do  not significantly interfere with this assay.   However, presence of multiple variants may affect accuracy.     11/07/2023 6.3 (H) 4.0 - 5.6 % Final     Comment:     ADA Screening Guidelines:  5.7-6.4%  Consistent with prediabetes  >or=6.5%  Consistent with diabetes    High levels of fetal hemoglobin interfere with the HbA1C  assay. Heterozygous hemoglobin variants (HbS, HgC, etc)do  not significantly interfere with this assay.   However, presence of multiple variants may affect accuracy.         TSH  Recent Labs   Lab 11/02/22  0913 05/31/23  0928 12/02/24  0920   TSH 1.833 1.555 1.470       The ASCVD Risk score (Charo LORENZO, et al., 2019) failed to calculate for the following reasons:    Risk score cannot be calculated because patient has a medical history suggesting prior/existing ASCVD       BNP    Lab Results   Component Value Date/Time    BNP 2,007 (H) 01/29/2024 02:34 PM    BNP 1,839 (H) 11/06/2023 01:06 PM            ECHO    Results for orders placed during the hospital encounter of 11/06/23    Echo    Interpretation Summary    Left Ventricle: The left ventricle is mildly dilated. Normal wall thickness. There is severely reduced systolic function with a visually estimated ejection fraction of 20 - 25%.    Right Ventricle: Normal right ventricular cavity size. Systolic function is normal.    Left Atrium: Left atrium is severely dilated.    Right Atrium: Right atrium is moderately dilated.    Mitral Valve: There is mild regurgitation.    Tricuspid Valve: There is mild to moderate regurgitation.    Pulmonary Artery: The estimated pulmonary artery systolic pressure is 49 mmHg.    IVC/SVC: Intermediate venous pressure at 8 mmHg.    Pericardium: There is a trivial effusion.      STRESS TEST    No results found for this or any previous  visit.        CATH    Results for orders placed during the hospital encounter of 11/06/23    Cardiac catheterization    Conclusion    No significant flow-limiting coronary artery stenosis.    The pre-procedure left ventricular end diastolic pressure was 25.    The estimated blood loss was <50 mL.    Angiogram:    Left main:  No significant stenosis    Lad:  Luminal irregularities    Circumflex:  Luminal irregularities    RCA:  Luminal irregularities    Right heart catheterization was performed    Right heart catheterization:    Pulmonary capillary wedge pressure:  24 mmHg    PA 52/25 with a mean of 37 mmHg    RV 50/10 with a RVEDP of 21 mmHg    RA 19 mmHg                    The procedure log was documented by Documenter: Matt Chambers, ALIDA; Dread Whiteside, ALIDA and verified by Asher Gonzalez MD.    Date: 11/29/2023  Time: 8:36 AM        ASSESSMENT AND PLAN     Patient Active Problem List   Diagnosis    Hypertension, essential, benign    Type 2 diabetes mellitus with microalbuminuria, without long-term current use of insulin    History of stroke with current residual effects    Macroalbuminuric diabetic nephropathy    Hyperlipidemia associated with type 2 diabetes mellitus    Iron deficiency anemia due to chronic blood loss    Multifocal atrial tachycardia    Chronic septic pulmonary embolism without acute cor pulmonale    VF (ventricular fibrillation)    CHF (congestive heart failure)    AICD (automatic cardioverter/defibrillator) present    Calculus of gallbladder without cholecystitis without obstruction    V-tach    Hemiplegia and hemiparesis following cerebral infarction affecting right non-dominant side    Personal history of nicotine dependence    Age-related osteoporosis without current pathological fracture    Nonischemic cardiomyopathy    Calcification of aorta    COPD         No orders of the defined types were placed in this encounter.      Nonischemic cardiomyopathy:  Continue current therapy.   Euvolemic on exam    Lasix as needed     AICD, [employer], single chamber.  SVT seen on device interrogation last time. Event monitor ordered.  No arrhythmias seen on event monitor    Dyslipidemia: Increase Lipitor to 80 mg daily  Lab Results   Component Value Date    LDLCALC 41.0 (L) 12/02/2024     History of cva. Residual left arm weakness    Calcification of aorta:  On Lipitor    History of pulmonary embolism: On Eliquis.    Follow-up in 6 m      Thank you very much for involving me in the care of your patient.  Please do not hesitate to contact me if there are any questions.      Asher Gonzalez MD, FACC, [facility]  Interventional Cardiologist, [facility].       Visit today included increased complexity associated with the care of the episodic problem hypertension, history of stroke, history of AFib status post AICD, history of nonischemic cardiomyopathy, calcification of aorta addressed and managing the longitudinal care of the patient due to the serious and/or complex managed problem(s)   Patient Active Problem List   Diagnosis    Hypertension, essential, benign    Type 2 diabetes mellitus with microalbuminuria, without long-term current use of insulin    History of stroke with current residual effects    Macroalbuminuric diabetic nephropathy    Hyperlipidemia associated with type 2 diabetes mellitus    Iron deficiency anemia due to chronic blood loss    Multifocal atrial tachycardia    Chronic septic pulmonary embolism without acute cor pulmonale    VF (ventricular fibrillation)    CHF (congestive heart failure)    AICD (automatic cardioverter/defibrillator) present    Calculus of gallbladder without cholecystitis without obstruction    V-tach    Hemiplegia and hemiparesis following cerebral infarction affecting right non-dominant side    Personal history of nicotine dependence    Age-related osteoporosis without current pathological fracture    Nonischemic cardiomyopathy    Calcification of aorta    COPD      .      This note was dictated with the help of speech recognition software.  There might be un-intended errors and/or substitutions.

## 2025-05-15 NOTE — PROGRESS NOTES
Patient is on [employer] report. Micro/Urine, Hg A1C,   Left message for patient.  HM and immunization's reviewed and updated.  Patient is due for Diabetic Eye Exam, Hg A1C.  Please help schedule or if already done please get information to get records.

## 2025-05-25 ENCOUNTER — CLINICAL SUPPORT (OUTPATIENT)
Dept: CARDIOLOGY | Facility: HOSPITAL | Age: 70
End: 2025-05-25
Payer: MEDICARE

## 2025-05-25 ENCOUNTER — HOSPITAL ENCOUNTER (OUTPATIENT)
Dept: CARDIOLOGY | Facility: HOSPITAL | Age: 70
Discharge: HOME | End: 2025-05-25
Attending: INTERNAL MEDICINE
Payer: MEDICARE

## 2025-05-25 DIAGNOSIS — Z95.810 PRESENCE OF AUTOMATIC (IMPLANTABLE) CARDIAC DEFIBRILLATOR: ICD-10-CM

## 2025-05-25 PROCEDURE — 93296 REM INTERROG EVL PM/IDS: CPT | Mod: HCNC | Performed by: INTERNAL MEDICINE

## 2025-05-25 PROCEDURE — 93295 DEV INTERROG REMOTE 1/2/MLT: CPT | Mod: HCNC,,, | Performed by: INTERNAL MEDICINE

## 2025-05-27 PROBLEM — Z86.79 HISTORY OF VENTRICULAR FIBRILLATION: Status: ACTIVE | Noted: 2023-11-06

## 2025-05-27 PROBLEM — I50.814: Status: ACTIVE | Noted: 2023-11-06

## 2025-05-27 PROBLEM — J43.9 COPD WITH EMPHYSEMA: Status: ACTIVE | Noted: 2024-02-27

## 2025-05-27 PROBLEM — E78.2 COMBINED HYPERLIPIDEMIA: Status: ACTIVE | Noted: 2019-06-03

## 2025-05-27 PROBLEM — Z86.79 HISTORY OF VENTRICULAR FIBRILLATION: Status: RESOLVED | Noted: 2023-11-06 | Resolved: 2025-05-27

## 2025-05-27 PROBLEM — I49.01 VENTRICULAR FIBRILLATION: Status: ACTIVE | Noted: 2025-05-27

## 2025-05-27 PROBLEM — Z86.79 HISTORY OF VENTRICULAR TACHYCARDIA: Status: ACTIVE | Noted: 2024-01-29

## 2025-05-27 PROBLEM — E11.21 MACROALBUMINURIC DIABETIC NEPHROPATHY: Status: RESOLVED | Noted: 2018-12-04 | Resolved: 2025-05-27

## 2025-05-27 PROBLEM — Z86.79 HISTORY OF VENTRICULAR TACHYCARDIA: Status: RESOLVED | Noted: 2024-01-29 | Resolved: 2025-05-27

## 2025-06-02 ENCOUNTER — OFFICE VISIT (OUTPATIENT)
Dept: FAMILY MEDICINE | Facility: CLINIC | Age: 70
End: 2025-06-02
Payer: MEDICARE

## 2025-06-02 ENCOUNTER — LAB VISIT (OUTPATIENT)
Dept: LAB | Facility: HOSPITAL | Age: 70
End: 2025-06-02
Attending: INTERNAL MEDICINE
Payer: MEDICARE

## 2025-06-02 ENCOUNTER — RESULTS FOLLOW-UP (OUTPATIENT)
Dept: FAMILY MEDICINE | Facility: CLINIC | Age: 70
End: 2025-06-02

## 2025-06-02 VITALS
TEMPERATURE: 98 F | OXYGEN SATURATION: 99 % | HEART RATE: 69 BPM | BODY MASS INDEX: 26.19 KG/M2 | DIASTOLIC BLOOD PRESSURE: 64 MMHG | WEIGHT: 152.56 LBS | SYSTOLIC BLOOD PRESSURE: 122 MMHG

## 2025-06-02 DIAGNOSIS — R80.9 TYPE 2 DIABETES MELLITUS WITH MICROALBUMINURIA, WITHOUT LONG-TERM CURRENT USE OF INSULIN: Primary | ICD-10-CM

## 2025-06-02 DIAGNOSIS — D50.0 IRON DEFICIENCY ANEMIA DUE TO CHRONIC BLOOD LOSS: ICD-10-CM

## 2025-06-02 DIAGNOSIS — E11.29 TYPE 2 DIABETES MELLITUS WITH MICROALBUMINURIA, WITHOUT LONG-TERM CURRENT USE OF INSULIN: ICD-10-CM

## 2025-06-02 DIAGNOSIS — I50.814 RIGHT-SIDED CONGESTIVE HEART FAILURE SECONDARY TO LEFT-SIDED CONGESTIVE HEART FAILURE: ICD-10-CM

## 2025-06-02 DIAGNOSIS — E78.2 COMBINED HYPERLIPIDEMIA: ICD-10-CM

## 2025-06-02 DIAGNOSIS — N17.9 AKI (ACUTE KIDNEY INJURY): Primary | ICD-10-CM

## 2025-06-02 DIAGNOSIS — R80.9 TYPE 2 DIABETES MELLITUS WITH MICROALBUMINURIA, WITHOUT LONG-TERM CURRENT USE OF INSULIN: ICD-10-CM

## 2025-06-02 DIAGNOSIS — E11.29 TYPE 2 DIABETES MELLITUS WITH MICROALBUMINURIA, WITHOUT LONG-TERM CURRENT USE OF INSULIN: Primary | ICD-10-CM

## 2025-06-02 LAB
ABSOLUTE EOSINOPHIL (OHS): 0.17 K/UL
ABSOLUTE MONOCYTE (OHS): 0.61 K/UL (ref 0.3–1)
ABSOLUTE NEUTROPHIL COUNT (OHS): 3.89 K/UL (ref 1.8–7.7)
ALBUMIN SERPL BCP-MCNC: 4.4 G/DL (ref 3.5–5.2)
ALP SERPL-CCNC: 44 UNIT/L (ref 40–150)
ALT SERPL W/O P-5'-P-CCNC: 11 UNIT/L (ref 10–44)
ANION GAP (OHS): 11 MMOL/L (ref 8–16)
AST SERPL-CCNC: 14 UNIT/L (ref 11–45)
BASOPHILS # BLD AUTO: 0.05 K/UL
BASOPHILS NFR BLD AUTO: 0.7 %
BILIRUB SERPL-MCNC: 0.4 MG/DL (ref 0.1–1)
BUN SERPL-MCNC: 27 MG/DL (ref 8–23)
CALCIUM SERPL-MCNC: 9.5 MG/DL (ref 8.7–10.5)
CHLORIDE SERPL-SCNC: 101 MMOL/L (ref 95–110)
CO2 SERPL-SCNC: 25 MMOL/L (ref 23–29)
CREAT SERPL-MCNC: 1.4 MG/DL (ref 0.5–1.4)
EAG (OHS): 131 MG/DL (ref 68–131)
ERYTHROCYTE [DISTWIDTH] IN BLOOD BY AUTOMATED COUNT: 13 % (ref 11.5–14.5)
FERRITIN SERPL-MCNC: 87.8 NG/ML (ref 20–300)
GFR SERPLBLD CREATININE-BSD FMLA CKD-EPI: 40 ML/MIN/1.73/M2
GLUCOSE SERPL-MCNC: 86 MG/DL (ref 70–110)
HBA1C MFR BLD: 6.2 % (ref 4–5.6)
HCT VFR BLD AUTO: 37.2 % (ref 37–48.5)
HGB BLD-MCNC: 11.4 GM/DL (ref 12–16)
IMM GRANULOCYTES # BLD AUTO: 0.03 K/UL (ref 0–0.04)
IMM GRANULOCYTES NFR BLD AUTO: 0.4 % (ref 0–0.5)
IRON SATN MFR SERPL: 22 % (ref 20–50)
IRON SERPL-MCNC: 69 UG/DL (ref 30–160)
LYMPHOCYTES # BLD AUTO: 2.33 K/UL (ref 1–4.8)
MCH RBC QN AUTO: 30.2 PG (ref 27–31)
MCHC RBC AUTO-ENTMCNC: 30.6 G/DL (ref 32–36)
MCV RBC AUTO: 98 FL (ref 82–98)
NUCLEATED RBC (/100WBC) (OHS): 0 /100 WBC
PLATELET # BLD AUTO: 418 K/UL (ref 150–450)
PMV BLD AUTO: 10.4 FL (ref 9.2–12.9)
POTASSIUM SERPL-SCNC: 4.7 MMOL/L (ref 3.5–5.1)
PROT SERPL-MCNC: 8.1 GM/DL (ref 6–8.4)
RBC # BLD AUTO: 3.78 M/UL (ref 4–5.4)
RELATIVE EOSINOPHIL (OHS): 2.4 %
RELATIVE LYMPHOCYTE (OHS): 32.9 % (ref 18–48)
RELATIVE MONOCYTE (OHS): 8.6 % (ref 4–15)
RELATIVE NEUTROPHIL (OHS): 55 % (ref 38–73)
SODIUM SERPL-SCNC: 137 MMOL/L (ref 136–145)
TIBC SERPL-MCNC: 312 UG/DL (ref 250–450)
TRANSFERRIN SERPL-MCNC: 211 MG/DL (ref 200–375)
WBC # BLD AUTO: 7.08 K/UL (ref 3.9–12.7)

## 2025-06-02 PROCEDURE — 1101F PT FALLS ASSESS-DOCD LE1/YR: CPT | Mod: CPTII,HCNC,S$GLB, | Performed by: INTERNAL MEDICINE

## 2025-06-02 PROCEDURE — 1126F AMNT PAIN NOTED NONE PRSNT: CPT | Mod: CPTII,HCNC,S$GLB, | Performed by: INTERNAL MEDICINE

## 2025-06-02 PROCEDURE — 3288F FALL RISK ASSESSMENT DOCD: CPT | Mod: CPTII,HCNC,S$GLB, | Performed by: INTERNAL MEDICINE

## 2025-06-02 PROCEDURE — 1159F MED LIST DOCD IN RCRD: CPT | Mod: CPTII,HCNC,S$GLB, | Performed by: INTERNAL MEDICINE

## 2025-06-02 PROCEDURE — 1160F RVW MEDS BY RX/DR IN RCRD: CPT | Mod: CPTII,HCNC,S$GLB, | Performed by: INTERNAL MEDICINE

## 2025-06-02 PROCEDURE — 85025 COMPLETE CBC W/AUTO DIFF WBC: CPT | Mod: HCNC

## 2025-06-02 PROCEDURE — 3074F SYST BP LT 130 MM HG: CPT | Mod: CPTII,HCNC,S$GLB, | Performed by: INTERNAL MEDICINE

## 2025-06-02 PROCEDURE — 99999 PR PBB SHADOW E&M-EST. PATIENT-LVL III: CPT | Mod: PBBFAC,HCNC,, | Performed by: INTERNAL MEDICINE

## 2025-06-02 PROCEDURE — 3078F DIAST BP <80 MM HG: CPT | Mod: CPTII,HCNC,S$GLB, | Performed by: INTERNAL MEDICINE

## 2025-06-02 PROCEDURE — 3008F BODY MASS INDEX DOCD: CPT | Mod: CPTII,HCNC,S$GLB, | Performed by: INTERNAL MEDICINE

## 2025-06-02 PROCEDURE — 36415 COLL VENOUS BLD VENIPUNCTURE: CPT | Mod: HCNC,PO

## 2025-06-02 PROCEDURE — 82728 ASSAY OF FERRITIN: CPT | Mod: HCNC

## 2025-06-02 PROCEDURE — 82040 ASSAY OF SERUM ALBUMIN: CPT | Mod: HCNC

## 2025-06-02 PROCEDURE — G2211 COMPLEX E/M VISIT ADD ON: HCPCS | Mod: HCNC,S$GLB,, | Performed by: INTERNAL MEDICINE

## 2025-06-02 PROCEDURE — 4010F ACE/ARB THERAPY RXD/TAKEN: CPT | Mod: CPTII,HCNC,S$GLB, | Performed by: INTERNAL MEDICINE

## 2025-06-02 PROCEDURE — 83540 ASSAY OF IRON: CPT | Mod: HCNC

## 2025-06-02 PROCEDURE — 83036 HEMOGLOBIN GLYCOSYLATED A1C: CPT | Mod: HCNC

## 2025-06-02 PROCEDURE — 99214 OFFICE O/P EST MOD 30 MIN: CPT | Mod: HCNC,S$GLB,, | Performed by: INTERNAL MEDICINE

## 2025-06-02 RX ORDER — ATORVASTATIN CALCIUM 80 MG/1
80 TABLET, FILM COATED ORAL DAILY
Qty: 90 TABLET | Refills: 3 | Status: SHIPPED | OUTPATIENT
Start: 2025-06-02

## 2025-06-02 RX ORDER — INSULIN PUMP SYRINGE, 3 ML
EACH MISCELLANEOUS
Qty: 1 EACH | Refills: 0 | Status: SHIPPED | OUTPATIENT
Start: 2025-06-02 | End: 2026-06-02

## 2025-06-02 RX ORDER — FUROSEMIDE 20 MG/1
TABLET ORAL
Qty: 180 TABLET | Refills: 1 | Status: SHIPPED | OUTPATIENT
Start: 2025-06-02

## 2025-06-02 RX ORDER — BLOOD-GLUCOSE CONTROL, NORMAL
1 EACH MISCELLANEOUS DAILY
Qty: 200 EACH | Refills: 3 | Status: SHIPPED | OUTPATIENT
Start: 2025-06-02 | End: 2026-06-02

## 2025-06-02 RX ORDER — VIT C/E/ZN/COPPR/LUTEIN/ZEAXAN 250MG-90MG
500 CAPSULE ORAL DAILY
COMMUNITY

## 2025-06-02 RX ORDER — METFORMIN HYDROCHLORIDE 1000 MG/1
1000 TABLET ORAL 2 TIMES DAILY
Qty: 180 TABLET | Refills: 1 | Status: SHIPPED | OUTPATIENT
Start: 2025-06-02

## 2025-06-13 LAB
OHS CV DC REMOTE DEVICE TYPE: NORMAL
OHS CV RV PACING PERCENT: 1 %

## 2025-06-25 DIAGNOSIS — Z78.0 MENOPAUSE: ICD-10-CM

## 2025-06-25 RX ORDER — ATORVASTATIN CALCIUM 40 MG/1
40 TABLET, FILM COATED ORAL NIGHTLY
Qty: 90 TABLET | Refills: 3 | Status: SHIPPED | OUTPATIENT
Start: 2025-06-25

## 2025-08-24 ENCOUNTER — HOSPITAL ENCOUNTER (OUTPATIENT)
Dept: CARDIOLOGY | Facility: HOSPITAL | Age: 70
Discharge: HOME | End: 2025-08-24
Attending: INTERNAL MEDICINE
Payer: MEDICARE

## 2025-08-24 ENCOUNTER — CLINICAL SUPPORT (OUTPATIENT)
Dept: CARDIOLOGY | Facility: HOSPITAL | Age: 70
End: 2025-08-24
Payer: MEDICARE

## 2025-08-24 DIAGNOSIS — Z95.810 PRESENCE OF AUTOMATIC (IMPLANTABLE) CARDIAC DEFIBRILLATOR: ICD-10-CM

## 2025-08-24 PROCEDURE — 93295 DEV INTERROG REMOTE 1/2/MLT: CPT | Mod: ,,, | Performed by: INTERNAL MEDICINE

## 2025-08-24 PROCEDURE — 93296 REM INTERROG EVL PM/IDS: CPT | Performed by: INTERNAL MEDICINE

## 2025-08-28 LAB
OHS CV DC REMOTE DEVICE TYPE: NORMAL
OHS CV RV PACING PERCENT: 1 %

## (undated) DEVICE — STAPLER SKIN ROTATING HEAD

## (undated) DEVICE — INTRODUCER OPTISEAL 7F 13CM

## (undated) DEVICE — KIT GLIDESHEATH SLEND 7FR 10CM

## (undated) DEVICE — KIT INTRODUCER MICROPUNCTR 4F

## (undated) DEVICE — KIT GLIDESHEATH SLEND 6FR 10CM

## (undated) DEVICE — SET CO-SET CLOSED INJ

## (undated) DEVICE — SLING SWATHE UNIVERSAL FOAM

## (undated) DEVICE — KIT PROBE COVER WITH GEL

## (undated) DEVICE — OMNIPAQUE 350 200ML

## (undated) DEVICE — SUT 2-0 VICRYL / SH (J417)

## (undated) DEVICE — PAD DEFIB CADENCE ADULT R2

## (undated) DEVICE — SUT SILK 0 SH 30IN BLK BR

## (undated) DEVICE — ANGIOTOUCH KIT

## (undated) DEVICE — CATH EMPULSE ANGLED 5FR PIGTAI

## (undated) DEVICE — CATH DXTERITY JL35 100CM 5FR

## (undated) DEVICE — HEMOSTAT VASC BAND REG 24CM

## (undated) DEVICE — SHEATH INTRO PINNACLE 7F 10CM

## (undated) DEVICE — WIRE GUIDE SAFE-T-J .035 260CM

## (undated) DEVICE — CABLE EXTENSION PACING 12 DISP

## (undated) DEVICE — KIT MANIFOLD LOW PRESS TUBING

## (undated) DEVICE — PACK PM MEADOWCREST CUSTOM

## (undated) DEVICE — TRANSDUCER ADULT DISP

## (undated) DEVICE — CATH DXTERITY JR40 100CM 5FR

## (undated) DEVICE — CATH PULMONARY WEDGE PRESS MON

## (undated) DEVICE — GUIDEWIRE TORQUE 3CM/180CML

## (undated) DEVICE — PACK CATH LAB

## (undated) DEVICE — KIT SYR REUSABLE